# Patient Record
Sex: FEMALE | Race: WHITE | NOT HISPANIC OR LATINO | Employment: FULL TIME | URBAN - METROPOLITAN AREA
[De-identification: names, ages, dates, MRNs, and addresses within clinical notes are randomized per-mention and may not be internally consistent; named-entity substitution may affect disease eponyms.]

---

## 2017-05-23 ENCOUNTER — TELEPHONE (OUTPATIENT)
Dept: BARIATRICS | Facility: CLINIC | Age: 42
End: 2017-05-23

## 2017-05-26 NOTE — TELEPHONE ENCOUNTER
Discussed options for surgery, balloon, and medical wt loss. Her ins doesn't cover anything.  She will decide and in the meantime I told her I'd send her info to [facility] to discuss pricing so this can help her with decisions.  She was appreciative of the call.

## 2017-08-28 ENCOUNTER — INITIAL CONSULT (OUTPATIENT)
Dept: BARIATRICS | Facility: CLINIC | Age: 42
End: 2017-08-28
Payer: COMMERCIAL

## 2017-08-28 VITALS
DIASTOLIC BLOOD PRESSURE: 70 MMHG | BODY MASS INDEX: 42.35 KG/M2 | HEART RATE: 86 BPM | HEIGHT: 65 IN | WEIGHT: 254.19 LBS | SYSTOLIC BLOOD PRESSURE: 126 MMHG

## 2017-08-28 DIAGNOSIS — E66.01 MORBID OBESITY WITH BMI OF 40.0-44.9, ADULT: ICD-10-CM

## 2017-08-28 DIAGNOSIS — R73.01 IMPAIRED FASTING BLOOD SUGAR: ICD-10-CM

## 2017-08-28 PROCEDURE — 99999 PR PBB SHADOW E&M-EST. PATIENT-LVL III: CPT | Mod: PBBFAC,,, | Performed by: INTERNAL MEDICINE

## 2017-08-28 PROCEDURE — 3008F BODY MASS INDEX DOCD: CPT | Mod: S$GLB,,, | Performed by: INTERNAL MEDICINE

## 2017-08-28 PROCEDURE — 99203 OFFICE O/P NEW LOW 30 MIN: CPT | Mod: S$GLB,,, | Performed by: INTERNAL MEDICINE

## 2017-08-28 RX ORDER — METHOTREXATE 2.5 MG/1
TABLET ORAL
Refills: 0 | COMMUNITY
Start: 2017-08-14 | End: 2018-09-11

## 2017-08-28 RX ORDER — DIETHYLPROPION HYDROCHLORIDE 75 MG/1
75 TABLET, EXTENDED RELEASE ORAL DAILY
Qty: 30 TABLET | Refills: 1 | Status: SHIPPED | OUTPATIENT
Start: 2017-08-28 | End: 2017-11-16

## 2017-08-28 RX ORDER — USTEKINUMAB 90 MG/ML
INJECTION, SOLUTION SUBCUTANEOUS
Refills: 0 | COMMUNITY
Start: 2017-06-13 | End: 2018-09-11

## 2017-08-28 NOTE — PROGRESS NOTES
Subjective:       Patient ID: Lachelle Engel is a 41 y.o. female.    Chief Complaint: Consult    Current attempts at weight loss: new pt to me with Patient Active Problem List:     Depression     Psoriasis     Impaired fasting blood sugar     GERD (gastroesophageal reflux disease)     Morbid obesity        No results found for: HGBA1C  Lab Results       Component                Value               Date                       LDLCALC                  89.0                07/17/2012                 CREATININE               0.7                 07/09/2012              Exercise 3 days a week . Has cut back on carbs.       Previous diet attempts:WW in past. Hard to track.     Heaviest weight: 254#    Lightest weight: 112#    Goal weight: 170#    History of medication for loss: No coverage for surgery. Phentermine- would lose a few lbs only.         Typical eating patterns: Is a teacher. Bar tends at night. Has 2 kids and boyfriend.   Breakfast: scrambled egg.     Lunch: Salad from [employer]. Weekends may skip or sushi.     Dinner: beans with meat and a salad. Pasta.     Snacks: denies    Beverages: Diet coke (2-3 /day), green tea- 0 usha or water.    Willingness to change: 8-9/10    EKG: Sinus bradycardia with sinus arrhythmia  Otherwise normal ECG  Since previous tracing Sinus arrhythmia Now present  Confirmed by fellow Jeffrey SEGURA (Unofficial Report), Mehdi ([id number]) on    BMR: 1820        Review of Systems   Constitutional: Negative for chills and fever.   Respiratory: Negative for shortness of breath.         + snores   Cardiovascular: Positive for leg swelling. Negative for chest pain.   Gastrointestinal: Negative for constipation and diarrhea.   Genitourinary: Negative for difficulty urinating and menstrual problem.        S/p ablation   Musculoskeletal: Negative for arthralgias and back pain.   Neurological: Negative for dizziness and light-headedness.   Psychiatric/Behavioral: Negative for dysphoric mood. The  "patient is not nervous/anxious.        Objective:     /70   Pulse 86   Ht 5' 5" (1.651 m)   Wt 115.3 kg (254 lb 3.1 oz)   BMI 42.30 kg/m²     Physical Exam   Constitutional: She is oriented to person, place, and time. She appears well-developed. No distress.   Morbidly obese     HENT:   Head: Normocephalic and atraumatic.   Eyes: EOM are normal. Pupils are equal, round, and reactive to light. No scleral icterus.   Neck: Normal range of motion. Neck supple. No thyromegaly present.   Cardiovascular: Normal rate and normal heart sounds.  Exam reveals no gallop and no friction rub.    No murmur heard.  Pulmonary/Chest: Effort normal and breath sounds normal. No respiratory distress. She has no wheezes.   Abdominal: Soft. Bowel sounds are normal. She exhibits no distension. There is no tenderness.   Musculoskeletal: Normal range of motion. She exhibits no edema.   Neurological: She is alert and oriented to person, place, and time. No cranial nerve deficit.   Skin: Skin is warm and dry. No erythema.   Psychiatric: She has a normal mood and affect. Her behavior is normal. Judgment normal.   Vitals reviewed.      Assessment:       1. Impaired fasting blood sugar    2. Morbid obesity with BMI of 40.0-44.9, adult        Plan:           1. Impaired fasting blood sugar  Discussed decreasing the risks of diabetes with changes in diet, routine exercise and losing weight.  Continue metformin.      2. Morbid obesity with BMI of 40.0-44.9, adult    Patient warned of common side effects of diethylpropion including anxiety, insomnia, palpitations and increased blood pressure. It was also explained that it is for short-term usage along with diet and exercise, and that stopping the medication without making lifestyle changes will result in regain of weight. Patient states understanding.    Weight loss medications are controlled substances.  They require routine follow up. Prescription or pills that are lost or destroyed will " not be replaced.       3 meals a day made up of the following:  Unlimited green vegetables, tomatoes, mushrooms, spaghetti squash, cauliflower, meat, poultry, seafood, eggs and hard cheeses.   Milk and plain yogurt  Dressings, seasonings, condiments, etc should have less than 2 g sugars.   beans or nuts can have 1 x a day.   1-2 servings of citrus fruits, berries, pineapple or melon a day (1/2 cup)  Avoid fried foods    No grains, rice, pasta, potatoes, bread, corn, peas, oatmeal, grits, tortillas, crackers, chips    No soda, sweet tea, juices or lemonade    [email or web address].[email or web address] for recipes.

## 2017-08-28 NOTE — PATIENT INSTRUCTIONS
Patient warned of common side effects of diethylpropion including anxiety, insomnia, palpitations and increased blood pressure. It was also explained that it is for short-term usage along with diet and exercise, and that stopping the medication without making lifestyle changes will result in regain of weight. Patient states understanding.    Weight loss medications are controlled substances.  They require routine follow up. Prescription or pills that are lost or destroyed will not be replaced.       3 meals a day made up of the following:  Unlimited green vegetables, tomatoes, mushrooms, spaghetti squash, cauliflower, meat, poultry, seafood, eggs and hard cheeses.   Milk and plain yogurt  Dressings, seasonings, condiments, etc should have less than 2 g sugars.   beans or nuts can have 1 x a day.   1-2 servings of citrus fruits, berries, pineapple or melon a day (1/2 cup)  Avoid fried foods    No grains, rice, pasta, potatoes, bread, corn, peas, oatmeal, grits, tortillas, crackers, chips    No soda, sweet tea, juices or lemonade    [email or web address].[email or web address] for recipes.    Meal Ideas for Regular Bariatric Diet  *Recipes and products available at www.bariatriceating.com      Breakfast: (15-20g protein)    - Egg white omelet: 2 egg whites or ½ cup Egg Beaters. (Optional proteins: cheese, shrimp, black beans, chicken, sliced turkey) (Optional veggies: tomatoes, salsa, spinach, mushrooms, onions, green peppers, or small slice avocado)     - Egg and sausage: 1 egg or ¼ cup Egg Beaters (any variety), with 1 nory or 2 links of Turkey sausage or Veggie breakfast sausage ("[employer] or Star.me)    - Crust-less breakfast quiche: To make a glass pie dish, mix 4oz part skim Ricotta, 1 cup skim milk, and 2 eggs as your base. Add protein: shredded cheese, sliced lean ham or turkey, turkey bal/sausage. Add veggies: tomato, onion, green onion, mushroom, green pepper, spinach, etc.    - Yogurt parfait: Mix 1 - 6oz container Dannon  Light N Fit vanilla yogurt, with ¼ cup Kashi Go Lean cereal    - Cottage cheese and fruit: ½ cup part-skim cottage cheese or ricotta cheese topped with fresh fruit or sugar free preserves     - Trixie Faria's Vanilla Egg custard* (add 2 Tbsp instant coffee granules to make Cappuccino Custard*)    - Hi-Protein café latte (skim milk, decaf coffee, 1 scoop protein powder). Optional to add Sugar free syrup or extract flavoring.    Lunch: (20-30g protein)    - ½ cup Black bean soup (Homemade or Progresso), with ¼ cup shredded low-fat cheese. Top with chopped tomato or fresh salsa.     - Lean deli turkey breast and low-fat sliced cheese, mustard or light andre to moisten, rolled up together, or wrapped in a Samuel lettuce leaf    - Chicken salad made from dinner leftovers, moisten with low-fat salad dressing or light andre. Also try leftover salmon, shrimp, tuna or boiled eggs. Serve ½ cup over dark green salad    - Fat-free canned refried beans, topped with ¼ cup shredded low-fat cheese. Top with chopped tomato or fresh salsa.     - Greek salad: Top mixed greens with 1-2oz grilled chicken, tomatoes, red onions, 2-3 kalamata olives, and sprinkle lightly with feta cheese. Spritz with Balsamic vinegar to taste.     - Crust-less lunch quiche: To make a glass pie dish, mix 4oz part skim Ricotta, 1 cup skim milk, and 2 eggs as your base. Add protein: shredded cheese, sliced lean ham or turkey, shrimp, chicken. Add veggies: tomato, onion, green onion, mushroom, green pepper, spinach, artichoke, broccoli, etc.    - Pizza bake: tomato sauce, low-fat shredded mozzarella and turkey pepperoni or [place] bal. Add any veggies.    - Cucumber crab bites: Spread ¼ cup crab dip (lump crabmeat + light cream cheese and green onions) over sliced cucumber.     - Chicken with light spinach and artichoke dip*: Puree in : 6oz cooked and drained spinach, 2 cloves garlic, 1 can cannelloni beans, ½ cup chopped green onions, 1 can  drained artichoke hearts (not marinated in oil), lemon juice and basil. Mix in 2oz chopped up chicken.    Supper: (20-30g protein)    - Serve grilled fish over dark green salad tossed with low-fat dressing, served with grilled asparagus colbert     - Rotisserie chicken salad: served with sliced strawberries, walnuts, fat-free feta cheese crumbles and 1 tbsp Kellys Own Light Raspberry [place] Vinaigrette    - Shrimp cocktail: Dip cold boiled shrimp in homemade low-sugar cocktail sauce (1/2 cup True One Carb ketchup, 2 tbsp horseradish, 1/4 tsp hot sauce, 1 tsp Worcestershire sauce, 1 tbsp freshly-squeezed lemon juice). Serve with dark green salad, walnuts, and crumbled blue cheese drizzled with olive oil and Balsamic vinegar    - Tuna Melt: Spread tuna salad onto 2 thick slices of tomato. Top with low-fat cheese and broil until cheese is melted. May also be made with chicken salad of shrimp salad. [place] with different types of cheeses.    - Homemade low-fat Chili using extra lean ground beef or ground turkey. Top with shredded cheese and salsa as desired. May add dollop fat-free sour cream if desired    - Dinner Omelet with shrimp or chicken and onion, green peppers and chives.    - No noodle lasagna: Use sliced zucchini or eggplant in place of noodles.  Layer with part skim ricotta cheese and low sugar meat sauce (use very lean ground beef or ground turkey).    - Mexican chicken bake: Bake chunks of chicken breast or thigh with taco seasoning, Pace brand enchilada sauce, green onions and low-fat cheese. Serve with ¼ cup black beans or fat free refried beans topped with chopped tomatoes or salsa.    - Linda frozen meatballs, simmered in Classico Marinara sauce. Different flavors of salsa or spaghetti sauce create different dishes! Sprinkle with parmesan cheese. Serve with grilled or steamed veggies, or a dark green salad.    - Simmer boneless skinless chicken thigh chunks in Classico Marinara sauce or  roasted salsa until tender with chopped onion, bell pepper, garlic, mushrooms, spinach, etc.     - Hamburger, without the bun, dressed the way you like. Served with grilled or steamed veggies.    - Eggplant parmesan: Bake slices of eggplant at 350 degrees for 15 minutes. Layer tomato sauce, sliced eggplant and low-fat mozzarella cheese in a baking dish and cover with foil. Bake 30-40 more minutes or until bubbly. Uncover and bake at 400 degrees for about 15 more minutes, or until top is slightly crisp.    - Fish tacos: grilled/baked white fish, wrapped in Samuel lettuce leaf, topped with salsa, shredded low-fat cheese, and light coleslaw.    Snacks: (100-200 calories; >5g protein)    - 1 low-fat cheese stick with 8 cherry tomatoes or 1 serving fresh fruit  - 4 thin slices fat-free turkey breast and 1 slice low-fat cheese  - 4 thin slices fat-free honey ham with wedge of melon  - 1/4 cup unsalted nuts with ½ cup fruit  - 6-oz container Dannon Light n Fit vanilla yogurt, topped with 1oz unsalted nuts         - apple, celery or baby carrots spread with 2 Tbsp natural peanut butter or almond butter   - apple slices with 1 oz slice low-fat cheese  - celery, cucumber, bell pepper or baby carrots dipped in ¼ cup hummus bean spread or light spinach and artichoke dip (*recipe in lunch section)  - 100 calorie bag microwave light popcorn with 3 tbsp grated parmesan cheese  - Yonny Links Beef Steak - 14g protein! (similar to beef jerky)  - 2 wedges [place] - Light Herb & Garlic Cheese with sliced cucumber or green bell pepper  - 1/2 cup low-fat cottage cheese with ¼ cup fruit or ¼ cup salsa  - RTD Protein drinks: Atkins, Low Carb Slim Fast, EAS light, Muscle Milk Light, etc.  - Homemade Protein drinks: [employer]NC Soy95, Isopure, Nectar, UNJURY, Whey Gourmet, etc. Mix 1 scoop powder with 8oz skim/1% milk or light soymilk.  - Protein bars: Atkins, EAS, Pure Protein, Think Thin, Detour, etc. Must have 0-4 grams sugar - Read the  label.    Takeout Options: No more than twice/week  Deli - Salads (no pasta or rice), meats, cheeses. Roasted chicken. Lox (salmon)    Mexican - Platters which don't include tortillas, chips, or rice. Go easy on the beans. Example: Fajitas without the tortillas. Ask the  not to bring chips to the table if they are too tempting.    Greek - Meat or fish and vegetable, but no bread or rice. Including hummus, baba ganoush, etc, is OK. Most sit-down Greek restaurants can provide you with cucumber slices for dipping instead of scottie bread.    Fast Food (Avoid as much as possible) - Salads (no croutons and limit salad dressing to 2 tbsp), grilled chicken sandwich without the bun and ask for no andre. Analilias low fat chili or Taco Bell pintos and cheese.    BBQ - The meats are fine if you ask for sauces on the side, but most of the traditional side dishes are loaded with carbs. Portillo slaw, baked beans and BBQ sauce are typically made with sugar.    Chinese - Nothing deep-fried, no rice or noodles. Many Chinese sauces have starch and sugar in them, so you'll have to use your judgement. If you find that these sauces trigger cravings, or cause Dumping, you can ask for the sauce to be made without sugar or just use soy sauce.      Fruits and Vegetables       Include 1-2 servings of fruit daily.      1 serving of fruit includes ½ cup unsweetened applesauce, ½ medium banana, tennis ball size piece of fruit, 17 grapes, 1 cup melon, 1 cup strawberries, ¼ cup dried fruit     Include 2-3 servings of vegetables daily. 1 serving is 1 cup raw or ½ cup cooked.     Non-starchy vegetables include artichoke, asparagus, baby corn, bamboo shoots, beans: green/Italian/wax, bean sprouts, beets, broccoli, [place] sprouts, cabbage, carrots, cauliflower, celery, cucumber, eggplant, green onions or scallions, greens, jicama, leeks, mushrooms, okra, onions, pea pods, peppers, radishes, spinach, summer squash, tomatoes and salsa, turnips,  vegetable juice cocktail, water chestnuts, zucchini

## 2017-11-15 ENCOUNTER — TELEPHONE (OUTPATIENT)
Dept: BARIATRICS | Facility: CLINIC | Age: 42
End: 2017-11-15

## 2017-11-16 ENCOUNTER — OFFICE VISIT (OUTPATIENT)
Dept: BARIATRICS | Facility: CLINIC | Age: 42
End: 2017-11-16
Payer: COMMERCIAL

## 2017-11-16 VITALS
BODY MASS INDEX: 40.52 KG/M2 | SYSTOLIC BLOOD PRESSURE: 110 MMHG | WEIGHT: 243.19 LBS | HEART RATE: 70 BPM | DIASTOLIC BLOOD PRESSURE: 70 MMHG | HEIGHT: 65 IN

## 2017-11-16 DIAGNOSIS — R73.01 IMPAIRED FASTING BLOOD SUGAR: ICD-10-CM

## 2017-11-16 DIAGNOSIS — E66.01 MORBID OBESITY WITH BMI OF 40.0-44.9, ADULT: Primary | ICD-10-CM

## 2017-11-16 PROCEDURE — 99999 PR PBB SHADOW E&M-EST. PATIENT-LVL III: CPT | Mod: PBBFAC,,, | Performed by: INTERNAL MEDICINE

## 2017-11-16 PROCEDURE — 99213 OFFICE O/P EST LOW 20 MIN: CPT | Mod: S$GLB,,, | Performed by: INTERNAL MEDICINE

## 2017-11-16 RX ORDER — TOPIRAMATE 50 MG/1
50 CAPSULE, EXTENDED RELEASE ORAL DAILY
Qty: 30 CAPSULE | Refills: 2 | Status: SHIPPED | OUTPATIENT
Start: 2017-11-16 | End: 2018-02-02 | Stop reason: SDUPTHER

## 2017-11-16 NOTE — PATIENT INSTRUCTIONS
Patient was informed that topiramate is used for migraine prevention and seizures. Weight loss is a common side effect that is well documented. She understands this. She was informed of the potential side effects such as serious and possibly fatal rash in which case the medication should be discontinued immediately. Paresthesias, forgetfulness, fatigue, kidney stones, GI symptoms, and changes in lab values such as electrolytes, blood counts and kidney function.    3 meals a day made up of the following:  Unlimited green vegetables, tomatoes, mushrooms, spaghetti squash, cauliflower, meat, poultry, seafood, eggs and hard cheeses.   Milk and plain yogurt  Dressings, seasonings, condiments, etc should have less than 2 g sugars.   beans or nuts can have 1 x a day.   1-2 servings of citrus fruits, berries, pineapple or melon a day (1/2 cup)  Avoid fried foods    No grains, rice, pasta, potatoes, bread, corn, peas, oatmeal, grits, tortillas, crackers, chips    No soda, sweet tea, juices or lemonade    Www.dietdoctor.[employer] for recipes.    Helpful tips to survive the holidays:  - Dont save yourself for the meal: Make sure you continue to eat high protein small meals every 3-4 hours to ensure to do not become over-hungry. Avoid temptation by not showing up to a holiday party or gathering hungry.   - Plan ahead. Bring a dish to a party if you know there may not be an appropriate option.   - Choose sugar-free drinks: Stick to water or other sugar-free beverages and make sure you are getting 6-8 cups of fluid each day (but not with meals!). Avoid alcohol, carbonated beverages, and high-fat/high-sugar beverages like hot chocolate and eggnog. Try sugar-free hot cocoa made with skim milk or water, or sugar-free spiced tea to add some holiday flair to your beverage (see sugar-free mulled cider recipe below)  - Take your time: Eat mindfully. Dont graze on food throughout the day. Sit down to enjoy your small meals. Chew slowly and  thoroughly. Cut your food into small pieces before eating.  - Listen to your body: Stop eating as soon as you feel full. Do not feel pressured to try certain (or all) foods or to eat all of the food on your plate. Listen to your hunger cues.   - REMEMBER: Make your holidays about spending time with family and friends instead of focusing gatherings around food.  - Keep up your exercise routine: Make sure you continue to get regular exercise throughout the holiday season. Encourage friends and family to be active by taking a walk together after a meal, to look at holiday lights, or to window-shop.    Good Holiday Meal Options:  - Roasted Turkey, NO skin. Use low sodium broth instead of gravy.   - Stuffed Bell Peppers made WITHOUT bread crumbs or Rice. Try using parmesan cheese instead  - Gumbo, NO rice. Try picking out mostly the meat/seafood and vegetables with little broth.   - Green Bean Casserole made with 98% fat free cream of mushroom soup and crushed almonds/pecans instead of fried onions  - Side salad w/ low fat dressing. Try a different kind of salad maybe use Kale or spinach.   - Roasted non-starchy vegetables like brussel sprouts, broccoli, green beans, zucchini, butternut squash, cauliflower  - Cauliflower Mash (steam or roast cauliflower, puree w/ low fat cheese, dash of fat free milk and 2-3 sprays of I cant believe its not butter spray. Add garlic powder and black pepper to season). Use Low sodium broth instead of gravy.   - Try Loaded Cauliflower Mash (Make cauliflower like above cauliflower mash. Top with diced turkey bal, ¼ cup low fat cheddar cheese and bake @ 350* F for 5-10 minutes, until cheese is melted. Top with minced chives, black pepper and garlic to taste).   - Homemade cranberry sauce using Splenda or another alternative sweetener. Boil fresh cranberries and add splenda to taste. Boil until cranberries break open and then simmer until it reaches the consistency you want (less time for  more watery sauce and simmer for longer to create a thicker sauce).   - Deviled eggs: make using low fat andre, mustard, DILL relish (not sweet relish).   - Vegetable tray w/ Greek yogurt Ranch Dip. Mix 1 packet of [place] ranch dip mix w/ 16 oz low fat plain greek yogurt.     Good Holiday Dessert Options:  - High protein Pumpkin Cheesecake (see recipe below)  - Pumpkin Whip (see recipe below)  - Quest Apple Pie or Cinnamon Roll flavored protein bar (warm in microwave for 10-15 seconds)  - Eggnog Protein shake (see recipe below)  - Fresh fruit w/ low fat cheese  - Sugar-free Jello Parfaits. Layer Red and Green sugar-free jello in cups and top w/ 2 tbsp Sugar-free cool-whip    Pumpkin Cheesecake    8 ounces fat free cream cheese, softened   2 scoops of vanilla protein powder (<4 g sugar per serving)   ¼ tsp Fine salt   2 eggs, at room temperature   1/3 cup fat free sour cream  1/3 cup fat free half and half  1 15 -ounce can pure pumpkin puree   1 tablespoon pumpkin pie spice, plus more for dusting   Unsalted nuts, crushed  *Add splenda to taste    Directions     1. Preheat the oven to 300 degrees F. Line 18 muffin cups with paper liners. Sprinkle 1 tsp crushed unsalted nuts at the bottom of each of muffin cup liner.     2. In a large bowl, beat the cream cheese, vanilla protein powder and 1/4 teaspoon fine salt on medium-high speed until smooth and creamy, 2 to 3 minutes. Scrape down the sides, reduce speed to low and beat in the eggs, 1 at a time, until combined. Beat in 1/3 cup fat free sour cream and fat free half and half. Stir in the pumpkin puree and pumpkin pie spice until smooth. Divide evenly among cookie-lined paper cups, filling almost all the way to the top.     3. Bake until the filling is just set, 40 to 45 minutes. A sharp knife inserted into the center will come out moist, but clean. Cool completely in tins on a wire rack. Refrigerate until cold, 4 hours, or overnight. Top with a dusting of  pumpkin pie spice.    Recipe altered from the following recipe: http://www.[employer]/recipes/food-network-vinnie/mini-pumpkin-cheesecakes-recipe.print.html?oc=chinmay    Pumpkin Whip    Box of sugar-free vanilla pudding  Can of pumpkin puree  Pumpkin Pie spice (sprinkle to taste)  ½ cup of sugar-free Cool Whip    Directions:  Make sugar-free pudding according to package directions using fat free or 1% milk. Stir in pumpkin and cool whip. Add pumpkin pie spice to taste.     Egg Nog Protein shake    8 oz skim or 1% milk  1 scoop vanilla protein powder  1 tbsp sugar-free vanilla pudding mix  ½ tsp butter flavor extract  ½ tsp rum extract  ½ tsp cinnamon     Shake together or blend with ice and serve.     Sugar-Free Mulled Cider    3 oz diet cran-apple juice  6 oz water  1 packet sugar-free apple cider mix  ½ tsp apple pie spice  ½ tsp butter flavor extract  1 tbsp Sugar-free Syrup    Mix together. Warm if needed and serve w/ orange wedge and cinnamon stick.     Eating well to be healthy and lose weight does not have to be hard. It also does not have to be time consuming or expensive. There a lots of ways you can work in healthy choices into your day. Many of these are easy, quick and even family friendly!    Homemade hazelnut au lait  Brew your favorite brand of hazelnut flavored coffee ([employer] makes a good one). Microwave 1/2 cup of milk that fits your eating plan (whole, skim or sugar-free almond milk can all work). Add half to 1 oz sugar free hazelnut syrup.     Quick and easy breakfast  1-2 boiled eggs or mini-frittatas with a tangerine. The boiled eggs and mini-frittatas can both be made ahead and last for up to 4 days in the refrigerator. Bonus if you portion them out in ready to go containers or zipper bags.     Breakfast Egg Muffins with Mccarthy and Spinach  Makes 12 muffins  Ingredients    6 eggs  ¼ cup milk  ¼ teaspoon salt  2 cups grated cheddar cheese  3/4 cup spinach, cooked and drained (about 8  oz fresh spinach)  6 bal slices, cooked, drained of fat, and chopped  1/2 cup grated Parmesan cheese (optional)    Instructions      Preheat oven to 350 degrees. Use a regular 12-cup muffin pan. Spray the muffin pan with non-stick cooking spray.  In a large bowl, beat eggs until smooth. Add milk, salt, Cheddar cheese and mix. Stir spinach, cooked bal into the egg mixture. Ladle the egg mixture into greased muffin cups ¾ full.  Top each muffin cup with grated Parmesan cheese.  Bake for 25 minutes. Remove from the oven, let the muffins cool for 30 minutes before removing them from the pan.      Be a brown bagger! When you make dinner, plan for an extra helping. When you serve your plate for dinner, serve an additional helping into a container that you can take with you the next day. If you don't have a refrigerator available during the day, an insulated lunch bag and ice packs will help you safely store you lunch.     Cold Brewed Iced tea. Fill a pitcher with 64 oz filtered water. Add either 4 regular tea bags of your choice or a large iced tea bag. Refrigerate over night then remove the tea bags. The tea will not be bitter and is super flavorful. Get creative! Try combinations like green tea and hibiscus tea or black tea with lemon zinger. Add orange or lemon slices for even more flavor.     Snack wisely. Protein filled snacks will fill you up, allowing you to get by with fewer calories. String cheese, pork skins (chicharrones), turkey pepperoni, or celery with cream cheese will all fit the bill.       Ditch the take out. Turkey tacos (with or without a low carb tortilla), burgers (without the bun), or fun stir fries are all quick and easy. The whole family will be happy, and you can save calories and money.      Orange Chicken Stir burton with asparagus   Makes 6 servings  Ingredients:    1.5 lbs boneless skinless chicken breast/tenders, diced into 1-inch pieces  1 Tbsp extra virgin olive or avocado oil, divided  2  lb asparagus, end portions trimmed and remainder diced into 1 1/2-inch pieces  1 small yellow onion, sliced into thin strips  8 oz button mushrooms, sliced  1 Tbsp peeled and finely grated fresh yoli  4 cloves garlic, minced  1/2 cup low-sodium chicken broth  Juice of 2 fresh oranges  2 Tbsp low sodium soy sauce  2 Tbsp cornstarch  Sea salt and freshly ground black pepper    Directions:    In a 12-inch non-stick wok, heat 1/2 oil over moderately high heat. Once oil is hot, add diced chicken and season lightly with salt and pepper. Sauté until cooked through, tossing occasionally, about 5-6 minutes.  Place chicken on a large plate and set aside. Return wok, reduce to medium-high heat, add remaining oil.  Once oil is hot, add asparagus, yellow onion and mushrooms, and sauté until tender-crisp, about 4 - 5 minutes, adding in garlic and yoli during the last 1 minute of sautéing.  Meanwhile, in a mixing bowl whisk together chicken broth, orange juice, soy sauce and cornstarch until well blended.  Pour chicken broth mixture into skillet with veggies, season with salt and pepper to taste, and bring mixture to a light boil, stirring constantly. Allow mixture to gently boil, stirring constantly, until thickened, about 1 minute.  Toss chicken into mixture and serve immediately over cauliflower rice or Shirataki noodles.      Skinny Chicken Tortilla Soup  Makes 7 servings    2 teaspoons olive oil  1 cup onion, chopped (about 1 small)  2 cups celery, sliced (about 4 medium stalks)  4 garlic cloves, minced  4 medium tomatoes, chopped  2 cups water  4 cups low-sodium organic chicken broth  3 cups chopped and/or shredded rotisserie chicken, skinless  2 cups sliced carrots (about 3 medium)  1 teaspoon dried oregano leaves  2 teaspoons chili powder  1 teaspoon garlic powder  2 teaspoons cumin  ½ teaspoon cayenne pepper (add less or omit, if you don't want a spicy soup)  ½ teaspoon sea salt + more to taste  ½ teaspoon pepper +  more to taste    Directions:   Put all ingredients into a large crock pot. Cook on low for 5-6 hours.     Optional garnish with chopped avocado, chopped fresh cilantro, crumbled Cotija cheese, sour cream, Greek yogurt, your favorite hot sauce.           Vegan Avocado Banana Chocolate Pudding  Makes 4 servings  Ingredients    1 1/2 ripe avocados  2 ripe bananas  6 tbsp raw cacao powder or unsweetened cocoa powder  2-3 tbsp maple syrup (or calorie free sweetener)  1/4 cup almond milk  Instructions    Blend everything together in a  until the consistency is smooth and velvety. Taste and see if more sweetener is needed and stir to make sure everything is evenly mixed. Blend a second time if needed.  Top with banana slices, raw cacao nibs, almond butter, or any other toppings and enjoy!

## 2017-11-24 ENCOUNTER — TELEPHONE (OUTPATIENT)
Dept: BARIATRICS | Facility: CLINIC | Age: 42
End: 2017-11-24

## 2017-11-24 NOTE — TELEPHONE ENCOUNTER
----- Message from Jessica De La Cruz sent at 11/24/2017  8:28 AM CST -----  [phone]//pt states that she needs to speak with nurse in ref to her meds causing her to be sleepy and needs to know what can be done//please call//thank you

## 2017-11-24 NOTE — TELEPHONE ENCOUNTER
Patient said that is her only side effect she is feeling which is tired. She has no entergy. She only been taking it a week. She do not know if she is giving it enough time.

## 2017-11-26 ENCOUNTER — ANESTHESIA EVENT (OUTPATIENT)
Dept: SURGERY | Facility: HOSPITAL | Age: 42
End: 2017-11-26
Payer: COMMERCIAL

## 2017-11-26 ENCOUNTER — ANESTHESIA (OUTPATIENT)
Dept: SURGERY | Facility: HOSPITAL | Age: 42
End: 2017-11-26
Payer: COMMERCIAL

## 2017-11-26 ENCOUNTER — HOSPITAL ENCOUNTER (OUTPATIENT)
Facility: HOSPITAL | Age: 42
Discharge: HOME | End: 2017-11-27
Attending: EMERGENCY MEDICINE | Admitting: SURGERY
Payer: COMMERCIAL

## 2017-11-26 DIAGNOSIS — K35.80 ACUTE APPENDICITIS, UNSPECIFIED ACUTE APPENDICITIS TYPE: Primary | ICD-10-CM

## 2017-11-26 DIAGNOSIS — K35.80 ACUTE APPENDICITIS: ICD-10-CM

## 2017-11-26 LAB
ALBUMIN SERPL BCP-MCNC: 3.8 G/DL
ALP SERPL-CCNC: 83 U/L
ALT SERPL W/O P-5'-P-CCNC: 38 U/L
ANION GAP SERPL CALC-SCNC: 7 MMOL/L
AST SERPL-CCNC: 26 U/L
B-HCG UR QL: NEGATIVE
BACTERIA #/AREA URNS AUTO: NORMAL /HPF
BASOPHILS # BLD AUTO: 0.01 K/UL
BASOPHILS NFR BLD: 0.1 %
BILIRUB SERPL-MCNC: 0.4 MG/DL
BILIRUB UR QL STRIP: NEGATIVE
BUN SERPL-MCNC: 11 MG/DL
CALCIUM SERPL-MCNC: 8.8 MG/DL
CHLORIDE SERPL-SCNC: 112 MMOL/L
CLARITY UR REFRACT.AUTO: ABNORMAL
CO2 SERPL-SCNC: 20 MMOL/L
COLOR UR AUTO: YELLOW
CREAT SERPL-MCNC: 0.9 MG/DL
CTP QC/QA: YES
DIFFERENTIAL METHOD: ABNORMAL
EOSINOPHIL # BLD AUTO: 0 K/UL
EOSINOPHIL NFR BLD: 0 %
ERYTHROCYTE [DISTWIDTH] IN BLOOD BY AUTOMATED COUNT: 13.2 %
EST. GFR  (AFRICAN AMERICAN): >60 ML/MIN/1.73 M^2
EST. GFR  (NON AFRICAN AMERICAN): >60 ML/MIN/1.73 M^2
GLUCOSE SERPL-MCNC: 83 MG/DL
GLUCOSE UR QL STRIP: NEGATIVE
HCT VFR BLD AUTO: 41.6 %
HGB BLD-MCNC: 14.4 G/DL
HGB UR QL STRIP: ABNORMAL
IMM GRANULOCYTES # BLD AUTO: 0.06 K/UL
IMM GRANULOCYTES NFR BLD AUTO: 0.6 %
KETONES UR QL STRIP: NEGATIVE
LEUKOCYTE ESTERASE UR QL STRIP: NEGATIVE
LIPASE SERPL-CCNC: 7 U/L
LYMPHOCYTES # BLD AUTO: 1.3 K/UL
LYMPHOCYTES NFR BLD: 12.5 %
MCH RBC QN AUTO: 31.1 PG
MCHC RBC AUTO-ENTMCNC: 34.6 G/DL
MCV RBC AUTO: 90 FL
MICROSCOPIC COMMENT: NORMAL
MONOCYTES # BLD AUTO: 0.4 K/UL
MONOCYTES NFR BLD: 3.7 %
NEUTROPHILS # BLD AUTO: 8.7 K/UL
NEUTROPHILS NFR BLD: 83.1 %
NITRITE UR QL STRIP: NEGATIVE
NRBC BLD-RTO: 0 /100 WBC
PH UR STRIP: 5 [PH] (ref 5–8)
PLATELET # BLD AUTO: 193 K/UL
PMV BLD AUTO: 11.3 FL
POTASSIUM SERPL-SCNC: 4 MMOL/L
PROT SERPL-MCNC: 6.6 G/DL
PROT UR QL STRIP: NEGATIVE
RBC # BLD AUTO: 4.63 M/UL
RBC #/AREA URNS AUTO: 2 /HPF (ref 0–4)
SODIUM SERPL-SCNC: 139 MMOL/L
SP GR UR STRIP: 1.01 (ref 1–1.03)
SQUAMOUS #/AREA URNS AUTO: 6 /HPF
URN SPEC COLLECT METH UR: ABNORMAL
UROBILINOGEN UR STRIP-ACNC: NEGATIVE EU/DL
WBC # BLD AUTO: 10.51 K/UL
WBC #/AREA URNS AUTO: 0 /HPF (ref 0–5)

## 2017-11-26 PROCEDURE — G0378 HOSPITAL OBSERVATION PER HR: HCPCS

## 2017-11-26 PROCEDURE — 25000003 PHARM REV CODE 250: Performed by: SURGERY

## 2017-11-26 PROCEDURE — 88304 TISSUE EXAM BY PATHOLOGIST: CPT | Mod: 26,,, | Performed by: PATHOLOGY

## 2017-11-26 PROCEDURE — S0030 INJECTION, METRONIDAZOLE: HCPCS | Performed by: SURGERY

## 2017-11-26 PROCEDURE — 25000003 PHARM REV CODE 250: Performed by: NURSE ANESTHETIST, CERTIFIED REGISTERED

## 2017-11-26 PROCEDURE — 63600175 PHARM REV CODE 636 W HCPCS: Performed by: SURGERY

## 2017-11-26 PROCEDURE — 25000003 PHARM REV CODE 250: Performed by: PHYSICIAN ASSISTANT

## 2017-11-26 PROCEDURE — 27201423 OPTIME MED/SURG SUP & DEVICES STERILE SUPPLY: Performed by: SURGERY

## 2017-11-26 PROCEDURE — 44970 LAPAROSCOPY APPENDECTOMY: CPT | Mod: ,,, | Performed by: SURGERY

## 2017-11-26 PROCEDURE — 63600175 PHARM REV CODE 636 W HCPCS: Performed by: NURSE ANESTHETIST, CERTIFIED REGISTERED

## 2017-11-26 PROCEDURE — D9220A PRA ANESTHESIA: Mod: CRNA,,, | Performed by: NURSE ANESTHETIST, CERTIFIED REGISTERED

## 2017-11-26 PROCEDURE — 37000008 HC ANESTHESIA 1ST 15 MINUTES: Performed by: SURGERY

## 2017-11-26 PROCEDURE — 81001 URINALYSIS AUTO W/SCOPE: CPT

## 2017-11-26 PROCEDURE — 36000709 HC OR TIME LEV III EA ADD 15 MIN: Performed by: SURGERY

## 2017-11-26 PROCEDURE — 94761 N-INVAS EAR/PLS OXIMETRY MLT: CPT

## 2017-11-26 PROCEDURE — 85025 COMPLETE CBC W/AUTO DIFF WBC: CPT

## 2017-11-26 PROCEDURE — 63600175 PHARM REV CODE 636 W HCPCS: Performed by: PHYSICIAN ASSISTANT

## 2017-11-26 PROCEDURE — 36000708 HC OR TIME LEV III 1ST 15 MIN: Performed by: SURGERY

## 2017-11-26 PROCEDURE — D9220A PRA ANESTHESIA: Mod: ANES,,, | Performed by: ANESTHESIOLOGY

## 2017-11-26 PROCEDURE — 80053 COMPREHEN METABOLIC PANEL: CPT

## 2017-11-26 PROCEDURE — 88304 TISSUE EXAM BY PATHOLOGIST: CPT | Performed by: PATHOLOGY

## 2017-11-26 PROCEDURE — 96375 TX/PRO/DX INJ NEW DRUG ADDON: CPT

## 2017-11-26 PROCEDURE — 99285 EMERGENCY DEPT VISIT HI MDM: CPT | Mod: ,,, | Performed by: PHYSICIAN ASSISTANT

## 2017-11-26 PROCEDURE — 81025 URINE PREGNANCY TEST: CPT | Performed by: EMERGENCY MEDICINE

## 2017-11-26 PROCEDURE — 37000009 HC ANESTHESIA EA ADD 15 MINS: Performed by: SURGERY

## 2017-11-26 PROCEDURE — 99285 EMERGENCY DEPT VISIT HI MDM: CPT | Mod: 25

## 2017-11-26 PROCEDURE — 25500020 PHARM REV CODE 255: Performed by: EMERGENCY MEDICINE

## 2017-11-26 PROCEDURE — 96374 THER/PROPH/DIAG INJ IV PUSH: CPT

## 2017-11-26 PROCEDURE — 83690 ASSAY OF LIPASE: CPT

## 2017-11-26 PROCEDURE — 71000039 HC RECOVERY, EACH ADD'L HOUR: Performed by: SURGERY

## 2017-11-26 PROCEDURE — 96361 HYDRATE IV INFUSION ADD-ON: CPT

## 2017-11-26 PROCEDURE — 71000033 HC RECOVERY, INTIAL HOUR: Performed by: SURGERY

## 2017-11-26 RX ORDER — NEOSTIGMINE METHYLSULFATE 1 MG/ML
INJECTION, SOLUTION INTRAVENOUS
Status: DISCONTINUED | OUTPATIENT
Start: 2017-11-26 | End: 2017-11-26

## 2017-11-26 RX ORDER — ONDANSETRON 8 MG/1
8 TABLET, ORALLY DISINTEGRATING ORAL EVERY 8 HOURS PRN
Status: DISCONTINUED | OUTPATIENT
Start: 2017-11-26 | End: 2017-11-27 | Stop reason: HOSPADM

## 2017-11-26 RX ORDER — RAMELTEON 8 MG/1
8 TABLET ORAL NIGHTLY PRN
Status: DISCONTINUED | OUTPATIENT
Start: 2017-11-26 | End: 2017-11-27 | Stop reason: HOSPADM

## 2017-11-26 RX ORDER — ONDANSETRON 2 MG/ML
INJECTION INTRAMUSCULAR; INTRAVENOUS
Status: DISCONTINUED | OUTPATIENT
Start: 2017-11-26 | End: 2017-11-26

## 2017-11-26 RX ORDER — ACETAMINOPHEN 325 MG/1
650 TABLET ORAL EVERY 8 HOURS PRN
Status: DISCONTINUED | OUTPATIENT
Start: 2017-11-26 | End: 2017-11-27 | Stop reason: HOSPADM

## 2017-11-26 RX ORDER — DIPHENHYDRAMINE HYDROCHLORIDE 50 MG/ML
25 INJECTION INTRAMUSCULAR; INTRAVENOUS EVERY 4 HOURS PRN
Status: DISCONTINUED | OUTPATIENT
Start: 2017-11-26 | End: 2017-11-27 | Stop reason: HOSPADM

## 2017-11-26 RX ORDER — SODIUM CHLORIDE 9 MG/ML
INJECTION, SOLUTION INTRAVENOUS CONTINUOUS
Status: DISCONTINUED | OUTPATIENT
Start: 2017-11-26 | End: 2017-11-27

## 2017-11-26 RX ORDER — LEVOTHYROXINE SODIUM 100 UG/1
100 TABLET ORAL DAILY
Status: DISCONTINUED | OUTPATIENT
Start: 2017-11-27 | End: 2017-11-27 | Stop reason: HOSPADM

## 2017-11-26 RX ORDER — OXYCODONE AND ACETAMINOPHEN 5; 325 MG/1; MG/1
1 TABLET ORAL EVERY 4 HOURS PRN
Qty: 31 TABLET | Refills: 0 | Status: SHIPPED | OUTPATIENT
Start: 2017-11-26 | End: 2017-11-27

## 2017-11-26 RX ORDER — MIDAZOLAM HYDROCHLORIDE 1 MG/ML
INJECTION, SOLUTION INTRAMUSCULAR; INTRAVENOUS
Status: DISCONTINUED | OUTPATIENT
Start: 2017-11-26 | End: 2017-11-26

## 2017-11-26 RX ORDER — PROPOFOL 10 MG/ML
VIAL (ML) INTRAVENOUS
Status: DISCONTINUED | OUTPATIENT
Start: 2017-11-26 | End: 2017-11-26

## 2017-11-26 RX ORDER — ONDANSETRON 2 MG/ML
4 INJECTION INTRAMUSCULAR; INTRAVENOUS
Status: COMPLETED | OUTPATIENT
Start: 2017-11-26 | End: 2017-11-26

## 2017-11-26 RX ORDER — OXYCODONE AND ACETAMINOPHEN 5; 325 MG/1; MG/1
1 TABLET ORAL EVERY 4 HOURS PRN
Status: DISCONTINUED | OUTPATIENT
Start: 2017-11-26 | End: 2017-11-27 | Stop reason: HOSPADM

## 2017-11-26 RX ORDER — METOCLOPRAMIDE HYDROCHLORIDE 5 MG/ML
10 INJECTION INTRAMUSCULAR; INTRAVENOUS EVERY 10 MIN PRN
Status: DISCONTINUED | OUTPATIENT
Start: 2017-11-26 | End: 2017-11-26 | Stop reason: HOSPADM

## 2017-11-26 RX ORDER — HYDROMORPHONE HYDROCHLORIDE 1 MG/ML
0.2 INJECTION, SOLUTION INTRAMUSCULAR; INTRAVENOUS; SUBCUTANEOUS EVERY 5 MIN PRN
Status: DISCONTINUED | OUTPATIENT
Start: 2017-11-26 | End: 2017-11-26 | Stop reason: HOSPADM

## 2017-11-26 RX ORDER — OXYCODONE AND ACETAMINOPHEN 10; 325 MG/1; MG/1
1 TABLET ORAL EVERY 4 HOURS PRN
Status: DISCONTINUED | OUTPATIENT
Start: 2017-11-26 | End: 2017-11-27 | Stop reason: HOSPADM

## 2017-11-26 RX ORDER — METRONIDAZOLE 500 MG/100ML
500 INJECTION, SOLUTION INTRAVENOUS
Status: DISCONTINUED | OUTPATIENT
Start: 2017-11-26 | End: 2017-11-26

## 2017-11-26 RX ORDER — SODIUM CHLORIDE 0.9 % (FLUSH) 0.9 %
3 SYRINGE (ML) INJECTION
Status: DISCONTINUED | OUTPATIENT
Start: 2017-11-26 | End: 2017-11-26 | Stop reason: HOSPADM

## 2017-11-26 RX ORDER — FENTANYL CITRATE 50 UG/ML
INJECTION, SOLUTION INTRAMUSCULAR; INTRAVENOUS
Status: DISCONTINUED | OUTPATIENT
Start: 2017-11-26 | End: 2017-11-26

## 2017-11-26 RX ORDER — DEXTROSE MONOHYDRATE, SODIUM CHLORIDE, AND POTASSIUM CHLORIDE 50; 1.49; 4.5 G/1000ML; G/1000ML; G/1000ML
INJECTION, SOLUTION INTRAVENOUS CONTINUOUS
Status: DISCONTINUED | OUTPATIENT
Start: 2017-11-26 | End: 2017-11-27

## 2017-11-26 RX ORDER — BUPIVACAINE HYDROCHLORIDE 2.5 MG/ML
INJECTION, SOLUTION EPIDURAL; INFILTRATION; INTRACAUDAL
Status: DISCONTINUED | OUTPATIENT
Start: 2017-11-26 | End: 2017-11-26

## 2017-11-26 RX ORDER — AMOXICILLIN 250 MG
1 CAPSULE ORAL DAILY
COMMUNITY
Start: 2017-11-26 | End: 2018-09-11

## 2017-11-26 RX ORDER — MORPHINE SULFATE 4 MG/ML
4 INJECTION, SOLUTION INTRAMUSCULAR; INTRAVENOUS
Status: COMPLETED | OUTPATIENT
Start: 2017-11-26 | End: 2017-11-26

## 2017-11-26 RX ORDER — LIDOCAINE HYDROCHLORIDE 10 MG/ML
INJECTION, SOLUTION EPIDURAL; INFILTRATION; INTRACAUDAL; PERINEURAL
Status: DISCONTINUED | OUTPATIENT
Start: 2017-11-26 | End: 2017-11-26

## 2017-11-26 RX ORDER — GLYCOPYRROLATE 0.2 MG/ML
INJECTION INTRAMUSCULAR; INTRAVENOUS
Status: DISCONTINUED | OUTPATIENT
Start: 2017-11-26 | End: 2017-11-26

## 2017-11-26 RX ORDER — ROCURONIUM BROMIDE 10 MG/ML
INJECTION, SOLUTION INTRAVENOUS
Status: DISCONTINUED | OUTPATIENT
Start: 2017-11-26 | End: 2017-11-26

## 2017-11-26 RX ORDER — LIDOCAINE HCL/PF 100 MG/5ML
SYRINGE (ML) INTRAVENOUS
Status: DISCONTINUED | OUTPATIENT
Start: 2017-11-26 | End: 2017-11-26

## 2017-11-26 RX ORDER — HYDROMORPHONE HYDROCHLORIDE 2 MG/ML
INJECTION, SOLUTION INTRAMUSCULAR; INTRAVENOUS; SUBCUTANEOUS
Status: DISCONTINUED | OUTPATIENT
Start: 2017-11-26 | End: 2017-11-26

## 2017-11-26 RX ADMIN — METRONIDAZOLE 500 MG: 500 INJECTION, SOLUTION INTRAVENOUS at 06:11

## 2017-11-26 RX ADMIN — FENTANYL CITRATE 50 MCG: 50 INJECTION, SOLUTION INTRAMUSCULAR; INTRAVENOUS at 06:11

## 2017-11-26 RX ADMIN — PROPOFOL 180 MG: 10 INJECTION, EMULSION INTRAVENOUS at 06:11

## 2017-11-26 RX ADMIN — HYDROMORPHONE HYDROCHLORIDE 0.4 MG: 2 INJECTION, SOLUTION INTRAMUSCULAR; INTRAVENOUS; SUBCUTANEOUS at 07:11

## 2017-11-26 RX ADMIN — DEXTROSE MONOHYDRATE, SODIUM CHLORIDE, AND POTASSIUM CHLORIDE: 50; 4.5; 1.49 INJECTION, SOLUTION INTRAVENOUS at 08:11

## 2017-11-26 RX ADMIN — SODIUM CHLORIDE, SODIUM GLUCONATE, SODIUM ACETATE, POTASSIUM CHLORIDE, MAGNESIUM CHLORIDE, SODIUM PHOSPHATE, DIBASIC, AND POTASSIUM PHOSPHATE: .53; .5; .37; .037; .03; .012; .00082 INJECTION, SOLUTION INTRAVENOUS at 06:11

## 2017-11-26 RX ADMIN — CEFTRIAXONE 2 G: 2 INJECTION, SOLUTION INTRAVENOUS at 06:11

## 2017-11-26 RX ADMIN — MIDAZOLAM HYDROCHLORIDE 2 MG: 1 INJECTION, SOLUTION INTRAMUSCULAR; INTRAVENOUS at 06:11

## 2017-11-26 RX ADMIN — GLYCOPYRROLATE 0.4 MG: 0.2 INJECTION, SOLUTION INTRAMUSCULAR; INTRAVENOUS at 07:11

## 2017-11-26 RX ADMIN — IOHEXOL 100 ML: 350 INJECTION, SOLUTION INTRAVENOUS at 03:11

## 2017-11-26 RX ADMIN — SODIUM CHLORIDE 1000 ML: 0.9 INJECTION, SOLUTION INTRAVENOUS at 01:11

## 2017-11-26 RX ADMIN — SODIUM CHLORIDE 1000 ML: 0.9 INJECTION, SOLUTION INTRAVENOUS at 08:11

## 2017-11-26 RX ADMIN — MORPHINE SULFATE 4 MG: 4 INJECTION INTRAVENOUS at 01:11

## 2017-11-26 RX ADMIN — NEOSTIGMINE METHYLSULFATE 4 MG: 1 INJECTION INTRAVENOUS at 07:11

## 2017-11-26 RX ADMIN — ROCURONIUM BROMIDE 10 MG: 10 INJECTION, SOLUTION INTRAVENOUS at 06:11

## 2017-11-26 RX ADMIN — OXYCODONE HYDROCHLORIDE AND ACETAMINOPHEN 1 TABLET: 10; 325 TABLET ORAL at 08:11

## 2017-11-26 RX ADMIN — ONDANSETRON 4 MG: 2 INJECTION INTRAMUSCULAR; INTRAVENOUS at 01:11

## 2017-11-26 RX ADMIN — ROCURONIUM BROMIDE 40 MG: 10 INJECTION, SOLUTION INTRAVENOUS at 06:11

## 2017-11-26 RX ADMIN — ONDANSETRON 4 MG: 2 INJECTION INTRAMUSCULAR; INTRAVENOUS at 07:11

## 2017-11-26 RX ADMIN — LIDOCAINE HYDROCHLORIDE 50 MG: 20 INJECTION, SOLUTION INTRAVENOUS at 06:11

## 2017-11-26 NOTE — CONSULTS
"History & Physical  Surgery      SUBJECTIVE:     Chief Complaint/Reason for Admission: acute appendicitis    History of Present Illness: Lachelle Engel is a 42 y.o. female with past medical history of thyroid disease and GERD who presents the ED with abdominal pain and nausea.  Patient states at 4 AM this morning, she was awakened by abdominal pain.  Patient states that her abdominal pain was located in her epigastric/mid abdominal area and started to localize in her right lower quadrant around 8 AM.  Currently, she complains of 8/10 "sharp" constant pain in her right lower quadrant that is worse with movement.  She has nausea.  She has not eaten since noon yesterday.  WBC wnl.  Afebrile.  CT abd/pelvis consistent with early acute appendicitis.      No current facility-administered medications on file prior to encounter.      Current Outpatient Prescriptions on File Prior to Encounter   Medication Sig    methotrexate 2.5 MG Tab TK 6 TS PO WEEKLY    levothyroxine (SYNTHROID) 100 MCG tablet Take 100 mcg by mouth once daily.    STELARA 90 mg/mL Syrg syringe BRING SYRINGE TO OFFICE TO BE ADMINISTERED    topiramate (TROKENDI XR) 50 mg Cp24 Take 50 mg by mouth once daily.    [DISCONTINUED] metformin (GLUCOPHAGE-XR) 500 MG 24 hr tablet Take 1,000 mg by mouth 2 (two) times daily.       Review of patient's allergies indicates:  No Known Allergies    Past Medical History:   Diagnosis Date    Depression     GERD (gastroesophageal reflux disease)     Impaired fasting blood sugar     Psoriasis     Thyroid disease     hyothyroidism     Past Surgical History:   Procedure Laterality Date     SECTION      CHOLECYSTECTOMY      ENDOMETRIAL ABLATION      gladder remova      INCONTINENCE SURGERY      TUBAL LIGATION       Family History   Problem Relation Age of Onset    Cancer Maternal Aunt 30     colon cancer    Multiple sclerosis Maternal Aunt      Social History   Substance Use Topics    Smoking " status: Current Some Day Smoker     Types: Cigarettes    Smokeless tobacco: Not on file      Comment: Social    Alcohol use Yes      Comment: Social        Review of Systems   Constitutional: Positive for appetite change. Negative for chills, fatigue and fever.   HENT: Negative for congestion and rhinorrhea.    Eyes: Negative for visual disturbance.   Respiratory: Negative for cough and shortness of breath.    Cardiovascular: Negative for chest pain and leg swelling.   Gastrointestinal: Positive for abdominal pain and nausea. Negative for constipation, diarrhea and vomiting.   Endocrine: Negative for polyuria.   Genitourinary: Negative for dysuria.   Musculoskeletal: Negative for arthralgias and myalgias.   Skin: Negative for wound.   Neurological: Negative for dizziness, light-headedness and headaches.   Psychiatric/Behavioral: Negative for agitation.     OBJECTIVE:     Vital Signs (Most Recent)  Temp: 98.6 °F (37 °C) (11/26/17 1558)  Pulse: 75 (11/26/17 1558)  Resp: 16 (11/26/17 1558)  BP: 129/71 (11/26/17 1558)  SpO2: 99 % (11/26/17 1558)    Physical Exam   Constitutional: She is oriented to person, place, and time. She appears well-developed and well-nourished. No distress.   HENT:   Head: Normocephalic and atraumatic.   Eyes: Conjunctivae and EOM are normal. Pupils are equal, round, and reactive to light.   Neck: Normal range of motion. Neck supple. No tracheal deviation present.   Cardiovascular: Normal rate, regular rhythm and normal heart sounds.    Pulmonary/Chest: Effort normal and breath sounds normal.   Abdominal: Soft. Bowel sounds are normal.   Well healed laparoscopic surgical scars.  RLQ TTP.  No rebound tenderness or guarding.  Negative Rosving's.   Musculoskeletal: Normal range of motion. She exhibits no edema.   Neurological: She is alert and oriented to person, place, and time.   Skin: Skin is warm and dry.   Psychiatric: She has a normal mood and affect.     Laboratory  CBC:   Recent Labs  Lab  11/26/17  1338   WBC 10.51   RBC 4.63   HGB 14.4   HCT 41.6      MCV 90   MCH 31.1*   MCHC 34.6     CMP:   Recent Labs  Lab 11/26/17  1410   GLU 83   CALCIUM 8.8   ALBUMIN 3.8   PROT 6.6      K 4.0   CO2 20*   *   BUN 11   CREATININE 0.9   ALKPHOS 83   ALT 38   AST 26   BILITOT 0.4       Diagnostic Results:  CT: Reviewed    ASSESSMENT/PLAN:     A/P:  41 yo F with acute appendicitis    To OR tonight for laparoscopic appendectomy  NPO  Rocephin and Flagyl on call to OR  The procedure was described in detail to the patient and all questions were answered.  The risks and benefits of the procedure were discussed and the patient wishes to proceed with surgery.    Vazquez Clay  General Surgery, PGY-5  Pager # [phone]

## 2017-11-26 NOTE — ED PROVIDER NOTES
"Encounter Date: 2017    SCRIBE #1 NOTE: I, Zora Soria, am scribing for, and in the presence of,  Dr. Smith. I have scribed the following portions of the note -       History     Chief Complaint   Patient presents with    Abdominal Pain     Patient is a 42-year-old female with a past medical history of thyroid disease and GERD who presents the ED with abdominal pain and nausea.  Patient states at 4 AM this morning, she was awakened by abdominal pain.  Patient states that her abdominal pain was located in her epigastric/mid abdominal area and started to localize in her right lower quadrant around 8 AM.  Currently, she complains of 8/10 "sharp" constant pain in her right lower quadrant that is worse with movement.  She endorses nausea.  She was able to tolerate a little sip of water.  She has not vomited.  No bowel movements since the onset of her symptoms.  She endorses weak urinary stream, but denies any dysuria, urinary frequency, or hematuria.  No fever or chills.  She has a history of a  and cholecystectomy.  She denies any chest pain, cough, SOB, vaginal bleeding or vaginal discharge.  She takes NSAIDs twice a week for years for her plantar fasciitis.  She had a sip of alcohol yesterday and drinks occasionally.          Review of patient's allergies indicates:  No Known Allergies  Past Medical History:   Diagnosis Date    Depression     GERD (gastroesophageal reflux disease)     Impaired fasting blood sugar     Psoriasis     Thyroid disease     hyothyroidism     Past Surgical History:   Procedure Laterality Date     SECTION      CHOLECYSTECTOMY      ENDOMETRIAL ABLATION      gladder remova      INCONTINENCE SURGERY      TUBAL LIGATION       Family History   Problem Relation Age of Onset    Cancer Maternal Aunt 30     colon cancer    Multiple sclerosis Maternal Aunt      Social History   Substance Use Topics    Smoking status: Current Some Day Smoker     Types: " Cigarettes    Smokeless tobacco: Not on file      Comment: Social    Alcohol use Yes      Comment: Social     Review of Systems   Constitutional: Negative for fever.   HENT: Negative for sore throat.    Respiratory: Negative for cough and shortness of breath.    Cardiovascular: Negative for chest pain.   Gastrointestinal: Positive for abdominal pain and nausea. Negative for constipation, diarrhea and vomiting.   Genitourinary: Negative for dysuria, frequency, urgency, vaginal bleeding and vaginal discharge.        +Weak urine stream   Musculoskeletal: Negative for back pain.   Skin: Negative for rash.   Neurological: Negative for weakness.   Hematological: Does not bruise/bleed easily.       Physical Exam     Initial Vitals [11/26/17 1156]   BP Pulse Resp Temp SpO2   (!) 171/79 77 16 98.4 °F (36.9 °C) 100 %      MAP       109.67         Physical Exam    Vitals reviewed.  Constitutional: She appears well-developed and well-nourished. She is not diaphoretic.   HENT:   Head: Normocephalic and atraumatic.   Nose: Nose normal.   Eyes: Conjunctivae and EOM are normal.   Neck: Normal range of motion.   Cardiovascular: Normal rate, regular rhythm and normal heart sounds. Exam reveals no friction rub.    No murmur heard.  Pulmonary/Chest: Breath sounds normal. No respiratory distress. She has no wheezes. She has no rales.   Abdominal: Soft. Bowel sounds are normal. She exhibits no distension. There is tenderness in the right lower quadrant. There is no rigidity, no rebound, no guarding and no CVA tenderness. No hernia.   Patient became tearful after palpation of right lower quadrant. +obdurator sign. -psoas sign and heel tap.   Musculoskeletal: Normal range of motion.   Neurological: She is alert and oriented to person, place, and time. She has normal strength. No sensory deficit.   Skin: Skin is warm and dry. No erythema. No pallor.   Psychiatric: She has a normal mood and affect. Her behavior is normal. Judgment and  thought content normal.         ED Course   Procedures  Labs Reviewed   URINALYSIS, REFLEX TO URINE CULTURE - Abnormal; Notable for the following:        Result Value    Appearance, UA Hazy (*)     Occult Blood UA 1+ (*)     All other components within normal limits    Narrative:     Preferred Collection Type->Urine, Clean Catch   CBC W/ AUTO DIFFERENTIAL - Abnormal; Notable for the following:     MCH 31.1 (*)     Immature Granulocytes 0.6 (*)     Gran # 8.7 (*)     Immature Grans (Abs) 0.06 (*)     Gran% 83.1 (*)     Lymph% 12.5 (*)     Mono% 3.7 (*)     All other components within normal limits   COMPREHENSIVE METABOLIC PANEL - Abnormal; Notable for the following:     Chloride 112 (*)     CO2 20 (*)     Anion Gap 7 (*)     All other components within normal limits   URINALYSIS MICROSCOPIC    Narrative:     Preferred Collection Type->Urine, Clean Catch   LIPASE   POCT URINE PREGNANCY             Medical Decision Making:   History:   Old Medical Records: I decided to obtain old medical records.  Clinical Tests:   Lab Tests: Ordered and Reviewed  Radiological Study: Ordered and Reviewed  Other:   I have discussed this case with another health care provider.    Imaging Results          CT Abdomen Pelvis With Contrast (Final result)  Result time 11/26/17 15:54:49    Final result by Allison Avila MD (11/26/17 15:54:49)                 Impression:      1.  Findings most consistent with acute appendicitis without focal fluid collection or free air.    2.  Suspected hepatic steatosis, correlation with LFTs recommended.    3.  Mild splenomegaly, nonspecific.    4.  Small hiatal area, and several additional findings above.      Electronically signed by: ALLISON AVILA MD  Date:     11/26/17  Time:    15:54              Narrative:    CT abdomen and pelvis with contrast    Clinical history: Right lower quadrant pain with nausea    Comparison: None    Technique:  Axial images of the abdomen and pelvis were obtained at 5 mm  intervals following administration of 100 cc Omni 350 IV contrast.  Coronal and sagittal reformatted images were reviewed.    Findings:  Images of the lower thorax are remarkable for minimal dependent atelectasis.    The liver is mildly hypoattenuating, suggesting steatosis, correlation with LFTs recommended.  The spleen is mildly prominent.  The pancreas and adrenal glands are grossly unremarkable.  The gallbladder is surgically absent.  There is no biliary dilation or ascites.  The portal vein, splenic vein, SMV, celiac axis and SMA all are patent.    The kidneys enhance symmetrically and excrete contrast appropriately without hydronephrosis or nephrolithiasis.  The bilateral ureters are unremarkable without calculi seen.  The urinary bladder is unremarkable.  The uterus and adnexa is grossly unremarkable.  No significant free fluid in the pelvis.    The large bowel is grossly unremarkable.  There is subtle inflammation about the appendix, early changes of appendicitis are suspected, correlation with right lower quadrant pain recommended.  The small bowel is grossly unremarkable.  Scattered shotty periaortic and paracaval lymph nodes are noted.    Degenerative changes are noted of the lower lumbar spine without focal osseous destructive process.    There is a small fat-containing umbilical hernia, without inflammation.  No significant inguinal lymphadenopathy.  Postsurgical changes are noted of the anterior abdominal wall without inflammation.                                 APC / Resident Notes:   On exam, vital signs stable.  Abdomen soft.  Patient became tearful after palpation of right lower quadrant.  No rebound, guarding, or rigidity.  Positive obturator sign.  Negative psoas and heel tap sign.  DDX includes but is not limited to appendicitis, gastroenteritis, colitis, gastritis, UTI, pancreatitis, musculoskeletal strain. I will consider pelvic organ pathology if initial work up negative, but high suspicious  for appendicitis given history and physical exam.  Will order labs, give IV fluids/Zofran/morphine, order CT abdomen and pelvis with contrast, and continue to monitor.    UA with no signs of infection.  UPT negative.  CBC with no leukocytosis with WBC at 10.51.  No anemia.  CMP with no significant electrolyte abnormalities.  Creatinine stable at 0.9.  No hyperbilirubinemia or transaminitis.  Lipase WNL at 7.    3:49 PM  Patient reassessed.  She reports great improvement in her nausea and abdominal pain after Zofran and morphine.  She would not like any more meds at this time.  CT results pending.  Will continue to monitor.    3:56 PM  CT shows acute appendicitis without complication. Will page Gen Surg.    5:06 PM  General surgery with patient in consult room. Patient seen ambulating to consult room in NAD and without difficulty. They will most likely admit for appendectomy. Consult appreciated.       Scribe Attestation:   Scribe #1: I performed the above scribed service and the documentation accurately describes the services I performed. I attest to the accuracy of the note.    Attending Attestation:     Physician Attestation Statement for NP/PA:   I discussed this assessment and plan of this patient with the NP/PA, but I did not personally examine the patient. The face to face encounter was performed by the NP/PA.    Other NP/PA Attestation Additions:    History of Present Illness: Jenny-umbilical pain radiating to the right lower quadrant.     Medical Decision Making: CT reveals acute appendicitis. Surgery consulted.       Physician Attestation for Scribe:      Comments: I, Dr. Thompson Smith, personally performed the services described in this documentation. All medical record entries made by the scribe were at my direction and in my presence.  I have reviewed the chart and agree that the record reflects my personal performance and is accurate and complete. Thompson Smith MD.  4:22 PM 11/26/2017              ED  Course      Clinical Impression:   The encounter diagnosis was Acute appendicitis, unspecified acute appendicitis type.    Disposition:   Disposition: Admitted                        Gracie Nicholson PA-C  11/26/17 3995

## 2017-11-26 NOTE — ANESTHESIA PREPROCEDURE EVALUATION
[facility]  Anesthesia Pre-Operative Evaluation         Patient Name: Lachelle Engel  YOB: 1975  MRN: [id number]    SUBJECTIVE:     Pre-operative evaluation for Procedure(s) (LRB):  APPENDECTOMY-LAPAROSCOPIC (N/A)     2017    Lachelle Engel is a 42 y.o. female w/ a significant PMHx of thyroid disease, MDD, and GERD who presented to the ED with abdominal pain and nausea.  .    Patient now presents for the above procedure(s).      LDA:       Peripheral IV - Single Lumen 17 1339 Left Hand (Active)   Site Assessment Clean;Dry;Intact;No redness;No swelling 2017  1:39 PM   Line Status Blood return noted 2017  1:39 PM   Number of days: 0       Prev airway: None documented.    Drips: None documented.      Patient Active Problem List   Diagnosis    Depression    Psoriasis    Impaired fasting blood sugar    GERD (gastroesophageal reflux disease)    Acute appendicitis       Review of patient's allergies indicates:  No Known Allergies    Current Inpatient Medications:      No current facility-administered medications on file prior to encounter.      Current Outpatient Prescriptions on File Prior to Encounter   Medication Sig Dispense Refill    methotrexate 2.5 MG Tab TK 6 TS PO WEEKLY  0    levothyroxine (SYNTHROID) 100 MCG tablet Take 100 mcg by mouth once daily.      STELARA 90 mg/mL Syrg syringe BRING SYRINGE TO OFFICE TO BE ADMINISTERED  0    topiramate (TROKENDI XR) 50 mg Cp24 Take 50 mg by mouth once daily. 30 capsule 2       Past Surgical History:   Procedure Laterality Date     SECTION      CHOLECYSTECTOMY      ENDOMETRIAL ABLATION      gladder remova      INCONTINENCE SURGERY      TUBAL LIGATION         Social History     Social History    Marital status: Single     Spouse name: N/A    Number of children: N/A    Years of education: N/A     Occupational History     [employer]     Social History Main Topics     Smoking status: Current Some Day Smoker     Types: Cigarettes    Smokeless tobacco: Not on file      Comment: Social    Alcohol use Yes      Comment: Social    Drug use: No    Sexual activity: Yes     Birth control/ protection: IUD      Comment: mirena     Other Topics Concern    Not on file     Social History Narrative    No narrative on file       OBJECTIVE:     Vital Signs Range (Last 24H):  Temp:  [36.9 °C (98.4 °F)-37 °C (98.6 °F)]   Pulse:  [75-77]   Resp:  [16]   BP: (129-171)/(71-79)   SpO2:  [99 %-100 %]       CBC:   Recent Labs      11/26/17   1338   WBC  10.51   RBC  4.63   HGB  14.4   HCT  41.6   PLT  193   MCV  90   MCH  31.1*   MCHC  34.6       CMP:   Recent Labs      11/26/17   1410   NA  139   K  4.0   CL  112*   CO2  20*   BUN  11   CREATININE  0.9   GLU  83   CALCIUM  8.8   ALBUMIN  3.8   PROT  6.6   ALKPHOS  83   ALT  38   AST  26   BILITOT  0.4       INR:  No results for input(s): INR, PROTIME, APTT in the last 72 hours.    Invalid input(s): PT    Diagnostic Studies: No relevant studies.    EKG: No recent studies available.    2D ECHO:  No results found for this or any previous visit.      ASSESSMENT/PLAN:         Anesthesia Evaluation    I have reviewed the Patient Summary Reports.     I have reviewed the Medications.     Review of Systems  Anesthesia Hx:  No problems with previous Anesthesia  History of prior surgery of interest to airway management or planning: Denies Family Hx of Anesthesia complications.   Denies Personal Hx of Anesthesia complications.   Social:  Smoker, Alcohol Use    Hematology/Oncology:  Hematology Normal   Oncology Normal     EENT/Dental:EENT/Dental Normal   Cardiovascular:  Cardiovascular Normal  ECG has been reviewed.    Pulmonary:  Pulmonary Normal    Hepatic/GI:   GERD    Neurological:  Neurology Normal    Endocrine:  Endocrine Normal    Psych:   depression          Physical Exam  General:  Well nourished    Airway/Jaw/Neck:  Airway Findings: Mouth  Opening: Normal Tongue: Normal  General Airway Assessment: Adult, Good  Mallampati: I  Jaw/Neck Findings:  Neck ROM: Normal ROM      Dental:  Dental Findings: In tact   Chest/Lungs:  Chest/Lungs Clear    Heart/Vascular:  Heart Findings: Normal Heart murmur: negative    Abdomen:  Abdomen Findings:  Tenderness       Mental Status:  Mental Status Findings:  Cooperative, Alert and Oriented         Anesthesia Plan  Type of Anesthesia, risks & benefits discussed:  Anesthesia Type:  general  Patient's Preference:   Intra-op Monitoring Plan: standard ASA monitors  Intra-op Monitoring Plan Comments:   Post Op Pain Control Plan: per primary service following discharge from PACU, IV/PO Opioids PRN and multimodal analgesia  Post Op Pain Control Plan Comments:   Induction:   IV  Beta Blocker:  Patient is not currently on a Beta-Blocker (No further documentation required).       Informed Consent: Patient understands risks and agrees with Anesthesia plan.  Questions answered. Anesthesia consent signed with patient.  ASA Score: 2     Day of Surgery Review of History & Physical:  There are no significant changes.          Ready For Surgery From Anesthesia Perspective.

## 2017-11-27 VITALS
OXYGEN SATURATION: 100 % | RESPIRATION RATE: 14 BRPM | HEART RATE: 70 BPM | DIASTOLIC BLOOD PRESSURE: 76 MMHG | HEIGHT: 65 IN | BODY MASS INDEX: 36.65 KG/M2 | SYSTOLIC BLOOD PRESSURE: 110 MMHG | TEMPERATURE: 98 F | WEIGHT: 220 LBS

## 2017-11-27 LAB
ANION GAP SERPL CALC-SCNC: 8 MMOL/L
BASOPHILS # BLD AUTO: 0.01 K/UL
BASOPHILS NFR BLD: 0.2 %
BUN SERPL-MCNC: 8 MG/DL
CALCIUM SERPL-MCNC: 8.1 MG/DL
CHLORIDE SERPL-SCNC: 113 MMOL/L
CO2 SERPL-SCNC: 19 MMOL/L
CREAT SERPL-MCNC: 0.8 MG/DL
DIFFERENTIAL METHOD: ABNORMAL
EOSINOPHIL # BLD AUTO: 0 K/UL
EOSINOPHIL NFR BLD: 0.2 %
ERYTHROCYTE [DISTWIDTH] IN BLOOD BY AUTOMATED COUNT: 13.2 %
EST. GFR  (AFRICAN AMERICAN): >60 ML/MIN/1.73 M^2
EST. GFR  (NON AFRICAN AMERICAN): >60 ML/MIN/1.73 M^2
GLUCOSE SERPL-MCNC: 106 MG/DL
HCT VFR BLD AUTO: 35.8 %
HGB BLD-MCNC: 12.1 G/DL
IMM GRANULOCYTES # BLD AUTO: 0.02 K/UL
IMM GRANULOCYTES NFR BLD AUTO: 0.4 %
LYMPHOCYTES # BLD AUTO: 1.7 K/UL
LYMPHOCYTES NFR BLD: 29.3 %
MAGNESIUM SERPL-MCNC: 2.1 MG/DL
MCH RBC QN AUTO: 31.1 PG
MCHC RBC AUTO-ENTMCNC: 33.8 G/DL
MCV RBC AUTO: 92 FL
MONOCYTES # BLD AUTO: 0.3 K/UL
MONOCYTES NFR BLD: 6 %
NEUTROPHILS # BLD AUTO: 3.6 K/UL
NEUTROPHILS NFR BLD: 63.9 %
NRBC BLD-RTO: 0 /100 WBC
PHOSPHATE SERPL-MCNC: 2.9 MG/DL
PLATELET # BLD AUTO: 138 K/UL
PMV BLD AUTO: 10.6 FL
POTASSIUM SERPL-SCNC: 4.1 MMOL/L
RBC # BLD AUTO: 3.89 M/UL
SODIUM SERPL-SCNC: 140 MMOL/L
WBC # BLD AUTO: 5.67 K/UL

## 2017-11-27 PROCEDURE — 85025 COMPLETE CBC W/AUTO DIFF WBC: CPT

## 2017-11-27 PROCEDURE — 83735 ASSAY OF MAGNESIUM: CPT

## 2017-11-27 PROCEDURE — G0378 HOSPITAL OBSERVATION PER HR: HCPCS

## 2017-11-27 PROCEDURE — 36415 COLL VENOUS BLD VENIPUNCTURE: CPT

## 2017-11-27 PROCEDURE — 84100 ASSAY OF PHOSPHORUS: CPT

## 2017-11-27 PROCEDURE — 25000003 PHARM REV CODE 250: Performed by: SURGERY

## 2017-11-27 PROCEDURE — 80048 BASIC METABOLIC PNL TOTAL CA: CPT

## 2017-11-27 RX ORDER — OXYCODONE AND ACETAMINOPHEN 5; 325 MG/1; MG/1
1 TABLET ORAL EVERY 4 HOURS PRN
Qty: 31 TABLET | Refills: 0 | Status: SHIPPED | OUTPATIENT
Start: 2017-11-27 | End: 2018-09-11

## 2017-11-27 RX ORDER — ONDANSETRON 8 MG/1
8 TABLET, ORALLY DISINTEGRATING ORAL EVERY 8 HOURS PRN
Qty: 30 TABLET | Refills: 2 | Status: SHIPPED | OUTPATIENT
Start: 2017-11-27 | End: 2018-09-11

## 2017-11-27 RX ADMIN — ACETAMINOPHEN 650 MG: 325 TABLET ORAL at 11:11

## 2017-11-27 RX ADMIN — ACETAMINOPHEN 650 MG: 325 TABLET ORAL at 03:11

## 2017-11-27 RX ADMIN — OXYCODONE HYDROCHLORIDE AND ACETAMINOPHEN 1 TABLET: 5; 325 TABLET ORAL at 09:11

## 2017-11-27 RX ADMIN — DEXTROSE MONOHYDRATE, SODIUM CHLORIDE, AND POTASSIUM CHLORIDE: 50; 4.5; 1.49 INJECTION, SOLUTION INTRAVENOUS at 04:11

## 2017-11-27 NOTE — ED NOTES
18:05 pt transported via stretcher with nurse  And PCT to surgery Pt 's mother with pt  Pt condition stable on transport, pt belongings were given to pt's mother,

## 2017-11-27 NOTE — PLAN OF CARE
Problem: Patient Care Overview  Goal: Plan of Care Review  Outcome: Ongoing (interventions implemented as appropriate)  Vital signs stable. Afebrile. Drowsy but oriented and following commands. Pain controlled with PRN pain meds. Denies nausea. IVF infusing per order. 1L NS bolus given per MD order.  Lap sites remain well approximated and without drainage

## 2017-11-27 NOTE — NURSING TRANSFER
Nursing Transfer Note      11/26/2017     Transfer to 540a    Transfer via stretcher    Transfer with n/a    Transported by JONELLE Emery RN     Medicines sent: n/a    Chart send with patient: yes    Notified: pts mother    Patient reassessed at: 11/26/17 @ 1222

## 2017-11-27 NOTE — PLAN OF CARE
Patient is a 42 year old female admitted from home through the ER and underwent Laparoscopic Appendectomy 11/26/2017.  Patient is expected to discharge home today with no needs.    PCP  Lyn Deras MD  [address] [facility] [id number] [phone]        [facility] [address] [place] [address] [address] [facility] [address]  [address] [facility] [address]  Phone: [phone] Fax: [phone]      Extended Emergency Contact Information  Primary Emergency Contact: Rosaline Marin   [place]  Mobile Phone: [phone]  Relation: Mother       11/27/17 0943   Discharge Assessment   Assessment Type Discharge Planning Assessment   Confirmed/corrected address and phone number on facesheet? Yes   Assessment information obtained from? Medical Record   Expected Length of Stay (days) 3   Prior to hospitilization cognitive status: Alert/Oriented   Prior to hospitalization functional status: Independent   Current cognitive status: Alert/Oriented   Current Functional Status: Independent   Patient's perception of discharge disposition home or selfcare   Equipment Currently Used at Home none   Does the patient have transportation home? Yes   Transportation Available family or friend will provide   Discharge Plan A Home with family

## 2017-11-27 NOTE — ASSESSMENT & PLAN NOTE
42 y.o female with acute appendicitis s/p lap appendectomy on 11/26/17    - POD#1 doing well  - Advance to regular diet  - D/C IVF  - D/C home later today if tolerates diet ok

## 2017-11-27 NOTE — SUBJECTIVE & OBJECTIVE
Interval History: No issues overnight. Pain well controlled. Some nausea now resolved. Tolerating clears    Medications:  Continuous Infusions:   Scheduled Meds:   levothyroxine  100 mcg Oral Daily     PRN Meds:acetaminophen, diphenhydrAMINE, ondansetron, oxyCODONE-acetaminophen, oxyCODONE-acetaminophen, ramelteon     Review of patient's allergies indicates:  No Known Allergies  Objective:     Vital Signs (Most Recent):  Temp: 98.1 °F (36.7 °C) (11/27/17 0715)  Pulse: 71 (11/27/17 0715)  Resp: 14 (11/27/17 0715)  BP: (!) 95/55 (11/27/17 0715)  SpO2: 97 % (11/27/17 0715) Vital Signs (24h Range):  Temp:  [96.3 °F (35.7 °C)-99 °F (37.2 °C)] 98.1 °F (36.7 °C)  Pulse:  [67-81] 71  Resp:  [14-20] 14  SpO2:  [96 %-100 %] 97 %  BP: ()/(53-79) 95/55     Weight: 99.8 kg (220 lb)  Body mass index is 36.61 kg/m².    Intake/Output - Last 3 Shifts       11/25 0700 - 11/26 0659 11/26 0700 - 11/27 0659 11/27 0700 - 11/28 0659    P.O.  118     I.V. (mL/kg)  1722.2 (17.3)     Other  0     IV Piggyback  1000     Total Intake(mL/kg)  2840.2 (28.5)     Urine (mL/kg/hr)  40     Total Output   40      Net   +2800.2             Urine Occurrence  1 x           Physical Exam   Constitutional: She is oriented to person, place, and time. She appears well-developed and well-nourished.   HENT:   Head: Atraumatic.   Eyes: EOM are normal.   Neck: Neck supple.   Cardiovascular: Normal rate and regular rhythm.    Pulmonary/Chest: Effort normal and breath sounds normal.   Abdominal: Soft. She exhibits no distension. There is tenderness (aTTP around incisions). There is no rebound and no guarding.   Lap sites c/d/i   Neurological: She is alert and oriented to person, place, and time.   Psychiatric: She has a normal mood and affect. Her behavior is normal.       Significant Labs:  CBC:   Recent Labs  Lab 11/27/17  0419   WBC 5.67   RBC 3.89*   HGB 12.1   HCT 35.8*   *   MCV 92   MCH 31.1*   MCHC 33.8     BMP:   Recent Labs  Lab  11/27/17  0419         K 4.1   *   CO2 19*   BUN 8   CREATININE 0.8   CALCIUM 8.1*   MG 2.1     LFTs:   Recent Labs  Lab 11/26/17  1410   ALT 38   AST 26   ALKPHOS 83   BILITOT 0.4   PROT 6.6   ALBUMIN 3.8       Significant Diagnostics:  I have reviewed all pertinent imaging results/findings within the past 24 hours.

## 2017-11-27 NOTE — OP NOTE
DATE OF PROCEDURE: 11/26/2017     PREOPERATIVE DIAGNOSIS: Acute appendicitis.     POSTOPERATIVE DIAGNOSIS: Acute appendicitis.     PROCEDURE PERFORMED: Laparoscopic appendectomy.     ATTENDING SURGEON: Marielena Matthews M.D.     HOUSESTAFF SURGEON: JOVITA Clay M.D. (RES)     ANESTHESIA: General endotracheal.     ESTIMATED BLOOD LOSS: 15 mL.     FINDINGS: Acute nonperforated appendicitis.     SPECIMEN: Appendix.     DRAINS: None.     COMPLICATIONS: None.     INDICATIONS: Lachelle Engel is a 42 y.o.female who presented to the Emergency Department with lower abdominal pain. The history and exam were consistent with acute appendicitis, which was confirmed by laboratory studies and a CT scan. We recommended laparoscopic appendectomy and the patient agreed to proceed. The patient signed informed consent and expressed understanding of the risks and benefits of surgery.     OPERATIVE PROCEDURE: The patient was identified in Preoperative Holding and  brought back to the Operating Room. Placed supine on the operating table and padded appropriately. Monitors were applied and there was smooth induction of general endotracheal anesthesia. A Davis catheter was placed. The patient's abdomen was prepped and draped in the standard sterile surgical fashion. A time-out was performed and all team members present agreed this was the correct procedure on the correct patient. We also confirmed administration of appropriate preoperative antibiotics.    A 2-cm infraumbilical skin incision was made. Subcutaneous tissue was bluntly dissected. The umbilical stalk was grasped with penetrating towel clip and elevated and a 1.5-cm midline infraumbilical fascial incision was made. The abdomen was bluntly entered under direct vision. A 0 Vicryl stay suture was placed around the fascial incision in a horizontal mattress fashion. A Ernie trocar was placed and the abdomen was insufflated with carbon dioxide to a maximum pressure of 15 mmHg. A  10-mm laparoscope was placed and the abdomen was examined. There was no evidence of injury from the initial trocar placement. Two 5-mm trocars were placed under direct vision through separate stab incisions, one in the suprapubic area avoiding the dome of the bladder and one in the left lower quadrant avoiding the inferior epigastric artery. We directed our attention to the right lower quadrant. The appendix was identified and noted to have moderate inflammatory change without evidence of perforation. The appendix was elevated. A mesenteric defect was made at the base of the appendix using the [place] dissector. The appendix was divided at the base using the Endo-RYDER stapler with a blue (45-3.5) load. The mesoappendix was then divided with a white (45-2.5) load of the stapler. The appendix was placed into an Endocatch bag and removed from the Ernie trocar without difficulty. We returned the laparoscope and Ernie trocar to the umbilicus and reexamined the right lower quadrant. The staple line on the mesoappendix was examined and no bleeding was noted. The base of the appendix was examined and appeared viable and well-sealed. All ports were removed under direct vision and no bleeding from any port site was noted. The insufflation of the abdomen was evacuated and the laparoscope and Ernie trocar were removed. The fascial incision at the umbilical port site was closed with the preexisting 0 Vicryl stitch. All port sites were infiltrated with Marcaine and closed in a subcuticular fashion. Sterile dressings were applied. The patient's Davis catheter was removed. The patient was extubated in the Operating Room and transported to the Recovery Room in stable condition. All sponge, instrument and needle counts were correct at the end of the case. I was present and scrubbed for the entire procedure.

## 2017-11-27 NOTE — PLAN OF CARE
Problem: Patient Care Overview  Goal: Plan of Care Review  Outcome: Ongoing (interventions implemented as appropriate)  Pt AAOX4, VSS. Surgical site CDI. family at bedside. Pt is resting quietly. No s/s infection, skin breakdown/pressure ulcers - pt moves independently well. Pain managed with PRN medications. Deep breathing encouraged. SCD's on and functioning. Fall precautions maintained. Will monitor

## 2017-11-27 NOTE — PROGRESS NOTES
Patient arrived to floor from recovery via stretcher. Patient is awake, alert, and oriented. Skin is warm and dry. Family at bedside. Some crackles noted to lung fields. Abdomen is slightly tender to touch. Ivf's infusing without difficulty. No skin breakdown noted, or complaints of discomfort. Oriented to environment. Will continue to monitor.

## 2017-11-27 NOTE — TRANSFER OF CARE
"Anesthesia Transfer of Care Note    Patient: Lachelle Engel    Procedure(s) Performed: Procedure(s) (LRB):  APPENDECTOMY-LAPAROSCOPIC (N/A)    Patient location: PACU    Anesthesia Type: general    Transport from OR: Transported from OR on 6-10 L/min O2 by face mask with adequate spontaneous ventilation    Post pain: adequate analgesia    Post assessment: no apparent anesthetic complications    Post vital signs: stable    Level of consciousness: awake    Nausea/Vomiting: no nausea/vomiting    Complications: none    Transfer of care protocol was followed      Last vitals:   Visit Vitals  /60 (BP Location: Right arm, Patient Position: Lying)   Pulse 77   Temp 37 °C (98.6 °F) (Temporal)   Resp 16   Ht 5' 5" (1.651 m)   Wt 108.9 kg (240 lb)   SpO2 100%   Breastfeeding? No   BMI 39.94 kg/m²     "

## 2017-11-27 NOTE — PROGRESS NOTES
[facility]  General Surgery  Progress Note    Subjective:     History of Present Illness:  No notes on file    Post-Op Info:  Procedure(s) (LRB):  APPENDECTOMY-LAPAROSCOPIC (N/A)   1 Day Post-Op     Interval History: No issues overnight. Pain well controlled. Some nausea now resolved. Tolerating clears    Medications:  Continuous Infusions:   Scheduled Meds:   levothyroxine  100 mcg Oral Daily     PRN Meds:acetaminophen, diphenhydrAMINE, ondansetron, oxyCODONE-acetaminophen, oxyCODONE-acetaminophen, ramelteon     Review of patient's allergies indicates:  No Known Allergies  Objective:     Vital Signs (Most Recent):  Temp: 98.1 °F (36.7 °C) (11/27/17 0715)  Pulse: 71 (11/27/17 0715)  Resp: 14 (11/27/17 0715)  BP: (!) 95/55 (11/27/17 0715)  SpO2: 97 % (11/27/17 0715) Vital Signs (24h Range):  Temp:  [96.3 °F (35.7 °C)-99 °F (37.2 °C)] 98.1 °F (36.7 °C)  Pulse:  [67-81] 71  Resp:  [14-20] 14  SpO2:  [96 %-100 %] 97 %  BP: ()/(53-79) 95/55     Weight: 99.8 kg (220 lb)  Body mass index is 36.61 kg/m².    Intake/Output - Last 3 Shifts       11/25 0700 - 11/26 0659 11/26 0700 - 11/27 0659 11/27 0700 - 11/28 0659    P.O.  118     I.V. (mL/kg)  1722.2 (17.3)     Other  0     IV Piggyback  1000     Total Intake(mL/kg)  2840.2 (28.5)     Urine (mL/kg/hr)  40     Total Output   40      Net   +2800.2             Urine Occurrence  1 x           Physical Exam   Constitutional: She is oriented to person, place, and time. She appears well-developed and well-nourished.   HENT:   Head: Atraumatic.   Eyes: EOM are normal.   Neck: Neck supple.   Cardiovascular: Normal rate and regular rhythm.    Pulmonary/Chest: Effort normal and breath sounds normal.   Abdominal: Soft. She exhibits no distension. There is tenderness (aTTP around incisions). There is no rebound and no guarding.   Lap sites c/d/i   Neurological: She is alert and oriented to person, place, and time.   Psychiatric: She has a normal mood and  affect. Her behavior is normal.       Significant Labs:  CBC:   Recent Labs  Lab 11/27/17  0419   WBC 5.67   RBC 3.89*   HGB 12.1   HCT 35.8*   *   MCV 92   MCH 31.1*   MCHC 33.8     BMP:   Recent Labs  Lab 11/27/17  0419         K 4.1   *   CO2 19*   BUN 8   CREATININE 0.8   CALCIUM 8.1*   MG 2.1     LFTs:   Recent Labs  Lab 11/26/17  1410   ALT 38   AST 26   ALKPHOS 83   BILITOT 0.4   PROT 6.6   ALBUMIN 3.8       Significant Diagnostics:  I have reviewed all pertinent imaging results/findings within the past 24 hours.    Assessment/Plan:     * Acute appendicitis    42 y.o female with acute appendicitis s/p lap appendectomy on 11/26/17    - POD#1 doing well  - Advance to regular diet  - D/C IVF  - D/C home later today if tolerates diet anna Zimmerman MD  General Surgery  [facility]

## 2017-11-27 NOTE — PLAN OF CARE
Patient discharged home with no needs.  Requested new PCP, scheduled.    Future Appointments  Date Time Provider Department Center   12/12/2017 3:45 PM Marielena Matthews MD [facility]   1/11/2018 8:40 AM Lyn Deras MD [facility]   2/15/2018 1:45 PM Mona Qureshi MD [facility]          11/27/17 1448   Final Note   Assessment Type Final Discharge Note   Discharge Disposition Home   Hospital Follow Up  Appt(s) scheduled? Yes   Right Care Referral Info   Post Acute Recommendation No Care

## 2017-11-28 NOTE — DISCHARGE SUMMARY
"[facility]  General Surgery  Discharge Summary      Patient Name: Lachelle Enegl  MRN: [id number]  Admission Date: 11/26/2017  Hospital Length of Stay: 0 days  Discharge Date and Time: 11/27/2017  2:22 PM  Attending Physician: Laura att. providers found   Discharging Provider: Delroy Zimmerman MD  Primary Care Provider: Lyn Deras MD     HPI:  Lachelle Engel is a 42 y.o. female with past medical history of thyroid disease and GERD who presents the ED with abdominal pain and nausea.  Patient states at 4 AM this morning, she was awakened by abdominal pain.  Patient states that her abdominal pain was located in her epigastric/mid abdominal area and started to localize in her right lower quadrant around 8 AM.  Currently, she complains of 8/10 "sharp" constant pain in her right lower quadrant that is worse with movement.  She has nausea.  She has not eaten since noon yesterday.  WBC wnl.  Afebrile.  CT abd/pelvis consistent with early acute appendicitis.    Procedure(s) (LRB):  APPENDECTOMY-LAPAROSCOPIC (N/A)     Hospital Course: The patient was admitted the underwent a laparoscopic appendectomy without complication. Her diet was advanced from liquids to regular without issue. Her pain has been well controlled on oral medication. She is to be discharged home with family and has a follow up appointment in 2 weeks.     Consults:   Consults         Status Ordering Provider     Inpatient consult to General surgery  Once     Provider:  (Not yet assigned)    Completed BHANU ROSADO          Significant Diagnostic Studies: Labs:   BMP:   Recent Labs  Lab 11/26/17  1410 11/27/17  0419   GLU 83 106    140   K 4.0 4.1   * 113*   CO2 20* 19*   BUN 11 8   CREATININE 0.9 0.8   CALCIUM 8.8 8.1*   MG  --  2.1   , CMP   Recent Labs  Lab 11/26/17  1410 11/27/17  0419    140   K 4.0 4.1   * 113*   CO2 20* 19*   GLU 83 106   BUN 11 8   CREATININE 0.9 0.8   CALCIUM 8.8 8.1*   PROT 6.6  --  "   ALBUMIN 3.8  --    BILITOT 0.4  --    ALKPHOS 83  --    AST 26  --    ALT 38  --    ANIONGAP 7* 8   ESTGFRAFRICA >60.0 >60.0   EGFRNONAA >60.0 >60.0    and CBC   Recent Labs  Lab 11/26/17  1338 11/27/17  0419   WBC 10.51 5.67   HGB 14.4 12.1   HCT 41.6 35.8*    138*       Pending Diagnostic Studies:     None        Final Active Diagnoses:    Diagnosis Date Noted POA    PRINCIPAL PROBLEM:  Acute appendicitis [K35.80] 11/26/2017 Unknown      Problems Resolved During this Admission:    Diagnosis Date Noted Date Resolved POA      Discharged Condition: good    Disposition: Home or Self Care    Follow Up:  Follow-up Information     Marielena Matthews MD On 12/12/2017.    Specialties:  General Surgery, Breast Surgery  Why:  SURGICAL FOLLOW UP with Dr Matthews 12/12/2017 @ 3:45pm in the [facility]  Contact information:  [address]  [facility] [address]  [phone]             Lyn Deras MD On 1/11/2018.    Specialty:  Internal Medicine  Why:  HOSPITAL FOLLOW UP and ESTABLISH CARE WITH MITCH PCPDr Deras 1/11/2018 @ 8:40am at [facility]  Contact information:  [address]  [facility] [address]  [phone]                 Patient Instructions:     Diet general     Activity as tolerated     Shower on day dressing removed (No bath)   Order Comments: No dressings needed. Can shower starting tomorrow with soap and water. No soaking in bath.     Lifting restrictions   Order Comments: No lifting greater than 20lbs for the next 4 weeks.     Call MD for:  redness, tenderness, or signs of infection (pain, swelling, redness, odor or green/yellow discharge around incision site)     Call MD for:  severe uncontrolled pain     Call MD for:  persistent nausea and vomiting or diarrhea     Call MD for:  temperature >100.4     No dressing needed       Medications:  Reconciled Home Medications:   Discharge Medication List as of 11/27/2017 11:50 AM      START taking these  medications    Details   ondansetron (ZOFRAN-ODT) 8 MG TbDL Take 1 tablet (8 mg total) by mouth every 8 (eight) hours as needed., Starting Mon 11/27/2017, Normal      senna-docusate 8.6-50 mg (SENNA WITH DOCUSATE SODIUM) 8.6-50 mg per tablet Take 1 tablet by mouth once daily., Starting Sun 11/26/2017, OTC         CONTINUE these medications which have CHANGED    Details   oxyCODONE-acetaminophen (PERCOCET) 5-325 mg per tablet Take 1 tablet by mouth every 4 (four) hours as needed., Starting Mon 11/27/2017, Print         CONTINUE these medications which have NOT CHANGED    Details   levothyroxine (SYNTHROID) 100 MCG tablet Take 100 mcg by mouth once daily., Until Discontinued, Historical Med      methotrexate 2.5 MG Tab TK 6 TS PO WEEKLY, Historical Med      STELARA 90 mg/mL Syrg syringe BRING SYRINGE TO OFFICE TO BE ADMINISTERED, Historical Med      topiramate (TROKENDI XR) 50 mg Cp24 Take 50 mg by mouth once daily., Starting Thu 11/16/2017, Normal             Delroy Zimmerman MD  General Surgery  [facility]

## 2017-12-08 ENCOUNTER — PATIENT MESSAGE (OUTPATIENT)
Dept: BARIATRICS | Facility: CLINIC | Age: 42
End: 2017-12-08

## 2017-12-12 ENCOUNTER — OFFICE VISIT (OUTPATIENT)
Dept: SURGERY | Facility: CLINIC | Age: 42
End: 2017-12-12
Payer: COMMERCIAL

## 2017-12-12 VITALS
HEART RATE: 65 BPM | DIASTOLIC BLOOD PRESSURE: 73 MMHG | SYSTOLIC BLOOD PRESSURE: 126 MMHG | WEIGHT: 240.19 LBS | BODY MASS INDEX: 40.02 KG/M2 | HEIGHT: 65 IN | TEMPERATURE: 99 F

## 2017-12-12 DIAGNOSIS — K35.30 ACUTE APPENDICITIS WITH LOCALIZED PERITONITIS: Primary | ICD-10-CM

## 2017-12-12 PROCEDURE — 99999 PR PBB SHADOW E&M-EST. PATIENT-LVL III: CPT | Mod: PBBFAC,,, | Performed by: SURGERY

## 2017-12-12 PROCEDURE — 99024 POSTOP FOLLOW-UP VISIT: CPT | Mod: S$GLB,,, | Performed by: SURGERY

## 2017-12-12 NOTE — LETTER
December 12, 2017      [facility]  [address]  [facility] [address]  Phone: [phone]  Fax: [phone]       Patient: Lachelle Engel   YOB: 1975  Date of Visit: 12/12/2017    To Whom It May Concern:    Thee Engel  was at [facility] on 12/12/2017.  Ms Engel had surgery 11/26/17.She  may return to work/school on 12/13/17 with no restrictions. If you have any questions or concerns, or if I can be of further assistance, please do not hesitate to contact me.    Sincerely,          Marielena Matthews MD

## 2017-12-18 NOTE — PROGRESS NOTES
"Seen and examined.  No problems since surgery.  She is back to regular activity.  BMs are normal.  No abdominal pain.  Regular diet.    /73 (BP Location: Right arm, Patient Position: Sitting, BP Method: Small (Automatic))   Pulse 65   Temp 98.6 °F (37 °C)   Ht 5' 5" (1.651 m)   Wt 109 kg (240 lb 3.1 oz)   BMI 39.97 kg/m²     Abd soft, NT, ND, incisions healing well.    A/P:  41 y/o female s/p laparoscopic appendectomy for acute appendicitis.  Resume regular activity and diet.  Return PRN.  "

## 2018-01-26 ENCOUNTER — PATIENT MESSAGE (OUTPATIENT)
Dept: ADMINISTRATIVE | Facility: OTHER | Age: 43
End: 2018-01-26

## 2018-02-02 DIAGNOSIS — E66.01 MORBID OBESITY WITH BMI OF 40.0-44.9, ADULT: ICD-10-CM

## 2018-02-02 RX ORDER — TOPIRAMATE 50 MG/1
50 CAPSULE, EXTENDED RELEASE ORAL DAILY
Qty: 30 CAPSULE | Refills: 2 | Status: SHIPPED | OUTPATIENT
Start: 2018-02-02 | End: 2018-02-15

## 2018-02-15 ENCOUNTER — OFFICE VISIT (OUTPATIENT)
Dept: BARIATRICS | Facility: CLINIC | Age: 43
End: 2018-02-15
Payer: COMMERCIAL

## 2018-02-15 VITALS
BODY MASS INDEX: 39.52 KG/M2 | DIASTOLIC BLOOD PRESSURE: 84 MMHG | HEART RATE: 80 BPM | WEIGHT: 237.19 LBS | SYSTOLIC BLOOD PRESSURE: 130 MMHG | HEIGHT: 65 IN

## 2018-02-15 DIAGNOSIS — E66.01 SEVERE OBESITY WITH BODY MASS INDEX (BMI) OF 35.0 TO 39.9: ICD-10-CM

## 2018-02-15 DIAGNOSIS — R73.01 IFG (IMPAIRED FASTING GLUCOSE): ICD-10-CM

## 2018-02-15 PROCEDURE — 99213 OFFICE O/P EST LOW 20 MIN: CPT | Mod: S$GLB,,, | Performed by: INTERNAL MEDICINE

## 2018-02-15 PROCEDURE — 99999 PR PBB SHADOW E&M-EST. PATIENT-LVL III: CPT | Mod: PBBFAC,,, | Performed by: INTERNAL MEDICINE

## 2018-02-15 PROCEDURE — 3008F BODY MASS INDEX DOCD: CPT | Mod: S$GLB,,, | Performed by: INTERNAL MEDICINE

## 2018-02-15 RX ORDER — TOPIRAMATE 100 MG/1
100 CAPSULE, EXTENDED RELEASE ORAL DAILY
Qty: 30 CAPSULE | Refills: 5 | Status: SHIPPED | OUTPATIENT
Start: 2018-02-15 | End: 2018-05-28 | Stop reason: SDUPTHER

## 2018-02-15 RX ORDER — PHENTERMINE HYDROCHLORIDE 37.5 MG/1
TABLET ORAL
Qty: 30 TABLET | Refills: 1 | Status: SHIPPED | OUTPATIENT
Start: 2018-02-15 | End: 2018-05-28 | Stop reason: SDUPTHER

## 2018-02-15 NOTE — PATIENT INSTRUCTIONS
Patient was informed that topiramate is used for migraine prevention and seizures. Weight loss is a common side effect that is well documented. She understands this. She was informed of the potential side effects such as serious and possibly fatal rash in which case the medication should be discontinued immediately. Paresthesias, forgetfulness, fatigue, kidney stones, GI symptoms, and changes in lab values such as electrolytes, blood counts and kidney function.    Patient warned of common side effects of phentermine including anxiety, insomnia, palpitations and increased blood pressure. It was also explained that it is for short-term usage along with diet and exercise, and that stopping the medication without making lifestyle changes will result in regain of weight. Patient states understanding.     Weight loss medications are controlled substances.  They require routine follow up. Prescription or pills that are lost or destroyed will not be replaced.     Start phentermine with 1/2 pill a day.      3 meals a day made up of the following:  Unlimited green vegetables, tomatoes, mushrooms, spaghetti squash, cauliflower, meat, poultry, seafood, eggs and hard cheeses.   Milk and plain yogurt  Dressings, seasonings, condiments, etc should have less than 2 g sugars.   beans or nuts can have 1 x a day.   1-2 servings of citrus fruits, berries, pineapple or melon a day (1/2 cup)  Avoid fried foods    No grains, rice, pasta, potatoes, bread, corn, peas, oatmeal, grits, tortillas, crackers, chips    No soda, sweet tea, juices or lemonade    [email or web address]-[employer] for recipes.        Dinner in Under 30 minutes and 400 calories.     Baked Chicken breast with Broccoli Cheese Casserole  2-3 chicken breast (approximately 6-8 oz each)    2 tsp each garlic and herb  Dash seasoning   2 tsp your favorite creole seasoning  2 tablespoons of balsamic vinegar  2 - 10 oz packages of frozen broccoli  1 egg   ½ cup skim milk  ½ tsp paprika    ½ tsp cayenne pepper   1 can fat free cream of mushroom soup.   1 .5 cups of shredded cheddar cheese    Pre-heat oven to 450 degrees. Toss 2-3 chicken breast (approximately 6-8 oz each) in 2 tsp each garlic and herb Mrs. Dash seasoning, 2 tsp your favorite creole seasoning, and 2 tablespoons of balsamic vinegar. This can also be done up to 24 hours ahead of time, and the chicken can be left in the refrigerator to marinate. Place on a baking sheet sprayed with non-stick cooking spray and bake on 450 degrees for 10 min, then reduce heat to 350 degrees and cook an addition 10-15 minutes until chicken is cooked through.   While chicken is baking, microwave safe dish, thaw 2 - 10 oz packages of frozen broccoli. Drain any excess water, season with salt, pepper, all-purpose seasoning of your choice. In another bowl, whisk together 1 egg, ½ cup skim milk, ½ tsp paprika, ½ tsp cayenne pepper and 1 can fat free cream of mushroom soup. Combine broccoli, soup mixture, 1 cup of shredded cheddar cheese in baking dish sprayed with cooking spray. Top with remaining cheese. Bake in the oven with chicken for about 20 min or until set cheese is melted and bay.     Makes 4 servings. 389 calories per serving.10 g fat, 13 g carbs, 54g protein     Sheet Pan [place] Shrimp  1 pound large shrimp, shelled, peeled and deveined.   2 tablespoon olive oil  The zest and the juice of 1 lime  ½ tsp chili powder  ½-1 tsp cayenne pepper  1 tsp cumin  ½ tsp dry oregano  1 red bell pepper  1 green bell pepper  1 red onion  2 cups mushrooms, quartered  1 avocado  Whole bria lettuce leaves  Preheat oven to 375 degrees.  In a bowl combine shrimp, lime juice, lime zest, cumin, cayenne pepper, chili powder, and a pinch of salt. Set aside.   Slice peppers and onions into thin strips. Toss in 1 tablespoon of olive oil. Sprinkle with salt and pepper and arrange in a single layer over 1/3 of a large sheet pan  Toss mushrooms with remaining olive oil,  oregano, salt and pepper. Arrange in single layer on sheet pan. Place sheet pan in oven for about 15-20 minutes until vegetables are softened, cooked about ¾ of the way through. Remove the sheet pan from oven.  Change setting on oven to low broil.  If needed, rearrange vegetables to make room for shrimp. Arrange the shrimp in a single layer on the sheet pan and return pan to oven. After 5 minutes, flip shrimp. Cook 3-5 additional minutes, or until  Shrimp are opaque.   Serve shrimp and cooked vegetables on lettuce leaves with sliced avocado.   Makes 4 servings. Calories per servin; 23g fat, 19 g carbohydrates, 27g protein.    Zoodles Primavera with Seasoned Ricotta  8 cups zucchini noodles. These can be purchased already prepared, or you can make them on your own with whole zucchini and a vegetable spiralizer.   1.5 cups cherry tomatoes, quartered  2 cups sliced mushrooms  1 cup chopped fresh broccoli  1 cup frozen peas and carrots  2 cloves garlic, finely chopped  16 oz part skim ricotta cheese  2 oz Neufchatel cream cream cheese, cut into small cubes  Juice and zest of 1 lemon  1 pinch red pepper flakes    2 tsp Italian seasoning  4-5 leaves fresh basil, thinly sliced  1 tablespoon of olive oil  Parmesan cheese for topping (optional)     In a bowl, combine ricotta cheese, 1 tsp Italian seasoning, ½ teaspoon lemon zest. Season with salt and pepper to taste. Mix well. Fold in half of fresh basil. Set aside.   Heat a large skillet to medium high. Add olive oil. Sautee mushrooms until starting to become tender. Season them with salt and pepper while cooking.  Add broccoli and peas and carrots. Reduce heat to medium. Cover pan and cook for 4-5 minutes until broccoli is tender and peas and carrots are warmed through. Add Neuf[place] cheese, Italian seasoning, red pepper flakes, 1 tsp lemon zest and lemon juice. Stir until a smooth sauce is formed. Add zucchini noodles (zoodles) to skillet and toss in sauce, then  add cherry tomatoes.  Separate zoodle and vegetables into 4 equal portions. Serve each with a ¼ cup serving of seasoned ricotta cheese. Sprinkle with grated parmesan cheese or fresh basil,  if desired.   Makes 4 servings. Calories per servin calories, 32 g carbs, 33 g protein, 18 g fat

## 2018-02-15 NOTE — PROGRESS NOTES
Subjective:       Patient ID: Lachelle Engel is a 42 y.o. female.    Chief Complaint: No chief complaint on file.    Pt here today for follow-up. Has lost 5 more lbs, net neg 16  lbs. On metformin.  Added LCHF diet and diethylpropion.  No signs hypoglycemia.  She sometimes skipped a few a few days. She did have emergency appendenctomy. Back to normal activity.     Had switched to topiramate.She had a period where she was fatigued. She did take 2 at a time. Some visual SE>          Review of Systems   Constitutional: Negative for chills and fever.   Respiratory: Negative for shortness of breath.         + snores   Cardiovascular: Positive for leg swelling. Negative for chest pain.   Gastrointestinal: Negative for constipation and diarrhea.   Genitourinary: Negative for difficulty urinating and menstrual problem.        S/p ablation   Musculoskeletal: Negative for arthralgias and back pain.   Neurological: Negative for dizziness and light-headedness.   Psychiatric/Behavioral: Negative for dysphoric mood. The patient is not nervous/anxious.        Objective:     There were no vitals taken for this visit.    Physical Exam   Constitutional: She is oriented to person, place, and time. She appears well-developed. No distress.   Morbidly obese     HENT:   Head: Normocephalic and atraumatic.   Eyes: EOM are normal. Pupils are equal, round, and reactive to light. No scleral icterus.   Neck: Normal range of motion. Neck supple.   Cardiovascular: Normal rate and normal heart sounds.  Exam reveals no gallop and no friction rub.    No murmur heard.  Pulmonary/Chest: Effort normal and breath sounds normal. No respiratory distress. She has no wheezes.   Abdominal: She exhibits no distension.   Musculoskeletal: Normal range of motion. She exhibits no edema.   Neurological: She is alert and oriented to person, place, and time. No cranial nerve deficit.   Skin: Skin is warm and dry. No erythema.   Psychiatric: She has a normal  mood and affect. Her behavior is normal. Judgment normal.   Vitals reviewed.      Assessment:       1. IFG (impaired fasting glucose)    2. Severe obesity with body mass index (BMI) of 35.0 to 39.9        Plan:           1. IFG (impaired fasting glucose)  Recheck after 10% TBW lost.       2. Severe obesity with body mass index (BMI) of 35.0 to 39.9    - phentermine (ADIPEX-P) 37.5 mg tablet; 1/2 tab po daily  Dispense: 30 tablet; Refill: 1    3 meals a day made up of the following:  Unlimited green vegetables, tomatoes, mushrooms, spaghetti squash, cauliflower, meat, poultry, seafood, eggs and hard cheeses.   Milk and plain yogurt  Dressings, seasonings, condiments, etc should have less than 2 g sugars.   beans or nuts can have 1 x a day.   1-2 servings of citrus fruits, berries, pineapple or melon a day (1/2 cup)  Avoid fried foods    No grains, rice, pasta, potatoes, bread, corn, peas, oatmeal, grits, tortillas, crackers, chips    No soda, sweet tea, juices or lemonade    [email or web address].dietdoctor.com for recipes.    30 min recipes tips given.

## 2018-05-28 ENCOUNTER — OFFICE VISIT (OUTPATIENT)
Dept: BARIATRICS | Facility: CLINIC | Age: 43
End: 2018-05-28
Payer: COMMERCIAL

## 2018-05-28 VITALS
WEIGHT: 220.25 LBS | HEART RATE: 80 BPM | SYSTOLIC BLOOD PRESSURE: 110 MMHG | BODY MASS INDEX: 36.69 KG/M2 | DIASTOLIC BLOOD PRESSURE: 70 MMHG | HEIGHT: 65 IN

## 2018-05-28 DIAGNOSIS — E66.01 SEVERE OBESITY WITH BODY MASS INDEX (BMI) OF 35.0 TO 39.9: ICD-10-CM

## 2018-05-28 DIAGNOSIS — R73.01 IMPAIRED FASTING BLOOD SUGAR: Primary | ICD-10-CM

## 2018-05-28 PROCEDURE — 3008F BODY MASS INDEX DOCD: CPT | Mod: CPTII,S$GLB,, | Performed by: INTERNAL MEDICINE

## 2018-05-28 PROCEDURE — 99999 PR PBB SHADOW E&M-EST. PATIENT-LVL III: CPT | Mod: PBBFAC,,, | Performed by: INTERNAL MEDICINE

## 2018-05-28 PROCEDURE — 99213 OFFICE O/P EST LOW 20 MIN: CPT | Mod: S$GLB,,, | Performed by: INTERNAL MEDICINE

## 2018-05-28 RX ORDER — TOPIRAMATE 100 MG/1
100 CAPSULE, EXTENDED RELEASE ORAL DAILY
Qty: 30 CAPSULE | Refills: 5 | Status: SHIPPED | OUTPATIENT
Start: 2018-05-28 | End: 2018-09-05 | Stop reason: SDUPTHER

## 2018-05-28 RX ORDER — PHENTERMINE HYDROCHLORIDE 37.5 MG/1
TABLET ORAL
Qty: 30 TABLET | Refills: 1 | Status: SHIPPED | OUTPATIENT
Start: 2018-05-28 | End: 2018-09-05 | Stop reason: SDUPTHER

## 2018-05-28 NOTE — PROGRESS NOTES
"Subjective:       Patient ID: Lachelle Engel is a 42 y.o. female.    Chief Complaint: Follow-up    Pt here today for follow-up. Has lost 17more lbs, net neg 33  lbs.  Added LCHF diet and topiramate, and then phentermine. Last filled 4/10/18. Now off the metformin. She feels like she has had more weight loss has been more in the past month. She has been walking for exericise. Plans to start adding some more weight training.       Review of Systems   Constitutional: Negative for chills and fever.   Respiratory: Negative for shortness of breath.         + snores   Cardiovascular: Positive for leg swelling. Negative for chest pain.   Gastrointestinal: Negative for constipation and diarrhea.   Genitourinary: Negative for difficulty urinating and menstrual problem.        S/p ablation   Musculoskeletal: Negative for arthralgias and back pain.   Neurological: Negative for dizziness and light-headedness.   Psychiatric/Behavioral: Negative for dysphoric mood. The patient is not nervous/anxious.        Objective:     /70   Pulse 80   Ht 5' 5" (1.651 m)   Wt 99.9 kg (220 lb 3.8 oz)   BMI 36.65 kg/m²     Physical Exam   Constitutional: She is oriented to person, place, and time. She appears well-developed. No distress.   Morbidly obese     HENT:   Head: Normocephalic and atraumatic.   Eyes: EOM are normal. Pupils are equal, round, and reactive to light. No scleral icterus.   Neck: Normal range of motion. Neck supple.   Cardiovascular: Normal rate and normal heart sounds.    Pulmonary/Chest: Effort normal and breath sounds normal.   Musculoskeletal: Normal range of motion. She exhibits no edema.   Neurological: She is alert and oriented to person, place, and time. No cranial nerve deficit.   Skin: Skin is warm and dry. No erythema.   Psychiatric: She has a normal mood and affect. Her behavior is normal. Judgment normal.   Vitals reviewed.      Assessment:       1. Impaired fasting blood sugar    2. Severe " obesity with body mass index (BMI) of 35.0 to 39.9        Plan:           1. IFG (impaired fasting glucose)  Should be improved on recheck. She has been off of metformin > 3 months now.       2. Severe obesity with body mass index (BMI) of 35.0 to 39.9  The current medical regimen is effective;  continue present plan and medications.   - phentermine (ADIPEX-P) 37.5 mg tablet; 1/2 tab po daily  Dispense: 30 tablet; Refill: 1    Spring recipes.  given.     Patient was informed that topiramate is used for migraine prevention and seizures. Weight loss is a common side effect that is well documented. She understands this. She was informed of the potential side effects such as serious and possibly fatal rash in which case the medication should be discontinued immediately. Paresthesias, forgetfulness, fatigue, kidney stones, GI symptoms, and changes in lab values such as electrolytes, blood counts and kidney function.    Patient warned of common side effects of phentermine including anxiety, insomnia, palpitations and increased blood pressure. It was also explained that it is for short-term usage along with diet and exercise, and that stopping the medication without making lifestyle changes will result in regain of weight. Patient states understanding.     Weight loss medications are controlled substances.  They require routine follow up. Prescription or pills that are lost or destroyed will not be replaced.       3 meals a day made up of the following:  Unlimited green vegetables, tomatoes, mushrooms, spaghetti squash, cauliflower, meat, poultry, seafood, eggs and hard cheeses.   Milk and plain yogurt  Dressings, seasonings, condiments, etc should have less than 2 g sugars.   Beans (1-1.5 cups) or nuts (1/4 cup) can have 1 x a day.   1-2 servings of citrus fruits, berries, pineapple or melon a day (1/2 cup)  Avoid fried foods    No grains, rice, pasta, potatoes, bread, corn, peas, oatmeal, grits, tortillas, crackers,  chips    No soda, sweet tea, juices or lemonade    [email or web address] for recipes. Moderate carb intake

## 2018-05-28 NOTE — PATIENT INSTRUCTIONS
Patient was informed that topiramate is used for migraine prevention and seizures. Weight loss is a common side effect that is well documented. She understands this. She was informed of the potential side effects such as serious and possibly fatal rash in which case the medication should be discontinued immediately. Paresthesias, forgetfulness, fatigue, kidney stones, GI symptoms, and changes in lab values such as electrolytes, blood counts and kidney function.    Patient warned of common side effects of phentermine including anxiety, insomnia, palpitations and increased blood pressure. It was also explained that it is for short-term usage along with diet and exercise, and that stopping the medication without making lifestyle changes will result in regain of weight. Patient states understanding.     Weight loss medications are controlled substances.  They require routine follow up. Prescription or pills that are lost or destroyed will not be replaced.       3 meals a day made up of the following:  Unlimited green vegetables, tomatoes, mushrooms, spaghetti squash, cauliflower, meat, poultry, seafood, eggs and hard cheeses.   Milk and plain yogurt  Dressings, seasonings, condiments, etc should have less than 2 g sugars.   Beans (1-1.5 cups) or nuts (1/4 cup) can have 1 x a day.   1-2 servings of citrus fruits, berries, pineapple or melon a day (1/2 cup)  Avoid fried foods    No grains, rice, pasta, potatoes, bread, corn, peas, oatmeal, grits, tortillas, crackers, chips    No soda, sweet tea, juices or lemonade    [email or web address] for recipes. Moderate carb intake    Spring Recipes:    Low Moor Shake:     Ingredients  ½ cup low fat cottage cheese  ¼ cup vanilla protein powder  1/8 tsp mint extract  2-3 packets of stevia or artificial sweetener of choice  5-10 ice cubes (more or less depending on how thick you would like it)  4 oz of water  2-3 drops of green food coloring OR a small handful of spinach to make it  "green    Put all ingredients in  and  until desired consistency.      "Unstuffed" Cabbage Rolls:    Ingredients:  1 1/2 to 2 pounds lean ground beef or turkey  1 large onion, chopped  1 clove garlic, minced  1 small cabbage, chopped  2 cans (14.5 ounces each) diced tomatoes  1 can (8 ounces) tomato sauce  ¼ - ½ cup water depending on desired consistency  1 teaspoon ground black pepper, salt to taste    Spray large skillet with nonstick cookspray. Heat pan on medium heat. Sauté the onions until tender, and then add the ground beef (or turkey) and cook until the meat is browned.    Add the garlic, cook an additional minute before adding the remaining ingredients. Cover, reduce the heat and simmer about 25 minutes (or until the cabbage is  fork tender)        Not so Fuentes's Pie:    Ingredients  2 (or more) pounds of lean ground beef, or turkey  2 heads of cauliflower or 3 bags of frozen cauliflower, chopped  1 bag of frozen mixed veggies          (NO Corn, Peas or Potatoes!)  1-2 onions  1 egg  1 teaspoon each of basil, garlic powder, pepper, oregano and a little cayenne  4-5 sprays of I cant believe its not butter spray  4 ounces of fat free cream cheese (optional)  Low fat Cheese to top (optional)    Roast cauliflower in oven on 350* F for about 15-20 minutes, until browned and fork tender. (begin waldrop meat while cauliflower is cooking). Place cooked cauliflower in . Add 4 ounces of fat free cream cheese, 4-5 sprays of I cant believe its not butter SPRAY, and spices and puree until creamy.     While cauliflower is roasting, brown meat in large skillet and season to taste. Set aside. Sauté diced onion in skillet until somewhat soft. Set aside with meat. Pour mixed veggies in the skillet to heat on low heat.     Mix the meat, onions, mixed veggies, raw egg and any additional seasonings and put in bottom of 9x13 baking dish. Spread mashed cauliflower mixture over it until smooth.    Bake " at 350 for approximately 30 minutes. Add cheese and bake 5 additional minutes (optional). Serve warm (or reheat later).    Crock Pot Balsamic Pork Tenderloin:    Ingredients:  1 tsp dried thyme  1 tsp ground pepper  ½ tsp salt  3 tbsp dried minced onion  2 tsp dried basil  ¼ cup low sodium broth  ½ cup balsamic vinegar  2 cloves garlic, minced  2 lbs Pork Tenderloin    Mix first 5 ingredients together. Rub pork tenderloin with dry seasoning mixture.  In a large sauté pan, heat ¼ cup balsamic vinegar and garlic on medium heat. Add pork to sauté anderson and sear, waldrop all sides. Add low sodium broth, 1 tbsp at a time to keep tenderloin from sticking or use nonstick cookspray.     Transfer meat to crock pot. Pour remaining balsamic vinegar into sauté pan and continue  to stir for a few minutes to deglaze the pan. Pour juices over the top of the tenderloin in crockpot. Cook on high for 3 hours or until meat thermometer says 170*. Let rest 5-10 minutes and then slice.       Side Dish: Steamed carrots w/ garlic and yoli    Ingredients:  2 garlic cloves, minced  1 lb baby carrots  5-6 sprays of I cant believe its not butter SPRAY  1 tsp minced peeled fresh yoli  1 tbsp chopped fresh cilantro  ½ tsp grated lime rind  1 tbsp fresh lime juice   ¼ tsp salt    Prepare garlic; let stand 10 minutes. Steam carrots, covered in pot, about 10 minutes or until tender.     In a nonstick skillet over medium heat, spray pan w/ I cant believe its not butter SPRAY. Add garlic and yoli and cook 1 minute, stirring constantly. Remove from  heat; stir in carrots, cilantro and remaining ingredients.         Side Dish: Green Bean Almondine    Ingredients:  1 pound fresh green beans, trimmed  1 tbsp bo vinegar  1 ½ tsp water  1 tsp Steven Mustard  ¼ tsp salt  ¼ tsp freshly ground black pepper  1 tbsp sliced almonds, toasted    Cook green beans in boiling water for 4 minutes or until crisp-tender. Drain and plunge beans into ice  water. Drain well. Pat beans dry w/ paper towel.     Combine vinegar, water, mustard, salt and pepper in a medium bowl. Add beans to vinegar mixture; toss to coat well. Sprinkle with toasted almonds.      How to Cook the Perfect Hard Boiled Egg:    Steam in water: Fill a large pot with 1 inch of water. Place steamer insert inside, cover, and bring to a boil over high heat. Add eggs to steamer basket, cover, and continue cooking 12 minute. If serving cold, immediately place eggs in a bowl of ice water and allow to cool for at least 15 minutes before peeling under cool running water. Store in the refrigerator for up to 5 days.    OR    Purchase Dash Go Rapid Egg Boiler: available @ [employer], [employer], [employer], [employer] for ~$15-$20      Classic Egg Salad Recipe:    Ingredients:  8 hard cooked eggs, peeled and coarsely chopped  4-6 tbsp of low fat or fat free andre  2 tbsp celery, chopped  2 tsp kelechi mustard  Dash of cayenne pepper (for spice)  Black pepper, salt to taste    In a medium bowl, combine andre, celery, mustard and cayenne pepper with chopped eggs. Season w/ pepper and salt. Serve over Lettuce leaves.     Get Creative! Add chopped turkey bal and tomato and serve on lettuce leaves for an egg-cellent BLT egg salad or mix lean diced ham, low fat cheddar and tomatoes for  egg salad    Tuna Deviled Eggs:    Ingredients:  12 hard boiled eggs  1 can of tuna packed in water  1 rib celery, diced  ¼ cup low fat andre  1 tsp mustard  Garlic powder, black pepper to taste  Dash of paprika (for finish)    Cut eggs in half lengthwise. Remove yolk and mash in bowl. In separate bowl, mix tuna, celery, low fat andre, mustard and seasonings.  Stir in egg yolks until blended. Stuff eggs and sprinkle dash of paprika      Bal Jalapeno Deviled Eggs:    Ingredients:  12 hard boiled eggs, peeled  ½ cup low fat andre  1 ½ tsp rice vinegar  ¾ tsp ground mustard  ½ packet splenda  2 jalapenos, seeded and chopped, 6  pieces turkey bal, cooked crisp and crumbled  Dash of paprika (for finish)    Cut eggs in half lengthwise. Remove yolk and mash in bowl. Add andre, vinegar, spices and Splenda until well combined. Mix in jalapenos and bal. Stuff eggs and sprinkle w/ dash of paprika      Sugar-Free High Protein Brownie Recipe:    Ingredients:  2 cups of pureed zucchini  4 oz fat free cream cheese  1 large egg  2 scoops chocolate protein powder  1 tbsp pure vanilla extract  1/3 cup unsweetened cocoa powder    ¼ tsp salt  2 tbsp chopped nuts  *8-9 packets of splenda or artificial sweetener of choice    Preheat oven to 350* F.     Line an 8x8 pan w/ parchment paper or spray with nonstick cookspray.     In a medium bowl, blend the fat free cream cheese with egg until creamy. Add chocolate protein powder and cocoa powder until creamy. Add vanilla extract. Stir in pureed zucchini and salt.     The batter will be thick, but not dry. If batter seems dry, add 1-2 tbsp water. Fold in Nuts. Pour batter in prepared pan.     Bake for 30 minutes. Allow to cool completely. Cut into squares and chill to semi-set.     *best if eaten within 2 days but may last 3-4 days in fridge.         Lemon Whip:    Ingredients:  Small box of sugar-free vanilla instant pudding mix  1 small packet of crystal light lemonade mix  2 cups skim milk or fat free fairlife milk  Sugar-free, fat free cool whip     Make sugar-free pudding using 2 cups skim milk according to package. Stir in 1 small packet of crystal light lemonade mix.  Fold in a dollop of sugar-free, fat free cool whip and stir to combine.     Home-made Sugar-free Pudding Pops:    Ingredients:  1 small pkg of sugar-free instant pudding mix (flavor of choice!)  2 cups fat free milk     Beat ingredients with whisk for 2 minutes. Pour into 6 paper or plastic cups or into a popsicle mold. Insert wooden pop stick in center of each cup.     Freeze for 5 hours or until firm. Peel off paper or pull out of  popsicle mold.     Variations: add sugar-free syrups to change up the flavor, such as sugar-free chocolate pudding + sugar free raspberry syrup = chocolate raspberry fudgesicle.

## 2018-08-21 DIAGNOSIS — E66.01 SEVERE OBESITY WITH BODY MASS INDEX (BMI) OF 35.0 TO 39.9: ICD-10-CM

## 2018-08-21 RX ORDER — PHENTERMINE HYDROCHLORIDE 37.5 MG/1
TABLET ORAL
Qty: 30 TABLET | Refills: 1 | OUTPATIENT
Start: 2018-08-21

## 2018-09-05 ENCOUNTER — OFFICE VISIT (OUTPATIENT)
Dept: BARIATRICS | Facility: CLINIC | Age: 43
End: 2018-09-05
Payer: COMMERCIAL

## 2018-09-05 VITALS
WEIGHT: 212.5 LBS | DIASTOLIC BLOOD PRESSURE: 70 MMHG | HEIGHT: 65 IN | SYSTOLIC BLOOD PRESSURE: 130 MMHG | BODY MASS INDEX: 35.4 KG/M2 | HEART RATE: 84 BPM

## 2018-09-05 DIAGNOSIS — E66.01 SEVERE OBESITY WITH BODY MASS INDEX (BMI) OF 35.0 TO 39.9: ICD-10-CM

## 2018-09-05 DIAGNOSIS — R73.01 IMPAIRED FASTING BLOOD SUGAR: Primary | ICD-10-CM

## 2018-09-05 PROCEDURE — 99213 OFFICE O/P EST LOW 20 MIN: CPT | Mod: S$GLB,,, | Performed by: INTERNAL MEDICINE

## 2018-09-05 PROCEDURE — 3008F BODY MASS INDEX DOCD: CPT | Mod: CPTII,S$GLB,, | Performed by: INTERNAL MEDICINE

## 2018-09-05 PROCEDURE — 99999 PR PBB SHADOW E&M-EST. PATIENT-LVL III: CPT | Mod: PBBFAC,,, | Performed by: INTERNAL MEDICINE

## 2018-09-05 RX ORDER — PHENTERMINE HYDROCHLORIDE 37.5 MG/1
TABLET ORAL
Qty: 30 TABLET | Refills: 1 | Status: SHIPPED | OUTPATIENT
Start: 2018-09-05 | End: 2018-12-26

## 2018-09-05 RX ORDER — TOPIRAMATE 100 MG/1
100 CAPSULE, EXTENDED RELEASE ORAL DAILY
Qty: 30 CAPSULE | Refills: 5 | Status: SHIPPED | OUTPATIENT
Start: 2018-09-05 | End: 2019-04-10

## 2018-09-05 NOTE — PATIENT INSTRUCTIONS
Patient warned of common side effects of phentermine including anxiety, insomnia, palpitations and increased blood pressure. It was also explained that it is for short-term usage along with diet and exercise, and that stopping the medication without making lifestyle changes will result in regain of weight. Patient states understanding.     Weight loss medications are controlled substances.  They require routine follow up. Prescription or pills that are lost or destroyed will not be replaced.     Continue with exercise      Patient was informed that topiramate is used for migraine prevention and seizures. Weight loss is a common side effect that is well documented. She understands this. She was informed of the potential side effects such as serious and possibly fatal rash in which case the medication should be discontinued immediately. Paresthesias, forgetfulness, fatigue, kidney stones, GI symptoms, and changes in lab values such as electrolytes, blood counts and kidney function.    3 meals a day made up of the following:  Unlimited green vegetables, tomatoes, mushrooms, spaghetti squash, cauliflower, meat, poultry, seafood, eggs and hard cheeses.   Milk and plain yogurt  Dressings, seasonings, condiments, etc should have less than 2 g sugars.   Beans (1-1.5 cups) or nuts (1/4 cup) can have 1 x a day.   1-2 servings of citrus fruits, berries, pineapple or melon a day (1/2 cup)  Avoid fried foods    No grains, rice, pasta, potatoes, bread, corn, peas, oatmeal, grits, tortillas, crackers, chips    No soda, sweet tea, juices or lemonade    [email or web address] for recipes. Moderate carb intake    Lower Carb Comfort Food Dupes      Skinny Bell Pepper Peng Boats  Yields: 18 boats  Servin boats  Calories: 145  Total Fat: 9g  Saturated Fat: 4g  Trans Fat: 0g  Cholesterol: 50mg  Sodium: 293mg  Carbohydrates: 4g  Fiber: 1g  Sugars: 2g  Protein: 13g  SmartPoints: 4     Ingredients   1 pound lean ground  turkey   1 teaspoons chili powder   1 teaspoon cumin   1/2 teaspoon black pepper   1/4 teaspoon kosher or sea salt   3/4 cup salsa, no sugar added   1 cup grated cheddar cheese, reduced-fat   3 bell peppers  Directions  Remove seeds, core, and membrane from bell peppers then slice each one into 6 verticle pieces where they dip down. Set sliced bell peppers aside.  Cook ground turkey over medium-high heat, breaking up as it cooks. Cook until the turkey loses it's pink color and is cooked through. Drain off any fat.  Preheat oven to 375 degrees.  Combine cooked turkey with spices and salsa. Evenly distribute mixture into the bell pepper boats, top with cheese. Bake on a parchment lined baking sheet for 10 minutes or until cheese is melted and peppers are hot.  NOTE: If you prefer much softer bell peppers, add a few tablespoons water to the bottom of a large casserole dish, add filled nachos, cover tightly with foil and bake 15 minutes.  Remove from the oven and add additional toppings, If desired.  Optional ingredients: sliced Jalepeno peppers, diced avocado, fat-free Greek yogurt or sour cream, or sliced green onions.    [place] Chicken Spaghetti Squash  Yields: 4 servings  Calories: 457  Total Fat: 23g  Saturated Fat: 8g  Trans Fat: 0g  Cholesterol: 201mg  Sodium: 1146mg  Carbohydrates: 19g  Fiber: 4g  Sugar: 9g  Protein: 44g  SmartPoints: 13    Ingredients   1 large spaghetti squash   1 small onion, diced   2 medium carrots, diced   2 celery stalks, diced   1 pound cooked chicken, shredded   1/2 cup hot sauce   1/4 cup Homemade Ranch dressing   1/2 teaspoon garlic powder   salt and pepper to taste   1 cup low-fat shredded cheddar cheese   2 eggs   1/4 cup green onion, chopped  Directions  Preheat oven to 400 degrees F and spray a baking sheet with cooking spray.  Slice your spaghetti squash in half lengthwise and scoop out the seeds, then spray the cut side of the squash with a little  olive oil cooking spray and place cut side down on the baking pan.  Roast spaghetti squash for 30-45 minutes or until it is tender.  While the squash is cooking, sauté the onion, celery, and carrots until softened and mostly cooked through.  In a large bowl, combine the sauteed vegetables, chicken, hot sauce, ranch dressing, garlic powder, salt, pepper, eggs, and cheese.  Once the squash is cooked through, allow to cool slightly then use a fork to scrape the insides into your chicken mixture, making sure not to tear the skins.  Make sure that the spaghetti squash is well incorporated with the other ingredients, then divide the mixture between the spaghetti squash halves.  Bake at 350 degrees for 30-35 minutes, or until hot and bubbly.  Remove from the oven and garnish with the green onions and additional ranch or hot sauce if desired.    Vianey Zucchini Boats  Servings: 8 zucchini boats  Ingredients   Report this ad    4 medium zucchini (2 1/2 lbs), sliced into halves through the length*   1 cup (8.6 oz) part-skim ricotta cheese   1 large egg   1 1/2 Tbsp chopped fresh parsley , plus more for garnish   1 1/4 cups (5 oz) shredded mozzarella cheese   1/2 cup (2 oz) finely shredded parmesan cheese   8 oz 93% lean ground beef or lean ground turkey   4 tsp olive oil , divided   Salt and freshly ground black pepper   1 3/4 cup roasted garlic marinara sauce (low sugar)   1 Tbsp chopped fresh basil , plus more for garnish  Instructions  1. Preheat oven to 400 degrees. Using a spoon, scoop centers from zucchini while leaving a 1/4-inch rim to create boats. Set aside.  2. In a mixing bowl stir together ricotta cheese, egg and 1 1/2 Tbsp of the parsley. Season lightly with salt and pepper. Stir in 1/2 cup of the mozzarella cheese and the parmesan cheese. Set aside.  3. Heat 2 tsp of the olive oil in a large non-stick skillet over medium-high heat. Crumble beef into pan, season with salt and pepper and cook,  stirring occasionally and breaking up beef when stirring, until browned (there shouldn't be any excess fat but if you happened to use a fattier beef then just drain excess rendered fat). Stir in marinara sauce and 1 Tbsp of the basil, remove from heat.  4. To assemble boats, brush both sides of of zucchini lightly with remaining 2 tsp olive oil and place in two baking pans (I used a 13 by 9 and a 9 by 9). Divide cheese mixture among zucchini spooning about 2 1/2 Tbsp into each, then spread cheese mixture into and even layer. Divide sauce among zucchini adding a few heaping spoonfuls to each. Cover baking dishes with foil and place in oven side by side and bake in preheated oven 30 minutes. Remove from oven, sprinkle tops with remaining 3/4 cup mozzarella, return to oven and bake until cheese has melted and zucchini is tender, about 5 minutes. Sprinkle tops with with fresh basil and parsley and serve warm.  5. *Look for zucchini that is wider and more uniform in width. The skinnier zucchini won't fit much filling.  6. Recipe source: [employer]    Chicken Avocado Lime Soup  Prep Time: 15 minutes  Cook Time: 20 minutes  Ingredients   Report this ad    1 1/2 lbs boneless skinless chicken breasts*   1 Tbsp olive oil   1 cup chopped green onions (including whites, mince the whites)   2 jalapeños , seeded and minced (leave seeds if you want soup spicy, omit if you don't like heat)   2 cloves garlic , minced   4 (14.5 oz) cans low-sodium chicken broth   2 Purlear tomatoes , seeded and diced   1/2 tsp ground cumin   Salt and freshly ground black pepper   1/3 cup chopped cilantro   3 Tbsp fresh lime juice   3 medium avocados , peeled, cored and diced   Tortilla chips , lloyd beatris cheese, sour cream for serving (optional)    Instructions  1. In a large pot heat 1 Tbsp olive oil over medium heat. Once hot, add green onions and jalapenos and saute until tender, about 2 minutes, adding garlic during last 30  "seconds of sauteing. Add chicken broth, tomatoes, cumin, season with salt and pepper to taste and add chicken breasts. Bring mixture to a boil over medium-high heat. Then reduce heat to medium, cover with lid and allow to cook, stirring occasionally, until chicken has cooked through 10 - 15 minutes (cook time will vary based on thickness of chicken breasts). Reduce burner to warm heat, remove chicken from pan and let rest on a cutting board 5 minutes, then shred chicken and return to soup. Stir in cilantro and lime juice. Add avocados to soup just before serving (if you don't plan on serving the soup right away, I would recommend adding the avocados to each bowl individually, about 1/2 an avocado per serving). Serve with tortilla chips, cheese and sour cream if desired.  2. *For thicker chicken breasts, cut breasts in half through the length (thickness) of the breasts, they will cook faster and more evenly.  3. Recipe Source: adapted slightly from [facility]        CAULIFLOWER "MAC" AND CHEESE    INGREDIENTS   1 small head cauliflower cut into small florets about 5-6 cups   1/2 small onion, diced   1 teaspoon olive oil   Kosher salt   Freshly ground black pepper   2 tablespoons parsley   1 teaspoon paprika   2 tablespoons butter   2 tablespoons flour   1 1/4 cups milk, (whole milk or coconut is best)   1/2 teaspoon granulated garlic   1 teaspoon mustard (optional)   1/2 teaspoon paprika   2 1/2 cups grated extra sharp cheddar cheese (reserve 1/2 cup)     PREPARATION  1. Preheat oven to 400°F. Place cauliflower florets on a baking sheet, mix with diced onion and oil, sprinkle with salt and pepper. Roast for 20-25 minutes tossing half way through until browned.  2. Warm the milk in the microwave, so it is just heated through (this helps prevent the cheese sauce from clumping). Heat a medium saucepan over medium med-high heat. Add 2 tablespoons butter and flour then whisk for 2 minutes (until a smooth " therese is made), add milk and continue whisking until sauce thickens. Once smooth add salt and pepper and other spices. Then add the cheese reserving 1/2 cup. Mix with spatula and add cauliflower, stir gently. Now add the reserved cheese, mix just enough to evenly distribute.   4. Place mixture into a 8x8 inch greased casserole dish and cover with paprika and parsley. Bake in oven for 20 minutes until toasty and bubbly.

## 2018-09-05 NOTE — PROGRESS NOTES
"Subjective:       Patient ID: Lachelle Engel is a 42 y.o. female.    Chief Complaint: Follow-up    Pt here today for follow-up. Has lost 8 more lbs, net neg 41 lbs.  Added LCHF diet and topiramate, and then phentermine. Last filled 8/18/18. She has been exercising. She does feel like the medication is still helping. Sometimes hungrier in the evening time.       Review of Systems   Constitutional: Negative for chills and fever.   Respiratory: Negative for shortness of breath.         + snores   Cardiovascular: Positive for leg swelling. Negative for chest pain.   Gastrointestinal: Negative for constipation and diarrhea.   Genitourinary: Negative for difficulty urinating and menstrual problem.        S/p ablation   Musculoskeletal: Negative for arthralgias and back pain.   Neurological: Negative for dizziness and light-headedness.   Psychiatric/Behavioral: Negative for dysphoric mood. The patient is not nervous/anxious.        Objective:     /70   Pulse 84   Ht 5' 5" (1.651 m)   Wt 96.4 kg (212 lb 8.4 oz)   BMI 35.37 kg/m²     Physical Exam   Constitutional: She is oriented to person, place, and time. She appears well-developed. No distress.   Morbidly obese     HENT:   Head: Normocephalic and atraumatic.   Eyes: EOM are normal. Pupils are equal, round, and reactive to light. No scleral icterus.   Neck: Normal range of motion. Neck supple.   Cardiovascular: Normal rate and normal heart sounds.   Pulmonary/Chest: Effort normal and breath sounds normal.   Musculoskeletal: Normal range of motion. She exhibits no edema.   Neurological: She is alert and oriented to person, place, and time. No cranial nerve deficit.   Skin: Skin is warm and dry. No erythema.   Psychiatric: She has a normal mood and affect. Her behavior is normal. Judgment normal.   Vitals reviewed.      Assessment:       1. Impaired fasting blood sugar    2. Severe obesity with body mass index (BMI) of 35.0 to 39.9        Plan:           1. " IFG (impaired fasting glucose)  Should be improved on recheck. She has been off of metformin > 3 months now.       2. Severe obesity with body mass index (BMI) of 35.0 to 39.9  The current medical regimen is effective;  continue present plan and medications.   - phentermine (ADIPEX-P) 37.5 mg tablet; 1/2 tab po daily  Dispense: 30 tablet; Refill: 1    Low carb comfort recipes given.     Patient was informed that topiramate is used for migraine prevention and seizures. Weight loss is a common side effect that is well documented. She understands this. She was informed of the potential side effects such as serious and possibly fatal rash in which case the medication should be discontinued immediately. Paresthesias, forgetfulness, fatigue, kidney stones, GI symptoms, and changes in lab values such as electrolytes, blood counts and kidney function.    Patient warned of common side effects of phentermine including anxiety, insomnia, palpitations and increased blood pressure. It was also explained that it is for short-term usage along with diet and exercise, and that stopping the medication without making lifestyle changes will result in regain of weight. Patient states understanding.     Weight loss medications are controlled substances.  They require routine follow up. Prescription or pills that are lost or destroyed will not be replaced.       3 meals a day made up of the following:  Unlimited green vegetables, tomatoes, mushrooms, spaghetti squash, cauliflower, meat, poultry, seafood, eggs and hard cheeses.   Milk and plain yogurt  Dressings, seasonings, condiments, etc should have less than 2 g sugars.   Beans (1-1.5 cups) or nuts (1/4 cup) can have 1 x a day.   1-2 servings of citrus fruits, berries, pineapple or melon a day (1/2 cup)  Avoid fried foods    No grains, rice, pasta, potatoes, bread, corn, peas, oatmeal, grits, tortillas, crackers, chips    No soda, sweet tea, juices or lemonade    [email or web address] for  recipes. Moderate carb intake.

## 2018-09-11 ENCOUNTER — OFFICE VISIT (OUTPATIENT)
Dept: INTERNAL MEDICINE | Facility: CLINIC | Age: 43
End: 2018-09-11
Payer: COMMERCIAL

## 2018-09-11 ENCOUNTER — LAB VISIT (OUTPATIENT)
Dept: LAB | Facility: HOSPITAL | Age: 43
End: 2018-09-11
Attending: INTERNAL MEDICINE
Payer: COMMERCIAL

## 2018-09-11 VITALS
WEIGHT: 218.94 LBS | SYSTOLIC BLOOD PRESSURE: 116 MMHG | HEART RATE: 57 BPM | HEIGHT: 65 IN | BODY MASS INDEX: 36.48 KG/M2 | OXYGEN SATURATION: 99 % | DIASTOLIC BLOOD PRESSURE: 80 MMHG | TEMPERATURE: 99 F

## 2018-09-11 DIAGNOSIS — R00.1 BRADYCARDIA: ICD-10-CM

## 2018-09-11 DIAGNOSIS — R42 DIZZINESS: Primary | ICD-10-CM

## 2018-09-11 DIAGNOSIS — R42 DIZZINESS: ICD-10-CM

## 2018-09-11 LAB
ALBUMIN SERPL BCP-MCNC: 3.9 G/DL
ALP SERPL-CCNC: 93 U/L
ALT SERPL W/O P-5'-P-CCNC: 19 U/L
ANION GAP SERPL CALC-SCNC: 6 MMOL/L
AST SERPL-CCNC: 16 U/L
BASOPHILS # BLD AUTO: 0.01 K/UL
BASOPHILS NFR BLD: 0.2 %
BILIRUB SERPL-MCNC: 0.2 MG/DL
BILIRUB UR QL STRIP: NEGATIVE
BUN SERPL-MCNC: 13 MG/DL
CALCIUM SERPL-MCNC: 9.4 MG/DL
CHLORIDE SERPL-SCNC: 109 MMOL/L
CLARITY UR REFRACT.AUTO: CLEAR
CO2 SERPL-SCNC: 25 MMOL/L
COLOR UR AUTO: YELLOW
CREAT SERPL-MCNC: 0.8 MG/DL
DIFFERENTIAL METHOD: NORMAL
EOSINOPHIL # BLD AUTO: 0.1 K/UL
EOSINOPHIL NFR BLD: 1.9 %
ERYTHROCYTE [DISTWIDTH] IN BLOOD BY AUTOMATED COUNT: 12.5 %
EST. GFR  (AFRICAN AMERICAN): >60 ML/MIN/1.73 M^2
EST. GFR  (NON AFRICAN AMERICAN): >60 ML/MIN/1.73 M^2
GLUCOSE SERPL-MCNC: 96 MG/DL
GLUCOSE UR QL STRIP: NEGATIVE
HCT VFR BLD AUTO: 41.8 %
HGB BLD-MCNC: 13.4 G/DL
HGB UR QL STRIP: NEGATIVE
KETONES UR QL STRIP: NEGATIVE
LEUKOCYTE ESTERASE UR QL STRIP: NEGATIVE
LYMPHOCYTES # BLD AUTO: 2.1 K/UL
LYMPHOCYTES NFR BLD: 37.3 %
MCH RBC QN AUTO: 30.9 PG
MCHC RBC AUTO-ENTMCNC: 32.1 G/DL
MCV RBC AUTO: 96 FL
MONOCYTES # BLD AUTO: 0.3 K/UL
MONOCYTES NFR BLD: 4.9 %
NEUTROPHILS # BLD AUTO: 3.2 K/UL
NEUTROPHILS NFR BLD: 55.5 %
NITRITE UR QL STRIP: NEGATIVE
NRBC BLD-RTO: 0 /100 WBC
PH UR STRIP: 5 [PH] (ref 5–8)
PLATELET # BLD AUTO: 153 K/UL
PMV BLD AUTO: 11.4 FL
POTASSIUM SERPL-SCNC: 4.5 MMOL/L
PROT SERPL-MCNC: 6.6 G/DL
PROT UR QL STRIP: NEGATIVE
RBC # BLD AUTO: 4.34 M/UL
SODIUM SERPL-SCNC: 140 MMOL/L
SP GR UR STRIP: 1.01 (ref 1–1.03)
TSH SERPL DL<=0.005 MIU/L-ACNC: 1.88 UIU/ML
URN SPEC COLLECT METH UR: NORMAL
UROBILINOGEN UR STRIP-ACNC: NEGATIVE EU/DL
WBC # BLD AUTO: 5.68 K/UL

## 2018-09-11 PROCEDURE — 99213 OFFICE O/P EST LOW 20 MIN: CPT | Mod: S$GLB,,, | Performed by: INTERNAL MEDICINE

## 2018-09-11 PROCEDURE — 84443 ASSAY THYROID STIM HORMONE: CPT

## 2018-09-11 PROCEDURE — 93005 ELECTROCARDIOGRAM TRACING: CPT | Mod: S$GLB,,, | Performed by: INTERNAL MEDICINE

## 2018-09-11 PROCEDURE — 93010 ELECTROCARDIOGRAM REPORT: CPT | Mod: S$GLB,,, | Performed by: INTERNAL MEDICINE

## 2018-09-11 PROCEDURE — 3008F BODY MASS INDEX DOCD: CPT | Mod: CPTII,S$GLB,, | Performed by: INTERNAL MEDICINE

## 2018-09-11 PROCEDURE — 80053 COMPREHEN METABOLIC PANEL: CPT

## 2018-09-11 PROCEDURE — 81003 URINALYSIS AUTO W/O SCOPE: CPT

## 2018-09-11 PROCEDURE — 99999 PR PBB SHADOW E&M-EST. PATIENT-LVL III: CPT | Mod: PBBFAC,,, | Performed by: INTERNAL MEDICINE

## 2018-09-11 PROCEDURE — 85025 COMPLETE CBC W/AUTO DIFF WBC: CPT

## 2018-09-11 PROCEDURE — 36415 COLL VENOUS BLD VENIPUNCTURE: CPT

## 2018-09-12 NOTE — PROGRESS NOTES
Subjective:       Patient ID: Lachelle Engel is a 42 y.o. female.    Chief Complaint: Dizziness    Patient presents for an urgent visit c/o dizziness with LOW blood pressure. Onset yesterday afternoon while at work, lightheadedness that made her feel faint. No syncope or collapse. 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 She was advised rest and hydration, and f/u with a doctor. Awoke this morning feeling somewhat better, but not quite herself. Has felt fatigued for the past few days, but no other symptoms reported. No recent travel or activity out of the ordinary. No new medications - currently taking Phentermine and Topiramate for weight management but states she's been on them for several months with no problems. Has been eating well and hydrating, and getting adequate rest. Her dizziness is not vertiginous in nature.     PMH: Psoriasis, not currently on any of the immune suppressants listed on the chart. Thyroid disease not currently on medication.   NKDA.  Some day smoker, social alcohol. Teacher.         Review of Systems   Constitutional: Positive for fatigue. Negative for activity change, appetite change, chills, diaphoresis, fever and unexpected weight change.   HENT: Negative for congestion, hearing loss, rhinorrhea, sneezing, sore throat, trouble swallowing and voice change.    Eyes: Negative for pain and visual disturbance.   Respiratory: Negative for cough, chest tightness, shortness of breath and wheezing.    Cardiovascular: Negative for chest pain, palpitations and leg swelling.   Gastrointestinal: Negative for abdominal pain, blood in stool, constipation, diarrhea, nausea and vomiting.   Genitourinary: Negative for difficulty urinating, dysuria, flank pain, frequency, hematuria, menstrual problem and urgency.   Musculoskeletal: Negative for arthralgias, back pain, gait problem, joint swelling, myalgias and neck pain.   Skin: Negative for  "color change and rash.   Neurological: Negative for syncope, facial asymmetry, speech difficulty, weakness, numbness and headaches.       Objective:    /80, Pulse 57, Temp 98.5, O2 Sat 99%, Ht 5' 5", Wt 218.8 lbs  Physical Exam   Constitutional: She is oriented to person, place, and time. She appears well-developed and well-nourished. No distress.   HENT:   Head: Normocephalic and atraumatic.   Right Ear: External ear normal.   Left Ear: External ear normal.   Nose: Nose normal.   Mouth/Throat: Oropharynx is clear and moist. No oropharyngeal exudate.   Eyes: Conjunctivae and EOM are normal. Pupils are equal, round, and reactive to light. Right conjunctiva is not injected. Left conjunctiva is not injected. No scleral icterus.   Neck: Normal range of motion. Neck supple. No JVD present. Carotid bruit is not present. No thyromegaly present.   Cardiovascular: Normal rate, regular rhythm, normal heart sounds and intact distal pulses. Exam reveals no gallop and no friction rub.   No murmur heard.  Pulmonary/Chest: Effort normal and breath sounds normal. No respiratory distress. She has no wheezes. She has no rhonchi. She has no rales.   Abdominal: Soft. Bowel sounds are normal. She exhibits no distension and no mass. There is no hepatosplenomegaly. There is no tenderness. There is no CVA tenderness.   Musculoskeletal: Normal range of motion. She exhibits no edema.   Lymphadenopathy:     She has no cervical adenopathy.   Neurological: She is alert and oriented to person, place, and time. She has normal strength and normal reflexes. No cranial nerve deficit. She exhibits normal muscle tone. Coordination and gait normal.   Skin: Skin is warm and dry. No lesion and no rash noted. She is not diaphoretic. No cyanosis or erythema. No pallor. Nails show no clubbing.   Psychiatric: She has a normal mood and affect. Her behavior is normal. Thought content normal.   Vitals reviewed.      EKG: NSR 62 bpm, with sinus arrhythmia, " otherwise normal and unchanged from EKG of July 2012.     Assessment:       1. Dizziness    2. Bradycardia        Plan:       Dizziness  -     CBC auto differential; Future; Expected date: 09/11/2018  -     Comprehensive metabolic panel; Future; Expected date: 09/11/2018  -     TSH; Future; Expected date: 09/11/2018  -     Urinalysis    Bradycardia  -     IN OFFICE EKG 12-LEAD (to [facility])  -     TSH; Future; Expected date: 09/11/2018    Advised regular meals, avoid skipping; and drink plenty water. Call if any new symptoms develop, and consider side effects of medication as causative.

## 2018-09-23 ENCOUNTER — PATIENT MESSAGE (OUTPATIENT)
Dept: INTERNAL MEDICINE | Facility: CLINIC | Age: 43
End: 2018-09-23

## 2018-12-26 ENCOUNTER — OFFICE VISIT (OUTPATIENT)
Dept: BARIATRICS | Facility: CLINIC | Age: 43
End: 2018-12-26
Payer: COMMERCIAL

## 2018-12-26 VITALS
BODY MASS INDEX: 36.1 KG/M2 | HEIGHT: 65 IN | SYSTOLIC BLOOD PRESSURE: 110 MMHG | WEIGHT: 216.69 LBS | HEART RATE: 64 BPM | DIASTOLIC BLOOD PRESSURE: 74 MMHG

## 2018-12-26 DIAGNOSIS — R73.01 IMPAIRED FASTING BLOOD SUGAR: ICD-10-CM

## 2018-12-26 DIAGNOSIS — E66.01 CLASS 2 SEVERE OBESITY WITH BODY MASS INDEX (BMI) OF 35 TO 39.9 WITH SERIOUS COMORBIDITY: ICD-10-CM

## 2018-12-26 PROCEDURE — 99213 OFFICE O/P EST LOW 20 MIN: CPT | Mod: S$GLB,,, | Performed by: INTERNAL MEDICINE

## 2018-12-26 PROCEDURE — 99999 PR PBB SHADOW E&M-EST. PATIENT-LVL III: CPT | Mod: PBBFAC,,, | Performed by: INTERNAL MEDICINE

## 2018-12-26 PROCEDURE — 3008F BODY MASS INDEX DOCD: CPT | Mod: CPTII,S$GLB,, | Performed by: INTERNAL MEDICINE

## 2018-12-26 RX ORDER — DIETHYLPROPION HYDROCHLORIDE 75 MG/1
75 TABLET, EXTENDED RELEASE ORAL DAILY
Qty: 30 TABLET | Refills: 2 | Status: SHIPPED | OUTPATIENT
Start: 2018-12-26 | End: 2019-04-10

## 2018-12-26 NOTE — PROGRESS NOTES
"Subjective:       Patient ID: Lachelle Engel is a 43 y.o. female.    Chief Complaint: Follow-up    Pt here today for follow-up. Has gained 4 lbs, net neg 37 lbs.  Added LCHF diet and topiramate, and then phentermine. Last filled 12/17/18. She has been exercising. She does feel like the medication is still helping, some effect may be wearing off. Does say she had a vacation and then going through the holidays. BS was 96 non fasting on recent labs.                    Review of Systems   Constitutional: Negative for chills and fever.   Respiratory: Negative for shortness of breath.         + snores   Cardiovascular: Positive for leg swelling. Negative for chest pain.   Gastrointestinal: Negative for constipation and diarrhea.   Genitourinary: Negative for difficulty urinating and menstrual problem.        S/p ablation   Musculoskeletal: Negative for arthralgias and back pain.   Neurological: Negative for dizziness and light-headedness.   Psychiatric/Behavioral: Negative for dysphoric mood. The patient is not nervous/anxious.        Objective:     /74   Pulse 64   Ht 5' 5" (1.651 m)   Wt 98.3 kg (216 lb 11.4 oz)   BMI 36.06 kg/m²     Physical Exam   Constitutional: She is oriented to person, place, and time. She appears well-developed. No distress.   Morbidly obese     HENT:   Head: Normocephalic and atraumatic.   Eyes: EOM are normal. Pupils are equal, round, and reactive to light. No scleral icterus.   Neck: Normal range of motion. Neck supple.   Cardiovascular: Normal rate.   Pulmonary/Chest: Effort normal.   Musculoskeletal: Normal range of motion. She exhibits no edema.   Neurological: She is alert and oriented to person, place, and time. No cranial nerve deficit.   Skin: Skin is warm and dry. No erythema.   Psychiatric: She has a normal mood and affect. Her behavior is normal. Judgment normal.   Vitals reviewed.      Assessment:       1. Impaired fasting blood sugar    2. Class 2 severe obesity " with body mass index (BMI) of 35 to 39.9 with serious comorbidity        Plan:             Lachelle Gunderson was seen today for follow-up.    Diagnoses and all orders for this visit:    Impaired fasting blood sugar  Labs improving.   Class 2 severe obesity with body mass index (BMI) of 35 to 39.9 with serious comorbidity  -     diethylpropion 75 mg TbSR; Take 75 mg by mouth once daily.            Patient warned of common side effects of diethylpropion including anxiety, insomnia, palpitations and increased blood pressure. It was also explained that it is for short-term usage along with diet and exercise, and that stopping the medication without making lifestyle changes will result in regain of weight. Patient states understanding.    Weight loss medications are controlled substances.  They require routine follow up. Prescription or pills that are lost or destroyed will not be replaced.       3 meals a day made up of the following:  Unlimited green vegetables, tomatoes, mushrooms, spaghetti squash, cauliflower, meat, poultry, seafood, eggs and hard cheeses.   Milk and plain yogurt  Dressings, seasonings, condiments, etc should have less than 2 g sugars.   Beans (1-1.5 cups) or nuts (1/4 cup) can have 1 x a day.   1-2 servings of citrus fruits, berries, pineapple or melon a day (1/2 cup)  Avoid fried foods    No grains, rice, pasta, potatoes, bread, corn, peas, oatmeal, grits, tortillas, crackers, chips    No soda, sweet tea, juices or lemonade    [email or web address].[email or web address] for recipes. Moderate carb intake.

## 2018-12-26 NOTE — PATIENT INSTRUCTIONS
Patient warned of common side effects of diethylpropion including anxiety, insomnia, palpitations and increased blood pressure. It was also explained that it is for short-term usage along with diet and exercise, and that stopping the medication without making lifestyle changes will result in regain of weight. Patient states understanding.    Weight loss medications are controlled substances.  They require routine follow up. Prescription or pills that are lost or destroyed will not be replaced.       3 meals a day made up of the following:  Unlimited green vegetables, tomatoes, mushrooms, spaghetti squash, cauliflower, meat, poultry, seafood, eggs and hard cheeses.   Milk and plain yogurt  Dressings, seasonings, condiments, etc should have less than 2 g sugars.   Beans (1-1.5 cups) or nuts (1/4 cup) can have 1 x a day.   1-2 servings of citrus fruits, berries, pineapple or melon a day (1/2 cup)  Avoid fried foods    No grains, rice, pasta, potatoes, bread, corn, peas, oatmeal, grits, tortillas, crackers, chips    No soda, sweet tea, juices or lemonade    [email or web address].[email or web address] for recipes. Moderate carb intake.

## 2019-01-20 NOTE — PROGRESS NOTES
"Subjective:       Patient ID: Lachelle Engel is a 42 y.o. female.    Chief Complaint: Follow-up    Pt here today for follow-up. Has lost 11 lbs. On metformin.  Added LCHF diet and diethylpropion.  No signs hypoglycemia. Last filled 10/3/17. She sometimes skipped a few a few days.       Review of Systems   Constitutional: Negative for chills and fever.   Respiratory: Negative for shortness of breath.         + snores   Cardiovascular: Positive for leg swelling. Negative for chest pain.   Gastrointestinal: Negative for constipation and diarrhea.   Genitourinary: Negative for difficulty urinating and menstrual problem.        S/p ablation   Musculoskeletal: Negative for arthralgias and back pain.   Neurological: Negative for dizziness and light-headedness.   Psychiatric/Behavioral: Negative for dysphoric mood. The patient is not nervous/anxious.        Objective:     /70   Pulse 70   Ht 5' 5" (1.651 m)   Wt 110.3 kg (243 lb 2.7 oz)   BMI 40.47 kg/m²     Physical Exam   Constitutional: She is oriented to person, place, and time. She appears well-developed. No distress.   Morbidly obese     HENT:   Head: Normocephalic and atraumatic.   Eyes: EOM are normal. Pupils are equal, round, and reactive to light. No scleral icterus.   Neck: Normal range of motion. Neck supple. No thyromegaly present.   Cardiovascular: Normal rate and normal heart sounds.  Exam reveals no gallop and no friction rub.    No murmur heard.  Pulmonary/Chest: Effort normal and breath sounds normal. No respiratory distress. She has no wheezes.   Abdominal: Soft. Bowel sounds are normal. She exhibits no distension. There is no tenderness.   Musculoskeletal: Normal range of motion. She exhibits no edema.   Neurological: She is alert and oriented to person, place, and time. No cranial nerve deficit.   Skin: Skin is warm and dry. No erythema.   Psychiatric: She has a normal mood and affect. Her behavior is normal. Judgment normal.   Vitals " reviewed.      Assessment:       1. Morbid obesity with BMI of 40.0-44.9, adult    2. Impaired fasting blood sugar        Plan:           1. Impaired fasting blood sugar  Discussed decreasing the risks of diabetes with changes in diet, routine exercise and losing weight.  Continue metformin.      2. Morbid obesity with BMI of 40.0-44.9, adult  Doing well.     3 meals a day made up of the following:  Unlimited green vegetables, tomatoes, mushrooms, spaghetti squash, cauliflower, meat, poultry, seafood, eggs and hard cheeses.   Milk and plain yogurt  Dressings, seasonings, condiments, etc should have less than 2 g sugars.   beans or nuts can have 1 x a day.   1-2 servings of citrus fruits, berries, pineapple or melon a day (1/2 cup)  Avoid fried foods    No grains, rice, pasta, potatoes, bread, corn, peas, oatmeal, grits, tortillas, crackers, chips    No soda, sweet tea, juices or lemonade    [email or web address] for recipes.    Healthy all day and holiday tips given.    No

## 2019-01-31 ENCOUNTER — OFFICE VISIT (OUTPATIENT)
Dept: GASTROENTEROLOGY | Facility: CLINIC | Age: 44
End: 2019-01-31
Payer: COMMERCIAL

## 2019-01-31 VITALS
HEIGHT: 65 IN | SYSTOLIC BLOOD PRESSURE: 118 MMHG | DIASTOLIC BLOOD PRESSURE: 76 MMHG | BODY MASS INDEX: 36.44 KG/M2 | WEIGHT: 218.69 LBS

## 2019-01-31 DIAGNOSIS — R14.0 ABDOMINAL BLOATING: ICD-10-CM

## 2019-01-31 DIAGNOSIS — K58.0 IRRITABLE BOWEL SYNDROME WITH DIARRHEA: Primary | ICD-10-CM

## 2019-01-31 DIAGNOSIS — K58.1 IRRITABLE BOWEL SYNDROME WITH CONSTIPATION: ICD-10-CM

## 2019-01-31 DIAGNOSIS — Z83.719 FAMILY HISTORY OF COLONIC POLYPS: ICD-10-CM

## 2019-01-31 DIAGNOSIS — R19.8 ALTERNATING CONSTIPATION AND DIARRHEA: ICD-10-CM

## 2019-01-31 DIAGNOSIS — R15.2 FECAL URGENCY: ICD-10-CM

## 2019-01-31 PROCEDURE — 3008F PR BODY MASS INDEX (BMI) DOCUMENTED: ICD-10-PCS | Mod: CPTII,S$GLB,, | Performed by: NURSE PRACTITIONER

## 2019-01-31 PROCEDURE — 99999 PR PBB SHADOW E&M-EST. PATIENT-LVL III: CPT | Mod: PBBFAC,,, | Performed by: NURSE PRACTITIONER

## 2019-01-31 PROCEDURE — 99203 OFFICE O/P NEW LOW 30 MIN: CPT | Mod: S$GLB,,, | Performed by: NURSE PRACTITIONER

## 2019-01-31 PROCEDURE — 3008F BODY MASS INDEX DOCD: CPT | Mod: CPTII,S$GLB,, | Performed by: NURSE PRACTITIONER

## 2019-01-31 PROCEDURE — 99999 PR PBB SHADOW E&M-EST. PATIENT-LVL III: ICD-10-PCS | Mod: PBBFAC,,, | Performed by: NURSE PRACTITIONER

## 2019-01-31 PROCEDURE — 99203 PR OFFICE/OUTPT VISIT, NEW, LEVL III, 30-44 MIN: ICD-10-PCS | Mod: S$GLB,,, | Performed by: NURSE PRACTITIONER

## 2019-01-31 NOTE — PROGRESS NOTES
Subjective:       Patient ID: Lachelle Engel is a 43 y.o. female.    Chief Complaint: Abdominal Pain; Constipation; and Diarrhea    HPI  Reports abdominal complaints off and on for some time.  She thought these would improve with appendectomy in 11/2017 but have continued.  She describes alternating diarrhea and constipation.  She will 3-4 days with no bowel movement and then hard stool and then have 3-4 days with urgency while eating or shortly after with diarrhea.  She can not associate this with any certain foods.    She does not eat very much bread, pastas, sweets or dairy.    Reports persistent abdominal bloating despite bowel pattern but not necessarily abdominal pain.    Denies blood with bowel movements or black stools.     She denies nausea, vomiting, reflux.    Family history of colon polyps in mother.     Patient last colonoscopy 5 years ago.    Review of Systems   Constitutional: Negative.  Negative for activity change, appetite change, fatigue, fever and unexpected weight change.   Respiratory: Negative for shortness of breath.    Cardiovascular: Negative for chest pain.   Gastrointestinal: Positive for abdominal pain, constipation and diarrhea. Negative for blood in stool, nausea and vomiting.   Skin: Negative.    Neurological: Negative.    Psychiatric/Behavioral: Negative.        Objective:      Physical Exam   Constitutional: She is oriented to person, place, and time. She appears well-developed and well-nourished. No distress.   Eyes: No scleral icterus.   Pulmonary/Chest: Effort normal. No respiratory distress.   Abdominal: Soft. Bowel sounds are normal. She exhibits no distension and no mass. There is no tenderness. There is no guarding.   Neurological: She is alert and oriented to person, place, and time.   Skin: Skin is warm and dry. She is not diaphoretic.   Psychiatric: She has a normal mood and affect. Her behavior is normal. Judgment and thought content normal.   Vitals reviewed.       Assessment:       1. Irritable bowel syndrome with diarrhea    2. Irritable bowel syndrome with constipation    3. Abdominal bloating    4. Fecal urgency    5. Family history of colonic polyps    6. Alternating constipation and diarrhea        Plan:     Lachelle Gunderson was seen today for abdominal pain, constipation and diarrhea.    Diagnoses and all orders for this visit:    Irritable bowel syndrome with diarrhea    Irritable bowel syndrome with constipation    Abdominal bloating    Fecal urgency    Family history of colonic polyps    Alternating constipation and diarrhea         Suspect IBS with diarrhea and constipation.     She needs screening colonoscopy due to family history of colon polyps and she will call back to schedule when able.    Would recommend fiber supplement and probiotic.     Could consider IBGard and/or trial of xifaxan.

## 2019-04-10 ENCOUNTER — OFFICE VISIT (OUTPATIENT)
Dept: BARIATRICS | Facility: CLINIC | Age: 44
End: 2019-04-10
Payer: COMMERCIAL

## 2019-04-10 VITALS
HEIGHT: 65 IN | DIASTOLIC BLOOD PRESSURE: 60 MMHG | WEIGHT: 218.25 LBS | BODY MASS INDEX: 36.36 KG/M2 | SYSTOLIC BLOOD PRESSURE: 120 MMHG | HEART RATE: 96 BPM

## 2019-04-10 DIAGNOSIS — E66.01 CLASS 2 SEVERE OBESITY WITH BODY MASS INDEX (BMI) OF 35 TO 39.9 WITH SERIOUS COMORBIDITY: Primary | ICD-10-CM

## 2019-04-10 PROCEDURE — 3008F PR BODY MASS INDEX (BMI) DOCUMENTED: ICD-10-PCS | Mod: CPTII,S$GLB,, | Performed by: INTERNAL MEDICINE

## 2019-04-10 PROCEDURE — 99213 OFFICE O/P EST LOW 20 MIN: CPT | Mod: S$GLB,,, | Performed by: INTERNAL MEDICINE

## 2019-04-10 PROCEDURE — 99999 PR PBB SHADOW E&M-EST. PATIENT-LVL III: CPT | Mod: PBBFAC,,, | Performed by: INTERNAL MEDICINE

## 2019-04-10 PROCEDURE — 3008F BODY MASS INDEX DOCD: CPT | Mod: CPTII,S$GLB,, | Performed by: INTERNAL MEDICINE

## 2019-04-10 PROCEDURE — 99999 PR PBB SHADOW E&M-EST. PATIENT-LVL III: ICD-10-PCS | Mod: PBBFAC,,, | Performed by: INTERNAL MEDICINE

## 2019-04-10 PROCEDURE — 99213 PR OFFICE/OUTPT VISIT, EST, LEVL III, 20-29 MIN: ICD-10-PCS | Mod: S$GLB,,, | Performed by: INTERNAL MEDICINE

## 2019-04-10 NOTE — PROGRESS NOTES
"Subjective:       Patient ID: Lachelle Engel is a 43 y.o. female.    Chief Complaint: Follow-up    Pt here today for follow-up. Has gained 2 more lbs, net neg 35 lbs. 2nd OV with weight gain  Added LCHF diet and topiramate, and with diethylpropion. Last filled 2/14/19. She switched to sensible meals. Has been using almonds for snacks. Boiled seafood on weekends. She has been exercising. She does feel like the medication is still helping, some effect may be wearing off.                Review of Systems   Constitutional: Negative for chills and fever.   Respiratory: Negative for shortness of breath.         + snores   Cardiovascular: Positive for leg swelling. Negative for chest pain.   Gastrointestinal: Negative for constipation and diarrhea.   Genitourinary: Negative for difficulty urinating and menstrual problem.        S/p ablation   Musculoskeletal: Negative for arthralgias and back pain.   Neurological: Negative for dizziness and light-headedness.   Psychiatric/Behavioral: Negative for dysphoric mood. The patient is not nervous/anxious.        Objective:     /60   Pulse 96   Ht 5' 5" (1.651 m)   Wt 99 kg (218 lb 4.1 oz)   BMI 36.32 kg/m²     Physical Exam   Constitutional: She is oriented to person, place, and time. She appears well-developed. No distress.   Morbidly obese     HENT:   Head: Normocephalic and atraumatic.   Eyes: Pupils are equal, round, and reactive to light. EOM are normal. No scleral icterus.   Neck: Normal range of motion. Neck supple.   Cardiovascular: Normal rate.   Pulmonary/Chest: Effort normal.   Musculoskeletal: Normal range of motion. She exhibits no edema.   Neurological: She is alert and oriented to person, place, and time. No cranial nerve deficit.   Skin: Skin is warm and dry. No erythema.   Psychiatric: She has a normal mood and affect. Her behavior is normal. Judgment normal.   Vitals reviewed.      Assessment:       1. Class 2 severe obesity with body mass index " (BMI) of 35 to 39.9 with serious comorbidity        Plan:               Lachelle Gunderson was seen today for follow-up.    Diagnoses and all orders for this visit:    Class 2 severe obesity with body mass index (BMI) of 35 to 39.9 with serious comorbidity  -     naltrexone-bupropion (CONTRAVE) 8-90 mg TbSR; Take 2 tablets by mouth 2 (two) times daily.         To taper trokendi, take one every other night for a week then 1 every third night for 1 week, then stop.       Contrave is long term weight loss medication. Side effects may include insomnia, nausea, headache, constipation, depression or change in thinking.  These side effects will improve with stopping the medication.            Please follow the dosing guide below when starting Contrave:  Week 1: One pill in the morning.   Week 2: 1 pill in the morning and evening  Week 3: 2 pills in the morning, 1 pill in the evening.   Week 4 and beyond: 2 pills twice a day.     3 meals a day made up of the following:  Unlimited green vegetables, tomatoes, mushrooms, spaghetti squash, cauliflower, meat, poultry, seafood, eggs and hard cheeses.   Milk and plain yogurt  Dressings, seasonings, condiments, etc should have less than 2 g sugars.   Beans (1-1.5 cups) or nuts (1/4 cup) can have 1 x a day.   1-2 servings of citrus fruits, berries, pineapple or melon a day (1/2 cup)  Avoid fried foods    No grains, rice, pasta, potatoes, bread, corn, peas, oatmeal, grits, tortillas, crackers, chips    No soda, sweet tea, juices or lemonade    [email or web address] for recipes. Moderate carb intake.

## 2019-04-10 NOTE — PATIENT INSTRUCTIONS
To taper trokendi, take one every other night for a week then 1 every third night for 1 week, then stop.       Contrave is long term weight loss medication. Side effects may include insomnia, nausea, headache, constipation, depression or change in thinking.  These side effects will improve with stopping the medication.            Please follow the dosing guide below when starting Contrave:  Week 1: One pill in the morning.   Week 2: 1 pill in the morning and evening  Week 3: 2 pills in the morning, 1 pill in the evening.   Week 4 and beyond: 2 pills twice a day.            3 meals a day made up of the following:  Unlimited green vegetables, tomatoes, mushrooms, spaghetti squash, cauliflower, meat, poultry, seafood, eggs and hard cheeses.   Milk and plain yogurt  Dressings, seasonings, condiments, etc should have less than 2 g sugars.   Beans (1-1.5 cups) or nuts (1/4 cup) can have 1 x a day.   1-2 servings of citrus fruits, berries, pineapple or melon a day (1/2 cup)  Avoid fried foods    No grains, rice, pasta, potatoes, bread, corn, peas, oatmeal, grits, tortillas, crackers, chips    No soda, sweet tea, juices or lemonade    [email or web address] for recipes. Moderate carb intake.      January 28, 2019  Hot Spinach and Artichoke Dip (Recipe)  BY DIEUDONNE KENT RD, CSSD    This creamy spinach dip can double as a protein-rich, gluten-free side dish, game day appetizer or parade route snack.  Calories 85 calories    Fat 3 grams saturated fat    Prep Time 5 minutes  Cook Time10 minutes  Yield Serves 5-10 people  Ingredients   1/2 cup onion, finely chopped   3 (10 ounce) packs of frozen chopped spinach, thawed and squeezed dry   1 (8 ounce) package reduced-fat cream cheese   1 (8 ounce) carton fat-free plain Greek yogurt   1/2 cup Parmesan cheese, grated   1 (14 ounce) can artichoke hearts   1/4 teaspoon salt (optional)   1/4 teaspoon black pepper   1/8 teaspoon crushed red pepper flakes  Instructions  1. Lightly  coat a skillet with cooking spray.  2. Cook and stir onion over medium heat until transparent (about 5 minutes).  3. Add spinach. Cook until thoroughly heated (about 1-2 minutes).  4. Reduce heat and add cream cheese. Stir until melted and smooth.  5. Stir in Greek yogurt, Parmesan cheese and artichokes. Remove from heat.  6. Season with salt (optional) and black and red pepper.  7. Serve with raw vegetables.                          January 31, 2019  Pizza Cups (Recipe)    Trying to eat better but craving pizza? These protein-packed pizza cups will do the trick.    Calories 65 calories per cup  Fat 2.7 grams per cup  Prep Time5 minutes  Cook Time 25 minutes  Yield 12 pizza cups  Ingredients   1 ½ cups liquid egg whites   2 large eggs   1 cup fat free or low moisture shredded mozzarella cheese   37 turkey pepperonis (25 chopped and 12 sliced)   ¼ cup Parmesan cheese   ¼ tsp oregano   ¼ tsp black pepper  Instructions  1. Preheat oven to 350 degrees Fahrenheit.  2. Chop up 25 turkey pepperonis into small pieces. In a bowl, combine all ingredients except the remaining 12 sliced turkey pepperoni.  3. Spray a muffin anderson with nonstick spray.  4. Place a whole sliced turkey pepperoni in the bottom of each of the 12 muffin molds.  5. Pour or spoon in the mixture in your bowl into each muffin mold evenly ¾ full.  6. Bake in the oven for 20-25 minutes. Place a toothpick in the center of the pizza cup to ensure all liquid egg has cooked. For a crispier pizza cup, leave in the oven a little longer.  7. Let cool for a few minutes before serving. Enjoy!

## 2019-05-05 ENCOUNTER — HOSPITAL ENCOUNTER (EMERGENCY)
Facility: HOSPITAL | Age: 44
Discharge: HOME | End: 2019-05-05
Attending: EMERGENCY MEDICINE
Payer: COMMERCIAL

## 2019-05-05 VITALS
OXYGEN SATURATION: 99 % | SYSTOLIC BLOOD PRESSURE: 105 MMHG | HEART RATE: 80 BPM | WEIGHT: 210 LBS | DIASTOLIC BLOOD PRESSURE: 58 MMHG | HEIGHT: 65 IN | BODY MASS INDEX: 34.99 KG/M2 | TEMPERATURE: 98 F | RESPIRATION RATE: 18 BRPM

## 2019-05-05 DIAGNOSIS — R10.13 EPIGASTRIC ABDOMINAL PAIN: ICD-10-CM

## 2019-05-05 DIAGNOSIS — R10.13 EPIGASTRIC PAIN: Primary | ICD-10-CM

## 2019-05-05 DIAGNOSIS — R74.01 TRANSAMINITIS: ICD-10-CM

## 2019-05-05 LAB
ALBUMIN SERPL BCP-MCNC: 3.9 G/DL (ref 3.5–5.2)
ALP SERPL-CCNC: 97 U/L (ref 55–135)
ALT SERPL W/O P-5'-P-CCNC: 65 U/L (ref 10–44)
ANION GAP SERPL CALC-SCNC: 10 MMOL/L (ref 8–16)
AST SERPL-CCNC: 74 U/L (ref 10–40)
BACTERIA #/AREA URNS AUTO: NORMAL /HPF
BASOPHILS # BLD AUTO: 0.01 K/UL (ref 0–0.2)
BASOPHILS NFR BLD: 0.2 % (ref 0–1.9)
BILIRUB SERPL-MCNC: 0.4 MG/DL (ref 0.1–1)
BILIRUB UR QL STRIP: NEGATIVE
BNP SERPL-MCNC: <10 PG/ML (ref 0–99)
BUN SERPL-MCNC: 14 MG/DL (ref 6–20)
CALCIUM SERPL-MCNC: 9.6 MG/DL (ref 8.7–10.5)
CHLORIDE SERPL-SCNC: 109 MMOL/L (ref 95–110)
CLARITY UR REFRACT.AUTO: ABNORMAL
CO2 SERPL-SCNC: 22 MMOL/L (ref 23–29)
COLOR UR AUTO: YELLOW
CREAT SERPL-MCNC: 0.7 MG/DL (ref 0.5–1.4)
D DIMER PPP IA.FEU-MCNC: 0.19 MG/L FEU
DIFFERENTIAL METHOD: ABNORMAL
EOSINOPHIL # BLD AUTO: 0.1 K/UL (ref 0–0.5)
EOSINOPHIL NFR BLD: 1.3 % (ref 0–8)
ERYTHROCYTE [DISTWIDTH] IN BLOOD BY AUTOMATED COUNT: 12.1 % (ref 11.5–14.5)
EST. GFR  (AFRICAN AMERICAN): >60 ML/MIN/1.73 M^2
EST. GFR  (NON AFRICAN AMERICAN): >60 ML/MIN/1.73 M^2
GLUCOSE SERPL-MCNC: 92 MG/DL (ref 70–110)
GLUCOSE UR QL STRIP: NEGATIVE
HCT VFR BLD AUTO: 42 % (ref 37–48.5)
HGB BLD-MCNC: 14.2 G/DL (ref 12–16)
HGB UR QL STRIP: NEGATIVE
IMM GRANULOCYTES # BLD AUTO: 0.01 K/UL (ref 0–0.04)
IMM GRANULOCYTES NFR BLD AUTO: 0.2 % (ref 0–0.5)
KETONES UR QL STRIP: NEGATIVE
LEUKOCYTE ESTERASE UR QL STRIP: ABNORMAL
LIPASE SERPL-CCNC: 27 U/L (ref 4–60)
LYMPHOCYTES # BLD AUTO: 1.2 K/UL (ref 1–4.8)
LYMPHOCYTES NFR BLD: 22.4 % (ref 18–48)
MCH RBC QN AUTO: 30.7 PG (ref 27–31)
MCHC RBC AUTO-ENTMCNC: 33.8 G/DL (ref 32–36)
MCV RBC AUTO: 91 FL (ref 82–98)
MICROSCOPIC COMMENT: NORMAL
MONOCYTES # BLD AUTO: 0.4 K/UL (ref 0.3–1)
MONOCYTES NFR BLD: 6.5 % (ref 4–15)
NEUTROPHILS # BLD AUTO: 3.8 K/UL (ref 1.8–7.7)
NEUTROPHILS NFR BLD: 69.4 % (ref 38–73)
NITRITE UR QL STRIP: NEGATIVE
NRBC BLD-RTO: 0 /100 WBC
PH UR STRIP: 5 [PH] (ref 5–8)
PLATELET # BLD AUTO: 128 K/UL (ref 150–350)
PMV BLD AUTO: 10.7 FL (ref 9.2–12.9)
POTASSIUM SERPL-SCNC: 3.8 MMOL/L (ref 3.5–5.1)
PROT SERPL-MCNC: 7 G/DL (ref 6–8.4)
PROT UR QL STRIP: NEGATIVE
RBC # BLD AUTO: 4.62 M/UL (ref 4–5.4)
RBC #/AREA URNS AUTO: 3 /HPF (ref 0–4)
SODIUM SERPL-SCNC: 141 MMOL/L (ref 136–145)
SP GR UR STRIP: 1.02 (ref 1–1.03)
SQUAMOUS #/AREA URNS AUTO: 3 /HPF
TROPONIN I SERPL DL<=0.01 NG/ML-MCNC: 0.01 NG/ML (ref 0–0.03)
TROPONIN I SERPL DL<=0.01 NG/ML-MCNC: <0.006 NG/ML (ref 0–0.03)
URN SPEC COLLECT METH UR: ABNORMAL
WBC # BLD AUTO: 5.4 K/UL (ref 3.9–12.7)
WBC #/AREA URNS AUTO: 1 /HPF (ref 0–5)

## 2019-05-05 PROCEDURE — 93010 ELECTROCARDIOGRAM REPORT: CPT | Mod: ,,, | Performed by: INTERNAL MEDICINE

## 2019-05-05 PROCEDURE — 96361 HYDRATE IV INFUSION ADD-ON: CPT

## 2019-05-05 PROCEDURE — 25000003 PHARM REV CODE 250: Performed by: PHYSICIAN ASSISTANT

## 2019-05-05 PROCEDURE — 85379 FIBRIN DEGRADATION QUANT: CPT

## 2019-05-05 PROCEDURE — 80053 COMPREHEN METABOLIC PANEL: CPT

## 2019-05-05 PROCEDURE — 83880 ASSAY OF NATRIURETIC PEPTIDE: CPT

## 2019-05-05 PROCEDURE — 84484 ASSAY OF TROPONIN QUANT: CPT

## 2019-05-05 PROCEDURE — 85025 COMPLETE CBC W/AUTO DIFF WBC: CPT

## 2019-05-05 PROCEDURE — 93010 EKG 12-LEAD: ICD-10-PCS | Mod: ,,, | Performed by: INTERNAL MEDICINE

## 2019-05-05 PROCEDURE — 99285 EMERGENCY DEPT VISIT HI MDM: CPT | Mod: ,,, | Performed by: PHYSICIAN ASSISTANT

## 2019-05-05 PROCEDURE — 83690 ASSAY OF LIPASE: CPT

## 2019-05-05 PROCEDURE — 96374 THER/PROPH/DIAG INJ IV PUSH: CPT

## 2019-05-05 PROCEDURE — S0028 INJECTION, FAMOTIDINE, 20 MG: HCPCS | Performed by: PHYSICIAN ASSISTANT

## 2019-05-05 PROCEDURE — 81001 URINALYSIS AUTO W/SCOPE: CPT

## 2019-05-05 PROCEDURE — 99285 EMERGENCY DEPT VISIT HI MDM: CPT | Mod: 25

## 2019-05-05 PROCEDURE — 93005 ELECTROCARDIOGRAM TRACING: CPT

## 2019-05-05 PROCEDURE — 99285 PR EMERGENCY DEPT VISIT,LEVEL V: ICD-10-PCS | Mod: ,,, | Performed by: PHYSICIAN ASSISTANT

## 2019-05-05 RX ORDER — FAMOTIDINE 10 MG/ML
20 INJECTION INTRAVENOUS
Status: COMPLETED | OUTPATIENT
Start: 2019-05-05 | End: 2019-05-05

## 2019-05-05 RX ORDER — FAMOTIDINE 20 MG/1
20 TABLET, FILM COATED ORAL 2 TIMES DAILY
Qty: 20 TABLET | Refills: 0 | Status: SHIPPED | OUTPATIENT
Start: 2019-05-05 | End: 2019-07-17 | Stop reason: ALTCHOICE

## 2019-05-05 RX ORDER — ONDANSETRON 4 MG/1
4 TABLET, ORALLY DISINTEGRATING ORAL EVERY 6 HOURS PRN
Qty: 15 TABLET | Refills: 0 | Status: SHIPPED | OUTPATIENT
Start: 2019-05-05 | End: 2020-11-03

## 2019-05-05 RX ADMIN — ALUMINUM HYDROXIDE, MAGNESIUM HYDROXIDE, AND SIMETHICONE 50 ML: 200; 200; 20 SUSPENSION ORAL at 05:05

## 2019-05-05 RX ADMIN — SODIUM CHLORIDE 1000 ML: 0.9 INJECTION, SOLUTION INTRAVENOUS at 05:05

## 2019-05-05 RX ADMIN — FAMOTIDINE 20 MG: 10 INJECTION, SOLUTION INTRAVENOUS at 05:05

## 2019-05-05 NOTE — ED TRIAGE NOTES
"Patient states episode of severe mid upper abd pain at 1400 lasting 10 min. Episode in lobby lasted 5 min. Currently denies nausea, denies pain "mild" denies vomiting, NO OTC pain meds.   "

## 2019-05-05 NOTE — ED NOTES
Patient identifiers verified and correct for Ms Engel  C/C: Brief periods of abd cramping SEE NN  APPEARANCE: awake and alert in NAD.  SKIN: warm, dry and intact. No breakdown or bruising.  MUSCULOSKELETAL: Patient moving all extremities spontaneously, no obvious swelling or deformities noted. Ambulates independently.  RESPIRATORY: Denies shortness of breath.Respirations unlabored.   CARDIAC: Denies CP, 2+ distal pulses; no peripheral edema  ABDOMEN:ABdomen soft, round, mild pain upon admit to Ed, denies nausea or vomiting,  : voids spontaneously, denies difficulty  Neurologic: AAO x 4; follows commands equal strength in all extremities; denies numbness/tingling. Denies dizziness Denies weakness

## 2019-05-05 NOTE — ED PROVIDER NOTES
"Encounter Date: 2019       History     Chief Complaint   Patient presents with    Abdominal Pain     epigastric pain that began while driving, gallbladder and appendix both removed, no cardiac hx,      43 year old female with medical history of GERD, Depression, Obesity, Irritable BS, Hx of appendectomy and cholecystectomy presenting to the ED with the chief complaint of abdominal pain. Patient reports developing constant, waxing and waning, "stabbing" epigastric abdominal pain today at 2:30pm while ridding in the car. Patient reports the pain feels similar to when she had gallbladder problems. She reports having a Jodie and lunch 1 hour prior to symptom onset. She reports the pain feels different than her typical GERD symptoms. She denies fever, chest pain, SOB, nausea, vomiting, urinary and bowel movement changes. Last BM was yesterday and normal. No melena. No family history of cardiac disease, recent surgeries or travel, calf pain/edema.         Review of patient's allergies indicates:  No Known Allergies  Past Medical History:   Diagnosis Date    Bradycardia     Depression     Dizziness     GERD (gastroesophageal reflux disease)     Impaired fasting blood sugar     Psoriasis     Thyroid disease     hyothyroidism     Past Surgical History:   Procedure Laterality Date    APPENDECTOMY-LAPAROSCOPIC N/A 2017    [facility]     SECTION      CHOLECYSTECTOMY      ENDOMETRIAL ABLATION      gladder remova      INCONTINENCE SURGERY      TUBAL LIGATION       Family History   Problem Relation Age of Onset    Cancer Maternal Aunt 30        colon cancer    Multiple sclerosis Maternal Aunt      Social History     Tobacco Use    Smoking status: Current Every Day Smoker     Types: Cigarettes    Smokeless tobacco: Never Used    Tobacco comment: 1 pack every 3 days   Substance Use Topics    Alcohol use: Yes     Comment: occas, yesterday    Drug use: " No     Review of Systems   Constitutional: Negative for chills, diaphoresis and fever.   HENT: Negative for congestion, sore throat and trouble swallowing.    Eyes: Negative for pain and redness.   Respiratory: Negative for shortness of breath.    Cardiovascular: Negative for chest pain.   Gastrointestinal: Positive for abdominal pain (epigastric). Negative for nausea.   Genitourinary: Negative for dysuria and flank pain.   Musculoskeletal: Negative for back pain, neck pain and neck stiffness.   Skin: Negative for rash and wound.   Neurological: Negative for weakness, light-headedness and headaches.   Hematological: Does not bruise/bleed easily.       Physical Exam     Initial Vitals [05/05/19 1509]   BP Pulse Resp Temp SpO2   139/71 103 18 97.7 °F (36.5 °C) 97 %      MAP       --         Physical Exam    Constitutional: She appears well-developed and well-nourished. She is not diaphoretic. No distress.   Obese female   HENT:   Head: Normocephalic and atraumatic.   Mouth/Throat: Oropharynx is clear and moist. No oropharyngeal exudate.   Eyes: EOM are normal. Pupils are equal, round, and reactive to light.   Neck: Normal range of motion. Neck supple.   Cardiovascular: Regular rhythm. Tachycardia present.    Pulmonary/Chest: Breath sounds normal. No respiratory distress. She has no wheezes.   Abdominal: There is tenderness (Epi, mild).       Musculoskeletal: Normal range of motion. She exhibits no edema.   Neurological: She is alert and oriented to person, place, and time. She has normal strength. No cranial nerve deficit or sensory deficit.   Skin: Skin is warm and dry. No erythema.       ED Course   Procedures  Labs Reviewed   CBC W/ AUTO DIFFERENTIAL - Abnormal; Notable for the following components:       Result Value    Platelets 128 (*)     All other components within normal limits    Narrative:     ADD-ON BNP #[id number] PER SWATHI PHAN PA-C 17:06  05/05/2019   ADD-ON DDIMR #[id number] PER SWATHI PHAN PA-C  17:08  05/05/2019    COMPREHENSIVE METABOLIC PANEL - Abnormal; Notable for the following components:    CO2 22 (*)     AST 74 (*)     ALT 65 (*)     All other components within normal limits    Narrative:     ADD-ON BNP #[id number] PER SWATHI PHAN, PA-C 17:06  05/05/2019   ADD-ON DDIMR #[id number] PER SWATHI PHAN, PA-C 17:08  05/05/2019    URINALYSIS, REFLEX TO URINE CULTURE - Abnormal; Notable for the following components:    Appearance, UA Hazy (*)     Leukocytes, UA Trace (*)     All other components within normal limits    Narrative:     yellow an gray  Preferred Collection Type->Urine, Clean Catch   TROPONIN I    Narrative:     ADD-ON BNP #[id number] PER SWATHI PHAN, PA-C 17:06  05/05/2019   ADD-ON DDIMR #[id number] PER SWATHI PHAN, PA-C 17:08  05/05/2019    LIPASE    Narrative:     ADD-ON BNP #[id number] PER SWATHI PHAN, PA-C 17:06  05/05/2019   ADD-ON DDIMR #[id number] PER SWATHI PHAN, PA-C 17:08  05/05/2019    D DIMER, QUANTITATIVE   B-TYPE NATRIURETIC PEPTIDE   URINALYSIS MICROSCOPIC    Narrative:     yellow an gray  Preferred Collection Type->Urine, Clean Catch   B-TYPE NATRIURETIC PEPTIDE    Narrative:     ADD-ON BNP #[id number] PER SWATHI PHAN, PA-C 17:06  05/05/2019   ADD-ON DDIMR #[id number] PER SWATHI PHAN, PA-C 17:08  05/05/2019    D DIMER, QUANTITATIVE    Narrative:     ADD-ON BNP #[id number] PER SWATHI PHAN, PA-C 17:06  05/05/2019   ADD-ON DDIMR #[id number] PER SWATHI PHAN, PA-C 17:08  05/05/2019    TROPONIN I          Imaging Results          X-Ray Chest PA And Lateral (Final result)  Result time 05/05/19 17:10:25    Final result by Ian Solorio MD (05/05/19 17:10:25)                 Impression:      No acute process.      Electronically signed by: Ian Solorio MD  Date:    05/05/2019  Time:    17:10             Narrative:    EXAMINATION:  XR CHEST PA AND LATERAL    CLINICAL HISTORY:  Epigastric pain    TECHNIQUE:  PA and lateral views of the chest were  performed.    COMPARISON:  07/09/2012.    FINDINGS:  The trachea is unremarkable.  The cardiomediastinal silhouette is within normal limits.  The hilar structures are unremarkable.  The hemidiaphragms are within normal limits.  There is no evidence of free air beneath the hemidiaphragms.  There are no pleural effusions.  There is no evidence of a pneumothorax.  There is no evidence of pneumomediastinum.  No airspace opacities are present.  The osseous structures are unremarkable.                                 Medical Decision Making:   History:   Old Medical Records: I decided to obtain old medical records.  Old Records Summarized: records from clinic visits.  Clinical Tests:   Lab Tests: Ordered and Reviewed  Radiological Study: Ordered and Reviewed  Medical Tests: Ordered and Reviewed    Additional MDM:   PERC Rule:   Age is greater than or equal to 50 = 0.0  Heart Rate is greater than or equal to 100 = 0.0  SaO2 on room air < 95% = 0.0  Unilateral leg swelling = 0.0  Hemoptysis = 0.0  Recent surgery or trauma = 0.0  Prior PE or DVT =  0.0  Hormone use = 0.00  PERC Score = 0    APC / Resident Notes:   43 year old female with medical history of GERD, Depression, Obesity, Irritable BS, Hx of appendectomy and cholecystectomy presenting to the ED c/o epigastric abdominal pain for 1 day. DDx includes but not limited to GERD, biliary colic, pancreatitis, esophagitis, ACS, PE, GI obstuction. Will get labs, CXR, ECG. Will give fluids and GI cocktail.     Work-up shows WBC 5.4, H/H 14/42, Plt 128, Na 141, K 3.8, Crt 0.7, Tbil 0.4, Alk phos 97, AST/ALT 74/65, AG 10, Trop negative x2, Lipase 27, D-dimer 0.19, UA negative for UTI  CXR without acute intrathoracic findings  ECG shows NSR 94 bpm. No STEMI    No emergent findings on ED work-up today. Patient sleeping comfortably on reassessment and reports moderate improvement in her abdominal pain. Repeat abdominal pain benign and she is tolerating PO fluids without  difficulty. Mild transaminitis on blood work likely from recent ETOH use. Patient is stable for discharge with outpatient follow-up with PCP. RX for Zofran and Pepcid given. Work excuse given. Patient expresses understanding and agreeable to the plan. Return to ED precautions given for new, worsening, or concerning symptoms. I have discussed the care of this patient with my supervising physician.                  Clinical Impression:       ICD-10-CM ICD-9-CM   1. Epigastric pain R10.13 789.06   2. Epigastric abdominal pain R10.13 789.06   3. Transaminitis R74.0 790.4         Disposition:   Disposition: Discharged  Condition: Stable                        Santos Caldera PA-C  05/05/19 2039

## 2019-05-05 NOTE — PROVIDER PROGRESS NOTES - EMERGENCY DEPT.
Encounter Date: 5/5/2019    ED Physician Progress Notes       SCRIBE NOTE: I, Nilo Teran, am scribing for, and in the presence of,  Dr. Olea.  Physician Statement: I, Dr. Olea, personally performed the services described in this documentation as scribed by Nilo Teran in my presence, and it is both accurate and complete.      EKG - STEMI Decision  Initial Reading: No STEMI present.

## 2019-05-06 NOTE — DISCHARGE INSTRUCTIONS
Follow-up with your primary care provider for further evaluation  Take the prescribed Pepcid and Zofran as needed for managing your symptoms  Hydrate by drinking plenty of water    Return to the emergency room for new, worsening, or concerning symptoms    Our goal in the emergency department is to always give you outstanding care and exceptional service. You may receive a survey by mail or e-mail in the next week regarding your experience in our ED. We would greatly appreciate your completing and returning the survey. Your feedback provides us with a way to recognize our staff who give very good care and it helps us learn how to improve when your experience was below our aspiration of excellence.

## 2019-07-17 ENCOUNTER — OFFICE VISIT (OUTPATIENT)
Dept: BARIATRICS | Facility: CLINIC | Age: 44
End: 2019-07-17
Payer: COMMERCIAL

## 2019-07-17 VITALS
HEART RATE: 56 BPM | WEIGHT: 226 LBS | HEIGHT: 65 IN | DIASTOLIC BLOOD PRESSURE: 80 MMHG | BODY MASS INDEX: 37.65 KG/M2 | SYSTOLIC BLOOD PRESSURE: 110 MMHG

## 2019-07-17 DIAGNOSIS — E66.01 CLASS 2 SEVERE OBESITY WITH BODY MASS INDEX (BMI) OF 35 TO 39.9 WITH SERIOUS COMORBIDITY: ICD-10-CM

## 2019-07-17 DIAGNOSIS — R73.01 IFG (IMPAIRED FASTING GLUCOSE): Primary | ICD-10-CM

## 2019-07-17 PROCEDURE — 99213 OFFICE O/P EST LOW 20 MIN: CPT | Mod: S$GLB,,, | Performed by: INTERNAL MEDICINE

## 2019-07-17 PROCEDURE — 99999 PR PBB SHADOW E&M-EST. PATIENT-LVL IV: ICD-10-PCS | Mod: PBBFAC,,, | Performed by: INTERNAL MEDICINE

## 2019-07-17 PROCEDURE — 3008F BODY MASS INDEX DOCD: CPT | Mod: CPTII,S$GLB,, | Performed by: INTERNAL MEDICINE

## 2019-07-17 PROCEDURE — 99213 PR OFFICE/OUTPT VISIT, EST, LEVL III, 20-29 MIN: ICD-10-PCS | Mod: S$GLB,,, | Performed by: INTERNAL MEDICINE

## 2019-07-17 PROCEDURE — 99999 PR PBB SHADOW E&M-EST. PATIENT-LVL IV: CPT | Mod: PBBFAC,,, | Performed by: INTERNAL MEDICINE

## 2019-07-17 PROCEDURE — 3008F PR BODY MASS INDEX (BMI) DOCUMENTED: ICD-10-PCS | Mod: CPTII,S$GLB,, | Performed by: INTERNAL MEDICINE

## 2019-07-17 RX ORDER — METFORMIN HYDROCHLORIDE 500 MG/1
500 TABLET, EXTENDED RELEASE ORAL
Qty: 90 TABLET | Refills: 1 | Status: SHIPPED | OUTPATIENT
Start: 2019-07-17 | End: 2019-10-29 | Stop reason: SDUPTHER

## 2019-07-17 RX ORDER — DIETHYLPROPION HYDROCHLORIDE 75 MG/1
TABLET, EXTENDED RELEASE ORAL
Refills: 2 | COMMUNITY
Start: 2019-06-18 | End: 2019-07-17 | Stop reason: SDUPTHER

## 2019-07-17 RX ORDER — PHENTERMINE HYDROCHLORIDE 37.5 MG/1
37.5 TABLET ORAL
Qty: 30 TABLET | Refills: 2 | Status: SHIPPED | OUTPATIENT
Start: 2019-07-17 | End: 2019-08-16

## 2019-07-17 NOTE — PROGRESS NOTES
"Subjective:       Patient ID: Lachelle Engel is a 43 y.o. female.    Chief Complaint: Follow-up    Pt here today for follow-up. Has gained 7 more lbs, net neg 28 lbs. 3rd OV with weight gain  Added LCHF diet and contrave at last OV, and with diethylpropion. Last filled 6/18/19. She switched to sensible meals. Has been using almonds for snacks. Boiled seafood on weekends. She has been exercising.   States she had constipation with the contrave. Was taking laxatives. She has continued to take it. She does not feel like it has decreased her appetite.   Tried topiramate. She gained weight. Was gaining weight last she took phentermine.     Review of Systems   Constitutional: Negative for chills and fever.   Respiratory: Negative for shortness of breath.         + snores   Cardiovascular: Positive for leg swelling. Negative for chest pain.   Gastrointestinal: Negative for constipation and diarrhea.   Genitourinary: Negative for difficulty urinating and menstrual problem.        S/p ablation   Musculoskeletal: Negative for arthralgias and back pain.   Neurological: Negative for dizziness and light-headedness.   Psychiatric/Behavioral: Negative for dysphoric mood. The patient is not nervous/anxious.        Objective:     /80   Pulse (!) 56   Ht 5' 5" (1.651 m)   Wt 102.5 kg (225 lb 15.5 oz)   BMI 37.60 kg/m²     Physical Exam   Constitutional: She is oriented to person, place, and time. She appears well-developed. No distress.   Morbidly obese     HENT:   Head: Normocephalic and atraumatic.   Eyes: Pupils are equal, round, and reactive to light. EOM are normal. No scleral icterus.   Neck: Normal range of motion. Neck supple.   Cardiovascular: Normal rate.   Pulmonary/Chest: Effort normal.   Musculoskeletal: Normal range of motion. She exhibits no edema.   Neurological: She is alert and oriented to person, place, and time. No cranial nerve deficit.   Skin: Skin is warm and dry. No erythema.   Psychiatric: " She has a normal mood and affect. Her behavior is normal. Judgment normal.   Vitals reviewed.      Assessment:       1. IFG (impaired fasting glucose)    2. Class 2 severe obesity with body mass index (BMI) of 35 to 39.9 with serious comorbidity        Plan:               Lachelle Gunderson was seen today for follow-up.    Diagnoses and all orders for this visit:    IFG (impaired fasting glucose)  -     metFORMIN (GLUCOPHAGE-XR) 500 MG 24 hr tablet; Take 1 tablet (500 mg total) by mouth daily with breakfast.    Class 2 severe obesity with body mass index (BMI) of 35 to 39.9 with serious comorbidity  -     phentermine (ADIPEX-P) 37.5 mg tablet; Take 1 tablet (37.5 mg total) by mouth before breakfast.         To taper contrave: decrease to 2 pills in the morning , and 1 pill in the evening for 3 days, then 1 pill twice a day for 3 days, then 1 pill in the morning for 3 days, then stop.     Start metformin with dinner for 2 weeks, then breakfast and dinner.  Always take with food.  No Alcohol.       Patient warned of common side effects of phentermine including anxiety, insomnia, palpitations and increased blood pressure. It was also explained that it is for short-term usage along with diet and exercise, and that stopping the medication without making lifestyle changes will result in regain of weight. Patient states understanding.     Weight loss medications are controlled substances.  They require routine follow up. Prescription or pills that are lost or destroyed will not be replaced.         3 meals a day made up of the following:  Unlimited green vegetables, tomatoes, mushrooms, spaghetti squash, cauliflower, meat, poultry, seafood, eggs and hard cheeses.   Milk and plain yogurt  Dressings, seasonings, condiments, etc should have less than 2 g sugars.   Beans (1-1.5 cups) or nuts (1/4 cup) can have 1 x a day.   1-2 servings of citrus fruits, berries, pineapple or melon a day (1/2 cup)  Avoid fried foods     No grains,  rice, pasta, potatoes, bread, corn, peas, oatmeal, grits, tortillas, crackers, chips     No soda, sweet tea, juices or lemonade     [email or web address] for recipes. Moderate carb intake.     Franca Galen recipes given.

## 2019-07-17 NOTE — PATIENT INSTRUCTIONS
To taper contrave: decrease to 2 pills in the morning , and 1 pill in the evening for 3 days, then 1 pill twice a day for 3 days, then 1 pill in the morning for 3 days, then stop.     Start metformin with dinner for 2 weeks, then breakfast and dinner.  Always take with food.  No Alcohol.       Patient warned of common side effects of phentermine including anxiety, insomnia, palpitations and increased blood pressure. It was also explained that it is for short-term usage along with diet and exercise, and that stopping the medication without making lifestyle changes will result in regain of weight. Patient states understanding.     Weight loss medications are controlled substances.  They require routine follow up. Prescription or pills that are lost or destroyed will not be replaced.         3 meals a day made up of the following:  Unlimited green vegetables, tomatoes, mushrooms, spaghetti squash, cauliflower, meat, poultry, seafood, eggs and hard cheeses.   Milk and plain yogurt  Dressings, seasonings, condiments, etc should have less than 2 g sugars.   Beans (1-1.5 cups) or nuts (1/4 cup) can have 1 x a day.   1-2 servings of citrus fruits, berries, pineapple or melon a day (1/2 cup)  Avoid fried foods     No grains, rice, pasta, potatoes, bread, corn, peas, oatmeal, grits, tortillas, crackers, chips     No soda, sweet tea, juices or lemonade     [email or web address] for recipes. Moderate carb intake.       January 28, 2019  Hot Spinach and Artichoke Dip (Recipe)  BY DIEUDONNE KENT RD, CSSD    This creamy spinach dip can double as a protein-rich, gluten-free side dish, game day appetizer or parade route snack.  Calories 85 calories    Fat 3 grams saturated fat    Prep Time 5 minutes  Cook Time10 minutes  Yield Serves 5-10 people  Ingredients   1/2 cup onion, finely chopped   3 (10 ounce) packs of frozen chopped spinach, thawed and squeezed dry   1 (8 ounce) package reduced-fat cream cheese   1 (8 ounce) carton  fat-free plain Greek yogurt   1/2 cup Parmesan cheese, grated   1 (14 ounce) can artichoke hearts   1/4 teaspoon salt (optional)   1/4 teaspoon black pepper   1/8 teaspoon crushed red pepper flakes  Instructions  1. Lightly coat a skillet with cooking spray.  2. Cook and stir onion over medium heat until transparent (about 5 minutes).  3. Add spinach. Cook until thoroughly heated (about 1-2 minutes).  4. Reduce heat and add cream cheese. Stir until melted and smooth.  5. Stir in Greek yogurt, Parmesan cheese and artichokes. Remove from heat.  6. Season with salt (optional) and black and red pepper.  7. Serve with raw vegetables.                          January 31, 2019  Pizza Cups (Recipe)    Trying to eat better but craving pizza? These protein-packed pizza cups will do the trick.    Calories 65 calories per cup  Fat 2.7 grams per cup  Prep Time5 minutes  Cook Time 25 minutes  Yield 12 pizza cups  Ingredients   1 ½ cups liquid egg whites   2 large eggs   1 cup fat free or low moisture shredded mozzarella cheese   37 turkey pepperonis (25 chopped and 12 sliced)   ¼ cup Parmesan cheese   ¼ tsp oregano   ¼ tsp black pepper  Instructions  1. Preheat oven to 350 degrees Fahrenheit.  2. Chop up 25 turkey pepperonis into small pieces. In a bowl, combine all ingredients except the remaining 12 sliced turkey pepperoni.  3. Spray a muffin anderson with nonstick spray.  4. Place a whole sliced turkey pepperoni in the bottom of each of the 12 muffin molds.  5. Pour or spoon in the mixture in your bowl into each muffin mold evenly ¾ full.  6. Bake in the oven for 20-25 minutes. Place a toothpick in the center of the pizza cup to ensure all liquid egg has cooked. For a crispier pizza cup, leave in the oven a little longer.  7. Let cool for a few minutes before serving. Enjoy!

## 2019-08-22 ENCOUNTER — HOSPITAL ENCOUNTER (OUTPATIENT)
Dept: RADIOLOGY | Facility: HOSPITAL | Age: 44
Discharge: HOME | End: 2019-08-22
Attending: INTERNAL MEDICINE
Payer: COMMERCIAL

## 2019-08-22 ENCOUNTER — OFFICE VISIT (OUTPATIENT)
Dept: RHEUMATOLOGY | Facility: CLINIC | Age: 44
End: 2019-08-22
Payer: COMMERCIAL

## 2019-08-22 VITALS
SYSTOLIC BLOOD PRESSURE: 119 MMHG | BODY MASS INDEX: 39.15 KG/M2 | HEART RATE: 80 BPM | DIASTOLIC BLOOD PRESSURE: 79 MMHG | HEIGHT: 65 IN | WEIGHT: 235 LBS

## 2019-08-22 DIAGNOSIS — M47.816 SPONDYLOSIS OF LUMBAR REGION WITHOUT MYELOPATHY OR RADICULOPATHY: ICD-10-CM

## 2019-08-22 DIAGNOSIS — G89.29 CHRONIC MIDLINE LOW BACK PAIN WITH RIGHT-SIDED SCIATICA: ICD-10-CM

## 2019-08-22 DIAGNOSIS — M54.41 CHRONIC MIDLINE LOW BACK PAIN WITH RIGHT-SIDED SCIATICA: ICD-10-CM

## 2019-08-22 DIAGNOSIS — L40.9 PSORIASIS: ICD-10-CM

## 2019-08-22 DIAGNOSIS — L40.9 PSORIASIS: Primary | ICD-10-CM

## 2019-08-22 PROCEDURE — 72200 X-RAY EXAM SI JOINTS: CPT | Mod: TC

## 2019-08-22 PROCEDURE — 72200 X-RAY EXAM SI JOINTS: CPT | Mod: 26,,, | Performed by: RADIOLOGY

## 2019-08-22 PROCEDURE — 99204 PR OFFICE/OUTPT VISIT, NEW, LEVL IV, 45-59 MIN: ICD-10-PCS | Mod: S$GLB,,, | Performed by: INTERNAL MEDICINE

## 2019-08-22 PROCEDURE — 3008F PR BODY MASS INDEX (BMI) DOCUMENTED: ICD-10-PCS | Mod: CPTII,S$GLB,, | Performed by: INTERNAL MEDICINE

## 2019-08-22 PROCEDURE — 99999 PR PBB SHADOW E&M-EST. PATIENT-LVL IV: ICD-10-PCS | Mod: PBBFAC,,, | Performed by: INTERNAL MEDICINE

## 2019-08-22 PROCEDURE — 73521 XR HIPS BILATERAL 2 VIEW INCL AP PELVIS: ICD-10-PCS | Mod: 26,,, | Performed by: RADIOLOGY

## 2019-08-22 PROCEDURE — 72100 X-RAY EXAM L-S SPINE 2/3 VWS: CPT | Mod: 26,,, | Performed by: RADIOLOGY

## 2019-08-22 PROCEDURE — 77077 JOINT SURVEY SINGLE VIEW: CPT | Mod: TC

## 2019-08-22 PROCEDURE — 73521 X-RAY EXAM HIPS BI 2 VIEWS: CPT | Mod: 26,,, | Performed by: RADIOLOGY

## 2019-08-22 PROCEDURE — 72100 X-RAY EXAM L-S SPINE 2/3 VWS: CPT | Mod: TC

## 2019-08-22 PROCEDURE — 73521 X-RAY EXAM HIPS BI 2 VIEWS: CPT | Mod: TC

## 2019-08-22 PROCEDURE — 72100 XR LUMBAR SPINE AP AND LATERAL: ICD-10-PCS | Mod: 26,,, | Performed by: RADIOLOGY

## 2019-08-22 PROCEDURE — 73562 X-RAY EXAM OF KNEE 3: CPT | Mod: 50,TC

## 2019-08-22 PROCEDURE — 73562 XR KNEE 3 VIEW BILATERAL: ICD-10-PCS | Mod: 26,,, | Performed by: RADIOLOGY

## 2019-08-22 PROCEDURE — 99204 OFFICE O/P NEW MOD 45 MIN: CPT | Mod: S$GLB,,, | Performed by: INTERNAL MEDICINE

## 2019-08-22 PROCEDURE — 77077 XR ARTHRITIS SURVEY: ICD-10-PCS | Mod: 26,,, | Performed by: RADIOLOGY

## 2019-08-22 PROCEDURE — 72200 XR SACROILIAC JOINTS 3 VIEWS: ICD-10-PCS | Mod: 26,,, | Performed by: RADIOLOGY

## 2019-08-22 PROCEDURE — 3008F BODY MASS INDEX DOCD: CPT | Mod: CPTII,S$GLB,, | Performed by: INTERNAL MEDICINE

## 2019-08-22 PROCEDURE — 77077 JOINT SURVEY SINGLE VIEW: CPT | Mod: 26,,, | Performed by: RADIOLOGY

## 2019-08-22 PROCEDURE — 99999 PR PBB SHADOW E&M-EST. PATIENT-LVL IV: CPT | Mod: PBBFAC,,, | Performed by: INTERNAL MEDICINE

## 2019-08-22 PROCEDURE — 73562 X-RAY EXAM OF KNEE 3: CPT | Mod: 26,,, | Performed by: RADIOLOGY

## 2019-08-22 RX ORDER — FOLIC ACID 1 MG/1
1 TABLET ORAL DAILY
Qty: 30 TABLET | Refills: 5 | Status: SHIPPED | OUTPATIENT
Start: 2019-08-22 | End: 2020-01-08

## 2019-08-22 RX ORDER — METHOTREXATE 2.5 MG/1
TABLET ORAL
Qty: 50 TABLET | Refills: 5 | Status: SHIPPED | OUTPATIENT
Start: 2019-08-22 | End: 2020-01-08

## 2019-08-22 ASSESSMENT — ROUTINE ASSESSMENT OF PATIENT INDEX DATA (RAPID3)
PSYCHOLOGICAL DISTRESS SCORE: 1
PAIN SCORE: 4
TOTAL RAPID3 SCORE: 2.89
AM STIFFNESS SCORE: 1, YES
PATIENT GLOBAL ASSESSMENT SCORE: 3
MDHAQ FUNCTION SCORE: .5
FATIGUE SCORE: 6.5

## 2019-08-22 NOTE — PROGRESS NOTES
Chief Complaint   Patient presents with    Disease Management    Pain       Patient was referred by : self    History of presenting illness    43 year old female has had psoriasis since 2005  Initially started on the scalp  Now on the hands,ears,feet,scalp  Dermatology confirmed    Tried  -mtx  -stelara  -otezla    On no meds for 6 months  Has active skin disease    She did well on 10 pills weekly mtx  She doesn't know why they stopped it    Didn't repond to otezla or stelara    Joints   -right hip,knee and foot hurts  She gets bad sciatica from the low back  Low back hurts on the right side,not in the center    Massages dont help  Left side no pain  Its mostly coming from the back  -neck feels tense  Massages dont help much  Hands,wrists,elbows,shoulders ok    She had been diagnosed with right hip bursitis and she got steroid shot  She had right foot plantar fasciitis     EMS 1 hour    Pain wakes her up in the middle of the night  She cant lay on the right side    Activity helps  Rest makes it worse     No malar rash,photosensitivity   No telangiectasias   No calcinosis     No patchy alopecia   No oral and nasal ulcers     No sicca symptoms     No pleurisy or any cardiopulmonary complaints     No dysphagia,diplopia and dysphonia and muscle weakness     No n/v/d/c   No acid reflux+     No raynaud's+   No digital ulcers     No cytopenias   No renal issues     No blood clots     No fever,chills,night sweats,weight loss and loss of appetite   No pregnancy losses     No headaches   No recurrent conjunctivitis or uveitis     No chronic or bloody diarrhea with no u colitis or crohn's /inflammatory bowel disease     No vaginal and urethral d/c/STDs/no ulcers       Past history : thyroid disease,depression,GERD,psoriasis     Family history : MS,cancer    Social history : current smoker         Review of Systems   Constitutional: Negative for activity change, appetite change, chills, diaphoresis, fatigue, fever and  unexpected weight change.   HENT: Negative for congestion, dental problem, drooling, ear discharge, ear pain, facial swelling, hearing loss, mouth sores, nosebleeds, postnasal drip, rhinorrhea, sinus pressure, sinus pain, sneezing, sore throat, tinnitus, trouble swallowing and voice change.    Eyes: Negative for photophobia, pain, discharge, redness, itching and visual disturbance.   Respiratory: Negative for apnea, cough, choking, chest tightness, shortness of breath, wheezing and stridor.    Cardiovascular: Negative for chest pain, palpitations and leg swelling.   Gastrointestinal: Negative for abdominal distention, abdominal pain, anal bleeding, blood in stool, constipation, diarrhea, nausea, rectal pain and vomiting.   Endocrine: Negative for cold intolerance, heat intolerance, polydipsia, polyphagia and polyuria.   Genitourinary: Negative for decreased urine volume, difficulty urinating, dysuria, enuresis, flank pain, frequency, genital sores, hematuria and urgency.   Musculoskeletal: Positive for arthralgias. Negative for back pain, gait problem, joint swelling, myalgias, neck pain and neck stiffness.   Skin: Positive for rash. Negative for color change, pallor and wound.   Allergic/Immunologic: Negative for environmental allergies, food allergies and immunocompromised state.   Neurological: Negative for dizziness, tremors, seizures, syncope, facial asymmetry, speech difficulty, weakness, light-headedness, numbness and headaches.   Hematological: Negative for adenopathy. Does not bruise/bleed easily.   Psychiatric/Behavioral: Negative for agitation, behavioral problems, confusion, decreased concentration, dysphoric mood, hallucinations, self-injury, sleep disturbance and suicidal ideas. The patient is not nervous/anxious and is not hyperactive.      Physical Exam     BUCHANAN-28 tender joint count: 0  BUCHANAN-28 swollen joint count: 0    Right hip laterally and posteriorly hurt with all ROM of the hip  LS spine is  tender  SI joints not tender     Right foot 3rd MTP tender     Physical Exam   Constitutional: She is oriented to person, place, and time and well-developed, well-nourished, and in no distress. No distress.   HENT:   Head: Normocephalic.   Mouth/Throat: Oropharynx is clear and moist.   Eyes: Conjunctivae are normal. Pupils are equal, round, and reactive to light. Right eye exhibits no discharge. Left eye exhibits no discharge. No scleral icterus.   Neck: Normal range of motion. No thyromegaly present.   Cardiovascular: Normal rate, regular rhythm, normal heart sounds and intact distal pulses.    Pulmonary/Chest: Effort normal and breath sounds normal. No stridor.   Abdominal: Soft. Bowel sounds are normal.   Lymphadenopathy:     She has no cervical adenopathy.   Neurological: She is alert and oriented to person, place, and time.   Skin: Skin is warm. No rash noted. She is not diaphoretic.     Psychiatric: Affect and judgment normal.   Musculoskeletal: Normal range of motion.         Assessment       43 year old female has had psoriasis since 2005  Past history : thyroid disease,depression,GERD,psoriasis     Initially started on the scalp  Now on the hands,ears,feet,scalp  Dermatology confirmed  Tried  -mtx  -stelara  -otezla    On no meds for 6 months  Has active skin disease    She did well on 10 pills weekly mtx  She doesn't know why they stopped it  Didn't repond to otezla or stelara    She has joint pains in the right hip,knee,foot and low back  She gets bad sciatica from the low back  Low back hurts on the right side,not in the center    She had been diagnosed with right hip bursitis and she got steroid shot  She had right foot plantar fasciitis   EMS 1 hour  Pain wakes her up in the middle of the night  She cant lay on the right side    Activity helps  Rest makes it worse     She has extensive psoriasis on the hands and feet   I am not clear she has psoriatic arthritis    No enthesitis,dactylitis or  synovitis  Right hip tender laterally and posteriorly   Right knee normal exam  LS spine tender  Right foot one MTP on the 3rd toe    Prior LS spine imaging shows deg arthritis    Her back pain seems inflammatory in nature      1. Psoriasis    2. Chronic midline low back pain with right-sided sciatica    3. Spondylosis of lumbar region without myelopathy or radiculopathy        New problem     Plan    -Inflammatory arthritis labs    -Xray  LS spine,hips,knees,arthritis survey and SI joints    -go back on mtx 10 tabs weekly and see if the psoriasis helps better    -lose weight    -follow with dermatology for the psoriasis  Add TNFs if mtx doesn't help enough      Lachelle Gunderson was seen today for disease management and pain.    Diagnoses and all orders for this visit:    Psoriasis  -     CBC auto differential; Future  -     Comprehensive metabolic panel; Future  -     Sedimentation rate; Future  -     C-reactive protein; Future  -     Rheumatoid factor; Future  -     Cyclic citrul peptide antibody, IgG; Future  -     HLA B27 Antigen; Future  -     X-Ray Sacroiliac Joints 3 Views; Future  -     X-Ray Hips Bilateral 2 View Inc AP Pelvis; Future  -     X-Ray Knee 3 View Bilateral; Future  -     X-Ray Lumbar Spine AP And Lateral; Future  -     XR Arthritis Survey; Future    Chronic midline low back pain with right-sided sciatica  -     CBC auto differential; Future  -     Comprehensive metabolic panel; Future  -     Sedimentation rate; Future  -     C-reactive protein; Future  -     Rheumatoid factor; Future  -     Cyclic citrul peptide antibody, IgG; Future  -     HLA B27 Antigen; Future  -     X-Ray Sacroiliac Joints 3 Views; Future  -     X-Ray Hips Bilateral 2 View [employer] AP Pelvis; Future  -     X-Ray Knee 3 View Bilateral; Future  -     X-Ray Lumbar Spine AP And Lateral; Future  -     XR Arthritis Survey; Future    Spondylosis of lumbar region without myelopathy or radiculopathy    Other orders  -     methotrexate 2.5  MG Tab; Take 10 pills weekly  -     folic acid (FOLVITE) 1 MG tablet; Take 1 tablet (1 mg total) by mouth once daily.

## 2019-09-13 ENCOUNTER — OFFICE VISIT (OUTPATIENT)
Dept: DERMATOLOGY | Facility: CLINIC | Age: 44
End: 2019-09-13
Payer: COMMERCIAL

## 2019-09-13 DIAGNOSIS — L40.0 PSORIASIS VULGARIS: Primary | ICD-10-CM

## 2019-09-13 PROCEDURE — 99999 PR PBB SHADOW E&M-EST. PATIENT-LVL II: ICD-10-PCS | Mod: PBBFAC,,, | Performed by: DERMATOLOGY

## 2019-09-13 PROCEDURE — 99999 PR PBB SHADOW E&M-EST. PATIENT-LVL II: CPT | Mod: PBBFAC,,, | Performed by: DERMATOLOGY

## 2019-09-13 PROCEDURE — 99202 PR OFFICE/OUTPT VISIT, NEW, LEVL II, 15-29 MIN: ICD-10-PCS | Mod: S$GLB,,, | Performed by: DERMATOLOGY

## 2019-09-13 PROCEDURE — 99202 OFFICE O/P NEW SF 15 MIN: CPT | Mod: S$GLB,,, | Performed by: DERMATOLOGY

## 2019-09-13 RX ORDER — CALCIPOTRIENE 50 UG/G
OINTMENT TOPICAL 2 TIMES DAILY
Qty: 120 G | Refills: 2 | Status: SHIPPED | OUTPATIENT
Start: 2019-09-13 | End: 2020-11-03

## 2019-09-13 NOTE — PROGRESS NOTES
Subjective:       Patient ID:  Lachelle Engel is a 43 y.o. female who presents for   Chief Complaint   Patient presents with    Psoriasis     head, hands, feet      Patient presents for psoriasis, located on her hands, feet, ears and head. Patient has had it for 17 years. Patient is now using  ointment. The ointment helps but makes the psoriasis scaly.     Psoriasis     Pt's psoriasis was treated in the past by Dr. Disla. Trying to switch all health care to [facility].   Psoriasis initially started on the scalp  Now on the hands,ears,feet,scalp     Previous tx:  -mtx  -stelara  -otezla  -enstilar    On no meds for 6 months  Has active skin disease     She did well on 10 pills weekly mtx. States Dr. Disla stopped it to switch her to another agent, but the stelara and otezla did not work as well. States she's not sure of her total MTX dose, but Dr. Disla had mentioned possible liver biopsy in the future. Does have pain in right hip,knee and foot. She gets bad sciatica from the low back. Low back hurts on the right side,not in the center    Rheumatologist recently started her back on MTX 10 pills weekly, but pt hasn't taken it yet because she had a cold. She plans to start it soon since she feels better. She would like a topical medication that isn't a steroid, because she feels that her skin gets worse once she stops the topical steroids.    Review of Systems   Constitutional: Negative for fever, chills, weight loss, weight gain, fatigue, night sweats and malaise.   Skin: Positive for itching, dry skin and recent sunburn. Negative for daily sunscreen use, activity-related sunscreen use and wears hat.   Hematologic/Lymphatic: Does not bruise/bleed easily.        Objective:    Physical Exam   Constitutional: She appears well-developed and well-nourished.   Neurological: She is alert and oriented to person, place, and time.   Psychiatric: She has a normal mood and affect.   Skin:   Areas Examined (abnormalities  noted in diagram):   Head / Face Inspection Performed  Neck Inspection Performed  RUE Inspected  LUE Inspection Performed  RLE Inspected  LLE Inspection Performed  Nails and Digits Inspection Performed              Diagram Legend     Erythematous scaling macule/papule c/w actinic keratosis       Vascular papule c/w angioma      Pigmented verrucoid papule/plaque c/w seborrheic keratosis      Yellow umbilicated papule c/w sebaceous hyperplasia      Irregularly shaped tan macule c/w lentigo     1-2 mm smooth white papules consistent with Milia      Movable subcutaneous cyst with punctum c/w epidermal inclusion cyst      Subcutaneous movable cyst c/w pilar cyst      Firm pink to brown papule c/w dermatofibroma      Pedunculated fleshy papule(s) c/w skin tag(s)      Evenly pigmented macule c/w junctional nevus     Mildly variegated pigmented, slightly irregular-bordered macule c/w mildly atypical nevus      Flesh colored to evenly pigmented papule c/w intradermal nevus       Pink pearly papule/plaque c/w basal cell carcinoma      Erythematous hyperkeratotic cursted plaque c/w SCC      Surgical scar with no sign of skin cancer recurrence      Open and closed comedones      Inflammatory papules and pustules      Verrucoid papule consistent consistent with wart     Erythematous eczematous patches and plaques     Dystrophic onycholytic nail with subungual debris c/w onychomycosis     Umbilicated papule    Erythematous-base heme-crusted tan verrucoid plaque consistent with inflamed seborrheic keratosis     Erythematous Silvery Scaling Plaque c/w Psoriasis     See annotation      Assessment / Plan:        Psoriasis vulgaris  -     calcipotriene (DOVONOX) 0.005 % ointment; Apply topically 2 (two) times daily.  Dispense: 120 g; Refill: 2  Pt does not currently want a topical steroid.  She plans to re-start MTX per rheum. She will try to find out her total dose from Dr. Disla.    rtc prn

## 2019-10-29 DIAGNOSIS — R73.01 IFG (IMPAIRED FASTING GLUCOSE): ICD-10-CM

## 2019-10-29 NOTE — TELEPHONE ENCOUNTER
Returned. . Call, patient stated. She will not be able to make her scheduled appointment she has on 10/31/2019. Due to, A halloween event at the school. Patient asked to rescheduled. I informed. . . Does not have any open availability until  March of 2020, informed, . I can add her to our recall list patient stated okay. Asked was she out of medication. Patient stated she is on her last week. she Asked could. . Refilled her Rx. Metformin she understand if she can't refill Rx. Phentermine. Due to she does not want to start over on taking Rx. Metformin. I informed. Ms. Engel. I will let . Know,  Also send her a refilled request.

## 2019-10-29 NOTE — TELEPHONE ENCOUNTER
----- Message from Keyanna Loya sent at 10/29/2019  4:32 PM CDT -----  Contact: patient  Please call pt at [phone]    Patient would like to reschedule the appt scheduled on 10/31/19    Thank you

## 2019-10-30 RX ORDER — METFORMIN HYDROCHLORIDE 500 MG/1
500 TABLET, EXTENDED RELEASE ORAL
Qty: 90 TABLET | Refills: 1 | Status: SHIPPED | OUTPATIENT
Start: 2019-10-30 | End: 2020-06-08 | Stop reason: SDUPTHER

## 2019-12-27 ENCOUNTER — OFFICE VISIT (OUTPATIENT)
Dept: URGENT CARE | Facility: CLINIC | Age: 44
End: 2019-12-27
Payer: COMMERCIAL

## 2019-12-27 VITALS
SYSTOLIC BLOOD PRESSURE: 113 MMHG | HEART RATE: 69 BPM | OXYGEN SATURATION: 99 % | TEMPERATURE: 98 F | RESPIRATION RATE: 18 BRPM | BODY MASS INDEX: 39.15 KG/M2 | WEIGHT: 235 LBS | HEIGHT: 65 IN | DIASTOLIC BLOOD PRESSURE: 73 MMHG

## 2019-12-27 DIAGNOSIS — J30.9 ALLERGIC RHINITIS, UNSPECIFIED SEASONALITY, UNSPECIFIED TRIGGER: Primary | ICD-10-CM

## 2019-12-27 PROCEDURE — 99214 PR OFFICE/OUTPT VISIT, EST, LEVL IV, 30-39 MIN: ICD-10-PCS | Mod: 25,S$GLB,, | Performed by: FAMILY MEDICINE

## 2019-12-27 PROCEDURE — 96372 THER/PROPH/DIAG INJ SC/IM: CPT | Mod: S$GLB,,, | Performed by: FAMILY MEDICINE

## 2019-12-27 PROCEDURE — 96372 PR INJECTION,THERAP/PROPH/DIAG2ST, IM OR SUBCUT: ICD-10-PCS | Mod: S$GLB,,, | Performed by: FAMILY MEDICINE

## 2019-12-27 PROCEDURE — 99214 OFFICE O/P EST MOD 30 MIN: CPT | Mod: 25,S$GLB,, | Performed by: FAMILY MEDICINE

## 2019-12-27 RX ORDER — BETAMETHASONE SODIUM PHOSPHATE AND BETAMETHASONE ACETATE 3; 3 MG/ML; MG/ML
9 INJECTION, SUSPENSION INTRA-ARTICULAR; INTRALESIONAL; INTRAMUSCULAR; SOFT TISSUE
Status: COMPLETED | OUTPATIENT
Start: 2019-12-27 | End: 2019-12-27

## 2019-12-27 RX ADMIN — BETAMETHASONE SODIUM PHOSPHATE AND BETAMETHASONE ACETATE 9 MG: 3; 3 INJECTION, SUSPENSION INTRA-ARTICULAR; INTRALESIONAL; INTRAMUSCULAR; SOFT TISSUE at 11:12

## 2019-12-27 NOTE — PATIENT INSTRUCTIONS
Understanding Nasal Allergies  Nasal allergies (also called allergic rhinitis) are a common health problem. They may be seasonal. This means they cause symptoms only at certain times of the year. Or they may be perennial. This means they cause symptoms all year long. Other health problems, such as asthma, often occur along with allergies as well.    What is an allergic reaction?  An allergy is a reaction to a substance called an allergen. Common allergens include:  · Wind-borne pollen  · Mold  · Dust mites  · Furry and feathered animals  · Cockroaches  Normally, allergens are harmless. But when a person has allergies, the body thinks they are harmful. The body then attacks allergens with antibodies. Antibodies are attached to special cells called mast cells. Allergens stick to the antibodies. This makes the mast cells release histamine and other chemicals. This is an allergic reaction. The chemicals irritate nearby nasal tissue. This causes nasal allergy symptoms.  Common nasal allergy symptoms  Allergies can cause nasal tissue to swell. This makes the air passages smaller. The nose may feel stuffed up. The nose may also make extra mucus, which can plug the nasal passages or drip out of the nose. Mucus can drip down the back of the throat (postnasal drip) as well. Sinus tissue can swell. This may cause pain and headache. Common allergy symptoms include:  · Runny nose with clear, watery discharge  · Stuffy nose (nasal congestion)  · Drainage down your throat (postnasal drip)  · Sneezing  · Red, watery eyes  · Itchy nose, eyes, ears, and throat  · Plugged-up ears (ear congestion)  · Sore throat  · Coughing  · Sinus pain and swelling  · Headache  It may not be allergies  Other health problems can cause symptoms like those of nasal allergies. These include:  · Nonallergic rhinitis and viruses such as colds  · Irritants and pollutants, such as strong odors or smoke  · Certain medicines  · Changes in the weather    Treatment  Your healthcare provider will evaluate you to find the cause of your symptoms then recommend treatment. If your symptoms are due to nasal allergies, your healthcare provider may prescribe nasal steroid sprays or oral antihistamines to help reduce symptoms. Avoidance of the allergen will also be suggested. You may also be referred to an allergist.   Date Last Reviewed: 10/1/2016  © 2016-8401 [employer]. [address] [place] [address], [place] [address]. All rights reserved. This information is not intended as a substitute for professional medical care. Always follow your healthcare professional's instructions.        TRY USING ZYRTEC OR CLARITIN DAILY AS NEEDED.    TRY USING THE FLONASE ON A REGULAR BASIS DURING DIFFICULT TIMES TO KEEP YOU FROM HAVING TO COME IN FOR A STEROID SHOT OR PILLS OR FROM DEVELOPING A SINUSITIS OR EAR INFECTION.      Make sure that you follow up with your primary care doctor in the next 2-5 days if needed .  Return to the Urgent Care if signs or symptoms change and certainly if you have worsening symptoms go to the nearest emergency department for further evaluation.

## 2019-12-27 NOTE — PROGRESS NOTES
"Subjective:       Patient ID: Lachelle Engel is a 44 y.o. female.    Vitals:  height is 5' 5" (1.651 m) and weight is 106.6 kg (235 lb). Her temperature is 98.3 °F (36.8 °C). Her blood pressure is 113/73 and her pulse is 69. Her respiration is 18 and oxygen saturation is 99%.     Chief Complaint: Sinus Problem and Otalgia (right)    Patient presents with sinus issues and right ear pain that started yestrerday.  She reports right ear pain intermittent over past 4 weeks.  Almost constant posterior nasal drainage. No fever.  No colored sputum or nasal drainage.    Sinus Problem   This is a new problem. The current episode started yesterday. The problem is unchanged. There has been no fever. She is experiencing no pain. Associated symptoms include congestion, ear pain, headaches and sinus pressure. Pertinent negatives include no chills, coughing, diaphoresis, shortness of breath or sore throat. Past treatments include acetaminophen and oral decongestants. The treatment provided no relief.   Otalgia    Associated symptoms include headaches. Pertinent negatives include no coughing, rash, sore throat or vomiting.       Constitution: Negative for chills, sweating, fatigue and fever.   HENT: Positive for ear pain, congestion, postnasal drip, sinus pain and sinus pressure. Negative for sore throat and voice change.    Neck: Negative for painful lymph nodes.   Eyes: Negative for eye redness.   Respiratory: Negative for chest tightness, cough, sputum production, bloody sputum, COPD, shortness of breath, stridor, wheezing and asthma.    Gastrointestinal: Negative for nausea and vomiting.   Musculoskeletal: Negative for muscle ache.   Skin: Negative for rash.   Allergic/Immunologic: Negative for seasonal allergies and asthma.   Neurological: Positive for headaches.   Hematologic/Lymphatic: Negative for swollen lymph nodes.       Objective:      Physical Exam   Constitutional: She is oriented to person, place, and time. She " appears well-developed and well-nourished. She is cooperative.  Non-toxic appearance. She does not have a sickly appearance. She does not appear ill. No distress.   HENT:   Head: Normocephalic and atraumatic.   Right Ear: Hearing, tympanic membrane, external ear and ear canal normal.   Left Ear: Hearing, tympanic membrane, external ear and ear canal normal.   Nose: Nose normal. No mucosal edema, rhinorrhea or nasal deformity. No epistaxis. Right sinus exhibits no maxillary sinus tenderness and no frontal sinus tenderness. Left sinus exhibits no maxillary sinus tenderness and no frontal sinus tenderness.   Mouth/Throat: Uvula is midline, oropharynx is clear and moist and mucous membranes are normal. No trismus in the jaw. Normal dentition. No uvula swelling. No oropharyngeal exudate, posterior oropharyngeal edema or posterior oropharyngeal erythema.   Pharynx with cobblestoning.  Sinuses nontender.   Eyes: Pupils are equal, round, and reactive to light. Conjunctivae, EOM and lids are normal. No scleral icterus.   Neck: Trachea normal, normal range of motion, full passive range of motion without pain and phonation normal. Neck supple. No neck rigidity. No edema and no erythema present.   Cardiovascular: Normal rate, regular rhythm, normal heart sounds, intact distal pulses and normal pulses.   Pulmonary/Chest: Effort normal and breath sounds normal. No stridor. No respiratory distress. She has no decreased breath sounds. She has no wheezes. She has no rhonchi. She has no rales.   Abdominal: Normal appearance.   Musculoskeletal: Normal range of motion. She exhibits no edema or deformity.   Lymphadenopathy:     She has no cervical adenopathy.   Neurological: She is alert and oriented to person, place, and time. She exhibits normal muscle tone. Coordination normal.   Skin: Skin is warm, dry, intact, not diaphoretic and not pale.   Psychiatric: She has a normal mood and affect. Her speech is normal and behavior is normal.  Judgment and thought content normal. Cognition and memory are normal.   Nursing note and vitals reviewed.        Assessment:       1. Allergic rhinitis, unspecified seasonality, unspecified trigger        Plan:         Allergic rhinitis, unspecified seasonality, unspecified trigger  -     betamethasone acetate-betamethasone sodium phosphate injection 9 mg    TRY USING ZYRTEC OR CLARITIN DAILY AS NEEDED.    TRY USING THE FLONASE ON A REGULAR BASIS DURING DIFFICULT TIMES TO KEEP YOU FROM HAVING TO COME IN FOR A STEROID SHOT OR PILLS OR FROM DEVELOPING A SINUSITIS OR EAR INFECTION.      Make sure that you follow up with your primary care doctor in the next 2-5 days if needed .  Return to the Urgent Care if signs or symptoms change and certainly if you have worsening symptoms go to the nearest emergency department for further evaluation.

## 2020-01-08 ENCOUNTER — OFFICE VISIT (OUTPATIENT)
Dept: RHEUMATOLOGY | Facility: CLINIC | Age: 45
End: 2020-01-08
Payer: COMMERCIAL

## 2020-01-08 VITALS
SYSTOLIC BLOOD PRESSURE: 157 MMHG | WEIGHT: 244.94 LBS | BODY MASS INDEX: 40.81 KG/M2 | DIASTOLIC BLOOD PRESSURE: 86 MMHG | HEART RATE: 84 BPM | HEIGHT: 65 IN

## 2020-01-08 DIAGNOSIS — L40.9 PSORIASIS: Primary | ICD-10-CM

## 2020-01-08 DIAGNOSIS — L40.50 PSORIATIC ARTHRITIS: ICD-10-CM

## 2020-01-08 PROCEDURE — 3008F PR BODY MASS INDEX (BMI) DOCUMENTED: ICD-10-PCS | Mod: CPTII,S$GLB,, | Performed by: INTERNAL MEDICINE

## 2020-01-08 PROCEDURE — 99214 OFFICE O/P EST MOD 30 MIN: CPT | Mod: S$GLB,,, | Performed by: INTERNAL MEDICINE

## 2020-01-08 PROCEDURE — 3008F BODY MASS INDEX DOCD: CPT | Mod: CPTII,S$GLB,, | Performed by: INTERNAL MEDICINE

## 2020-01-08 PROCEDURE — 99214 PR OFFICE/OUTPT VISIT, EST, LEVL IV, 30-39 MIN: ICD-10-PCS | Mod: S$GLB,,, | Performed by: INTERNAL MEDICINE

## 2020-01-08 PROCEDURE — 99999 PR PBB SHADOW E&M-EST. PATIENT-LVL III: ICD-10-PCS | Mod: PBBFAC,,, | Performed by: INTERNAL MEDICINE

## 2020-01-08 PROCEDURE — 99999 PR PBB SHADOW E&M-EST. PATIENT-LVL III: CPT | Mod: PBBFAC,,, | Performed by: INTERNAL MEDICINE

## 2020-01-08 RX ORDER — METHOTREXATE 2.5 MG/1
TABLET ORAL
Qty: 12 TABLET | Refills: 5 | Status: SHIPPED | OUTPATIENT
Start: 2020-01-08 | End: 2020-11-03

## 2020-01-08 RX ORDER — PHENTERMINE HYDROCHLORIDE 37.5 MG/1
TABLET ORAL
COMMUNITY
Start: 2019-11-16 | End: 2020-01-22 | Stop reason: SDUPTHER

## 2020-01-08 RX ORDER — ADALIMUMAB 40MG/0.8ML
40 KIT SUBCUTANEOUS
Qty: 2 PEN | Refills: 11 | Status: SHIPPED | OUTPATIENT
Start: 2020-01-08 | End: 2021-02-19

## 2020-01-08 RX ORDER — ADALIMUMAB 40MG/0.8ML
KIT SUBCUTANEOUS
Qty: 4 PEN | Refills: 0 | Status: SHIPPED | OUTPATIENT
Start: 2020-01-08 | End: 2020-02-07

## 2020-01-08 RX ORDER — FOLIC ACID 1 MG/1
1 TABLET ORAL DAILY
Qty: 30 TABLET | Refills: 5 | Status: SHIPPED | OUTPATIENT
Start: 2020-01-08 | End: 2021-02-19

## 2020-01-08 ASSESSMENT — ROUTINE ASSESSMENT OF PATIENT INDEX DATA (RAPID3)
PATIENT GLOBAL ASSESSMENT SCORE: 6
TOTAL RAPID3 SCORE: 4.22
MDHAQ FUNCTION SCORE: .5
PAIN SCORE: 5
AM STIFFNESS SCORE: 1, YES
FATIGUE SCORE: 5
PSYCHOLOGICAL DISTRESS SCORE: 1.1

## 2020-01-08 NOTE — PROGRESS NOTES
Chief Complaint   Patient presents with    Disease Management       History of presenting illness    44 year old female has had psoriasis since 2005  Initially started on the scalp  Now on the hands,ears,feet,scalp  Dermatology confirmed    Tried  -mtx  -stelara  -otezla    On no meds for 6 months  Has active skin disease    In the past she did well on mtx 10 tabs weekly and she had stopped it  So we resumed it  Now she cannot tolerate it    Didn't repond to otezla or stelara    Joints   -right hip,knee and foot hurts  She gets bad sciatica from the low back  Low back hurts on the right side,not in the center    Massages dont help  Left side no pain  Its mostly coming from the back  -neck feels tense  Massages dont help much  Hands,wrists,elbows,shoulders ok    She had been diagnosed with right hip bursitis and she got steroid shot  She had right foot plantar fasciitis     EMS 1 hour    Pain wakes her up in the middle of the night  She cant lay on the right side    Activity helps  Rest makes it worse     No malar rash,photosensitivity   No telangiectasias   No calcinosis     No patchy alopecia   No oral and nasal ulcers     No sicca symptoms     No pleurisy or any cardiopulmonary complaints     No dysphagia,diplopia and dysphonia and muscle weakness     No n/v/d/c   No acid reflux+     No raynaud's+   No digital ulcers     No cytopenias   No renal issues     No blood clots     No fever,chills,night sweats,weight loss and loss of appetite   No pregnancy losses     No headaches   No recurrent conjunctivitis or uveitis     No chronic or bloody diarrhea with no u colitis or crohn's /inflammatory bowel disease     No vaginal and urethral d/c/STDs/no ulcers     Neg RF,CCP,HLAB27  CBC : mild thrombocytopenia  CMP nml  ESR,CRP nml    Xrays    LS spine deg arthritis  SI joint nml  Hip xray nml  C spine deg arthritis  Knees,hands,wrists feet nml      Past history : thyroid disease,depression,GERD,psoriasis     Family history  : MS,cancer    Social history : current smoker         Review of Systems   Constitutional: Negative for activity change, appetite change, chills, diaphoresis, fatigue, fever and unexpected weight change.   HENT: Negative for congestion, dental problem, drooling, ear discharge, ear pain, facial swelling, hearing loss, mouth sores, nosebleeds, postnasal drip, rhinorrhea, sinus pressure, sinus pain, sneezing, sore throat, tinnitus, trouble swallowing and voice change.    Eyes: Negative for photophobia, pain, discharge, redness, itching and visual disturbance.   Respiratory: Negative for apnea, cough, choking, chest tightness, shortness of breath, wheezing and stridor.    Cardiovascular: Negative for chest pain, palpitations and leg swelling.   Gastrointestinal: Negative for abdominal distention, abdominal pain, anal bleeding, blood in stool, constipation, diarrhea, nausea, rectal pain and vomiting.   Endocrine: Negative for cold intolerance, heat intolerance, polydipsia, polyphagia and polyuria.   Genitourinary: Negative for decreased urine volume, difficulty urinating, dysuria, enuresis, flank pain, frequency, genital sores, hematuria and urgency.   Musculoskeletal: Positive for arthralgias. Negative for back pain, gait problem, joint swelling, myalgias, neck pain and neck stiffness.   Skin: Positive for rash. Negative for color change, pallor and wound.   Allergic/Immunologic: Negative for environmental allergies, food allergies and immunocompromised state.   Neurological: Negative for dizziness, tremors, seizures, syncope, facial asymmetry, speech difficulty, weakness, light-headedness, numbness and headaches.   Hematological: Negative for adenopathy. Does not bruise/bleed easily.   Psychiatric/Behavioral: Negative for agitation, behavioral problems, confusion, decreased concentration, dysphoric mood, hallucinations, self-injury, sleep disturbance and suicidal ideas. The patient is not nervous/anxious and is not  hyperactive.      Physical Exam     BUCHANAN-28 tender joint count: 0  BUCHANAN-28 swollen joint count: 0    Right hip laterally and posteriorly hurt with all ROM of the hip  LS spine is tender  SI joints not tender     Right foot 3rd MTP tender     Physical Exam   Constitutional: She is oriented to person, place, and time and well-developed, well-nourished, and in no distress. No distress.   HENT:   Head: Normocephalic.   Mouth/Throat: Oropharynx is clear and moist.   Eyes: Conjunctivae are normal. Pupils are equal, round, and reactive to light. Right eye exhibits no discharge. Left eye exhibits no discharge. No scleral icterus.   Neck: Normal range of motion. No thyromegaly present.   Cardiovascular: Normal rate, regular rhythm, normal heart sounds and intact distal pulses.    Pulmonary/Chest: Effort normal and breath sounds normal. No stridor.   Abdominal: Soft. Bowel sounds are normal.   Lymphadenopathy:     She has no cervical adenopathy.   Neurological: She is alert and oriented to person, place, and time.   Skin: Skin is warm. No rash noted. She is not diaphoretic.     Psychiatric: Affect and judgment normal.   Musculoskeletal: Normal range of motion.         Assessment       44 year old female has had psoriasis since 2005  Past history : thyroid disease,depression,GERD,psoriasis     Initially started on the scalp  Now on the hands,ears,feet,scalp  Dermatology confirmed  Tried  -mtx  -stelara  -otezla    On no meds for 6 months  Has active skin disease    She did well on 10 pills weekly mtx  She doesn't know why they stopped it  Didn't repond to otezla or stelara  But when we resumed it she had severe GI side effects    She has joint pains in the right hip,knee,foot and low back  She gets bad sciatica from the low back  Low back hurts on the right side,not in the center    She had been diagnosed with right hip bursitis and she got steroid shot  She had right foot plantar fasciitis   EMS 1 hour  Pain wakes her up in the  middle of the night  She cant lay on the right side    Activity helps  Rest makes it worse     She has extensive psoriasis on the hands and feet   I am not clear she has psoriatic arthritis    No enthesitis,dactylitis or synovitis  Right hip tender laterally and posteriorly   Right knee normal exam  LS spine tender  Right foot one MTP on the 3rd toe    Prior LS spine imaging shows deg arthritis    Her back pain seems inflammatory in nature    But labs and xrays dont show inflammatory arthritis yet    Will treat psoriasis and questionable psoriatic arthritis       1. Psoriasis    2. Psoriatic arthritis        f/u problem     Plan    humira to be initiated    Cut down mtx to 3 tabs weekly/folic acid     -lose weight    -follow with dermatology for the psoriasis      Lachelle Gunderson was seen today for disease management.    Diagnoses and all orders for this visit:    Psoriasis  -     HIV 1/2 Ag/Ab (4th Gen); Future  -     Hepatitis B surface antigen; Future  -     HBcAB; Future  -     Hepatitis B surface antibody; Future  -     Hepatitis C antibody; Future  -     Hepatitis A antibody, IgG; Future  -     Strongyloides IgG Antibodies; Future  -     Quantiferon Gold TB; Future  -     RPR; Future  -     Varicella zoster antibody, IgG; Future  -     CBC auto differential; Future  -     Comprehensive metabolic panel; Future    Psoriatic arthritis  -     HIV 1/2 Ag/Ab (4th Gen); Future  -     Hepatitis B surface antigen; Future  -     HBcAB; Future  -     Hepatitis B surface antibody; Future  -     Hepatitis C antibody; Future  -     Hepatitis A antibody, IgG; Future  -     Strongyloides IgG Antibodies; Future  -     Quantiferon Gold TB; Future  -     RPR; Future  -     Varicella zoster antibody, IgG; Future  -     CBC auto differential; Future  -     Comprehensive metabolic panel; Future    Other orders  -     methotrexate 2.5 MG Tab; Take 3 pills weekly  -     folic acid (FOLVITE) 1 MG tablet; Take 1 tablet (1 mg total) by  mouth once daily.  -     adalimumab (HUMIRA PEN) 40 mg/0.8 mL PnKt; Inject 1 pen (40 mg total) into the skin every 14 (fourteen) days.  -     adalimumab (HUMIRA PEN) 40 mg/0.8 mL PnKt; Initial: 80 mg as a single dose  Maintenance: 40 mg every other week beginning 1 week after initial dose

## 2020-01-08 NOTE — PROGRESS NOTES
Rapid3 Question Responses and Scores 1/8/2020   MDHAQ Score 0.5   Psychologic Score 1.1   Pain Score 5   When you awakened in the morning OVER THE LAST WEEK, did you feel stiff? Yes   If Yes, please indicate the number of hours until you are as limber as you will be for the day 3   Fatigue Score 5   Global Health Score 6   RAPID3 Score 4.22       Answers for HPI/ROS submitted by the patient on 1/8/2020   fever: Yes  eye redness: No  headaches: Yes  shortness of breath: No  chest pain: No  trouble swallowing: No  diarrhea: No  constipation: Yes  unexpected weight change: No  genital sore: No  dysuria: No  During the last 3 days, have you had a skin rash?: No  Bruises or bleeds easily: No  cough: No

## 2020-01-09 ENCOUNTER — TELEPHONE (OUTPATIENT)
Dept: PHARMACY | Facility: CLINIC | Age: 45
End: 2020-01-09

## 2020-01-13 ENCOUNTER — OFFICE VISIT (OUTPATIENT)
Dept: GASTROENTEROLOGY | Facility: CLINIC | Age: 45
End: 2020-01-13
Payer: COMMERCIAL

## 2020-01-13 ENCOUNTER — TELEPHONE (OUTPATIENT)
Dept: ENDOSCOPY | Facility: HOSPITAL | Age: 45
End: 2020-01-13

## 2020-01-13 VITALS
DIASTOLIC BLOOD PRESSURE: 78 MMHG | HEART RATE: 66 BPM | HEIGHT: 65 IN | SYSTOLIC BLOOD PRESSURE: 125 MMHG | WEIGHT: 241.19 LBS | BODY MASS INDEX: 40.18 KG/M2

## 2020-01-13 DIAGNOSIS — R19.7 DIARRHEA, UNSPECIFIED TYPE: ICD-10-CM

## 2020-01-13 DIAGNOSIS — K59.00 CONSTIPATION, UNSPECIFIED CONSTIPATION TYPE: ICD-10-CM

## 2020-01-13 DIAGNOSIS — K21.9 GASTROESOPHAGEAL REFLUX DISEASE, ESOPHAGITIS PRESENCE NOT SPECIFIED: ICD-10-CM

## 2020-01-13 DIAGNOSIS — R10.13 EPIGASTRIC PAIN: Primary | ICD-10-CM

## 2020-01-13 PROCEDURE — 3008F BODY MASS INDEX DOCD: CPT | Mod: CPTII,S$GLB,, | Performed by: NURSE PRACTITIONER

## 2020-01-13 PROCEDURE — 99999 PR PBB SHADOW E&M-EST. PATIENT-LVL IV: CPT | Mod: PBBFAC,,, | Performed by: NURSE PRACTITIONER

## 2020-01-13 PROCEDURE — 3008F PR BODY MASS INDEX (BMI) DOCUMENTED: ICD-10-PCS | Mod: CPTII,S$GLB,, | Performed by: NURSE PRACTITIONER

## 2020-01-13 PROCEDURE — 99999 PR PBB SHADOW E&M-EST. PATIENT-LVL IV: ICD-10-PCS | Mod: PBBFAC,,, | Performed by: NURSE PRACTITIONER

## 2020-01-13 PROCEDURE — 99214 PR OFFICE/OUTPT VISIT, EST, LEVL IV, 30-39 MIN: ICD-10-PCS | Mod: S$GLB,,, | Performed by: NURSE PRACTITIONER

## 2020-01-13 PROCEDURE — 99214 OFFICE O/P EST MOD 30 MIN: CPT | Mod: S$GLB,,, | Performed by: NURSE PRACTITIONER

## 2020-01-13 RX ORDER — OMEPRAZOLE 20 MG/1
20 CAPSULE, DELAYED RELEASE ORAL DAILY
Qty: 30 CAPSULE | Refills: 11 | Status: SHIPPED | OUTPATIENT
Start: 2020-01-13 | End: 2023-01-17

## 2020-01-13 NOTE — H&P (VIEW-ONLY)
[facility] Consultation Note    Reason for Consult:  The primary encounter diagnosis was Epigastric pain. Diagnoses of Gastroesophageal reflux disease, esophagitis presence not specified, Constipation, unspecified constipation type, and Diarrhea, unspecified type were also pertinent to this visit.    PCP:   Lyn Deras       Referring MD:  No referring provider defined for this encounter.    Initial History of Present Illness (HPI):  This is a 44 y.o. female here for evaluation of 10 year history of epigastric pain and burning. She feels this has worsened recently to daily symptoms. This pain worsens at night, worse when lying down. Occasionally she will have dysphagia of liquids and solids. She denies N, V, or NSAID use. She also complains of alternating diarrhea and constipation. She has at least one bowel movement per day. She denies rectal bleeding or melena.     Abdominal pain - epigastric  Reflux - yes  Dysphagia - liquids and solids  Bowel habits - diarrhea/constipation  GI bleeding - none  NSAID usage - none      ROS:  Constitutional: No fevers, chills, No weight loss  ENT:  No heartburn no dysphagia no odynophagia no hoarseness  CV: No chest pain, no palpitation  Pulm: No cough, No shortness of breath, no wheezing  Ophtho: No vision changes  GI: see HPI  Derm: +psoriasis   Heme: No lymphadenopathy, No easy bruising  MSK: No significant arthritis  : No dysuria, No hematuria  Endo: No hot or cold intolerance  Neuro: No syncope, No seizure, no strokes  Psych: No uncontrolled anxiety, No uncontrolled depression    Medical History:  has a past medical history of Bradycardia, Depression, Dizziness, GERD (gastroesophageal reflux disease), Impaired fasting blood sugar, Psoriasis, and Thyroid disease.    Surgical History:  has a past surgical history that includes  section; gladder remova; Incontinence surgery; Cholecystectomy; Endometrial ablation; Tubal ligation; and  "Appendectomy.    Family History: family history includes Cancer (age of onset: 30) in her maternal aunt; Multiple sclerosis in her maternal aunt..     Social History:  reports that she has been smoking cigarettes. She has never used smokeless tobacco. She reports that she drinks alcohol. She reports that she does not use drugs.    Review of patient's allergies indicates:  No Known Allergies    Medication List with Changes/Refills   New Medications    OMEPRAZOLE (PRILOSEC) 20 MG CAPSULE    Take 1 capsule (20 mg total) by mouth once daily.   Current Medications    ADALIMUMAB (HUMIRA PEN) 40 MG/0.8 ML PNKT    Inject 1 pen (40 mg total) into the skin every 14 (fourteen) days.    ADALIMUMAB (HUMIRA PEN) 40 MG/0.8 ML PNKT    Initial: 80 mg as a single dose  Maintenance: 40 mg every other week beginning 1 week after initial dose    CALCIPOTRIENE (DOVONOX) 0.005 % OINTMENT    Apply topically 2 (two) times daily.    FOLIC ACID (FOLVITE) 1 MG TABLET    Take 1 tablet (1 mg total) by mouth once daily.    METFORMIN (GLUCOPHAGE-XR) 500 MG 24 HR TABLET    Take 1 tablet (500 mg total) by mouth daily with breakfast.    METHOTREXATE 2.5 MG TAB    Take 3 pills weekly    ONDANSETRON (ZOFRAN-ODT) 4 MG TBDL    Take 1 tablet (4 mg total) by mouth every 6 (six) hours as needed.    PHENTERMINE (ADIPEX-P) 37.5 MG TABLET    TK ONE T PO D BEFORE BREAKFAST         Objective Findings:    Vital Signs:  Blood Pressure 125/78 (BP Location: Right arm)   Pulse 66   Height 5' 5" (1.651 m)   Weight 109.4 kg (241 lb 2.9 oz)   Body Mass Index 40.13 kg/m²   Body mass index is 40.13 kg/m².    Physical Exam:  General Appearance: Well appearing in no acute distress  Eyes:    No scleral icterus  ENT:  No lesions or masses   Lungs: CTA bilaterally, no wheezes, no rhonchi, no rales  Heart:  S1, S2 normal, no murmurs heard  Abdomen:  Non distended, soft, no guarding, no rebound, no tenderness, no appreciated ascites, no bruits, no hepatosplenomegaly,  No CVA " tenderness, no appreciated hernias  Musculoskeletal:  No major joint deformities  Skin: No petechiae or rash on exposed skin areas  Neurologic:  Alert and oriented x4  Psychiatric:  Normal speech mentation and affect    Labs:  Lab Results   Component Value Date    WBC 6.58 2020    HGB 13.5 2020    HCT 42.6 2020     (L) 2020    CHOL 159 2012    TRIG 100 2012    HDL 50 2012    ALT 27 2020    AST 20 2020     2020    K 3.8 2020     2020    CREATININE 0.9 2020    BUN 11 2020    CO2 27 2020    TSH 1.883 2018    INR 0.9 2010           Imagin17 CT abdomen pelvis  1.  Findings most consistent with acute appendicitis without focal fluid collection or free air.  2.  Suspected hepatic steatosis, correlation with LFTs recommended.  3.  Mild splenomegaly, nonspecific.  4.  Small hiatal area, and several additional findings above.    Endoscopy:    Per pt report- EGD/Colonoscopy in  at [facility]  She reports EGD showed hiatal hernia, gerd, and erosions  She reports colonoscopy was normal    Medical Decision Making:    Assessment:  1. Epigastric pain    2. Gastroesophageal reflux disease, esophagitis presence not specified    3. Constipation, unspecified constipation type    4. Diarrhea, unspecified type         Recommendations:   Ms. Engel is a 44yr female with longstanding history of reflux and epigastric pain. She recalls trying a ppi for a few months 8-10 years ago and did not note much improvement. She recalls EGD with acidic damage. Her symptoms have worsened recently and include dysphagia. Because of this, will start PPI now and order EGD.     F/u pending scopes    Order summary:  Orders Placed This Encounter    omeprazole (PRILOSEC) 20 MG capsule    Case request GI: EGD (ESOPHAGOGASTRODUODENOSCOPY)         Thank you for allowing me to participate in the care of Lachelle Gunderson  Toro Blackburn, FNP-C

## 2020-01-13 NOTE — PATIENT INSTRUCTIONS
[email or web address]  Dropping Acid The Reflux Diet Cookbook and Cure -  Eliel Taavres M.D.    GERD  Worst Foods for Acid Reflux  Chocolate (milk chocolate worse than dark chocolate)  Soda (all carbonated beverages)  Alcohol (beer, liquor, wine)  Fried foods  Mccarthy, sausage, ribs  Cream sauce  Fatty meats (beef)  Butter, margarine, lard, shortening  Coffee, tea  Mint   High fat nuts  Hot sauces and pepper  Citrus fruit/juices      Acidic foods (pH - 1 is MORE acidic, 5 is LESS acidic)     Do not eat or drink these (lower numbers are worse)    Induction diet - For 2 weeks eat nothing below pH 5     Lemon juice 2.3  Grape cranberry juice 2.5  Stomach Acid 2.5  Gelatin Dessert 2.6  Lemon/lime 2.9/2.7  Vinegar 2.9  Gatorade 3.0  Fruits - plums, apricots, strawberries, cherries 3.0  Vitamin C (ascorbic acid) 3.0  Iced tea, Snapple 3.1  Mustard 3.2  Soft drinks 3.3  Nectarines 3.3  Pomegranate 3.3  Applesauce 3.4  Grapefruit 3.4  Kiwi 3.4  Barbecue sauce 3.4  Caesar dressing 3.5  [place] island dressing 3.6  Strawberries 3.5  Pineapple juice 3.5  Beer 3.5  Wine 3.5  Grape 3.6  Apples 3.6  Pineapple 3.7  Pickle 3.7  Blackberries, blueberries 3.7  [place] 3.7  Orange 3.8  Cherries 3.9  Red Bull 3.9  Tomatoes 4.2  Coffee 5.1      These are Safe foods:  Agave  Aloe Vera  Apple (only red)  Bagels  Banana (worsens reflux in 1%)  Beans - black, red, lima, lentils  Bread - whole grain, rye  Caramel  Celery  Chamomile tea  Chicken - skinless, never fried  Chicken stock or bouillon  Coffee - one cup/day with milk  Fennel  Fish  Collette  Green vegetables (no green peppers)  Herbs  Honey  Melon  Milk - skin, soy, or Lactaid skim milk  Mushrooms  Oatmeal  Olive oil  Parsley  Pasta  Pears  Popcorn  Potatoes  Red bell peppers  Rice  Soups  Tofu  Turkey Breast  Turnip  Vegetables - no onion, tomatoes, peppers  Vinaigrette  Water - non carbonated  Whole grain breads, crackers, breakfast cereals      Best Foods for Acid  Reflux  Whole grain breads  Oatmeal  Aloe Vera  Salad (no tomatoes, onions, cheese, or high fat dressing)  Banana  Melon  Fennel  Chicken and turkey (skinless, never fried)  Fish/seafood (never fried)  Celery  Parsley  Couscous and Rice    Maybe bad foods (Everyone is unique)  Tomatoes  Garlic  Onion  Nuts (macadamia nuts)  Apples (especially green)  Cucumber  Green peppers  Spicy food  Some herbal teas    GERD tips  Change what you eat:  Eat smaller meals  Eat slowly and chew thoroughly until food is almost liquid  Cut down on junk carbohydrates such as sugar and white flour  Use herbs in your cooking  Eat more raw foods (more than 10 ingredients is not a raw food)  Avoid trans fats and partially hydrogenated oils  Eat more fish and switch to grass fed beef  Switch your cooking oil to macadamia nut or olive oil  Watch extremes of salt intake (too high or too low is bad)    If just cutting out acidic foods is not enough, change how you eat:  Large breakfast, medium lunch, light dinner  Dont mix fruit juices, sweet fruits, and refined starches with meats and heavy food  Dont wash your food down with a lot of liquid      Change these habits:  Stop smoking  Eat dinner earlier (3-4 hours before lying down to sleep)  Elevate the head of your bed 6 inches (blocks under the head of the bed are better than pillows) OR [employer] sells a special mWater Reflux relief system  That may be purchased online for a comfortable, individual solution for raising the head of the bed.  Exercise (but wait 2 hours after eating)  Drink more water (between meals)    Take these supplements:  Multi vitamin  Probiotic  Fish oil    Most common food allergens: milk, eggs, peanuts, tree nuts, fish, shellfish, wheat, and soy    All natural immediate relief:  Chew 2-3 soft probiotic capsules - Dr. Hawkins's Probiotics 12 Plus  Chew chewable DGL licorice tablet  Chew papaya tablet with high protein meal - [employer]  Drink 2 ounces of aloe  vera juice  Swedish bitters  Prelief- reduce the acid in food to keep it form burning sensitive tissue  Iberogast  [place]  Drink Chamomile Tea  Teaspoon of baking soda in water  Spoonful of vinegar in water      All natural ulcer healers:  Zinc carnosine - 75.5 mg with food twice a day x 8 weeks   Lima by Jim - $8 for 60 pills  DGL (deglycyrrhizinated licorice) - 2 tablets before meals. Heals stomach lining   Natural Factors brand, Enzymatic therapy brand.  Aloe Vera juice  - 2 to 8 ounces a day   [place] or [place]

## 2020-01-13 NOTE — PROGRESS NOTES
[facility] Consultation Note    Reason for Consult:  The primary encounter diagnosis was Epigastric pain. Diagnoses of Gastroesophageal reflux disease, esophagitis presence not specified, Constipation, unspecified constipation type, and Diarrhea, unspecified type were also pertinent to this visit.    PCP:   Lyn Deras       Referring MD:  No referring provider defined for this encounter.    Initial History of Present Illness (HPI):  This is a 44 y.o. female here for evaluation of 10 year history of epigastric pain and burning. She feels this has worsened recently to daily symptoms. This pain worsens at night, worse when lying down. Occasionally she will have dysphagia of liquids and solids. She denies N, V, or NSAID use. She also complains of alternating diarrhea and constipation. She has at least one bowel movement per day. She denies rectal bleeding or melena.     Abdominal pain - epigastric  Reflux - yes  Dysphagia - liquids and solids  Bowel habits - diarrhea/constipation  GI bleeding - none  NSAID usage - none      ROS:  Constitutional: No fevers, chills, No weight loss  ENT:  No heartburn no dysphagia no odynophagia no hoarseness  CV: No chest pain, no palpitation  Pulm: No cough, No shortness of breath, no wheezing  Ophtho: No vision changes  GI: see HPI  Derm: +psoriasis   Heme: No lymphadenopathy, No easy bruising  MSK: No significant arthritis  : No dysuria, No hematuria  Endo: No hot or cold intolerance  Neuro: No syncope, No seizure, no strokes  Psych: No uncontrolled anxiety, No uncontrolled depression    Medical History:  has a past medical history of Bradycardia, Depression, Dizziness, GERD (gastroesophageal reflux disease), Impaired fasting blood sugar, Psoriasis, and Thyroid disease.    Surgical History:  has a past surgical history that includes  section; gladder remova; Incontinence surgery; Cholecystectomy; Endometrial ablation; Tubal ligation; and  "Appendectomy.    Family History: family history includes Cancer (age of onset: 30) in her maternal aunt; Multiple sclerosis in her maternal aunt..     Social History:  reports that she has been smoking cigarettes. She has never used smokeless tobacco. She reports that she drinks alcohol. She reports that she does not use drugs.    Review of patient's allergies indicates:  No Known Allergies    Medication List with Changes/Refills   New Medications    OMEPRAZOLE (PRILOSEC) 20 MG CAPSULE    Take 1 capsule (20 mg total) by mouth once daily.   Current Medications    ADALIMUMAB (HUMIRA PEN) 40 MG/0.8 ML PNKT    Inject 1 pen (40 mg total) into the skin every 14 (fourteen) days.    ADALIMUMAB (HUMIRA PEN) 40 MG/0.8 ML PNKT    Initial: 80 mg as a single dose  Maintenance: 40 mg every other week beginning 1 week after initial dose    CALCIPOTRIENE (DOVONOX) 0.005 % OINTMENT    Apply topically 2 (two) times daily.    FOLIC ACID (FOLVITE) 1 MG TABLET    Take 1 tablet (1 mg total) by mouth once daily.    METFORMIN (GLUCOPHAGE-XR) 500 MG 24 HR TABLET    Take 1 tablet (500 mg total) by mouth daily with breakfast.    METHOTREXATE 2.5 MG TAB    Take 3 pills weekly    ONDANSETRON (ZOFRAN-ODT) 4 MG TBDL    Take 1 tablet (4 mg total) by mouth every 6 (six) hours as needed.    PHENTERMINE (ADIPEX-P) 37.5 MG TABLET    TK ONE T PO D BEFORE BREAKFAST         Objective Findings:    Vital Signs:  Blood Pressure 125/78 (BP Location: Right arm)   Pulse 66   Height 5' 5" (1.651 m)   Weight 109.4 kg (241 lb 2.9 oz)   Body Mass Index 40.13 kg/m²   Body mass index is 40.13 kg/m².    Physical Exam:  General Appearance: Well appearing in no acute distress  Eyes:    No scleral icterus  ENT:  No lesions or masses   Lungs: CTA bilaterally, no wheezes, no rhonchi, no rales  Heart:  S1, S2 normal, no murmurs heard  Abdomen:  Non distended, soft, no guarding, no rebound, no tenderness, no appreciated ascites, no bruits, no hepatosplenomegaly,  No CVA " tenderness, no appreciated hernias  Musculoskeletal:  No major joint deformities  Skin: No petechiae or rash on exposed skin areas  Neurologic:  Alert and oriented x4  Psychiatric:  Normal speech mentation and affect    Labs:  Lab Results   Component Value Date    WBC 6.58 2020    HGB 13.5 2020    HCT 42.6 2020     (L) 2020    CHOL 159 2012    TRIG 100 2012    HDL 50 2012    ALT 27 2020    AST 20 2020     2020    K 3.8 2020     2020    CREATININE 0.9 2020    BUN 11 2020    CO2 27 2020    TSH 1.883 2018    INR 0.9 2010           Imagin17 CT abdomen pelvis  1.  Findings most consistent with acute appendicitis without focal fluid collection or free air.  2.  Suspected hepatic steatosis, correlation with LFTs recommended.  3.  Mild splenomegaly, nonspecific.  4.  Small hiatal area, and several additional findings above.    Endoscopy:    Per pt report- EGD/Colonoscopy in  at [facility]  She reports EGD showed hiatal hernia, gerd, and erosions  She reports colonoscopy was normal    Medical Decision Making:    Assessment:  1. Epigastric pain    2. Gastroesophageal reflux disease, esophagitis presence not specified    3. Constipation, unspecified constipation type    4. Diarrhea, unspecified type         Recommendations:   Ms. Engel is a 44yr female with longstanding history of reflux and epigastric pain. She recalls trying a ppi for a few months 8-10 years ago and did not note much improvement. She recalls EGD with acidic damage. Her symptoms have worsened recently and include dysphagia. Because of this, will start PPI now and order EGD.     F/u pending scopes    Order summary:  Orders Placed This Encounter    omeprazole (PRILOSEC) 20 MG capsule    Case request GI: EGD (ESOPHAGOGASTRODUODENOSCOPY)         Thank you for allowing me to participate in the care of Lachelle Gunderson  Toro Blackburn, FNP-C

## 2020-01-16 ENCOUNTER — HOSPITAL ENCOUNTER (OUTPATIENT)
Facility: HOSPITAL | Age: 45
Discharge: HOME | End: 2020-01-16
Attending: INTERNAL MEDICINE | Admitting: INTERNAL MEDICINE
Payer: COMMERCIAL

## 2020-01-16 ENCOUNTER — ANESTHESIA EVENT (OUTPATIENT)
Dept: ENDOSCOPY | Facility: HOSPITAL | Age: 45
End: 2020-01-16
Payer: COMMERCIAL

## 2020-01-16 ENCOUNTER — ANESTHESIA (OUTPATIENT)
Dept: ENDOSCOPY | Facility: HOSPITAL | Age: 45
End: 2020-01-16
Payer: COMMERCIAL

## 2020-01-16 VITALS
OXYGEN SATURATION: 98 % | SYSTOLIC BLOOD PRESSURE: 129 MMHG | DIASTOLIC BLOOD PRESSURE: 62 MMHG | RESPIRATION RATE: 12 BRPM | TEMPERATURE: 99 F | BODY MASS INDEX: 39.99 KG/M2 | HEIGHT: 65 IN | WEIGHT: 240 LBS | HEART RATE: 62 BPM

## 2020-01-16 DIAGNOSIS — K21.9 GASTROESOPHAGEAL REFLUX DISEASE, ESOPHAGITIS PRESENCE NOT SPECIFIED: Primary | ICD-10-CM

## 2020-01-16 DIAGNOSIS — R10.9 ABDOMINAL PAIN: ICD-10-CM

## 2020-01-16 LAB — POCT GLUCOSE: 67 MG/DL (ref 70–110)

## 2020-01-16 PROCEDURE — 63600175 PHARM REV CODE 636 W HCPCS: Performed by: INTERNAL MEDICINE

## 2020-01-16 PROCEDURE — 43239 EGD BIOPSY SINGLE/MULTIPLE: CPT | Mod: ,,, | Performed by: INTERNAL MEDICINE

## 2020-01-16 PROCEDURE — 37000008 HC ANESTHESIA 1ST 15 MINUTES: Performed by: INTERNAL MEDICINE

## 2020-01-16 PROCEDURE — 43239 EGD BIOPSY SINGLE/MULTIPLE: CPT | Performed by: INTERNAL MEDICINE

## 2020-01-16 PROCEDURE — E9220 PRA ENDO ANESTHESIA: HCPCS | Mod: ,,, | Performed by: NURSE ANESTHETIST, CERTIFIED REGISTERED

## 2020-01-16 PROCEDURE — 88305 TISSUE EXAM BY PATHOLOGIST: CPT | Mod: 26,,, | Performed by: PATHOLOGY

## 2020-01-16 PROCEDURE — 63600175 PHARM REV CODE 636 W HCPCS: Performed by: NURSE ANESTHETIST, CERTIFIED REGISTERED

## 2020-01-16 PROCEDURE — 27201012 HC FORCEPS, HOT/COLD, DISP: Performed by: INTERNAL MEDICINE

## 2020-01-16 PROCEDURE — 88305 TISSUE EXAM BY PATHOLOGIST: ICD-10-PCS | Mod: 26,,, | Performed by: PATHOLOGY

## 2020-01-16 PROCEDURE — 25000003 PHARM REV CODE 250: Performed by: NURSE ANESTHETIST, CERTIFIED REGISTERED

## 2020-01-16 PROCEDURE — 43239 PR EGD, FLEX, W/BIOPSY, SGL/MULTI: ICD-10-PCS | Mod: ,,, | Performed by: INTERNAL MEDICINE

## 2020-01-16 PROCEDURE — E9220 PRA ENDO ANESTHESIA: ICD-10-PCS | Mod: ,,, | Performed by: NURSE ANESTHETIST, CERTIFIED REGISTERED

## 2020-01-16 PROCEDURE — 37000009 HC ANESTHESIA EA ADD 15 MINS: Performed by: INTERNAL MEDICINE

## 2020-01-16 PROCEDURE — 88305 TISSUE EXAM BY PATHOLOGIST: CPT | Performed by: PATHOLOGY

## 2020-01-16 RX ORDER — LIDOCAINE HCL/PF 100 MG/5ML
SYRINGE (ML) INTRAVENOUS
Status: DISCONTINUED | OUTPATIENT
Start: 2020-01-16 | End: 2020-01-16

## 2020-01-16 RX ORDER — SODIUM CHLORIDE 9 MG/ML
INJECTION, SOLUTION INTRAVENOUS CONTINUOUS
Status: DISCONTINUED | OUTPATIENT
Start: 2020-01-16 | End: 2020-01-16 | Stop reason: HOSPADM

## 2020-01-16 RX ORDER — PROPOFOL 10 MG/ML
VIAL (ML) INTRAVENOUS CONTINUOUS PRN
Status: DISCONTINUED | OUTPATIENT
Start: 2020-01-16 | End: 2020-01-16

## 2020-01-16 RX ORDER — GLYCOPYRROLATE 0.2 MG/ML
INJECTION INTRAMUSCULAR; INTRAVENOUS
Status: DISCONTINUED | OUTPATIENT
Start: 2020-01-16 | End: 2020-01-16

## 2020-01-16 RX ORDER — PROPOFOL 10 MG/ML
VIAL (ML) INTRAVENOUS
Status: DISCONTINUED | OUTPATIENT
Start: 2020-01-16 | End: 2020-01-16

## 2020-01-16 RX ORDER — SODIUM CHLORIDE 9 MG/ML
INJECTION, SOLUTION INTRAVENOUS CONTINUOUS
Status: CANCELLED | OUTPATIENT
Start: 2020-01-16

## 2020-01-16 RX ADMIN — PROPOFOL 80 MG: 10 INJECTION, EMULSION INTRAVENOUS at 03:01

## 2020-01-16 RX ADMIN — PROPOFOL 200 MCG/KG/MIN: 10 INJECTION, EMULSION INTRAVENOUS at 03:01

## 2020-01-16 RX ADMIN — LIDOCAINE HYDROCHLORIDE 100 MG: 20 INJECTION, SOLUTION INTRAVENOUS at 03:01

## 2020-01-16 RX ADMIN — SODIUM CHLORIDE: 0.9 INJECTION, SOLUTION INTRAVENOUS at 02:01

## 2020-01-16 RX ADMIN — GLYCOPYRROLATE 0.2 MG: 0.2 INJECTION, SOLUTION INTRAMUSCULAR; INTRAVENOUS at 03:01

## 2020-01-16 NOTE — DISCHARGE INSTRUCTIONS

## 2020-01-16 NOTE — ANESTHESIA PREPROCEDURE EVALUATION
2020  Lachelle Engel is a 44 y.o., female.  Past Medical History:   Diagnosis Date    Bradycardia     Depression     Dizziness     GERD (gastroesophageal reflux disease)     Impaired fasting blood sugar     Psoriasis     Thyroid disease     hyothyroidism     Past Surgical History:   Procedure Laterality Date    APPENDECTOMY       SECTION      CHOLECYSTECTOMY      ENDOMETRIAL ABLATION      INCONTINENCE SURGERY      TUBAL LIGATION           Anesthesia Evaluation    I have reviewed the Patient Summary Reports.    I have reviewed the Nursing Notes.      Review of Systems  Anesthesia Hx:  History of prior surgery of interest to airway management or planning: Denies Family Hx of Anesthesia complications.   Denies Personal Hx of Anesthesia complications.   Cardiovascular:   Exercise tolerance: good    Hepatic/GI:   GERD    Psych:   Psychiatric History          Physical Exam  General:  Well nourished    Airway/Jaw/Neck:  AIRWAY FINDINGS: Normal      Eyes/Ears/Nose:  EYES/EARS/NOSE FINDINGS: Normal   Dental:  DENTAL FINDINGS: Normal   Chest/Lungs:  Chest/Lungs Clear    Heart/Vascular:  Heart Findings: Normal       Mental Status:  Mental Status Findings: Normal        Anesthesia Plan  Type of Anesthesia, risks & benefits discussed:  Anesthesia Type:  general  Patient's Preference: general  Intra-op Monitoring Plan: standard ASA monitors  Intra-op Monitoring Plan Comments:   Post Op Pain Control Plan:   Post Op Pain Control Plan Comments:   Induction:   IV  Beta Blocker:  Patient is not currently on a Beta-Blocker (No further documentation required).       Informed Consent: Patient understands risks and agrees with Anesthesia plan.  Questions answered. Anesthesia consent signed with patient.  ASA Score: 2     Day of Surgery Review of History & Physical:    H&P update referred to the  provider.         Ready For Surgery From Anesthesia Perspective.

## 2020-01-16 NOTE — ANESTHESIA POSTPROCEDURE EVALUATION
Anesthesia Post Evaluation    Patient: Lachelle Engel    Procedure(s) Performed: Procedure(s) (LRB):  EGD (ESOPHAGOGASTRODUODENOSCOPY) (N/A)    Final Anesthesia Type: general    Patient location during evaluation: GI PACU  Patient participation: Yes- Able to Participate  Level of consciousness: awake and alert  Post-procedure vital signs: reviewed and stable  Pain management: adequate  Airway patency: patent    PONV status at discharge: No PONV  Anesthetic complications: no      Cardiovascular status: blood pressure returned to baseline  Respiratory status: unassisted  Hydration status: euvolemic  Follow-up not needed.          Vitals Value Taken Time   /72 1/16/2020  3:25 PM   Temp 37.1 °C (98.7 °F) 1/16/2020  3:31 PM   Pulse 104 1/16/2020  3:25 PM   Resp 14 1/16/2020  3:25 PM   SpO2 98 % 1/16/2020  3:25 PM         No case tracking events are documented in the log.      Pain/Joel Score: No data recorded

## 2020-01-16 NOTE — [employer] PATIENT INSTRUCTIONS
Discharge Summary/Instructions after an Endoscopic Procedure  Patient Name: Lachelle Engel  Patient MRN: [id number]  Patient YOB: 1975 Thursday, January 16, 2020  Venkat Be MD  RESTRICTIONS:  During your procedure today, you received medications for sedation.  These   medications may affect your judgment, balance and coordination.  Therefore,   for 24 hours, you have the following restrictions:   - DO NOT drive a car, operate machinery, make legal/financial decisions,   sign important papers or drink alcohol.    ACTIVITY:  Today: no heavy lifting, straining or running due to procedural   sedation/anesthesia.  The following day: return to full activity including work.  DIET:  Eat and drink normally unless instructed otherwise.     TREATMENT FOR COMMON SIDE EFFECTS:  - Mild abdominal pain, nausea, belching, bloating or excessive gas:  rest,   eat lightly and use a heating pad.  - Sore Throat: treat with throat lozenges and/or gargle with warm salt   water.  - Because air was used during the procedure, expelling large amounts of air   from your rectum or belching is normal.  - If a bowel prep was taken, you may not have a bowel movement for 1-3 days.    This is normal.  SYMPTOMS TO WATCH FOR AND REPORT TO YOUR PHYSICIAN:  1. Abdominal pain or bloating, other than gas cramps.  2. Chest pain.  3. Back pain.  4. Signs of infection such as: chills or fever occurring within 24 hours   after the procedure.  5. Rectal bleeding, which would show as bright red, maroon, or black stools.   (A tablespoon of blood from the rectum is not serious, especially if   hemorrhoids are present.)  6. Vomiting.  7. Weakness or dizziness.  GO DIRECTLY TO THE NEAREST EMERGENCY ROOM IF YOU HAVE ANY OF THE FOLLOWING:      Difficulty breathing              Chills and/or fever over 101 F   Persistent vomiting and/or vomiting blood   Severe abdominal pain   Severe chest pain   Black, tarry stools   Bleeding- more than one  tablespoon   Any other symptom or condition that you feel may need urgent attention  Your doctor recommends these additional instructions:  If any biopsies were taken, your doctors clinic will contact you in 1 to 2   weeks with any results.  - Patient has a contact number available for emergencies.  The signs and   symptoms of potential delayed complications were discussed with the   patient.  Return to normal activities tomorrow.  Written discharge   instructions were provided to the patient.   - Discharge patient to home (ambulatory).   - Resume previous diet.   - Continue present medications.   - Use Prilosec (omeprazole) 40 mg PO daily.   - Await pathology results.   - Return to referring physician as previously scheduled.  For questions, problems or results please call your physician - Venkat Be MD at Work:  [phone].  [facility], EMERGENCY ROOM PHONE NUMBER: [phone]  IF A COMPLICATION OR EMERGENCY SITUATION ARISES AND YOU ARE UNABLE TO REACH   YOUR PHYSICIAN - GO DIRECTLY TO THE EMERGENCY ROOM.  Venkat Be MD  1/16/2020 3:23:51 PM  This report has been verified and signed electronically.  [employer]

## 2020-01-16 NOTE — TRANSFER OF CARE
"Anesthesia Transfer of Care Note    Patient: Lachelle Engel    Procedure(s) Performed: Procedure(s) (LRB):  EGD (ESOPHAGOGASTRODUODENOSCOPY) (N/A)    Patient location: GI    Anesthesia Type: general    Transport from OR: Transported from OR on room air with adequate spontaneous ventilation    Post pain: adequate analgesia    Post assessment: no apparent anesthetic complications and tolerated procedure well    Post vital signs: stable    Level of consciousness: sedated and responds to stimulation    Nausea/Vomiting: no nausea/vomiting    Complications: none    Transfer of care protocol was followed      Last vitals:   Visit Vitals  /70   Pulse 78   Temp 36.8 °C (98.2 °F)   Resp 15   Ht 5' 5" (1.651 m)   Wt 108.9 kg (240 lb)   SpO2 98%   BMI 39.94 kg/m²     "

## 2020-01-21 NOTE — TELEPHONE ENCOUNTER
DOCUMENTATION ONLY:  Prior authorization for Humira approved from 1/9/2020 to 6/30/2020    Case ID# [id number]    Co-pay: $5.00    Patient Assistance IS NOT required.    Forward to clinical pharmacist for consult & shipment. -HBR

## 2020-01-22 ENCOUNTER — OFFICE VISIT (OUTPATIENT)
Dept: BARIATRICS | Facility: CLINIC | Age: 45
End: 2020-01-22
Payer: COMMERCIAL

## 2020-01-22 VITALS
HEIGHT: 65 IN | WEIGHT: 237.88 LBS | BODY MASS INDEX: 39.63 KG/M2 | HEART RATE: 98 BPM | DIASTOLIC BLOOD PRESSURE: 74 MMHG | SYSTOLIC BLOOD PRESSURE: 114 MMHG

## 2020-01-22 DIAGNOSIS — R73.01 IMPAIRED FASTING BLOOD SUGAR: ICD-10-CM

## 2020-01-22 DIAGNOSIS — E66.01 CLASS 2 SEVERE OBESITY WITH BODY MASS INDEX (BMI) OF 35 TO 39.9 WITH SERIOUS COMORBIDITY: Primary | ICD-10-CM

## 2020-01-22 PROCEDURE — 3008F BODY MASS INDEX DOCD: CPT | Mod: CPTII,S$GLB,, | Performed by: INTERNAL MEDICINE

## 2020-01-22 PROCEDURE — 99999 PR PBB SHADOW E&M-EST. PATIENT-LVL III: CPT | Mod: PBBFAC,,, | Performed by: INTERNAL MEDICINE

## 2020-01-22 PROCEDURE — 99999 PR PBB SHADOW E&M-EST. PATIENT-LVL III: ICD-10-PCS | Mod: PBBFAC,,, | Performed by: INTERNAL MEDICINE

## 2020-01-22 PROCEDURE — 3008F PR BODY MASS INDEX (BMI) DOCUMENTED: ICD-10-PCS | Mod: CPTII,S$GLB,, | Performed by: INTERNAL MEDICINE

## 2020-01-22 PROCEDURE — 99213 OFFICE O/P EST LOW 20 MIN: CPT | Mod: S$GLB,,, | Performed by: INTERNAL MEDICINE

## 2020-01-22 PROCEDURE — 99213 PR OFFICE/OUTPT VISIT, EST, LEVL III, 20-29 MIN: ICD-10-PCS | Mod: S$GLB,,, | Performed by: INTERNAL MEDICINE

## 2020-01-22 RX ORDER — TOPIRAMATE 50 MG/1
50 TABLET, FILM COATED ORAL 2 TIMES DAILY
Qty: 60 TABLET | Refills: 3 | Status: SHIPPED | OUTPATIENT
Start: 2020-01-22 | End: 2020-06-08 | Stop reason: SDUPTHER

## 2020-01-22 RX ORDER — PHENTERMINE HYDROCHLORIDE 37.5 MG/1
TABLET ORAL
Qty: 30 TABLET | Refills: 1 | Status: SHIPPED | OUTPATIENT
Start: 2020-01-22 | End: 2020-06-08 | Stop reason: SDUPTHER

## 2020-01-22 NOTE — PATIENT INSTRUCTIONS
Patient was informed that topiramate is used for migraine prevention and seizures. Weight loss is a common side effect that is well documented. S/he understands this. S/he was informed of the potential side effects such as serious and possibly fatal rash in which case the medication should be discontinued immediately. Paresthesias, forgetfulness, fatigue, kidney stones, GI symptoms, and changes in lab values such as electrolytes, blood counts and kidney function.      Start topiramate  in the evening for 1 week, then morning and evening.       Patient warned of common side effects of phentermine including anxiety, insomnia, palpitations and increased blood pressure. It was also explained that it is for short-term usage along with diet and exercise, and that stopping the medication without making lifestyle changes will result in regain of weight. Patient states understanding.     Weight loss medications are controlled substances.  They require routine follow up. Prescription or pills that are lost or destroyed will not be replaced.       Start phentermine with 1/2 pill a day        Dinner in Under 30 minutes and 400 calories.     Baked Chicken breast with Broccoli Cheese Casserole  2-3 chicken breast (approximately 6-8 oz each)    2 tsp each garlic and herb Mrs. Dash seasoning   2 tsp your favorite creole seasoning  2 tablespoons of balsamic vinegar  2 - 10 oz packages of frozen broccoli  1 egg   ½ cup skim milk  ½ tsp paprika   ½ tsp cayenne pepper   1 can fat free cream of mushroom soup.   1 .5 cups of shredded cheddar cheese    Pre-heat oven to 450 degrees. Toss 2-3 chicken breast (approximately 6-8 oz each) in 2 tsp each garlic and herb Mrs. Dash seasoning, 2 tsp your favorite creole seasoning, and 2 tablespoons of balsamic vinegar. This can also be done up to 24 hours ahead of time, and the chicken can be left in the refrigerator to marinate. Place on a baking sheet sprayed with non-stick cooking spray and bake  on 450 degrees for 10 min, then reduce heat to 350 degrees and cook an addition 10-15 minutes until chicken is cooked through.   While chicken is baking, microwave safe dish, thaw 2 - 10 oz packages of frozen broccoli. Drain any excess water, season with salt, pepper, all-purpose seasoning of your choice. In another bowl, whisk together 1 egg, ½ cup skim milk, ½ tsp paprika, ½ tsp cayenne pepper and 1 can fat free cream of mushroom soup. Combine broccoli, soup mixture, 1 cup of shredded cheddar cheese in baking dish sprayed with cooking spray. Top with remaining cheese. Bake in the oven with chicken for about 20 min or until set cheese is melted and bay.     Makes 4 servings. 389 calories per serving.10 g fat, 13 g carbs, 54g protein     Sheet Pan [place] Shrimp  1 pound large shrimp, shelled, peeled and deveined.   2 tablespoon olive oil  The zest and the juice of 1 lime  ½ tsp chili powder  ½-1 tsp cayenne pepper  1 tsp cumin  ½ tsp dry oregano  1 red bell pepper  1 green bell pepper  1 red onion  2 cups mushrooms, quartered  1 avocado  Whole bria lettuce leaves  Preheat oven to 375 degrees.  In a bowl combine shrimp, lime juice, lime zest, cumin, cayenne pepper, chili powder, and a pinch of salt. Set aside.   Slice peppers and onions into thin strips. Toss in 1 tablespoon of olive oil. Sprinkle with salt and pepper and arrange in a single layer over 1/3 of a large sheet pan  Toss mushrooms with remaining olive oil, oregano, salt and pepper. Arrange in single layer on sheet pan. Place sheet pan in oven for about 15-20 minutes until vegetables are softened, cooked about ¾ of the way through. Remove the sheet pan from oven.  Change setting on oven to low broil.  If needed, rearrange vegetables to make room for shrimp. Arrange the shrimp in a single layer on the sheet pan and return pan to oven. After 5 minutes, flip shrimp. Cook 3-5 additional minutes, or until  Shrimp are opaque.   Serve shrimp and cooked  vegetables on lettuce leaves with sliced avocado.   Makes 4 servings. Calories per servin; 23g fat, 19 g carbohydrates, 27g protein.    Zoodles Primavera with Seasoned Ricotta  8 cups zucchini noodles. These can be purchased already prepared, or you can make them on your own with whole zucchini and a vegetable spiralizer.   1.5 cups cherry tomatoes, quartered  2 cups sliced mushrooms  1 cup chopped fresh broccoli  1 cup frozen peas and carrots  2 cloves garlic, finely chopped  16 oz part skim ricotta cheese  2 oz Neufchatel cream cream cheese, cut into small cubes  Juice and zest of 1 lemon  1 pinch red pepper flakes    2 tsp Italian seasoning  4-5 leaves fresh basil, thinly sliced  1 tablespoon of olive oil  Parmesan cheese for topping (optional)     In a bowl, combine ricotta cheese, 1 tsp Italian seasoning, ½ teaspoon lemon zest. Season with salt and pepper to taste. Mix well. Fold in half of fresh basil. Set aside.   Heat a large skillet to medium high. Add olive oil. Sautee mushrooms until starting to become tender. Season them with salt and pepper while cooking.  Add broccoli and peas and carrots. Reduce heat to medium. Cover pan and cook for 4-5 minutes until broccoli is tender and peas and carrots are warmed through. Add Neuf[place]el cheese, Italian seasoning, red pepper flakes, 1 tsp lemon zest and lemon juice. Stir until a smooth sauce is formed. Add zucchini noodles (zoodles) to skillet and toss in sauce, then add cherry tomatoes.  Separate zoodle and vegetables into 4 equal portions. Serve each with a ¼ cup serving of seasoned ricotta cheese. Sprinkle with grated parmesan cheese or fresh basil,  if desired.   Makes 4 servings. Calories per servin calories, 32 g carbs, 33 g protein, 18 g fat

## 2020-01-22 NOTE — PROGRESS NOTES
"Subjective:       Patient ID: Lachelle Engel is a 44 y.o. female.    Chief Complaint: Follow-up    Pt here today for follow-up. Has gained 12 more lbs, net neg 16 lbs. 4th OV with weight gain  Added LCHF diet and phentermine at last OV,. Last filled 11/16/19. Also metformin for IFG. She feels she is doing ok with eating. She has been exercising. Denies SE with metformin.   States she had constipationand weight gain with the contrave. Tried topiramate. She gained weight. Was gaining weight last she took phentermine and diethylpropion      Follow-up   Pertinent negatives include no arthralgias, chest pain, chills or fever.     Review of Systems   Constitutional: Negative for chills and fever.   Respiratory: Negative for shortness of breath.         + snores   Cardiovascular: Positive for leg swelling. Negative for chest pain.   Gastrointestinal: Negative for constipation and diarrhea.   Genitourinary: Negative for difficulty urinating and menstrual problem.        S/p ablation   Musculoskeletal: Negative for arthralgias and back pain.   Neurological: Negative for dizziness and light-headedness.   Psychiatric/Behavioral: Negative for dysphoric mood. The patient is not nervous/anxious.        Objective:     /74   Pulse 98   Ht 5' 5" (1.651 m)   Wt 107.9 kg (237 lb 14 oz)   BMI 39.58 kg/m²     Physical Exam   Constitutional: She is oriented to person, place, and time. She appears well-developed. No distress.   Morbidly obese     HENT:   Head: Normocephalic and atraumatic.   Eyes: Pupils are equal, round, and reactive to light. EOM are normal. No scleral icterus.   Neck: Normal range of motion. Neck supple.   Cardiovascular: Normal rate.   Pulmonary/Chest: Effort normal.   Musculoskeletal: Normal range of motion. She exhibits no edema.   Neurological: She is alert and oriented to person, place, and time. No cranial nerve deficit.   Skin: Skin is warm and dry. No erythema.   Psychiatric: She has a normal " mood and affect. Her behavior is normal. Judgment normal.   Vitals reviewed.      Assessment:       1. Class 2 severe obesity with body mass index (BMI) of 35 to 39.9 with serious comorbidity    2. Impaired fasting blood sugar        Plan:               Lachelle Gunderson was seen today for follow-up.    Diagnoses and all orders for this visit:    Class 2 severe obesity with body mass index (BMI) of 35 to 39.9 with serious comorbidity  -     phentermine (ADIPEX-P) 37.5 mg tablet; 1/2 tab po qam    Impaired fasting blood sugar    Other orders  -     topiramate (TOPAMAX) 50 MG tablet; Take 1 tablet (50 mg total) by mouth 2 (two) times daily.           Patient warned of common side effects of phentermine including anxiety, insomnia, palpitations and increased blood pressure. It was also explained that it is for short-term usage along with diet and exercise, and that stopping the medication without making lifestyle changes will result in regain of weight. Patient states understanding.     Weight loss medications are controlled substances.  They require routine follow up. Prescription or pills that are lost or destroyed will not be replaced.     Patient was informed that topiramate is used for migraine prevention and seizures. Weight loss is a common side effect that is well documented. S/he understands this. S/he was informed of the potential side effects such as serious and possibly fatal rash in which case the medication should be discontinued immediately. Paresthesias, forgetfulness, fatigue, kidney stones, GI symptoms, and changes in lab values such as electrolytes, blood counts and kidney function.    Start topiramate  in the evening for 1 week, then morning and evening.     Start phentermine with 1/2 pill a day       3 meals a day made up of the following:  Unlimited green vegetables, tomatoes, mushrooms, spaghetti squash, cauliflower, meat, poultry, seafood, eggs and hard cheeses.   Milk and plain yogurt  Dressings,  seasonings, condiments, etc should have less than 2 g sugars.   Beans (1-1.5 cups) or nuts (1/4 cup) can have 1 x a day.   1-2 servings of citrus fruits, berries, pineapple or melon a day (1/2 cup)  Avoid fried foods     No grains, rice, pasta, potatoes, bread, corn, peas, oatmeal, grits, tortillas, crackers, chips     No soda, sweet tea, juices or lemonade     [email or web address] for recipes. Moderate carb intake.  30 min recipes given.

## 2020-01-28 ENCOUNTER — PATIENT MESSAGE (OUTPATIENT)
Dept: RHEUMATOLOGY | Facility: CLINIC | Age: 45
End: 2020-01-28

## 2020-01-29 NOTE — TELEPHONE ENCOUNTER
DOCUMENTATION ONLY:  Prior authorization for Humira loading dose approved from 01/21/2020 to 2/20/2020    Case ID# [id number]    Co-pay: $5.00    Patient Assistance IS required.    Forward to clinical pharmacist for consult & shipment. -HBR

## 2020-01-30 ENCOUNTER — TELEPHONE (OUTPATIENT)
Dept: PHARMACY | Facility: CLINIC | Age: 45
End: 2020-01-30

## 2020-01-30 NOTE — TELEPHONE ENCOUNTER
Initial Humira 40mg/0.8ml Pen Injection consult completed on .Humira 40mg/0.8ml Pen Injection will be shipped on  to arrive at patient's home on  via [employer]. $ 5.00 copay. Patient will start Humira 40mg/0.8ml Pen Injection on . Address confirmed, CC on file. Confirmed 2 patient identifiers - name and . Therapy Appropriate.    Counseled patient on administration directions:  -Initial, 80 mg subQ; 40 mg subQ every other week beginning 1 week after initial dosage.   - Wash hands before and after injection.  - Monthly RX will come with gauze, bandaids, and alcohol swabs.  - Patient may inject in either the tops of the thighs, abdomen- but at least 2 inches away from her belly button, or the outer part of her upper arm.  Patient was instructed to rotate injections sites.  - Patient is to wipe down the injection site with the alcohol pad, wait to dry.  Gently squeeze the area of the cleaned skin and hold it firmly.   Place the pen flat against the raised area of skin that is being squeezed, then push down on the button and hold for 10-15 seconds, until the window has gone from clear to yellow.  - Patient should rotate injection sites.   - Patient will use sharps container; once full, per [place] law, she may lock the sharps container and place in her trash. She can then contact the Pharmacy and we will replace the sharps at no additional charge.    Patient was counseled on possible side effects:  - Injection site reaction: redness, soreness, itching, bruising, which should resolve within 3-5 days.   - Increased risk for infections.  If patient becomes sick, patient is to hold Humira  use until she is better.     DDIs:  Medication list reviewed and potential DDIs addressed.    Patient verbalized understanding. Compliance stressed. Patient advised to keep a calendar marking dates of injections to ensure better compliance. Patient advised to call myself or provider should any questions arise. Patient plans to  start Humira on 2/1. Consultation included: indication; goals of treatment; administration; storage and handling; side effects; how to handle side effects; the importance of compliance; how to handle missed doses; the importance of laboratory monitoring; the importance of keeping all follow up appointments.  Patient understands to report any medication changes to [facility] and provider. All questions answered and addressed to patient's satisfaction. [facility] will f/u with her in 1 week from start, [facility] to contact patient in 3 weeks for refills.

## 2020-02-03 ENCOUNTER — TELEPHONE (OUTPATIENT)
Dept: GASTROENTEROLOGY | Facility: CLINIC | Age: 45
End: 2020-02-03

## 2020-02-03 LAB
FINAL PATHOLOGIC DIAGNOSIS: NORMAL
GROSS: NORMAL

## 2020-02-03 NOTE — TELEPHONE ENCOUNTER
----- Message from Venkat Be MD sent at 2/3/2020 12:08 PM CST -----  The stomach biopsy did not show anything of concern, and no bacterial infection

## 2020-02-12 NOTE — ANESTHESIA POSTPROCEDURE EVALUATION
"Anesthesia Post Evaluation    Patient: Lachelle Engel    Procedure(s) Performed: Procedure(s) (LRB):  APPENDECTOMY-LAPAROSCOPIC (N/A)    Final Anesthesia Type: general  Patient location during evaluation: PACU  Patient participation: Yes- Able to Participate  Level of consciousness: awake and alert and oriented  Post-procedure vital signs: reviewed and stable  Pain management: adequate  Airway patency: patent  PONV status at discharge: No PONV  Anesthetic complications: no      Cardiovascular status: hemodynamically stable  Respiratory status: unassisted, spontaneous ventilation and room air  Hydration status: euvolemic  Follow-up not needed.        Visit Vitals  BP (!) 121/59 (BP Location: Right arm, Patient Position: Lying)   Pulse 68   Temp 36.3 °C (97.3 °F) (Oral)   Resp 16   Ht 5' 5" (1.651 m)   Wt 99.8 kg (220 lb)   SpO2 96%   Breastfeeding? No   BMI 36.61 kg/m²       Pain/Joel Score: Pain Assessment Performed: Yes (11/26/2017 10:00 PM)  Presence of Pain: denies (11/26/2017 10:00 PM)  Pain Rating Prior to Med Admin: 3 (11/26/2017  8:31 PM)  Joel Score: 9 (11/26/2017  9:15 PM)      " Workforce Health  Daily Note    Visit Count: 14  Referred by: Dr. Latisha Ramos MD  Diagnosis:  Right rotator cuff repair  Next Referring Provider Visit: 2/17/2020  Â   Insurance: [employer] Medical Number: [id number]  Claim Status: claim open and in good standing  Current Work Status: full time occupation: ; off work due to current condition  Adjustor/: [employer]   Phone number: [phone], [phone]  Â   Date of Injury: 5/16/2019  Surgery: date of surgery: 10/17/2019; surgery performed: right rotator cuff repair, biceps tenodesis  Precautions: History of left total knee arthroplasty  Â   Work Status:  Job Title:   Current Employer: [employer]  :   Last Date Worked: 10/16/2019  Restrictions: currently off work due to current condition  Return to Work Date: hoping for end of March, 2020  Job Description Obtained: no  Job Site Visit: No, due to nature of the work    Case Notes/Attendance:  Date of Communication: NA; Results: none at this time  Attendance Concerns: none at this time    SUBJECTIVE   Pain on arrival: 3/10. Patient reports that his shoulder is its normal sore. OBJECTIVE   Hand Dominance: right  Posture/Observation:   Patient is observed to be an obese older male who sits with fair posture, but forward, rounded shoulders are noted. Â   Palpation:   Mild tenderness to palpation noted over the right biceps tendon  Â   Range of Motion (ROM)Â (norms in parentheses, measurement in degrees unless noted): Shoulder Flexion (180): Right: Active:Â 150Â   Shoulder Abduction (180):Right: Active:Â 150  Shoulder Internal Rotation at 45Â°: Right: Â Active: 55  Shoulder External Rotation at 45Â°: Right: Active:Â 95  Comments / Details:Â Right shoulder scaption 150 Â°Â     Treatment   Completed task sheet.     Therapeutic Exercise:   Exercise: Weight lbs Reps: Sets: Time: Position/Cues:   Recumbent bike L6   15 min At the beginning of the session   UBE: F/R L4   10 min Standing   Seated Row 65# 20 1     Lat pull down 57.5# 10 2     Chest press 37.5# 10 2     Shoulder press 30# 10 2     FM extension 20# 10 2     FM reverse fly  20# 10 2     FM ER 2.5# 10 2     FM IR 5# 10 2     FM tricep push down 30# 10 2     Wall dribbles red  10 2     Pizza carry 90/90, full flexion 6# 1 lap  1     Yellow band wall walks  5 1  Until fatigue   Alvarez press level 2 push ups  10 2     Red band ER walks   2 laps      Bicep Curls and hammer curls 7# 10 2     Supine SA Punches 6# 10 2  Bilateral   Supine CW/CCW Circles 6# 10 2  Bilateral   Supine Long Axis Rhythmic Stabilization 6# 30sec 2     Incline Bench: T/Y/I 2# 10 ea   Bilateral           Sleeper stretch  3 1 30 sec    Standing towel stretch  2 1 20 sec    Lifting circuit        Waist to overhead lift  10.5# 10 1    Floor to waist lift 40# 10 1    Front carry with box 50 feet 35# 10 1    Post carry 100 feet  2 1    Overhead nuts and bolts  1 at 3 min     Push/pull sled 30 feet 50# on sled 10 1    Comments: body mechanics and work simulation addressed with the above exercises     ASSESSMENT   Patient tolerated progressing to the band ER walking and wall dribbles without increased pain. Patient is very excited about returning to work next week. Pain after treatment: 3/10. Result of above outlined education: Verbalizes understanding and Demonstrates understanding    Goals:       See eval     PLAN   Progress LE strengthening. Procedures and total treatment time documented in Time Entry flowsheet.     Patient consented to portions of this session being provided by [employer], LAT under the supervision of Cristel Medina DPT.  2

## 2020-02-20 ENCOUNTER — OFFICE VISIT (OUTPATIENT)
Dept: URGENT CARE | Facility: CLINIC | Age: 45
End: 2020-02-20
Payer: COMMERCIAL

## 2020-02-20 ENCOUNTER — TELEPHONE (OUTPATIENT)
Dept: PHARMACY | Facility: CLINIC | Age: 45
End: 2020-02-20

## 2020-02-20 VITALS
TEMPERATURE: 97 F | SYSTOLIC BLOOD PRESSURE: 141 MMHG | HEART RATE: 86 BPM | BODY MASS INDEX: 39.49 KG/M2 | HEIGHT: 65 IN | DIASTOLIC BLOOD PRESSURE: 92 MMHG | OXYGEN SATURATION: 99 % | RESPIRATION RATE: 12 BRPM | WEIGHT: 237 LBS

## 2020-02-20 DIAGNOSIS — J20.9 ACUTE PURULENT BRONCHITIS: ICD-10-CM

## 2020-02-20 DIAGNOSIS — J01.40 ACUTE PANSINUSITIS, RECURRENCE NOT SPECIFIED: Primary | ICD-10-CM

## 2020-02-20 PROCEDURE — 99214 OFFICE O/P EST MOD 30 MIN: CPT | Mod: 25,S$GLB,, | Performed by: INTERNAL MEDICINE

## 2020-02-20 PROCEDURE — 96372 PR INJECTION,THERAP/PROPH/DIAG2ST, IM OR SUBCUT: ICD-10-PCS | Mod: S$GLB,,, | Performed by: INTERNAL MEDICINE

## 2020-02-20 PROCEDURE — 99214 PR OFFICE/OUTPT VISIT, EST, LEVL IV, 30-39 MIN: ICD-10-PCS | Mod: 25,S$GLB,, | Performed by: INTERNAL MEDICINE

## 2020-02-20 PROCEDURE — 96372 THER/PROPH/DIAG INJ SC/IM: CPT | Mod: S$GLB,,, | Performed by: INTERNAL MEDICINE

## 2020-02-20 RX ORDER — BETAMETHASONE SODIUM PHOSPHATE AND BETAMETHASONE ACETATE 3; 3 MG/ML; MG/ML
9 INJECTION, SUSPENSION INTRA-ARTICULAR; INTRALESIONAL; INTRAMUSCULAR; SOFT TISSUE ONCE
Status: COMPLETED | OUTPATIENT
Start: 2020-02-20 | End: 2020-02-20

## 2020-02-20 RX ORDER — AZITHROMYCIN 250 MG/1
TABLET, FILM COATED ORAL
Qty: 6 TABLET | Refills: 0 | Status: SHIPPED | OUTPATIENT
Start: 2020-02-20 | End: 2020-11-03

## 2020-02-20 RX ADMIN — BETAMETHASONE SODIUM PHOSPHATE AND BETAMETHASONE ACETATE 9 MG: 3; 3 INJECTION, SUSPENSION INTRA-ARTICULAR; INTRALESIONAL; INTRAMUSCULAR; SOFT TISSUE at 08:02

## 2020-02-21 NOTE — PROGRESS NOTES
"Subjective:       Patient ID: Lachelle Engel is a 44 y.o. female.    Vitals:  height is 5' 5" (1.651 m) and weight is 107.5 kg (237 lb). Her oral temperature is 97.1 °F (36.2 °C). Her blood pressure is 141/92 (abnormal) and her pulse is 86. Her respiration is 12 and oxygen saturation is 99%.     Chief Complaint: URI    Pt c/o chills, persistent cough, postnasal drip, head and chest congestion, x 1 wk     URI    This is a new problem. The problem has been gradually worsening. There has been no fever. Associated symptoms include congestion, coughing and rhinorrhea. Pertinent negatives include no ear pain, nausea, rash, sinus pain, sore throat, vomiting or wheezing. She has tried decongestant (OTC cold meds ) for the symptoms. The treatment provided mild relief.       Constitution: Positive for chills. Negative for sweating, fatigue and fever.   HENT: Positive for congestion and postnasal drip. Negative for ear pain, sinus pain, sinus pressure, sore throat and voice change.    Neck: Negative for painful lymph nodes.   Eyes: Negative for eye redness.   Respiratory: Positive for cough and sputum production. Negative for chest tightness, bloody sputum, COPD, shortness of breath, stridor, wheezing and asthma.    Gastrointestinal: Negative for nausea and vomiting.   Musculoskeletal: Negative for muscle ache.   Skin: Negative for rash.   Allergic/Immunologic: Negative for seasonal allergies and asthma.   Hematologic/Lymphatic: Negative for swollen lymph nodes.       Objective:      Physical Exam   Constitutional: She appears well-developed and well-nourished.   HENT:   Head: Normocephalic and atraumatic.   Nose: Mucosal edema and purulent discharge present. Right sinus exhibits maxillary sinus tenderness. Left sinus exhibits maxillary sinus tenderness.   Eyes: Pupils are equal, round, and reactive to light. Conjunctivae and EOM are normal.   Neck: Normal range of motion. Neck supple.   Cardiovascular: Normal rate and " regular rhythm.   Pulmonary/Chest: Effort normal.   Upper airway congestion with end exp wheeze3   Nursing note and vitals reviewed.        Assessment:       No diagnosis found.    Plan:         There are no diagnoses linked to this encounter.

## 2020-03-25 ENCOUNTER — TELEPHONE (OUTPATIENT)
Dept: PHARMACY | Facility: CLINIC | Age: 45
End: 2020-03-25

## 2020-04-06 ENCOUNTER — PATIENT MESSAGE (OUTPATIENT)
Dept: PHARMACY | Facility: CLINIC | Age: 45
End: 2020-04-06

## 2020-04-06 ENCOUNTER — PATIENT MESSAGE (OUTPATIENT)
Dept: DERMATOLOGY | Facility: CLINIC | Age: 45
End: 2020-04-06

## 2020-04-06 ENCOUNTER — TELEPHONE (OUTPATIENT)
Dept: PHARMACY | Facility: CLINIC | Age: 45
End: 2020-04-06

## 2020-04-08 ENCOUNTER — PATIENT MESSAGE (OUTPATIENT)
Dept: DERMATOLOGY | Facility: CLINIC | Age: 45
End: 2020-04-08

## 2020-04-08 DIAGNOSIS — B00.9 HSV (HERPES SIMPLEX VIRUS) INFECTION: Primary | ICD-10-CM

## 2020-04-08 RX ORDER — VALACYCLOVIR HYDROCHLORIDE 500 MG/1
TABLET, FILM COATED ORAL
Qty: 6 TABLET | Refills: 4 | Status: SHIPPED | OUTPATIENT
Start: 2020-04-08 | End: 2020-11-03

## 2020-04-09 ENCOUNTER — PATIENT MESSAGE (OUTPATIENT)
Dept: RHEUMATOLOGY | Facility: CLINIC | Age: 45
End: 2020-04-09

## 2020-04-16 ENCOUNTER — OFFICE VISIT (OUTPATIENT)
Dept: RHEUMATOLOGY | Facility: CLINIC | Age: 45
End: 2020-04-16
Payer: COMMERCIAL

## 2020-04-16 DIAGNOSIS — L40.50 PSORIATIC ARTHRITIS: Primary | ICD-10-CM

## 2020-04-16 PROCEDURE — 99214 PR OFFICE/OUTPT VISIT, EST, LEVL IV, 30-39 MIN: ICD-10-PCS | Mod: 95,,, | Performed by: INTERNAL MEDICINE

## 2020-04-16 PROCEDURE — 99214 OFFICE O/P EST MOD 30 MIN: CPT | Mod: 95,,, | Performed by: INTERNAL MEDICINE

## 2020-04-16 ASSESSMENT — ROUTINE ASSESSMENT OF PATIENT INDEX DATA (RAPID3)
PSYCHOLOGICAL DISTRESS SCORE: 2.2
AM STIFFNESS SCORE: 0, NO
PAIN SCORE: 4
PATIENT GLOBAL ASSESSMENT SCORE: 4
TOTAL RAPID3 SCORE: 3.11
MDHAQ FUNCTION SCORE: .4
FATIGUE SCORE: 2

## 2020-04-16 NOTE — PROGRESS NOTES
Pre-charting Done. Nieves Elmore MA  Answers for HPI/ROS submitted by the patient on 4/15/2020   fever: No  eye redness: No  headaches: No  shortness of breath: No  chest pain: No  trouble swallowing: No  diarrhea: No  constipation: Yes  unexpected weight change: No  genital sore: No  dysuria: No  During the last 3 days, have you had a skin rash?: No  Bruises or bleeds easily: No  cough: No

## 2020-04-16 NOTE — PROGRESS NOTES
Rapid3 Question Responses and Scores 4/15/2020   MDHAQ Score 0.4   Psychologic Score 2.2   Pain Score 4   When you awakened in the morning OVER THE LAST WEEK, did you feel stiff? No   If Yes, please indicate the number of hours until you are as limber as you will be for the day -   Fatigue Score 2   Global Health Score 4   RAPID3 Score 3.11       Answers for HPI/ROS submitted by the patient on 4/15/2020   fever: No  eye redness: No  headaches: No  shortness of breath: No  chest pain: No  trouble swallowing: No  diarrhea: No  constipation: Yes  unexpected weight change: No  genital sore: No  dysuria: No  During the last 3 days, have you had a skin rash?: No  Bruises or bleeds easily: No  cough: No

## 2020-04-16 NOTE — PROGRESS NOTES
Chief Complaint   Patient presents with    Disease Management     psoriasis and psoriatic arthritis       History of presenting illness    The patient location is: home  The chief complaint leading to consultation is: psoriasis and psoriatic arthritis  Visit type: audiovisual  Total time spent with patient: 30 minutes   Each patient to whom he or she provides medical services by telemedicine is:  (1) informed of the relationship between the physician and patient and the respective role of any other health care provider with respect to management of the patient; and (2) notified that he or she may decline to receive medical services by telemedicine and may withdraw from such care at any time.      44 year old female has had psoriasis since 2005  Initially started on the scalp  Now on the hands,ears,feet,scalp  Dermatology confirmed    Tried  -mtx  -stelara  -otezla    On no meds for 6 months  Has active skin disease    In the past she did well on mtx 10 tabs weekly and she had stopped it  So we resumed it  Now she cannot tolerate it    Didn't repond to otezla or stelara    Joints   -right hip,knee and foot hurts  She gets bad sciatica from the low back  Low back hurts on the right side,not in the center    Massages dont help  Left side no pain  Its mostly coming from the back  -neck feels tense  Massages dont help much  Hands,wrists,elbows,shoulders ok    She had been diagnosed with right hip bursitis and she got steroid shot  She had right foot plantar fasciitis     EMS 1 hour    Pain wakes her up in the middle of the night  She cant lay on the right side    Activity helps  Rest makes it worse     No malar rash,photosensitivity   No telangiectasias   No calcinosis     No patchy alopecia   No oral and nasal ulcers     No sicca symptoms     No pleurisy or any cardiopulmonary complaints     No dysphagia,diplopia and dysphonia and muscle weakness     No n/v/d/c   No acid reflux+     No raynaud's+   No digital ulcers      No cytopenias   No renal issues     No blood clots     No fever,chills,night sweats,weight loss and loss of appetite   No pregnancy losses     No headaches   No recurrent conjunctivitis or uveitis     No chronic or bloody diarrhea with no u colitis or crohn's /inflammatory bowel disease     No vaginal and urethral d/c/STDs/no ulcers     Neg RF,CCP,HLAB27  CBC : mild thrombocytopenia  CMP nml  ESR,CRP nml    Pre dmard panel neg    Xrays    LS spine deg arthritis  SI joint nml  Hip xray nml  C spine deg arthritis  Knees,hands,wrists feet nml      She is now on humira and mtx 3 tabs weekly /folic acid    Past history : thyroid disease,depression,GERD,psoriasis     Family history : MS,cancer    Social history : current smoker         Review of Systems   Constitutional: Negative for activity change, appetite change, chills, diaphoresis, fatigue, fever and unexpected weight change.   HENT: Negative for congestion, dental problem, drooling, ear discharge, ear pain, facial swelling, hearing loss, mouth sores, nosebleeds, postnasal drip, rhinorrhea, sinus pressure, sinus pain, sneezing, sore throat, tinnitus, trouble swallowing and voice change.    Eyes: Negative for photophobia, pain, discharge, redness, itching and visual disturbance.   Respiratory: Negative for apnea, cough, choking, chest tightness, shortness of breath, wheezing and stridor.    Cardiovascular: Negative for chest pain, palpitations and leg swelling.   Gastrointestinal: Negative for abdominal distention, abdominal pain, anal bleeding, blood in stool, constipation, diarrhea, nausea, rectal pain and vomiting.   Endocrine: Negative for cold intolerance, heat intolerance, polydipsia, polyphagia and polyuria.   Genitourinary: Negative for decreased urine volume, difficulty urinating, dysuria, enuresis, flank pain, frequency, genital sores, hematuria and urgency.   Musculoskeletal: Positive for arthralgias. Negative for back pain, gait problem, joint  swelling, myalgias, neck pain and neck stiffness.   Skin: Positive for rash. Negative for color change, pallor and wound.   Allergic/Immunologic: Negative for environmental allergies, food allergies and immunocompromised state.   Neurological: Negative for dizziness, tremors, seizures, syncope, facial asymmetry, speech difficulty, weakness, light-headedness, numbness and headaches.   Hematological: Negative for adenopathy. Does not bruise/bleed easily.   Psychiatric/Behavioral: Negative for agitation, behavioral problems, confusion, decreased concentration, dysphoric mood, hallucinations, self-injury, sleep disturbance and suicidal ideas. The patient is not nervous/anxious and is not hyperactive.      Physical exam last time    Right hip laterally and posteriorly hurt with all ROM of the hip  LS spine is tender  SI joints not tender     Right foot 3rd MTP tender     Not done today    Assessment       44 year old female has had psoriasis since 2005  Past history : thyroid disease,depression,GERD,psoriasis     Initially started on the scalp  Now on the hands,ears,feet,scalp  Dermatology confirmed  Tried  -mtx  -stelara  -otezla    On no meds for 6 months  Has active skin disease    She did well on 10 pills weekly mtx  She doesn't know why they stopped it  Didn't repond to otezla or stelara  But when we resumed it she had severe GI side effects    She has joint pains in the right hip,knee,foot and low back  She gets bad sciatica from the low back  Low back hurts on the right side,not in the center    She had been diagnosed with right hip bursitis and she got steroid shot  She had right foot plantar fasciitis   EMS 1 hour  Pain wakes her up in the middle of the night  She cant lay on the right side    Activity helps  Rest makes it worse     She has extensive psoriasis on the hands and feet   I am not clear she has psoriatic arthritis    No enthesitis,dactylitis or synovitis  Right hip tender laterally and posteriorly    Right knee normal exam  LS spine tender  Right foot one MTP on the 3rd toe    Prior LS spine imaging shows deg arthritis    Her back pain seems inflammatory in nature    But labs and xrays dont show inflammatory arthritis yet    We are treating her for psoriasis and questionable psoriatic arthritis     Thankfully she has done remarkably well on humira 40 mg every other week and mtx 3 tabs weekly/folic acid     1. Psoriatic arthritis        f/u problem     Plan    Continue humira and  mtx 3 tabs weekly/folic acid     -lose weight    -follow with dermatology for the psoriasis    Labs today    rtc in 3 months       Lachelle Gunderson was seen today for disease management.    Diagnoses and all orders for this visit:    Psoriatic arthritis  -     CBC auto differential; Future  -     Comprehensive metabolic panel; Future  -     C-Reactive Protein; Future  -     Sedimentation rate; Future

## 2020-04-17 ENCOUNTER — LAB VISIT (OUTPATIENT)
Dept: LAB | Facility: HOSPITAL | Age: 45
End: 2020-04-17
Attending: INTERNAL MEDICINE
Payer: COMMERCIAL

## 2020-04-17 DIAGNOSIS — L40.50 PSORIATIC ARTHRITIS: ICD-10-CM

## 2020-04-17 LAB
ALBUMIN SERPL BCP-MCNC: 3.7 G/DL (ref 3.5–5.2)
ALP SERPL-CCNC: 77 U/L (ref 55–135)
ALT SERPL W/O P-5'-P-CCNC: 32 U/L (ref 10–44)
ANION GAP SERPL CALC-SCNC: 7 MMOL/L (ref 8–16)
AST SERPL-CCNC: 17 U/L (ref 10–40)
BASOPHILS # BLD AUTO: 0.02 K/UL (ref 0–0.2)
BASOPHILS NFR BLD: 0.4 % (ref 0–1.9)
BILIRUB SERPL-MCNC: 0.3 MG/DL (ref 0.1–1)
BUN SERPL-MCNC: 11 MG/DL (ref 6–20)
CALCIUM SERPL-MCNC: 8.8 MG/DL (ref 8.7–10.5)
CHLORIDE SERPL-SCNC: 109 MMOL/L (ref 95–110)
CO2 SERPL-SCNC: 24 MMOL/L (ref 23–29)
CREAT SERPL-MCNC: 0.9 MG/DL (ref 0.5–1.4)
CRP SERPL-MCNC: 0.3 MG/L (ref 0–8.2)
DIFFERENTIAL METHOD: ABNORMAL
EOSINOPHIL # BLD AUTO: 0 K/UL (ref 0–0.5)
EOSINOPHIL NFR BLD: 0.7 % (ref 0–8)
ERYTHROCYTE [DISTWIDTH] IN BLOOD BY AUTOMATED COUNT: 12.7 % (ref 11.5–14.5)
ERYTHROCYTE [SEDIMENTATION RATE] IN BLOOD BY WESTERGREN METHOD: <2 MM/HR (ref 0–36)
EST. GFR  (AFRICAN AMERICAN): >60 ML/MIN/1.73 M^2
EST. GFR  (NON AFRICAN AMERICAN): >60 ML/MIN/1.73 M^2
GLUCOSE SERPL-MCNC: 101 MG/DL (ref 70–110)
HCT VFR BLD AUTO: 43.9 % (ref 37–48.5)
HGB BLD-MCNC: 14.6 G/DL (ref 12–16)
IMM GRANULOCYTES # BLD AUTO: 0.02 K/UL (ref 0–0.04)
IMM GRANULOCYTES NFR BLD AUTO: 0.4 % (ref 0–0.5)
LYMPHOCYTES # BLD AUTO: 1.9 K/UL (ref 1–4.8)
LYMPHOCYTES NFR BLD: 33 % (ref 18–48)
MCH RBC QN AUTO: 31 PG (ref 27–31)
MCHC RBC AUTO-ENTMCNC: 33.3 G/DL (ref 32–36)
MCV RBC AUTO: 93 FL (ref 82–98)
MONOCYTES # BLD AUTO: 0.3 K/UL (ref 0.3–1)
MONOCYTES NFR BLD: 4.8 % (ref 4–15)
NEUTROPHILS # BLD AUTO: 3.4 K/UL (ref 1.8–7.7)
NEUTROPHILS NFR BLD: 60.7 % (ref 38–73)
NRBC BLD-RTO: 0 /100 WBC
PLATELET # BLD AUTO: 141 K/UL (ref 150–350)
PMV BLD AUTO: 11.5 FL (ref 9.2–12.9)
POTASSIUM SERPL-SCNC: 4.2 MMOL/L (ref 3.5–5.1)
PROT SERPL-MCNC: 6.5 G/DL (ref 6–8.4)
RBC # BLD AUTO: 4.71 M/UL (ref 4–5.4)
SODIUM SERPL-SCNC: 140 MMOL/L (ref 136–145)
WBC # BLD AUTO: 5.64 K/UL (ref 3.9–12.7)

## 2020-04-17 PROCEDURE — 80053 COMPREHEN METABOLIC PANEL: CPT

## 2020-04-17 PROCEDURE — 85652 RBC SED RATE AUTOMATED: CPT

## 2020-04-17 PROCEDURE — 85025 COMPLETE CBC W/AUTO DIFF WBC: CPT

## 2020-04-17 PROCEDURE — 36415 COLL VENOUS BLD VENIPUNCTURE: CPT

## 2020-04-17 PROCEDURE — 86140 C-REACTIVE PROTEIN: CPT

## 2020-05-12 ENCOUNTER — TELEPHONE (OUTPATIENT)
Dept: PHARMACY | Facility: CLINIC | Age: 45
End: 2020-05-12

## 2020-05-20 NOTE — TELEPHONE ENCOUNTER
RX call attempt 3 regarding Humira refill from [facility]. Patient was not reached, left voicemail. $5.00 copay. JANE

## 2020-05-22 ENCOUNTER — PATIENT MESSAGE (OUTPATIENT)
Dept: RHEUMATOLOGY | Facility: CLINIC | Age: 45
End: 2020-05-22

## 2020-05-22 ENCOUNTER — PATIENT MESSAGE (OUTPATIENT)
Dept: PHARMACY | Facility: CLINIC | Age: 45
End: 2020-05-22

## 2020-05-22 ENCOUNTER — TELEPHONE (OUTPATIENT)
Dept: PHARMACY | Facility: CLINIC | Age: 45
End: 2020-05-22

## 2020-05-27 ENCOUNTER — TELEPHONE (OUTPATIENT)
Dept: PHARMACY | Facility: CLINIC | Age: 45
End: 2020-05-27

## 2020-05-27 NOTE — TELEPHONE ENCOUNTER
Patient confirmed shipping of Humira on  to arrive . Address and  verified. $5 copay (004), [facility] 1088. Patient stated she has 1 pen on hand. Next dose due . She stated she was injecting every other Tuesday, but she had 1 late dose due to not feeling well. She stated if her stomach feels unsettled or she is not feeling well she will hold doses.Patient stated she has reviewed this with her provider in past. Advised patient to consider holding doses if running a fever or on antibiotics. Patient voiced understanding. She sated she notes her doses in her phone. She stated that she has not started any new medications or had any dose changes. She reported no new allergies or health conditions. Patient requested a new sharps today. Patient had no further questions or concerns.

## 2020-05-29 ENCOUNTER — TELEPHONE (OUTPATIENT)
Dept: BARIATRICS | Facility: CLINIC | Age: 45
End: 2020-05-29

## 2020-05-29 NOTE — TELEPHONE ENCOUNTER
Attempted to reach pt in regards to converting appt into virtual visit. No answer, LVM requesting a call back

## 2020-06-08 ENCOUNTER — OFFICE VISIT (OUTPATIENT)
Dept: BARIATRICS | Facility: CLINIC | Age: 45
End: 2020-06-08
Payer: COMMERCIAL

## 2020-06-08 DIAGNOSIS — E66.01 CLASS 2 SEVERE OBESITY WITH BODY MASS INDEX (BMI) OF 35 TO 39.9 WITH SERIOUS COMORBIDITY: Primary | ICD-10-CM

## 2020-06-08 DIAGNOSIS — R73.01 IFG (IMPAIRED FASTING GLUCOSE): ICD-10-CM

## 2020-06-08 PROCEDURE — 99213 OFFICE O/P EST LOW 20 MIN: CPT | Mod: 95,,, | Performed by: INTERNAL MEDICINE

## 2020-06-08 PROCEDURE — 99213 PR OFFICE/OUTPT VISIT, EST, LEVL III, 20-29 MIN: ICD-10-PCS | Mod: 95,,, | Performed by: INTERNAL MEDICINE

## 2020-06-08 RX ORDER — TOPIRAMATE 50 MG/1
50 TABLET, FILM COATED ORAL 2 TIMES DAILY
Qty: 180 TABLET | Refills: 1 | Status: SHIPPED | OUTPATIENT
Start: 2020-06-08 | End: 2021-05-31

## 2020-06-08 RX ORDER — PHENTERMINE HYDROCHLORIDE 37.5 MG/1
TABLET ORAL
Qty: 30 TABLET | Refills: 1 | Status: SHIPPED | OUTPATIENT
Start: 2020-06-08 | End: 2021-05-31

## 2020-06-08 RX ORDER — METFORMIN HYDROCHLORIDE 500 MG/1
500 TABLET, EXTENDED RELEASE ORAL
Qty: 90 TABLET | Refills: 1 | Status: SHIPPED | OUTPATIENT
Start: 2020-06-08 | End: 2022-11-08

## 2020-06-08 NOTE — PROGRESS NOTES
Subjective:       Patient ID: Lachelle Engel is a 44 y.o. female.    Chief Complaint: Follow-up    The patient location is: [place], [place]. Home   The chief complaint leading to consultation is: Patient presents with:  Follow-up       Visit type: audiovisual    Face to Face time with patient: 9 min  15 minutes of total time spent on the encounter, which includes face to face time and non-face to face time preparing to see the patient (eg, review of tests), Obtaining and/or reviewing separately obtained history, Documenting clinical information in the electronic or other health record, Independently interpreting results (not separately reported) and communicating results to the patient/family/caregiver, or Care coordination (not separately reported).         Each patient to whom he or she provides medical services by telemedicine is:  (1) informed of the relationship between the physician and patient and the respective role of any other health care provider with respect to management of the patient; and (2) notified that he or she may decline to receive medical services by telemedicine and may withdraw from such care at any time.    Notes:   Pt here today for follow-up. Has lost 8 more lbs, net neg 24 lbs. 4th OV with weight gain  Added LCHF diet and phentermine at last OV,. Last filled 5/11/2020. [place] before that.. Also metformin for IFG. She feels she is doing ok with eating. She has been exercising. Denies SE with metformin. Added topiramate at last OV. She does feel her appetite is down. Skipping metformin. She has gotten a bike.   States she had constipationand weight gain with the contrave. Tried topiramate. She gained weight. Was gaining weight last she took phentermine and diethylpropion        No results found for: HGBA1C  Lab Results       Component                Value               Date                       LDLCALC                  89.0                07/17/2012                 CREATININE                0.9                 04/17/2020              Prev 237. Current home weight 229#    Follow-up   Pertinent negatives include no arthralgias, chest pain, chills or fever.     Review of Systems   Constitutional: Negative for chills and fever.   Respiratory: Negative for shortness of breath.         + snores   Cardiovascular: Positive for leg swelling. Negative for chest pain.   Gastrointestinal: Negative for constipation and diarrhea.   Genitourinary: Negative for difficulty urinating and menstrual problem.        S/p ablation   Musculoskeletal: Negative for arthralgias and back pain.   Neurological: Negative for dizziness and light-headedness.   Psychiatric/Behavioral: Negative for dysphoric mood. The patient is not nervous/anxious.        Objective:     There were no vitals taken for this visit.    Physical Exam   Constitutional: She is oriented to person, place, and time. She appears well-developed. No distress.   Morbidly obese     HENT:   Head: Normocephalic and atraumatic.   Pulmonary/Chest: Effort normal.   Neurological: She is alert and oriented to person, place, and time.   Psychiatric: She has a normal mood and affect. Her behavior is normal. Judgment normal.   Vitals reviewed.      Assessment:       1. Class 2 severe obesity with body mass index (BMI) of 35 to 39.9 with serious comorbidity    2. IFG (impaired fasting glucose)        Plan:               Lachelle Gunderson was seen today for follow-up.    Diagnoses and all orders for this visit:    Class 2 severe obesity with body mass index (BMI) of 35 to 39.9 with serious comorbidity  -     phentermine (ADIPEX-P) 37.5 mg tablet; 1/2 tab po qam    IFG (impaired fasting glucose)  -     metFORMIN (GLUCOPHAGE-XR) 500 MG XR 24hr tablet; Take 1 tablet (500 mg total) by mouth daily with breakfast.    Other orders  -     topiramate (TOPAMAX) 50 MG tablet; Take 1 tablet (50 mg total) by mouth 2 (two) times daily.           Patient warned of common side effects of  phentermine including anxiety, insomnia, palpitations and increased blood pressure. It was also explained that it is for short-term usage along with diet and exercise, and that stopping the medication without making lifestyle changes will result in regain of weight. Patient states understanding.     Weight loss medications are controlled substances.  They require routine follow up. Prescription or pills that are lost or destroyed will not be replaced.     Patient was informed that topiramate is used for migraine prevention and seizures. Weight loss is a common side effect that is well documented. S/he understands this. S/he was informed of the potential side effects such as serious and possibly fatal rash in which case the medication should be discontinued immediately. Paresthesias, forgetfulness, fatigue, kidney stones, GI symptoms, and changes in lab values such as electrolytes, blood counts and kidney function.    Start topiramate  in the evening for 1 week, then morning and evening.     Start phentermine with 1/2 pill a day       3 meals a day made up of the following:  Unlimited green vegetables, tomatoes, mushrooms, spaghetti squash, cauliflower, meat, poultry, seafood, eggs and hard cheeses.   Milk and plain yogurt  Dressings, seasonings, condiments, etc should have less than 2 g sugars.   Beans (1-1.5 cups) or nuts (1/4 cup) can have 1 x a day.   1-2 servings of citrus fruits, berries, pineapple or melon a day (1/2 cup)  Avoid fried foods     No grains, rice, pasta, potatoes, bread, corn, peas, oatmeal, grits, tortillas, crackers, chips     No soda, sweet tea, juices or lemonade     Www.dietdoctor.com for recipes. Moderate carb intake.  Quarantine tips given.

## 2020-06-08 NOTE — PATIENT INSTRUCTIONS
Patient warned of common side effects of phentermine including anxiety, insomnia, palpitations and increased blood pressure. It was also explained that it is for short-term usage along with diet and exercise, and that stopping the medication without making lifestyle changes will result in regain of weight. Patient states understanding.     Weight loss medications are controlled substances.  They require routine follow up. Prescription or pills that are lost or destroyed will not be replaced.     Patient was informed that topiramate is used for migraine prevention and seizures. Weight loss is a common side effect that is well documented. S/he understands this. S/he was informed of the potential side effects such as serious and possibly fatal rash in which case the medication should be discontinued immediately. Paresthesias, forgetfulness, fatigue, kidney stones, GI symptoms, and changes in lab values such as electrolytes, blood counts and kidney function.    Start topiramate  in the evening for 1 week, then morning and evening.     Start phentermine with 1/2 pill a day       3 meals a day made up of the following:  Unlimited green vegetables, tomatoes, mushrooms, spaghetti squash, cauliflower, meat, poultry, seafood, eggs and hard cheeses.   Milk and plain yogurt  Dressings, seasonings, condiments, etc should have less than 2 g sugars.   Beans (1-1.5 cups) or nuts (1/4 cup) can have 1 x a day.   1-2 servings of citrus fruits, berries, pineapple or melon a day (1/2 cup)  Avoid fried foods     No grains, rice, pasta, potatoes, bread, corn, peas, oatmeal, grits, tortillas, crackers, chips     No soda, sweet tea, juices or lemonade     Www.dietdoctor.com for recipes. Moderate carb intake.      [facility]  Tips to Stay on Track During COVID-19      DO DON'T   Keep up with your food log  Maintaining your caloric intake and diet quality is critical for achieving your health and weight loss  goals.  Download the Geovanny Amitree and use [facility] Code 52325 to track food and fluid intake Feel Discouraged - You can do this!  There are a lot of changes happening in our world but don't let them discourage you. Focus on the future and remind yourself of all the work and effort you've put in so far.     Keep protein-rich foods stocked up  buy chicken and turkey in bulk and freeze them, keep dry or canned beans on hand, get the largest quantity of eggs available. Make sure you are getting your required protein intake (between  grams EACH DAY).   Drink fluid during meals or 30 minutes before/after eating  Your regular routine may have changed which may have caused some of your meal or snack times to change.  keep track of when you consume any liquid and time your meals accordingly. This will also help you make sure you are staying hydrated throughout the day   Continue with your protein drinks or bars  Order online or use grocery delivery service. Always make sure you order more WELL BEFORE you are running low, especially now when deliveries may take longer to arrive.  Remember to check to make sure your protein shakes or bars have 4 gms of sugar or less.     Keep table sugar around  You may be more tempted to add it to your drinks or food if it is visible and easily accessible.   Try new recipes  Use this time to experiment in the kitchen and find some different healthy dishes you enjoy - you can use the nutrition booklet to help guide you.  If you cannot find your copy, please download it from our website @ [email or web address]  Click here to download [facility]'s Nutrition Binder.   Add tough or crunchy foods back into your diet too quickly  Raw veggies are great snack foods but adding them in too quickly after surgery will cause pain and discomfort   Eat your meals slowly and intentionally  You shouldn't have to rush out to be  anywhere so really take your time and aim for each meal to last around 20-30 minutes.   Drink sugary/bubbly drinks or alcohol    It may be tempting especially if you are surrounded by others without these limitations, but these beverages will prevent weight loss and cause gastric  pain/discomfort     Listen to your body - try to recognize when you feel full.  Learning your body's signals can be difficult but it is a key step in your weight loss journey. While you are is this process of working on this step, be sure portion out the recommended quantities of foods and meals/snacks to prevent overeating.   Eat your meals using electronics  This is especially difficult at home where your use of computers, phones, and TVs are pretty much unregulated. Designate a meal spot or spots where there is limited distractions and you can focus on your food - maybe your kitchen table or on an outside porch or deck.   Continue taking vitamins and minerals  Take them at the same time each day and keep a log of when you take your supplements to make sure you don't miss any. These supplements are essential for preventing malnutrition and other health problems that will deter your progress.   'Save' your appetite   You may feel like holding out from food as long as possible so you can eat a large meal later on, but your body needs energy throughout the day in order to properly fuel itself and keep you alert.  Try eating small meals throughout the day - use these meals as mini breaks from work or projects you are doing at home.   Stay active!  It is so important for both your physical and mental health that you get regular physical activity. Even if you can no longer physically go to the gym or to workout classes there are tons of online resources to keep you moving. Incorporate a specific exercise time into your daily home routine and keep a journal of your activity.   Order food delivery or take out   Most places are now offering  delivery services, but it can still be difficult to find and choose healthy options. Choose to do a grocery store  or delivery instead- cooking food at home is less expensive then eating out!   Review the resources you've been provided and keep in touch with your healthcare team.  Let them know if you are struggling or experiencing any problems - they are here to help!  Call us to schedule a telehealth visit at [phone] Isolate yourself   It may be called 'social distancing' but that does not mean we can't still connect with one another. Phone calls or group video chats are great ways to keep in touch while staying at home. Any method of getting regular social time with friends and family will help to remind you that you're not in this alone.     Grocery List    Items to Keep Stocked in Your Kitchen  PROTEINS (Lean)    Eggs  Beans (canned and/or dried)  Skinless chicken/turkey   Tuna/Kalida (canned and/or pouch)  Tofu or Tempeh  Lissa Veggie Burgers  Fish or shrimp (fresh or frozen)  Ground Beef (90% lean)  Steaks  Chobani Greek Yogurt  Cabot Cottage Cheese  Hummus  Low-fat cheeses (Laughing Cow, Baby Bell, mozzarella string cheese)  Fairlife Non-fat Milk    Vegetables (non-starchy)  *veggies can be fresh or frozen    Broccoli   Cauliflower   Carrots   Onions   Cabbage   Radishes   Zucchini   Okra   Greens   Peppers   Spinach   Turnips   Mushrooms   Tomatoes   Celery   Lettuce   Asparagus  Eggplant   Green Onions   Kale    Fruits  *Fruits can be fresh or frozen    Apples  Oranges  Pears  Kiwi  Melon  Berries  Peaches  Unsweetened Applesauce    *Avoid fruits canned In syrup  *Stick to 1-2 servings of fruit/day   Vitamins    Flintstones Complete  Chewables    Super B-Complex tablets with 50 mg Thiamine    Nature's Way liquid Calcium Citrate + Vit D     Sublingual Vitamin B12   Other    Sugar-free Popsicles    Sugar-free Jello    Crystal Lite    Low Fat Condiments     Decaf Coffee/Tea           Foods  to Avoid Having Around the House  Butter/Margarine Cookies Candy Chips  Pretzels Grits  Granola Popcorn Mccarthy Corn  Bread Alcohol Soda  Pasta  Rice  Cake/Pie Sausage Potatoes Ice Cream                 Tricks to Prevent Emotional Eating During Quarantine      Keep 'trigger foods' out of the house   Keep yourself distracted with work, games, music, or whatever hobby you enjoy. This may be the time to try a new activity!   Try fighting stress with breathing techniques, yoga, meditation, or prayer   Mix up your meals with a variety of dishes   Keep up with your food diary   Call or video chat with a friend or family   Plan your meals for specific time and try for smaller meals throughout the day   Pre-portion all meals and snacks    Step outside for some fresh air or do a quick exercise activity to reset yourself    *Avoid negative thoughts about yourself - if you have a slip-up, you are not a failure. Forgive yourself and focus on learning from it so you can prevent it for the future              Ways to Stay Active While Staying Inside      Zonoffube Videos - free exercise and gym classes at any fitness level   Apps -tons of fitness apps are currently offering free trial periods and offer workouts that don't require equipment   Chores around the house, such as cleaning or gardening   Walk up and down the stairs   Video-chat with your regular workout andres and do an online class together   Make a playlist of your favorite fun songs and dance around - no need to worry about knowing any serious dance moves, just jump around get your heart rate up!    While you are limited to working out at home, you may find it easier to do short, mini workouts multiple times a day instead of all at once. Try to still get at least 30 minutes of physical activity in each day!   Physical Health = Mental Health    The health of both your mind and body are equally important -be sure you are taking the time to care for both. It  is likely that the current health concerns and quarantine mandates caused significant changes to your normal routine. Although this can feel overwhelming and seem difficult to manage, there are ways you can take to manage these feelings and keep your mental and physical health journey on track. Here are some tips for self-care during quarantine:     Meditate, take deep breaths - find any practice that will help you center yourself.   Move around your house throughout the day. Avoid staying in the same seat or room for too long and try to work in an area of your house that get lots of sunlight if possible.   Get fresh air for a bit every day - being outside is a great way to improve your mood! You don't necessarily have to go far from your house. You could even just hang out on your porch for a while or take a walk around the block.    Get good sleep and maintain a regular sleep schedule   Connect with others. While we aren't able to physically be with others right now it is still so important to socialize and interact with other people, even if it is being done remotely.    Keep yourself busy. Make a list of tasks that you want to complete around the house, start a new book, do some art projects, try journaling.                Bike Mendez  Bike Mendez Trainer Bicycle Indoor Trainer Exercise Machine Ride All Year Around With 850 Gram Machined Steel Flywheel for the Most Natural Pedal Feel

## 2020-06-23 ENCOUNTER — TELEPHONE (OUTPATIENT)
Dept: PHARMACY | Facility: CLINIC | Age: 45
End: 2020-06-23

## 2020-07-06 ENCOUNTER — TELEPHONE (OUTPATIENT)
Dept: PHARMACY | Facility: CLINIC | Age: 45
End: 2020-07-06

## 2020-09-23 ENCOUNTER — TELEPHONE (OUTPATIENT)
Dept: PHARMACY | Facility: CLINIC | Age: 45
End: 2020-09-23

## 2020-10-02 ENCOUNTER — TELEPHONE (OUTPATIENT)
Dept: PHARMACY | Facility: CLINIC | Age: 45
End: 2020-10-02

## 2020-10-21 ENCOUNTER — OFFICE VISIT (OUTPATIENT)
Dept: DERMATOLOGY | Facility: CLINIC | Age: 45
End: 2020-10-21
Payer: COMMERCIAL

## 2020-10-21 DIAGNOSIS — R21 RASH AND NONSPECIFIC SKIN ERUPTION: Primary | ICD-10-CM

## 2020-10-21 PROCEDURE — 99214 OFFICE O/P EST MOD 30 MIN: CPT | Mod: 25,S$GLB,, | Performed by: DERMATOLOGY

## 2020-10-21 PROCEDURE — 99999 PR PBB SHADOW E&M-EST. PATIENT-LVL III: ICD-10-PCS | Mod: PBBFAC,,, | Performed by: DERMATOLOGY

## 2020-10-21 PROCEDURE — 88305 TISSUE EXAM BY PATHOLOGIST: CPT | Performed by: PATHOLOGY

## 2020-10-21 PROCEDURE — 88312 PR  SPECIAL STAINS,GROUP I: ICD-10-PCS | Mod: 26,,, | Performed by: PATHOLOGY

## 2020-10-21 PROCEDURE — 88305 TISSUE EXAM BY PATHOLOGIST: ICD-10-PCS | Mod: 26,,, | Performed by: PATHOLOGY

## 2020-10-21 PROCEDURE — 88312 SPECIAL STAINS GROUP 1: CPT | Performed by: PATHOLOGY

## 2020-10-21 PROCEDURE — 99999 PR PBB SHADOW E&M-EST. PATIENT-LVL III: CPT | Mod: PBBFAC,,, | Performed by: DERMATOLOGY

## 2020-10-21 PROCEDURE — 11104 PR PUNCH BIOPSY, SKIN, SINGLE LESION: ICD-10-PCS | Mod: S$GLB,,, | Performed by: DERMATOLOGY

## 2020-10-21 PROCEDURE — 87070 CULTURE OTHR SPECIMN AEROBIC: CPT

## 2020-10-21 PROCEDURE — 11104 PUNCH BX SKIN SINGLE LESION: CPT | Mod: S$GLB,,, | Performed by: DERMATOLOGY

## 2020-10-21 PROCEDURE — 88312 SPECIAL STAINS GROUP 1: CPT | Mod: 26,,, | Performed by: PATHOLOGY

## 2020-10-21 PROCEDURE — 88305 TISSUE EXAM BY PATHOLOGIST: CPT | Mod: 26,,, | Performed by: PATHOLOGY

## 2020-10-21 PROCEDURE — 99214 PR OFFICE/OUTPT VISIT, EST, LEVL IV, 30-39 MIN: ICD-10-PCS | Mod: 25,S$GLB,, | Performed by: DERMATOLOGY

## 2020-10-21 RX ORDER — MUPIROCIN 20 MG/G
OINTMENT TOPICAL 3 TIMES DAILY
Qty: 1 TUBE | Refills: 2 | Status: SHIPPED | OUTPATIENT
Start: 2020-10-21

## 2020-10-21 NOTE — PROGRESS NOTES
Subjective:       Patient ID:  Lachelle Engel is a 44 y.o. female who presents for   Chief Complaint   Patient presents with    Lesion     left inner thigh (2)     Established pt - previously saw Dr Pereira on 9/13/2019 for psoriasis - given Dovonex as patient was already being followed by rheumatology at that time and being restarted on MTX. Patient now on Humira and MTX 5 mg weekly (patient states she takes 2 pills weekly). Other co morbidities include depression, GERD.     Today, patient c/o two growths  Location(s): L inner thigh   Duration: first lesion appeared 7 months ago, second lesion appeared in the last few months ; lesions seem to intermittent flare  Associated symptom(s): painful and tender to palpation   Prior treatment(s): no treatment aside from warm compresses (denies hx drainage)     Denies any oral or genital lesions; denies hx similar lesions at axillae, submammary, gluteal fold. Notes one boil/ingrown hair in nearby area; resolved without issue.       Review of Systems   Constitutional: Negative for fever, chills, fatigue and malaise.   Skin:        +lesion, pain   Hematologic/Lymphatic: Does not bruise/bleed easily.        Objective:    Physical Exam   Constitutional: She appears well-developed and well-nourished. She is obese.  No distress.   Neurological: She is alert and oriented to person, place, and time. She is not disoriented.   Psychiatric: She has a normal mood and affect.   Skin:   Areas Examined (abnormalities noted in diagram):   Head / Face Inspection Performed  Neck Inspection Performed  Chest / Axilla Inspection Performed  Abdomen Inspection Performed  RLE Inspected  LLE Inspection Performed              Diagram Legend     Erythematous scaling macule/papule c/w actinic keratosis       Vascular papule c/w angioma      Pigmented verrucoid papule/plaque c/w seborrheic keratosis      Yellow umbilicated papule c/w sebaceous hyperplasia      Irregularly shaped tan macule c/w  lentigo     1-2 mm smooth white papules consistent with Milia      Movable subcutaneous cyst with punctum c/w epidermal inclusion cyst      Subcutaneous movable cyst c/w pilar cyst      Firm pink to brown papule c/w dermatofibroma      Pedunculated fleshy papule(s) c/w skin tag(s)      Evenly pigmented macule c/w junctional nevus     Mildly variegated pigmented, slightly irregular-bordered macule c/w mildly atypical nevus      Flesh colored to evenly pigmented papule c/w intradermal nevus       Pink pearly papule/plaque c/w basal cell carcinoma      Erythematous hyperkeratotic cursted plaque c/w SCC      Surgical scar with no sign of skin cancer recurrence      Open and closed comedones      Inflammatory papules and pustules      Verrucoid papule consistent consistent with wart     Erythematous eczematous patches and plaques     Dystrophic onycholytic nail with subungual debris c/w onychomycosis     Umbilicated papule    Erythematous-base heme-crusted tan verrucoid plaque consistent with inflamed seborrheic keratosis     Erythematous Silvery Scaling Plaque c/w Psoriasis     See annotation              Assessment / Plan:      Pathology Orders:     Normal Orders This Visit    Specimen to Pathology, Dermatology     Comments:    Multiple pieces  Number of Specimens:->1  ------------------------->-------------------------  Spec 1 Procedure:->Biopsy  Spec 1 Clinical Impression:->hidradentitis/furuncle, less  likely pemphigus vegetans, crohns  Spec 1 Source:->left upper medial thigh    Questions:    Procedure Type: Dermatology and skin neoplasms    Number of Specimens: 1    ------------------------: -------------------------    Spec 1 Procedure: Biopsy    Spec 1 Clinical Impression: hidradentitis/furuncle, less likely pemphigus vegetans, crohns    Spec 1 Source: left upper medial thigh    Specimen to Pathology, Dermatology     Questions:    Procedure Type: Dermatology and skin neoplasms    Number of Specimens: 1     ------------------------: -------------------------    Spec 1 Procedure: Biopsy    Spec 1 Clinical Impression: hidradentitis/furuncle vs other infectious, less likely pemphigus vegetans, crohns    Spec 1 Source: left upper medial thigh        Rash and nonspecific skin eruption  -     Aerobic culture  -     Specimen to Pathology, Dermatology  -     mupirocin (BACTROBAN) 2 % ointment; Apply topically 3 (three) times daily.  Dispense: 1 Tube; Refill: 2    - Punch Biopsy Procedure Note: Discussed procedure with patient/patient's guardian including risks and benefits as well as treatment alternatives. Risks of procedure include pain, bleeding, infection, post-inflammatory pigmentary alteration, scar, recurrence. Patient informed that the purpose of a biopsy is sampling of condition in question rather than removal in entirety; further treatment may be necessary. Verbal consent obtained. Area to be biopsied marked and cleansed with alcohol. Local anesthesia achieved by injecting approximately 1 cc of 1% lidocaine with epinephrine. One punch biopsy performed using a 4 mm disposable punch; specimen submitted to pathology. Hemostasis and closure achieved with 4-0 Prolene sutures. Petroleum jelly and bandage applied to wound. Patient tolerated procedure well. After-visit wound care instructions reviewed and provided in writing. F/u 14 days for S/R.     - Punch biopsy performed and submitted; some serous drainage subsequent (no purulence) - culture still submitted.   - Patient to use mupirocin for wound care and wash area daily with Hibiclens. OK to c/w warm compresses.   - Further treatment pending results.            Follow up in about 2 weeks (around 11/4/2020) for pending pathology.

## 2020-10-23 ENCOUNTER — TELEPHONE (OUTPATIENT)
Dept: DERMATOLOGY | Facility: CLINIC | Age: 45
End: 2020-10-23

## 2020-10-23 NOTE — TELEPHONE ENCOUNTER
----- Message from Jessica Batista MD sent at 10/22/2020  8:06 AM CDT -----  Please call patient and reassure of negative culture result - no change in treatment at this time. Biopsy still pending.

## 2020-10-23 NOTE — TELEPHONE ENCOUNTER
Spoke to pt.Informed pt on culture that came back negative.also informed that Bx is still pending.pt verbalized understanding.

## 2020-10-24 LAB — BACTERIA SPEC AEROBE CULT: NO GROWTH

## 2020-10-27 LAB
FINAL PATHOLOGIC DIAGNOSIS: NORMAL
GROSS: NORMAL

## 2020-10-28 DIAGNOSIS — L73.2 HIDRADENITIS SUPPURATIVA: Primary | ICD-10-CM

## 2020-10-28 RX ORDER — CLINDAMYCIN PHOSPHATE 1 G/10ML
1 GEL TOPICAL 2 TIMES DAILY
Qty: 75 ML | Refills: 6 | Status: SHIPPED | OUTPATIENT
Start: 2020-10-28 | End: 2021-02-19

## 2020-10-30 ENCOUNTER — SPECIALTY PHARMACY (OUTPATIENT)
Dept: PHARMACY | Facility: CLINIC | Age: 45
End: 2020-10-30

## 2020-10-30 NOTE — TELEPHONE ENCOUNTER
Specialty Pharmacy - Refill Coordination    Specialty Medication Orders Linked to Encounter      Most Recent Value   Medication #1  adalimumab (HUMIRA PEN) 40 mg/0.8 mL PnKt (Order#[id number], Rx#[id number])          Refill Questions - Documented Responses      Most Recent Value   Relationship to patient of person spoken to?  Self   HIPAA/medical authority confirmed?  Yes   Any changes in contact preferences or allowed representatives?  No   Has the patient had any insurance changes?  No   Has the patient had any changes to specialty medication, dose, or instructions?  No   Has the patient started taking any new medications, herbals, or supplements?  No   Has the patient been diagnosed with any new medical conditions?  No   Does the patient have any new allergies to medications or foods?  No   Does the patient have any concerns about side effects?  No   Can the patient store medication/sharps container properly (at the correct temperature, away from children/pets, etc.)?  Yes   Can the patient call emergency services (911) in the event of an emergency?  Yes   Does the patient have any concerns or questions about taking or administering this medication as prescribed?  No   How many doses did the patient miss in the past 4 weeks or since the last fill?  0   How many doses does the patient have on hand?  0   How many days does the patient report on hand quantity will last?  0   Does the number of doses/days supply remaining match pharmacy expected amounts?  Yes   How will the patient receive the medication?  Mail   When does the patient need to receive the medication?  11/04/20   Shipping Address  Home   Address in [facility] confirmed and updated if neccessary?  Yes   Expected Copay ($)  5   Is the patient able to afford the medication copay?  Yes   Payment Method  CC on file   Days supply of Refill  28   Would patient like to speak to a pharmacist?  No   Do you want to trigger an intervention?  No   Do you  want to trigger an additional referral task?  No   Refill activity completed?  Yes   Refill activity plan  Refill scheduled   Shipment/Pickup Date:  11/02/20          Current Outpatient Medications   Medication Sig    adalimumab (HUMIRA PEN) 40 mg/0.8 mL PnKt Inject 1 pen (40 mg total) into the skin every 14 (fourteen) days.    azithromycin (ZITHROMAX Z-WESTLEY) 250 MG tablet Take 2 tablets (500 mg) on  Day 1,  followed by 1 tablet (250 mg) once daily on Days 2 through 5.    calcipotriene (DOVONOX) 0.005 % ointment Apply topically 2 (two) times daily.    clindamycin phosphate (CLINDAGEL) 1 % glqd Apply 1 application topically 2 (two) times daily.    folic acid (FOLVITE) 1 MG tablet Take 1 tablet (1 mg total) by mouth once daily.    metFORMIN (GLUCOPHAGE-XR) 500 MG XR 24hr tablet Take 1 tablet (500 mg total) by mouth daily with breakfast.    methotrexate 2.5 MG Tab Take 3 pills weekly    mupirocin (BACTROBAN) 2 % ointment Apply topically 3 (three) times daily.    omeprazole (PRILOSEC) 20 MG capsule Take 1 capsule (20 mg total) by mouth once daily.    ondansetron (ZOFRAN-ODT) 4 MG TbDL Take 1 tablet (4 mg total) by mouth every 6 (six) hours as needed.    phentermine (ADIPEX-P) 37.5 mg tablet 1/2 tab po qam    topiramate (TOPAMAX) 50 MG tablet Take 1 tablet (50 mg total) by mouth 2 (two) times daily.    valACYclovir (VALTREX) 500 MG tablet 1 tab po BID x 3 days. Begin at first sign of outbreak   Last reviewed on 10/30/2020  3:45 PM by Bright Duenas    Review of patient's allergies indicates:  No Known Allergies Last reviewed on  10/30/2020 3:45 PM by Bright Duenas      Tasks added this encounter   No tasks added.   Tasks due within next 3 months   No tasks due.     Bright Duenas  [facility]  [address] [place] [address]  Phone: [phone]  Fax: [phone]

## 2020-11-03 ENCOUNTER — CLINICAL SUPPORT (OUTPATIENT)
Dept: DERMATOLOGY | Facility: CLINIC | Age: 45
End: 2020-11-03
Payer: COMMERCIAL

## 2020-11-03 ENCOUNTER — OFFICE VISIT (OUTPATIENT)
Dept: OBSTETRICS AND GYNECOLOGY | Facility: CLINIC | Age: 45
End: 2020-11-03
Payer: COMMERCIAL

## 2020-11-03 VITALS — WEIGHT: 247.19 LBS | HEIGHT: 68 IN | BODY MASS INDEX: 37.46 KG/M2

## 2020-11-03 DIAGNOSIS — Z12.31 BREAST CANCER SCREENING BY MAMMOGRAM: ICD-10-CM

## 2020-11-03 DIAGNOSIS — Z00.00 HEALTHCARE MAINTENANCE: ICD-10-CM

## 2020-11-03 DIAGNOSIS — Z01.419 WOMEN'S ANNUAL ROUTINE GYNECOLOGICAL EXAMINATION: Primary | ICD-10-CM

## 2020-11-03 DIAGNOSIS — E03.9 HYPOTHYROIDISM, UNSPECIFIED TYPE: ICD-10-CM

## 2020-11-03 DIAGNOSIS — Z91.89 AT HIGH RISK FOR BREAST CANCER: ICD-10-CM

## 2020-11-03 DIAGNOSIS — Z12.4 PAP SMEAR FOR CERVICAL CANCER SCREENING: ICD-10-CM

## 2020-11-03 PROCEDURE — 3008F BODY MASS INDEX DOCD: CPT | Mod: CPTII,S$GLB,, | Performed by: NURSE PRACTITIONER

## 2020-11-03 PROCEDURE — 87624 HPV HI-RISK TYP POOLED RSLT: CPT

## 2020-11-03 PROCEDURE — 99386 PR PREVENTIVE VISIT,NEW,40-64: ICD-10-PCS | Mod: S$GLB,,, | Performed by: NURSE PRACTITIONER

## 2020-11-03 PROCEDURE — 99386 PREV VISIT NEW AGE 40-64: CPT | Mod: S$GLB,,, | Performed by: NURSE PRACTITIONER

## 2020-11-03 PROCEDURE — 3008F PR BODY MASS INDEX (BMI) DOCUMENTED: ICD-10-PCS | Mod: CPTII,S$GLB,, | Performed by: NURSE PRACTITIONER

## 2020-11-03 PROCEDURE — 99999 PR PBB SHADOW E&M-EST. PATIENT-LVL IV: ICD-10-PCS | Mod: PBBFAC,,, | Performed by: NURSE PRACTITIONER

## 2020-11-03 PROCEDURE — 99999 PR PBB SHADOW E&M-EST. PATIENT-LVL III: CPT | Mod: PBBFAC,,,

## 2020-11-03 PROCEDURE — 99999 PR PBB SHADOW E&M-EST. PATIENT-LVL III: ICD-10-PCS | Mod: PBBFAC,,,

## 2020-11-03 PROCEDURE — 88175 CYTOPATH C/V AUTO FLUID REDO: CPT

## 2020-11-03 PROCEDURE — 99999 PR PBB SHADOW E&M-EST. PATIENT-LVL IV: CPT | Mod: PBBFAC,,, | Performed by: NURSE PRACTITIONER

## 2020-11-03 NOTE — PROGRESS NOTES
CC: Annual  HPI: Pt is a 45 y.o.  female who presents for routine annual exam. She uses BLT/ablation for contraception. She does not want STD screening. The patient participates in regular exercise: Yes.  The patient does smoke, 8 cigarettes/ day.  The patient wears seatbelts.   Pt denies any domestic violence.    Last pap in 2014 with normal result, history of abnormal pap as young adult with procedure. Last mammo in 2014- normal. Last colonoscopy in 2014- reported normal. History of GERD, hypothyroidism, T2DM all without treatment for several years. Reports hair loss, skin dryness, brittle nails, weight gain.    Mother dx of breast cancer 2 years ago, negative BRCA 1/2 per patient.     FH:  Breast cancer: mother  Colon cancer: none  Ovarian cancer: maternal aunt  Endometrial cancer: maternal uncle    ROS:  GENERAL: Feeling well overall. Denies fever or chills. Hair loss, brittle nails, weight gain.  SKIN: Denies rash or lesions.   HEAD: Denies head injury or headache.   NODES: Denies enlarged lymph nodes.   CHEST: Denies chest pain or shortness of breath.   CARDIOVASCULAR: Denies palpitations or left sided chest pain.   ABDOMEN: No abdominal pain, constipation, diarrhea, nausea, vomiting or rectal bleeding.   URINARY: No dysuria, hematuria, or burning on urination.  REPRODUCTIVE: See HPI.   BREASTS: Denies pain, lumps, or nipple discharge.   HEMATOLOGIC: No easy bruisability or excessive bleeding.   MUSCULOSKELETAL: Denies joint pain or swelling.   NEUROLOGIC: Denies syncope or weakness.   PSYCHIATRIC: Denies depression, anxiety or mood swings.    PE:   APPEARANCE: Well nourished, well developed, White female in no acute distress.   NODES: no cervical, supraclavicular, or inguinal lymphadenopathy  BREASTS: Symmetrical, no skin changes or visible lesions. No palpable masses, nipple discharge or adenopathy bilaterally.  ABDOMEN: Soft. No tenderness or masses. No distention. No hernias palpated. No CVA  tenderness.  VULVA: No lesions. Normal external female genitalia.  URETHRAL MEATUS: Normal size and location, no lesions, no prolapse.  URETHRA: No masses, tenderness, or prolapse.  VAGINA: Moist. No lesions or lacerations noted. No abnormal discharge present. No odor present.   CERVIX: No lesions or discharge. No cervical motion tenderness.   UTERUS: Normal size, regular shape, mobile, non-tender.  ADNEXA: No tenderness. No fullness or masses palpated in the adnexal regions.   ANUS PERINEUM: Normal.      Diagnosis:  1. Women's annual routine gynecological examination    2. Healthcare maintenance    3. Breast cancer screening by mammogram    4. At high risk for breast cancer    5. Pap smear for cervical cancer screening    6. Hypothyroidism, unspecified type        Plan:     Orders Placed This Encounter    HPV High Risk Genotypes, PCR    Mammo Digital Screening Bilat w/ Patrick    Lipid panel    CBC Auto Differential    Comprehensive Metabolic Panel    TSH    T4, Free    T3, free    Follicle Stimulating Hormone    Estradiol    Hemoglobin A1C    Ambulatory referral/consult to Breast Surgery    Ambulatory referral/consult to Internal Medicine    Liquid-Based Pap Smear, Screening     Pap/HPV    Screening mammogram, referral to breast center due to high risk status.    IM referral for annual exam, management of chronic health conditions, and to discuss colonoscopy. Will perform annual labs prior to visit.     Patient was counseled today on the new ACS guidelines for cervical cytology screening as well as the current recommendations for breast cancer screening. She was counseled to follow up with her PCP for other routine health maintenance. Counseling session lasted approximately 10 minutes, and all her questions were answered.    Follow-up with me in 1 year for routine exam.      Brea Clay NP

## 2020-11-03 NOTE — PROGRESS NOTES
Suture Removal note:  CC: 45 y.o. female patient is here for suture removal.         HPI: Patient is s/p excision of punch biopsy from the left upper medial thigh on 11/03/2020.  Patient reports no problems.    WOUND PE:  Sutures extracted during daily activity, not present during visit.  Wound healing well.  Good approximation of skin edges.  No signs or symptoms of infection.    IMPRESSION:  Skin, left upper medial thigh, punch biopsy:   - MIXED SUPPURATIVE AND GRANULOMATOUS INFLAMMATION AND DERMAL FIBROSIS,   CONSISTENT WITH HIDRADENITIS SUPPURATIVA OR A REACTION TO A RUPTURED   FOLLICLULAR CYST.   - A SECONDARY BACTERIAL INFECTION CANNOT BE EXCLUDED.  - margins clear.    PLAN:  Sutures removed during daily activity - date unknown.  Continue wound care.    RTC: In 6 months, or as needed.

## 2020-11-03 NOTE — PROGRESS NOTES
Lachelle Engel is a 45 y.o. female patient.   No diagnosis found.  Past Medical History:   Diagnosis Date    Bradycardia     Depression     Dizziness     GERD (gastroesophageal reflux disease)     Impaired fasting blood sugar     Psoriasis     Thyroid disease     hyothyroidism     No past surgical history pertinent negatives on file.  Scheduled Meds:  Continuous Infusions:  PRN Meds:    Review of patient's allergies indicates:  No Known Allergies  There are no hospital problems to display for this patient.    There were no vitals taken for this visit.    Subjective  Objective   Assessment & Plan       Yesenia Sheridan LPN  11/3/2020

## 2020-11-06 ENCOUNTER — TELEPHONE (OUTPATIENT)
Dept: INTERNAL MEDICINE | Facility: CLINIC | Age: 45
End: 2020-11-06

## 2020-11-06 ENCOUNTER — HOSPITAL ENCOUNTER (OUTPATIENT)
Dept: RADIOLOGY | Facility: HOSPITAL | Age: 45
Discharge: HOME | End: 2020-11-06
Attending: NURSE PRACTITIONER
Payer: COMMERCIAL

## 2020-11-06 DIAGNOSIS — Z12.31 BREAST CANCER SCREENING BY MAMMOGRAM: ICD-10-CM

## 2020-11-06 PROCEDURE — 77063 MAMMO DIGITAL SCREENING BILAT WITH TOMO: ICD-10-PCS | Mod: 26,,, | Performed by: RADIOLOGY

## 2020-11-06 PROCEDURE — 77063 BREAST TOMOSYNTHESIS BI: CPT | Mod: 26,,, | Performed by: RADIOLOGY

## 2020-11-06 PROCEDURE — 77067 SCR MAMMO BI INCL CAD: CPT | Mod: 26,,, | Performed by: RADIOLOGY

## 2020-11-06 PROCEDURE — 77067 SCR MAMMO BI INCL CAD: CPT | Mod: TC

## 2020-11-06 PROCEDURE — 77067 MAMMO DIGITAL SCREENING BILAT WITH TOMO: ICD-10-PCS | Mod: 26,,, | Performed by: RADIOLOGY

## 2020-11-06 NOTE — TELEPHONE ENCOUNTER
Patient picked up at [facility] on 11/6.     Bhavin Sutton, PharmD  Clinical Pharmacist  [facility]  P: [phone]

## 2020-11-06 NOTE — TELEPHONE ENCOUNTER
Spoke with pt and let her know she is not accepting new patients.    ----- Message from Ramo Haynes sent at 11/6/2020  1:23 PM CST -----  Regarding: Internal Medicine Appointment  Pt has an active referral in the system ordered by Brea Clay NP  to see Internal Medicine for Healthcare maintenance, Hypothyroidism, unspecified type. Pt is requesting to see this provider at [facility]. There are no appointments showing in [facility] with this provider. Is it anyway she can be worked into the schedule for an appointment? Please contact the pt to advise at [phone].

## 2020-11-07 ENCOUNTER — LAB VISIT (OUTPATIENT)
Dept: LAB | Facility: HOSPITAL | Age: 45
End: 2020-11-07
Attending: NURSE PRACTITIONER
Payer: COMMERCIAL

## 2020-11-07 DIAGNOSIS — Z01.419 WOMEN'S ANNUAL ROUTINE GYNECOLOGICAL EXAMINATION: ICD-10-CM

## 2020-11-07 DIAGNOSIS — Z00.00 HEALTHCARE MAINTENANCE: ICD-10-CM

## 2020-11-07 LAB
ALBUMIN SERPL BCP-MCNC: 3.7 G/DL (ref 3.5–5.2)
ALP SERPL-CCNC: 79 U/L (ref 55–135)
ALT SERPL W/O P-5'-P-CCNC: 26 U/L (ref 10–44)
ANION GAP SERPL CALC-SCNC: 9 MMOL/L (ref 8–16)
AST SERPL-CCNC: 17 U/L (ref 10–40)
BASOPHILS # BLD AUTO: 0.01 K/UL (ref 0–0.2)
BASOPHILS NFR BLD: 0.2 % (ref 0–1.9)
BILIRUB SERPL-MCNC: 0.3 MG/DL (ref 0.1–1)
BUN SERPL-MCNC: 15 MG/DL (ref 6–20)
CALCIUM SERPL-MCNC: 8.6 MG/DL (ref 8.7–10.5)
CHLORIDE SERPL-SCNC: 109 MMOL/L (ref 95–110)
CHOLEST SERPL-MCNC: 176 MG/DL (ref 120–199)
CHOLEST/HDLC SERPL: 3.1 {RATIO} (ref 2–5)
CO2 SERPL-SCNC: 23 MMOL/L (ref 23–29)
CREAT SERPL-MCNC: 0.8 MG/DL (ref 0.5–1.4)
DIFFERENTIAL METHOD: NORMAL
EOSINOPHIL # BLD AUTO: 0.1 K/UL (ref 0–0.5)
EOSINOPHIL NFR BLD: 1.2 % (ref 0–8)
ERYTHROCYTE [DISTWIDTH] IN BLOOD BY AUTOMATED COUNT: 12.6 % (ref 11.5–14.5)
EST. GFR  (AFRICAN AMERICAN): >60 ML/MIN/1.73 M^2
EST. GFR  (NON AFRICAN AMERICAN): >60 ML/MIN/1.73 M^2
ESTIMATED AVG GLUCOSE: 97 MG/DL (ref 68–131)
ESTRADIOL SERPL-MCNC: 129 PG/ML
FSH SERPL-ACNC: 10.1 MIU/ML
GLUCOSE SERPL-MCNC: 76 MG/DL (ref 70–110)
HBA1C MFR BLD HPLC: 5 % (ref 4–5.6)
HCT VFR BLD AUTO: 42.9 % (ref 37–48.5)
HDLC SERPL-MCNC: 57 MG/DL (ref 40–75)
HDLC SERPL: 32.4 % (ref 20–50)
HGB BLD-MCNC: 13.8 G/DL (ref 12–16)
IMM GRANULOCYTES # BLD AUTO: 0.01 K/UL (ref 0–0.04)
IMM GRANULOCYTES NFR BLD AUTO: 0.2 % (ref 0–0.5)
LDLC SERPL CALC-MCNC: 102 MG/DL (ref 63–159)
LYMPHOCYTES # BLD AUTO: 1.9 K/UL (ref 1–4.8)
LYMPHOCYTES NFR BLD: 38.7 % (ref 18–48)
MCH RBC QN AUTO: 30.9 PG (ref 27–31)
MCHC RBC AUTO-ENTMCNC: 32.2 G/DL (ref 32–36)
MCV RBC AUTO: 96 FL (ref 82–98)
MONOCYTES # BLD AUTO: 0.3 K/UL (ref 0.3–1)
MONOCYTES NFR BLD: 5.3 % (ref 4–15)
NEUTROPHILS # BLD AUTO: 2.6 K/UL (ref 1.8–7.7)
NEUTROPHILS NFR BLD: 54.4 % (ref 38–73)
NONHDLC SERPL-MCNC: 119 MG/DL
NRBC BLD-RTO: 0 /100 WBC
PLATELET # BLD AUTO: 152 K/UL (ref 150–350)
PMV BLD AUTO: 11.1 FL (ref 9.2–12.9)
POTASSIUM SERPL-SCNC: 4.2 MMOL/L (ref 3.5–5.1)
PROT SERPL-MCNC: 6.5 G/DL (ref 6–8.4)
RBC # BLD AUTO: 4.47 M/UL (ref 4–5.4)
SODIUM SERPL-SCNC: 141 MMOL/L (ref 136–145)
T3FREE SERPL-MCNC: 2.8 PG/ML (ref 2.3–4.2)
T4 FREE SERPL-MCNC: 0.83 NG/DL (ref 0.71–1.51)
TRIGL SERPL-MCNC: 85 MG/DL (ref 30–150)
TSH SERPL DL<=0.005 MIU/L-ACNC: 2.12 UIU/ML (ref 0.4–4)
WBC # BLD AUTO: 4.86 K/UL (ref 3.9–12.7)

## 2020-11-07 PROCEDURE — 36415 COLL VENOUS BLD VENIPUNCTURE: CPT | Mod: PO

## 2020-11-07 PROCEDURE — 80061 LIPID PANEL: CPT

## 2020-11-07 PROCEDURE — 83036 HEMOGLOBIN GLYCOSYLATED A1C: CPT

## 2020-11-07 PROCEDURE — 80053 COMPREHEN METABOLIC PANEL: CPT

## 2020-11-07 PROCEDURE — 84481 FREE ASSAY (FT-3): CPT

## 2020-11-07 PROCEDURE — 82670 ASSAY OF TOTAL ESTRADIOL: CPT

## 2020-11-07 PROCEDURE — 85025 COMPLETE CBC W/AUTO DIFF WBC: CPT

## 2020-11-07 PROCEDURE — 84443 ASSAY THYROID STIM HORMONE: CPT

## 2020-11-07 PROCEDURE — 83001 ASSAY OF GONADOTROPIN (FSH): CPT

## 2020-11-07 PROCEDURE — 84439 ASSAY OF FREE THYROXINE: CPT

## 2020-11-09 ENCOUNTER — PATIENT MESSAGE (OUTPATIENT)
Dept: INTERNAL MEDICINE | Facility: CLINIC | Age: 45
End: 2020-11-09

## 2020-11-09 ENCOUNTER — TELEPHONE (OUTPATIENT)
Dept: INTERNAL MEDICINE | Facility: CLINIC | Age: 45
End: 2020-11-09

## 2020-11-09 ENCOUNTER — PATIENT MESSAGE (OUTPATIENT)
Dept: OBSTETRICS AND GYNECOLOGY | Facility: CLINIC | Age: 45
End: 2020-11-09

## 2020-11-09 NOTE — TELEPHONE ENCOUNTER
----- Message from Allyson Varghese sent at 11/9/2020  8:20 AM CST -----  Appointment Request From: Lachelle Engel    With Provider: Lyn Deras MD [Juanito sayra [facility]]    Preferred Date Range: 11/9/2020 - 11/20/2020    Preferred Times: Any Time    Reason for visit: Annual Check-up    Comments:  Thyroid medication    [phone] (Home)   [phone] (Mobile)

## 2020-11-11 LAB
HPV HR 12 DNA SPEC QL NAA+PROBE: NEGATIVE
HPV16 AG SPEC QL: NEGATIVE
HPV18 DNA SPEC QL NAA+PROBE: NEGATIVE

## 2020-11-25 ENCOUNTER — TELEPHONE (OUTPATIENT)
Dept: OBSTETRICS AND GYNECOLOGY | Facility: CLINIC | Age: 45
End: 2020-11-25

## 2020-11-25 DIAGNOSIS — R92.2 INCONCLUSIVE MAMMOGRAM: ICD-10-CM

## 2020-11-25 DIAGNOSIS — Z91.89 AT HIGH RISK FOR BREAST CANCER: Primary | ICD-10-CM

## 2020-11-25 DIAGNOSIS — R92.2 INCONCLUSIVE MAMMOGRAM: Primary | ICD-10-CM

## 2020-11-25 NOTE — TELEPHONE ENCOUNTER
----- Message from Brea Clay NP sent at 11/25/2020 10:51 AM CST -----  Regarding: Diagnostic Mammo and US  Please assist to schedule for both bilateral diagnostic mammogram and bilateral breast US. Preferably at [facility].    Thanks,  Brea

## 2020-11-25 NOTE — TELEPHONE ENCOUNTER
Spoke with pt. And she has already made her appt. At the [facility].   Labs/Imaging Studies

## 2020-12-02 ENCOUNTER — DOCUMENTATION ONLY (OUTPATIENT)
Dept: DERMATOLOGY | Facility: CLINIC | Age: 45
End: 2020-12-02

## 2020-12-02 ENCOUNTER — HOSPITAL ENCOUNTER (OUTPATIENT)
Dept: RADIOLOGY | Facility: HOSPITAL | Age: 45
Discharge: HOME | End: 2020-12-02
Attending: NURSE PRACTITIONER
Payer: COMMERCIAL

## 2020-12-02 ENCOUNTER — PATIENT MESSAGE (OUTPATIENT)
Dept: PHARMACY | Facility: CLINIC | Age: 45
End: 2020-12-02

## 2020-12-02 ENCOUNTER — SPECIALTY PHARMACY (OUTPATIENT)
Dept: PHARMACY | Facility: CLINIC | Age: 45
End: 2020-12-02

## 2020-12-02 DIAGNOSIS — R92.2 INCONCLUSIVE MAMMOGRAM: ICD-10-CM

## 2020-12-02 DIAGNOSIS — L40.9 PSORIASIS: Primary | ICD-10-CM

## 2020-12-02 PROCEDURE — 77062 BREAST TOMOSYNTHESIS BI: CPT | Mod: TC

## 2020-12-02 PROCEDURE — 77066 MAMMO DIGITAL DIAGNOSTIC BILAT WITH TOMO: ICD-10-PCS | Mod: 26,,, | Performed by: RADIOLOGY

## 2020-12-02 PROCEDURE — 77062 MAMMO DIGITAL DIAGNOSTIC BILAT WITH TOMO: ICD-10-PCS | Mod: 26,,, | Performed by: RADIOLOGY

## 2020-12-02 PROCEDURE — 77062 BREAST TOMOSYNTHESIS BI: CPT | Mod: 26,,, | Performed by: RADIOLOGY

## 2020-12-02 PROCEDURE — 77066 DX MAMMO INCL CAD BI: CPT | Mod: 26,,, | Performed by: RADIOLOGY

## 2020-12-02 NOTE — PROGRESS NOTES
[employer] denied Clindamycin phos 1% on 11-.  The pt must try generic Cleocin T gel (Step therapy).    PA ref #  [id number].

## 2020-12-04 NOTE — TELEPHONE ENCOUNTER
Specialty Pharmacy - Refill Coordination    Specialty Medication Orders Linked to Encounter      Most Recent Value   Medication #1  adalimumab (HUMIRA PEN) 40 mg/0.8 mL PnKt (Order#[id number], Rx#[id number])        Pt plans to pickup Humira from [facility] on . Name and  verified. $5 copay in 004. Pt is not in need of a new sharps container. Pt has 0 doses on hand, next dose due . Pt reported no missed doses. Pt did not start any new medications. Pt had no further questions or concerns.    Refill Questions - Documented Responses      Most Recent Value   Relationship to patient of person spoken to?  Self   HIPAA/medical authority confirmed?  Yes   Any changes in contact preferences or allowed representatives?  No   Has the patient had any insurance changes?  No   Has the patient had any changes to specialty medication, dose, or instructions?  No   Has the patient started taking any new medications, herbals, or supplements?  No   Has the patient been diagnosed with any new medical conditions?  No   Does the patient have any new allergies to medications or foods?  No   Does the patient have any concerns about side effects?  No   Can the patient store medication/sharps container properly (at the correct temperature, away from children/pets, etc.)?  Yes   Can the patient call emergency services (911) in the event of an emergency?  Yes   Does the patient have any concerns or questions about taking or administering this medication as prescribed?  No   How many doses did the patient miss in the past 4 weeks or since the last fill?  0   How many doses does the patient have on hand?  0   Does the number of doses/days supply remaining match pharmacy expected amounts?  Yes   Does the patient feel that this medication is effective?  Yes   How will the patient receive the medication?  Pickup   When does the patient need to receive the medication?  20   Expected Copay ($)  5   Is the patient able to afford the medication  copay?  Yes   Payment Method  at pickup   Days supply of Refill  28   Would patient like to speak to a pharmacist?  No   Do you want to trigger an intervention?  No   Do you want to trigger an additional referral task?  No   Refill activity completed?  Yes   Refill activity plan  Refill scheduled   Shipment/Pickup Date:  12/04/20          Current Outpatient Medications   Medication Sig    adalimumab (HUMIRA PEN) 40 mg/0.8 mL PnKt Inject 1 pen (40 mg total) into the skin every 14 (fourteen) days.    clindamycin phosphate (CLINDAGEL) 1 % glqd Apply 1 application topically 2 (two) times daily.    folic acid (FOLVITE) 1 MG tablet Take 1 tablet (1 mg total) by mouth once daily.    metFORMIN (GLUCOPHAGE-XR) 500 MG XR 24hr tablet Take 1 tablet (500 mg total) by mouth daily with breakfast.    mupirocin (BACTROBAN) 2 % ointment Apply topically 3 (three) times daily.    omeprazole (PRILOSEC) 20 MG capsule Take 1 capsule (20 mg total) by mouth once daily.    phentermine (ADIPEX-P) 37.5 mg tablet 1/2 tab po qam    topiramate (TOPAMAX) 50 MG tablet Take 1 tablet (50 mg total) by mouth 2 (two) times daily.   Last reviewed on 11/3/2020 12:18 PM by Brea Clay NP    Review of patient's allergies indicates:  No Known Allergies Last reviewed on  11/3/2020 12:18 PM by Brea Clay      Tasks added this encounter   12/25/2020 - Refill Call (Auto Added)  12/5/2020 - Pickup Reminder   Tasks due within next 3 months   No tasks due.     Alejandra Schilling, PharmD  [facility]  [address] [place] [id number]  Phone: [phone]  Fax: [phone]

## 2020-12-08 LAB
FINAL PATHOLOGIC DIAGNOSIS: NORMAL
Lab: NORMAL

## 2020-12-29 ENCOUNTER — SPECIALTY PHARMACY (OUTPATIENT)
Dept: PHARMACY | Facility: CLINIC | Age: 45
End: 2020-12-29

## 2020-12-29 NOTE — TELEPHONE ENCOUNTER
Specialty Pharmacy - Refill Coordination    Specialty Medication Orders Linked to Encounter      Most Recent Value   Medication #1  adalimumab (HUMIRA PEN) 40 mg/0.8 mL PnKt (Order#[id number], Rx#[id number])          Refill Questions - Documented Responses      Most Recent Value   Relationship to patient of person spoken to?  Self   HIPAA/medical authority confirmed?  Yes   Any changes in contact preferences or allowed representatives?  No   Has the patient had any insurance changes?  No   Has the patient had any changes to specialty medication, dose, or instructions?  No   Has the patient started taking any new medications, herbals, or supplements?  No   Has the patient been diagnosed with any new medical conditions?  No   Does the patient have any new allergies to medications or foods?  No   Does the patient have any concerns about side effects?  No   Can the patient store medication/sharps container properly (at the correct temperature, away from children/pets, etc.)?  Yes   Can the patient call emergency services (911) in the event of an emergency?  Yes   Does the patient have any concerns or questions about taking or administering this medication as prescribed?  No   How many doses did the patient miss in the past 4 weeks or since the last fill?  0   How many doses does the patient have on hand?  0   How many days does the patient report on hand quantity will last?  0   Does the number of doses/days supply remaining match pharmacy expected amounts?  Yes   Does the patient feel that this medication is effective?  Yes   During the past 4 weeks, has patient missed any activities due to condition or medication?  No   During the past 4 weeks, did patient have any of the following urgent care visits?  None   How will the patient receive the medication?  Pickup   When does the patient need to receive the medication?  01/04/21   Shipping Address  Home   Address in [facility] confirmed and updated if neccessary?   Yes   Expected Copay ($)  5   Is the patient able to afford the medication copay?  Yes   Payment Method  CC on file   Days supply of Refill  28   Would patient like to speak to a pharmacist?  No   Do you want to trigger an intervention?  No   Do you want to trigger an additional referral task?  No   Refill activity completed?  Yes   Refill activity plan  Refill scheduled   Shipment/Pickup Date:  12/30/20          Current Outpatient Medications   Medication Sig    adalimumab (HUMIRA PEN) 40 mg/0.8 mL PnKt Inject 1 pen (40 mg total) into the skin every 14 (fourteen) days.    clindamycin phosphate (CLINDAGEL) 1 % glqd Apply 1 application topically 2 (two) times daily.    folic acid (FOLVITE) 1 MG tablet Take 1 tablet (1 mg total) by mouth once daily.    metFORMIN (GLUCOPHAGE-XR) 500 MG XR 24hr tablet Take 1 tablet (500 mg total) by mouth daily with breakfast.    mupirocin (BACTROBAN) 2 % ointment Apply topically 3 (three) times daily.    omeprazole (PRILOSEC) 20 MG capsule Take 1 capsule (20 mg total) by mouth once daily.    phentermine (ADIPEX-P) 37.5 mg tablet 1/2 tab po qam    topiramate (TOPAMAX) 50 MG tablet Take 1 tablet (50 mg total) by mouth 2 (two) times daily.   Last reviewed on 11/3/2020 12:18 PM by Brea Clay NP    Review of patient's allergies indicates:  No Known Allergies Last reviewed on  11/3/2020 12:18 PM by Brea Clay      Tasks added this encounter   1/21/2021 - Refill Call (Auto Added)  12/31/2020 - Pickup Reminder   Tasks due within next 3 months   No tasks due.     [facility]  [address] [facility] [address]  Phone: [phone]  Fax: [phone]

## 2021-01-05 ENCOUNTER — PATIENT MESSAGE (OUTPATIENT)
Dept: RHEUMATOLOGY | Facility: CLINIC | Age: 46
End: 2021-01-05

## 2021-01-30 RX ORDER — ADALIMUMAB 40MG/0.8ML
40 KIT SUBCUTANEOUS
Qty: 2 PEN | Refills: 11 | OUTPATIENT
Start: 2021-01-30 | End: 2021-03-01

## 2021-02-01 ENCOUNTER — TELEPHONE (OUTPATIENT)
Dept: RHEUMATOLOGY | Facility: CLINIC | Age: 46
End: 2021-02-01

## 2021-02-05 ENCOUNTER — PATIENT MESSAGE (OUTPATIENT)
Dept: PHARMACY | Facility: CLINIC | Age: 46
End: 2021-02-05

## 2021-02-18 ENCOUNTER — OFFICE VISIT (OUTPATIENT)
Dept: SPORTS MEDICINE | Facility: CLINIC | Age: 46
End: 2021-02-18
Payer: COMMERCIAL

## 2021-02-18 ENCOUNTER — HOSPITAL ENCOUNTER (OUTPATIENT)
Dept: RADIOLOGY | Facility: HOSPITAL | Age: 46
Discharge: HOME | End: 2021-02-18
Attending: PHYSICIAN ASSISTANT
Payer: COMMERCIAL

## 2021-02-18 ENCOUNTER — SPECIALTY PHARMACY (OUTPATIENT)
Dept: PHARMACY | Facility: CLINIC | Age: 46
End: 2021-02-18

## 2021-02-18 VITALS
BODY MASS INDEX: 37.89 KG/M2 | HEIGHT: 68 IN | DIASTOLIC BLOOD PRESSURE: 79 MMHG | HEART RATE: 77 BPM | WEIGHT: 250 LBS | SYSTOLIC BLOOD PRESSURE: 134 MMHG

## 2021-02-18 DIAGNOSIS — G89.29 CHRONIC PAIN OF RIGHT KNEE: ICD-10-CM

## 2021-02-18 DIAGNOSIS — M25.561 RIGHT KNEE PAIN, UNSPECIFIED CHRONICITY: ICD-10-CM

## 2021-02-18 DIAGNOSIS — M25.561 CHRONIC PAIN OF RIGHT KNEE: ICD-10-CM

## 2021-02-18 DIAGNOSIS — M25.461 EFFUSION OF RIGHT KNEE: Primary | ICD-10-CM

## 2021-02-18 PROCEDURE — 73564 X-RAY EXAM KNEE 4 OR MORE: CPT | Mod: 26,,, | Performed by: RADIOLOGY

## 2021-02-18 PROCEDURE — 3008F PR BODY MASS INDEX (BMI) DOCUMENTED: ICD-10-PCS | Mod: CPTII,S$GLB,, | Performed by: PHYSICIAN ASSISTANT

## 2021-02-18 PROCEDURE — 99204 PR OFFICE/OUTPT VISIT, NEW, LEVL IV, 45-59 MIN: ICD-10-PCS | Mod: S$GLB,,, | Performed by: PHYSICIAN ASSISTANT

## 2021-02-18 PROCEDURE — 99999 PR PBB SHADOW E&M-EST. PATIENT-LVL III: CPT | Mod: PBBFAC,,, | Performed by: PHYSICIAN ASSISTANT

## 2021-02-18 PROCEDURE — 1125F AMNT PAIN NOTED PAIN PRSNT: CPT | Mod: S$GLB,,, | Performed by: PHYSICIAN ASSISTANT

## 2021-02-18 PROCEDURE — 1125F PR PAIN SEVERITY QUANTIFIED, PAIN PRESENT: ICD-10-PCS | Mod: S$GLB,,, | Performed by: PHYSICIAN ASSISTANT

## 2021-02-18 PROCEDURE — 73564 XR KNEE ORTHO BILAT WITH FLEXION: ICD-10-PCS | Mod: 26,,, | Performed by: RADIOLOGY

## 2021-02-18 PROCEDURE — 99204 OFFICE O/P NEW MOD 45 MIN: CPT | Mod: S$GLB,,, | Performed by: PHYSICIAN ASSISTANT

## 2021-02-18 PROCEDURE — 3008F BODY MASS INDEX DOCD: CPT | Mod: CPTII,S$GLB,, | Performed by: PHYSICIAN ASSISTANT

## 2021-02-18 PROCEDURE — 73564 X-RAY EXAM KNEE 4 OR MORE: CPT | Mod: TC,50

## 2021-02-18 PROCEDURE — 99999 PR PBB SHADOW E&M-EST. PATIENT-LVL III: ICD-10-PCS | Mod: PBBFAC,,, | Performed by: PHYSICIAN ASSISTANT

## 2021-02-18 RX ORDER — ADALIMUMAB 40MG/0.8ML
40 KIT SUBCUTANEOUS
Qty: 2 PEN | Refills: 11 | OUTPATIENT
Start: 2021-02-18 | End: 2021-03-20

## 2021-02-19 ENCOUNTER — OFFICE VISIT (OUTPATIENT)
Dept: RHEUMATOLOGY | Facility: CLINIC | Age: 46
End: 2021-02-19
Payer: COMMERCIAL

## 2021-02-19 DIAGNOSIS — L40.9 PSORIASIS: Primary | ICD-10-CM

## 2021-02-19 DIAGNOSIS — Z13.820 SCREENING FOR OSTEOPOROSIS: ICD-10-CM

## 2021-02-19 DIAGNOSIS — L40.50 PSORIATIC ARTHRITIS: ICD-10-CM

## 2021-02-19 PROCEDURE — 99214 PR OFFICE/OUTPT VISIT, EST, LEVL IV, 30-39 MIN: ICD-10-PCS | Mod: 95,,, | Performed by: INTERNAL MEDICINE

## 2021-02-19 PROCEDURE — 1125F AMNT PAIN NOTED PAIN PRSNT: CPT | Mod: ,,, | Performed by: INTERNAL MEDICINE

## 2021-02-19 PROCEDURE — 1125F PR PAIN SEVERITY QUANTIFIED, PAIN PRESENT: ICD-10-PCS | Mod: ,,, | Performed by: INTERNAL MEDICINE

## 2021-02-19 PROCEDURE — 99214 OFFICE O/P EST MOD 30 MIN: CPT | Mod: 95,,, | Performed by: INTERNAL MEDICINE

## 2021-02-19 RX ORDER — METHOTREXATE 2.5 MG/1
TABLET ORAL
Qty: 15 TABLET | Refills: 4 | Status: SHIPPED | OUTPATIENT
Start: 2021-02-19 | End: 2021-11-15

## 2021-02-19 RX ORDER — ADALIMUMAB 40MG/0.8ML
40 KIT SUBCUTANEOUS
Qty: 2 PEN | Refills: 11 | Status: SHIPPED | OUTPATIENT
Start: 2021-02-19 | End: 2021-11-15

## 2021-02-19 RX ORDER — FOLIC ACID 1 MG/1
1 TABLET ORAL DAILY
Qty: 30 TABLET | Refills: 4 | Status: SHIPPED | OUTPATIENT
Start: 2021-02-19 | End: 2021-11-15

## 2021-02-22 ENCOUNTER — HOSPITAL ENCOUNTER (OUTPATIENT)
Dept: RADIOLOGY | Facility: CLINIC | Age: 46
Discharge: HOME | End: 2021-02-22
Attending: INTERNAL MEDICINE
Payer: COMMERCIAL

## 2021-02-22 DIAGNOSIS — Z13.820 SCREENING FOR OSTEOPOROSIS: ICD-10-CM

## 2021-02-22 PROCEDURE — 77080 DXA BONE DENSITY AXIAL: CPT | Mod: 26,,, | Performed by: INTERNAL MEDICINE

## 2021-02-22 PROCEDURE — 77080 DXA BONE DENSITY AXIAL: CPT | Mod: TC

## 2021-02-22 PROCEDURE — 77080 DEXA BONE DENSITY SPINE HIP: ICD-10-PCS | Mod: 26,,, | Performed by: INTERNAL MEDICINE

## 2021-02-23 ENCOUNTER — HOSPITAL ENCOUNTER (OUTPATIENT)
Dept: RADIOLOGY | Facility: HOSPITAL | Age: 46
Discharge: HOME | End: 2021-02-23
Attending: PHYSICIAN ASSISTANT
Payer: COMMERCIAL

## 2021-02-23 ENCOUNTER — PATIENT MESSAGE (OUTPATIENT)
Dept: RHEUMATOLOGY | Facility: CLINIC | Age: 46
End: 2021-02-23

## 2021-02-23 DIAGNOSIS — M25.461 EFFUSION OF RIGHT KNEE: ICD-10-CM

## 2021-02-23 DIAGNOSIS — G89.29 CHRONIC PAIN OF RIGHT KNEE: ICD-10-CM

## 2021-02-23 DIAGNOSIS — M25.561 CHRONIC PAIN OF RIGHT KNEE: ICD-10-CM

## 2021-02-23 PROCEDURE — 73721 MRI KNEE WITHOUT CONTRAST RIGHT: ICD-10-PCS | Mod: 26,RT,, | Performed by: RADIOLOGY

## 2021-02-23 PROCEDURE — 73721 MRI JNT OF LWR EXTRE W/O DYE: CPT | Mod: 26,RT,, | Performed by: RADIOLOGY

## 2021-02-23 PROCEDURE — 73721 MRI JNT OF LWR EXTRE W/O DYE: CPT | Mod: TC,RT

## 2021-02-25 ENCOUNTER — OFFICE VISIT (OUTPATIENT)
Dept: SPORTS MEDICINE | Facility: CLINIC | Age: 46
End: 2021-02-25
Payer: COMMERCIAL

## 2021-02-25 VITALS
SYSTOLIC BLOOD PRESSURE: 135 MMHG | HEART RATE: 83 BPM | BODY MASS INDEX: 37.89 KG/M2 | HEIGHT: 68 IN | DIASTOLIC BLOOD PRESSURE: 82 MMHG | WEIGHT: 250 LBS

## 2021-02-25 DIAGNOSIS — M25.561 ACUTE PAIN OF RIGHT KNEE: ICD-10-CM

## 2021-02-25 DIAGNOSIS — S83.231D COMPLEX TEAR OF MEDIAL MENISCUS OF RIGHT KNEE AS CURRENT INJURY, SUBSEQUENT ENCOUNTER: Primary | ICD-10-CM

## 2021-02-25 DIAGNOSIS — M22.41 CHONDROMALACIA PATELLAE, RIGHT KNEE: ICD-10-CM

## 2021-02-25 PROCEDURE — 3008F PR BODY MASS INDEX (BMI) DOCUMENTED: ICD-10-PCS | Mod: CPTII,S$GLB,, | Performed by: PHYSICIAN ASSISTANT

## 2021-02-25 PROCEDURE — 1126F AMNT PAIN NOTED NONE PRSNT: CPT | Mod: S$GLB,,, | Performed by: PHYSICIAN ASSISTANT

## 2021-02-25 PROCEDURE — 1126F PR PAIN SEVERITY QUANTIFIED, NO PAIN PRESENT: ICD-10-PCS | Mod: S$GLB,,, | Performed by: PHYSICIAN ASSISTANT

## 2021-02-25 PROCEDURE — 3008F BODY MASS INDEX DOCD: CPT | Mod: CPTII,S$GLB,, | Performed by: PHYSICIAN ASSISTANT

## 2021-02-25 PROCEDURE — 99214 PR OFFICE/OUTPT VISIT, EST, LEVL IV, 30-39 MIN: ICD-10-PCS | Mod: S$GLB,,, | Performed by: PHYSICIAN ASSISTANT

## 2021-02-25 PROCEDURE — 99999 PR PBB SHADOW E&M-EST. PATIENT-LVL III: CPT | Mod: PBBFAC,,, | Performed by: PHYSICIAN ASSISTANT

## 2021-02-25 PROCEDURE — 99214 OFFICE O/P EST MOD 30 MIN: CPT | Mod: S$GLB,,, | Performed by: PHYSICIAN ASSISTANT

## 2021-02-25 PROCEDURE — 99999 PR PBB SHADOW E&M-EST. PATIENT-LVL III: ICD-10-PCS | Mod: PBBFAC,,, | Performed by: PHYSICIAN ASSISTANT

## 2021-03-18 ENCOUNTER — PATIENT MESSAGE (OUTPATIENT)
Dept: PHARMACY | Facility: CLINIC | Age: 46
End: 2021-03-18

## 2021-03-23 ENCOUNTER — PATIENT MESSAGE (OUTPATIENT)
Dept: PHARMACY | Facility: CLINIC | Age: 46
End: 2021-03-23

## 2021-04-05 ENCOUNTER — PATIENT MESSAGE (OUTPATIENT)
Dept: PHARMACY | Facility: CLINIC | Age: 46
End: 2021-04-05

## 2021-04-06 ENCOUNTER — SPECIALTY PHARMACY (OUTPATIENT)
Dept: PHARMACY | Facility: CLINIC | Age: 46
End: 2021-04-06

## 2021-04-26 ENCOUNTER — PATIENT MESSAGE (OUTPATIENT)
Dept: DERMATOLOGY | Facility: CLINIC | Age: 46
End: 2021-04-26

## 2021-04-27 ENCOUNTER — PATIENT MESSAGE (OUTPATIENT)
Dept: PHARMACY | Facility: CLINIC | Age: 46
End: 2021-04-27

## 2021-05-03 ENCOUNTER — PATIENT MESSAGE (OUTPATIENT)
Dept: PHARMACY | Facility: CLINIC | Age: 46
End: 2021-05-03

## 2021-05-19 ENCOUNTER — LAB VISIT (OUTPATIENT)
Dept: LAB | Facility: HOSPITAL | Age: 46
End: 2021-05-19
Attending: INTERNAL MEDICINE
Payer: COMMERCIAL

## 2021-05-19 DIAGNOSIS — L40.50 PSORIATIC ARTHRITIS: ICD-10-CM

## 2021-05-19 DIAGNOSIS — L40.9 PSORIASIS: ICD-10-CM

## 2021-05-19 LAB
25(OH)D3+25(OH)D2 SERPL-MCNC: 46 NG/ML (ref 30–96)
ALBUMIN SERPL BCP-MCNC: 4 G/DL (ref 3.5–5.2)
ALP SERPL-CCNC: 63 U/L (ref 55–135)
ALT SERPL W/O P-5'-P-CCNC: 40 U/L (ref 10–44)
ANION GAP SERPL CALC-SCNC: 10 MMOL/L (ref 8–16)
AST SERPL-CCNC: 28 U/L (ref 10–40)
BASOPHILS # BLD AUTO: 0.01 K/UL (ref 0–0.2)
BASOPHILS NFR BLD: 0.3 % (ref 0–1.9)
BILIRUB SERPL-MCNC: 0.5 MG/DL (ref 0.1–1)
BUN SERPL-MCNC: 13 MG/DL (ref 6–20)
CALCIUM SERPL-MCNC: 9.6 MG/DL (ref 8.7–10.5)
CHLORIDE SERPL-SCNC: 106 MMOL/L (ref 95–110)
CO2 SERPL-SCNC: 24 MMOL/L (ref 23–29)
CREAT SERPL-MCNC: 0.8 MG/DL (ref 0.5–1.4)
CRP SERPL-MCNC: 0.6 MG/L (ref 0–8.2)
DIFFERENTIAL METHOD: ABNORMAL
EOSINOPHIL # BLD AUTO: 0.1 K/UL (ref 0–0.5)
EOSINOPHIL NFR BLD: 1.8 % (ref 0–8)
ERYTHROCYTE [DISTWIDTH] IN BLOOD BY AUTOMATED COUNT: 12.6 % (ref 11.5–14.5)
ERYTHROCYTE [SEDIMENTATION RATE] IN BLOOD BY WESTERGREN METHOD: <2 MM/HR (ref 0–36)
EST. GFR  (AFRICAN AMERICAN): >60 ML/MIN/1.73 M^2
EST. GFR  (NON AFRICAN AMERICAN): >60 ML/MIN/1.73 M^2
GLUCOSE SERPL-MCNC: 94 MG/DL (ref 70–110)
HBV CORE AB SERPL QL IA: NEGATIVE
HBV SURFACE AB SER-ACNC: NEGATIVE M[IU]/ML
HBV SURFACE AG SERPL QL IA: NEGATIVE
HCT VFR BLD AUTO: 41.3 % (ref 37–48.5)
HGB BLD-MCNC: 13.9 G/DL (ref 12–16)
IMM GRANULOCYTES # BLD AUTO: 0 K/UL (ref 0–0.04)
IMM GRANULOCYTES NFR BLD AUTO: 0 % (ref 0–0.5)
LYMPHOCYTES # BLD AUTO: 1.4 K/UL (ref 1–4.8)
LYMPHOCYTES NFR BLD: 35.4 % (ref 18–48)
MCH RBC QN AUTO: 30.5 PG (ref 27–31)
MCHC RBC AUTO-ENTMCNC: 33.7 G/DL (ref 32–36)
MCV RBC AUTO: 91 FL (ref 82–98)
MONOCYTES # BLD AUTO: 0.2 K/UL (ref 0.3–1)
MONOCYTES NFR BLD: 5.8 % (ref 4–15)
NEUTROPHILS # BLD AUTO: 2.2 K/UL (ref 1.8–7.7)
NEUTROPHILS NFR BLD: 56.7 % (ref 38–73)
NRBC BLD-RTO: 0 /100 WBC
PLATELET # BLD AUTO: 122 K/UL (ref 150–450)
PMV BLD AUTO: 11.7 FL (ref 9.2–12.9)
POTASSIUM SERPL-SCNC: 3.7 MMOL/L (ref 3.5–5.1)
PROT SERPL-MCNC: 6.7 G/DL (ref 6–8.4)
RBC # BLD AUTO: 4.56 M/UL (ref 4–5.4)
SODIUM SERPL-SCNC: 140 MMOL/L (ref 136–145)
WBC # BLD AUTO: 3.81 K/UL (ref 3.9–12.7)

## 2021-05-19 PROCEDURE — 87340 HEPATITIS B SURFACE AG IA: CPT | Performed by: INTERNAL MEDICINE

## 2021-05-19 PROCEDURE — 85652 RBC SED RATE AUTOMATED: CPT | Performed by: INTERNAL MEDICINE

## 2021-05-19 PROCEDURE — 86140 C-REACTIVE PROTEIN: CPT | Performed by: INTERNAL MEDICINE

## 2021-05-19 PROCEDURE — 86706 HEP B SURFACE ANTIBODY: CPT | Performed by: INTERNAL MEDICINE

## 2021-05-19 PROCEDURE — 85025 COMPLETE CBC W/AUTO DIFF WBC: CPT | Performed by: INTERNAL MEDICINE

## 2021-05-19 PROCEDURE — 82306 VITAMIN D 25 HYDROXY: CPT | Performed by: INTERNAL MEDICINE

## 2021-05-19 PROCEDURE — 86704 HEP B CORE ANTIBODY TOTAL: CPT | Performed by: INTERNAL MEDICINE

## 2021-05-19 PROCEDURE — 80053 COMPREHEN METABOLIC PANEL: CPT | Performed by: INTERNAL MEDICINE

## 2021-05-21 ENCOUNTER — SPECIALTY PHARMACY (OUTPATIENT)
Dept: PHARMACY | Facility: CLINIC | Age: 46
End: 2021-05-21

## 2021-05-31 ENCOUNTER — OFFICE VISIT (OUTPATIENT)
Dept: RHEUMATOLOGY | Facility: CLINIC | Age: 46
End: 2021-05-31
Payer: COMMERCIAL

## 2021-05-31 DIAGNOSIS — L40.50 PSORIATIC ARTHRITIS: ICD-10-CM

## 2021-05-31 DIAGNOSIS — L40.9 PSORIASIS: Primary | ICD-10-CM

## 2021-05-31 PROCEDURE — 99214 PR OFFICE/OUTPT VISIT, EST, LEVL IV, 30-39 MIN: ICD-10-PCS | Mod: 95,,, | Performed by: INTERNAL MEDICINE

## 2021-05-31 PROCEDURE — 1126F PR PAIN SEVERITY QUANTIFIED, NO PAIN PRESENT: ICD-10-PCS | Mod: ,,, | Performed by: INTERNAL MEDICINE

## 2021-05-31 PROCEDURE — 1126F AMNT PAIN NOTED NONE PRSNT: CPT | Mod: ,,, | Performed by: INTERNAL MEDICINE

## 2021-05-31 PROCEDURE — 99214 OFFICE O/P EST MOD 30 MIN: CPT | Mod: 95,,, | Performed by: INTERNAL MEDICINE

## 2021-06-11 ENCOUNTER — PATIENT MESSAGE (OUTPATIENT)
Dept: PHARMACY | Facility: CLINIC | Age: 46
End: 2021-06-11

## 2021-06-30 ENCOUNTER — SPECIALTY PHARMACY (OUTPATIENT)
Dept: PHARMACY | Facility: CLINIC | Age: 46
End: 2021-06-30

## 2021-07-10 ENCOUNTER — IMMUNIZATION (OUTPATIENT)
Dept: INTERNAL MEDICINE | Facility: CLINIC | Age: 46
End: 2021-07-10
Payer: COMMERCIAL

## 2021-07-10 DIAGNOSIS — Z23 NEED FOR VACCINATION: Primary | ICD-10-CM

## 2021-07-10 PROCEDURE — 91300 COVID-19, MRNA, LNP-S, PF, 30 MCG/0.3 ML DOSE VACCINE: CPT | Mod: PBBFAC | Performed by: INTERNAL MEDICINE

## 2021-07-28 ENCOUNTER — PATIENT MESSAGE (OUTPATIENT)
Dept: PHARMACY | Facility: CLINIC | Age: 46
End: 2021-07-28

## 2021-07-31 ENCOUNTER — IMMUNIZATION (OUTPATIENT)
Dept: INTERNAL MEDICINE | Facility: CLINIC | Age: 46
End: 2021-07-31
Payer: COMMERCIAL

## 2021-07-31 DIAGNOSIS — Z23 NEED FOR VACCINATION: Primary | ICD-10-CM

## 2021-07-31 PROCEDURE — 91300 COVID-19, MRNA, LNP-S, PF, 30 MCG/0.3 ML DOSE VACCINE: CPT | Mod: PBBFAC | Performed by: INTERNAL MEDICINE

## 2021-07-31 PROCEDURE — 0002A COVID-19, MRNA, LNP-S, PF, 30 MCG/0.3 ML DOSE VACCINE: CPT | Mod: PBBFAC | Performed by: INTERNAL MEDICINE

## 2021-08-03 ENCOUNTER — PATIENT MESSAGE (OUTPATIENT)
Dept: PHARMACY | Facility: CLINIC | Age: 46
End: 2021-08-03

## 2021-08-09 ENCOUNTER — SPECIALTY PHARMACY (OUTPATIENT)
Dept: PHARMACY | Facility: CLINIC | Age: 46
End: 2021-08-09

## 2021-11-10 ENCOUNTER — PATIENT MESSAGE (OUTPATIENT)
Dept: RHEUMATOLOGY | Facility: CLINIC | Age: 46
End: 2021-11-10
Payer: COMMERCIAL

## 2021-11-12 ENCOUNTER — LAB VISIT (OUTPATIENT)
Dept: LAB | Facility: HOSPITAL | Age: 46
End: 2021-11-12
Attending: INTERNAL MEDICINE
Payer: COMMERCIAL

## 2021-11-12 DIAGNOSIS — L40.9 PSORIASIS: ICD-10-CM

## 2021-11-12 DIAGNOSIS — L40.50 PSORIATIC ARTHRITIS: ICD-10-CM

## 2021-11-12 LAB
ALBUMIN SERPL BCP-MCNC: 4.4 G/DL (ref 3.5–5.2)
ALP SERPL-CCNC: 80 U/L (ref 55–135)
ALT SERPL W/O P-5'-P-CCNC: 24 U/L (ref 10–44)
ANION GAP SERPL CALC-SCNC: 7 MMOL/L (ref 8–16)
AST SERPL-CCNC: 21 U/L (ref 10–40)
BASOPHILS # BLD AUTO: 0.01 K/UL (ref 0–0.2)
BASOPHILS NFR BLD: 0.2 % (ref 0–1.9)
BILIRUB SERPL-MCNC: 0.4 MG/DL (ref 0.1–1)
BUN SERPL-MCNC: 19 MG/DL (ref 6–20)
CALCIUM SERPL-MCNC: 9.8 MG/DL (ref 8.7–10.5)
CHLORIDE SERPL-SCNC: 105 MMOL/L (ref 95–110)
CO2 SERPL-SCNC: 28 MMOL/L (ref 23–29)
CREAT SERPL-MCNC: 1.1 MG/DL (ref 0.5–1.4)
CRP SERPL-MCNC: 0.4 MG/L (ref 0–8.2)
DIFFERENTIAL METHOD: ABNORMAL
EOSINOPHIL # BLD AUTO: 0.1 K/UL (ref 0–0.5)
EOSINOPHIL NFR BLD: 1.8 % (ref 0–8)
ERYTHROCYTE [DISTWIDTH] IN BLOOD BY AUTOMATED COUNT: 11.9 % (ref 11.5–14.5)
ERYTHROCYTE [SEDIMENTATION RATE] IN BLOOD BY WESTERGREN METHOD: 8 MM/HR (ref 0–36)
EST. GFR  (AFRICAN AMERICAN): >60 ML/MIN/1.73 M^2
EST. GFR  (NON AFRICAN AMERICAN): >60 ML/MIN/1.73 M^2
GLUCOSE SERPL-MCNC: 82 MG/DL (ref 70–110)
HCT VFR BLD AUTO: 42.5 % (ref 37–48.5)
HGB BLD-MCNC: 14.3 G/DL (ref 12–16)
IMM GRANULOCYTES # BLD AUTO: 0.01 K/UL (ref 0–0.04)
IMM GRANULOCYTES NFR BLD AUTO: 0.2 % (ref 0–0.5)
LYMPHOCYTES # BLD AUTO: 2.2 K/UL (ref 1–4.8)
LYMPHOCYTES NFR BLD: 40 % (ref 18–48)
MCH RBC QN AUTO: 31.6 PG (ref 27–31)
MCHC RBC AUTO-ENTMCNC: 33.6 G/DL (ref 32–36)
MCV RBC AUTO: 94 FL (ref 82–98)
MONOCYTES # BLD AUTO: 0.2 K/UL (ref 0.3–1)
MONOCYTES NFR BLD: 4 % (ref 4–15)
NEUTROPHILS # BLD AUTO: 3 K/UL (ref 1.8–7.7)
NEUTROPHILS NFR BLD: 53.8 % (ref 38–73)
NRBC BLD-RTO: 0 /100 WBC
PLATELET # BLD AUTO: 138 K/UL (ref 150–450)
PMV BLD AUTO: 10.8 FL (ref 9.2–12.9)
POTASSIUM SERPL-SCNC: 4.1 MMOL/L (ref 3.5–5.1)
PROT SERPL-MCNC: 7.1 G/DL (ref 6–8.4)
RBC # BLD AUTO: 4.52 M/UL (ref 4–5.4)
SODIUM SERPL-SCNC: 140 MMOL/L (ref 136–145)
WBC # BLD AUTO: 5.48 K/UL (ref 3.9–12.7)

## 2021-11-12 PROCEDURE — 36415 COLL VENOUS BLD VENIPUNCTURE: CPT | Performed by: INTERNAL MEDICINE

## 2021-11-12 PROCEDURE — 80053 COMPREHEN METABOLIC PANEL: CPT | Performed by: INTERNAL MEDICINE

## 2021-11-12 PROCEDURE — 86140 C-REACTIVE PROTEIN: CPT | Performed by: INTERNAL MEDICINE

## 2021-11-12 PROCEDURE — 85652 RBC SED RATE AUTOMATED: CPT | Performed by: INTERNAL MEDICINE

## 2021-11-12 PROCEDURE — 85025 COMPLETE CBC W/AUTO DIFF WBC: CPT | Performed by: INTERNAL MEDICINE

## 2021-11-15 ENCOUNTER — PATIENT MESSAGE (OUTPATIENT)
Dept: PHARMACY | Facility: CLINIC | Age: 46
End: 2021-11-15
Payer: COMMERCIAL

## 2021-11-15 ENCOUNTER — SPECIALTY PHARMACY (OUTPATIENT)
Dept: PHARMACY | Facility: CLINIC | Age: 46
End: 2021-11-15
Payer: COMMERCIAL

## 2021-11-15 ENCOUNTER — OFFICE VISIT (OUTPATIENT)
Dept: RHEUMATOLOGY | Facility: CLINIC | Age: 46
End: 2021-11-15
Payer: COMMERCIAL

## 2021-11-15 VITALS — WEIGHT: 211.44 LBS | BODY MASS INDEX: 32.15 KG/M2

## 2021-11-15 DIAGNOSIS — M70.71 BURSITIS OF OTHER BURSA OF RIGHT HIP: ICD-10-CM

## 2021-11-15 DIAGNOSIS — M72.2 PLANTAR FASCIITIS OF RIGHT FOOT: ICD-10-CM

## 2021-11-15 DIAGNOSIS — L40.9 PSORIASIS: Primary | ICD-10-CM

## 2021-11-15 DIAGNOSIS — M47.816 SPONDYLOSIS OF LUMBAR REGION WITHOUT MYELOPATHY OR RADICULOPATHY: ICD-10-CM

## 2021-11-15 DIAGNOSIS — M23.203 OLD TEAR OF MEDIAL MENISCUS OF RIGHT KNEE, UNSPECIFIED TEAR TYPE: ICD-10-CM

## 2021-11-15 PROCEDURE — 99999 PR PBB SHADOW E&M-EST. PATIENT-LVL II: CPT | Mod: PBBFAC,,, | Performed by: INTERNAL MEDICINE

## 2021-11-15 PROCEDURE — 3008F PR BODY MASS INDEX (BMI) DOCUMENTED: ICD-10-PCS | Mod: CPTII,S$GLB,, | Performed by: INTERNAL MEDICINE

## 2021-11-15 PROCEDURE — 1159F MED LIST DOCD IN RCRD: CPT | Mod: CPTII,S$GLB,, | Performed by: INTERNAL MEDICINE

## 2021-11-15 PROCEDURE — 3008F BODY MASS INDEX DOCD: CPT | Mod: CPTII,S$GLB,, | Performed by: INTERNAL MEDICINE

## 2021-11-15 PROCEDURE — 1159F PR MEDICATION LIST DOCUMENTED IN MEDICAL RECORD: ICD-10-PCS | Mod: CPTII,S$GLB,, | Performed by: INTERNAL MEDICINE

## 2021-11-15 PROCEDURE — 99214 OFFICE O/P EST MOD 30 MIN: CPT | Mod: S$GLB,,, | Performed by: INTERNAL MEDICINE

## 2021-11-15 PROCEDURE — 99214 PR OFFICE/OUTPT VISIT, EST, LEVL IV, 30-39 MIN: ICD-10-PCS | Mod: S$GLB,,, | Performed by: INTERNAL MEDICINE

## 2021-11-15 PROCEDURE — 99999 PR PBB SHADOW E&M-EST. PATIENT-LVL II: ICD-10-PCS | Mod: PBBFAC,,, | Performed by: INTERNAL MEDICINE

## 2021-11-15 RX ORDER — ADALIMUMAB 40MG/0.8ML
40 KIT SUBCUTANEOUS
Qty: 2 PEN | Refills: 11 | Status: SHIPPED | OUTPATIENT
Start: 2021-11-15 | End: 2022-12-08 | Stop reason: SDUPTHER

## 2021-11-15 RX ORDER — FOLIC ACID 1 MG/1
1 TABLET ORAL DAILY
Qty: 30 TABLET | Refills: 4 | Status: SHIPPED | OUTPATIENT
Start: 2021-11-15 | End: 2023-02-02

## 2021-11-15 RX ORDER — METHOTREXATE 2.5 MG/1
TABLET ORAL
Qty: 15 TABLET | Refills: 4 | Status: SHIPPED | OUTPATIENT
Start: 2021-11-15 | End: 2022-10-24

## 2021-12-10 ENCOUNTER — PATIENT MESSAGE (OUTPATIENT)
Dept: PHARMACY | Facility: CLINIC | Age: 46
End: 2021-12-10
Payer: COMMERCIAL

## 2021-12-14 ENCOUNTER — SPECIALTY PHARMACY (OUTPATIENT)
Dept: PHARMACY | Facility: CLINIC | Age: 46
End: 2021-12-14
Payer: COMMERCIAL

## 2021-12-16 ENCOUNTER — PATIENT MESSAGE (OUTPATIENT)
Dept: PHARMACY | Facility: CLINIC | Age: 46
End: 2021-12-16
Payer: COMMERCIAL

## 2021-12-21 ENCOUNTER — SPECIALTY PHARMACY (OUTPATIENT)
Dept: PHARMACY | Facility: CLINIC | Age: 46
End: 2021-12-21
Payer: COMMERCIAL

## 2021-12-27 ENCOUNTER — OFFICE VISIT (OUTPATIENT)
Dept: OTOLARYNGOLOGY | Facility: CLINIC | Age: 46
End: 2021-12-27
Payer: COMMERCIAL

## 2021-12-27 ENCOUNTER — CLINICAL SUPPORT (OUTPATIENT)
Dept: AUDIOLOGY | Facility: CLINIC | Age: 46
End: 2021-12-27
Payer: COMMERCIAL

## 2021-12-27 VITALS — DIASTOLIC BLOOD PRESSURE: 84 MMHG | SYSTOLIC BLOOD PRESSURE: 121 MMHG | HEART RATE: 55 BPM

## 2021-12-27 DIAGNOSIS — H93.8X1 PRESSURE SENSATION IN RIGHT EAR: ICD-10-CM

## 2021-12-27 DIAGNOSIS — H90.5 HIGH FREQUENCY SENSORINEURAL HEARING LOSS OF RIGHT EAR: ICD-10-CM

## 2021-12-27 DIAGNOSIS — H93.8X1 PRESSURE SENSATION IN RIGHT EAR: Primary | ICD-10-CM

## 2021-12-27 DIAGNOSIS — H91.91 HIGH FREQUENCY HEARING LOSS, RIGHT: Primary | ICD-10-CM

## 2021-12-27 PROCEDURE — 92567 TYMPANOMETRY: CPT | Mod: S$GLB,,, | Performed by: AUDIOLOGIST

## 2021-12-27 PROCEDURE — 99203 OFFICE O/P NEW LOW 30 MIN: CPT | Mod: S$GLB,,, | Performed by: NURSE PRACTITIONER

## 2021-12-27 PROCEDURE — 1160F RVW MEDS BY RX/DR IN RCRD: CPT | Mod: CPTII,S$GLB,, | Performed by: NURSE PRACTITIONER

## 2021-12-27 PROCEDURE — 1159F PR MEDICATION LIST DOCUMENTED IN MEDICAL RECORD: ICD-10-PCS | Mod: CPTII,S$GLB,, | Performed by: NURSE PRACTITIONER

## 2021-12-27 PROCEDURE — 1159F MED LIST DOCD IN RCRD: CPT | Mod: CPTII,S$GLB,, | Performed by: NURSE PRACTITIONER

## 2021-12-27 PROCEDURE — 99999 PR PBB SHADOW E&M-EST. PATIENT-LVL III: CPT | Mod: PBBFAC,,, | Performed by: NURSE PRACTITIONER

## 2021-12-27 PROCEDURE — 92557 COMPREHENSIVE HEARING TEST: CPT | Mod: S$GLB,,, | Performed by: AUDIOLOGIST

## 2021-12-27 PROCEDURE — 1160F PR REVIEW ALL MEDS BY PRESCRIBER/CLIN PHARMACIST DOCUMENTED: ICD-10-PCS | Mod: CPTII,S$GLB,, | Performed by: NURSE PRACTITIONER

## 2021-12-27 PROCEDURE — 3074F PR MOST RECENT SYSTOLIC BLOOD PRESSURE < 130 MM HG: ICD-10-PCS | Mod: CPTII,S$GLB,, | Performed by: NURSE PRACTITIONER

## 2021-12-27 PROCEDURE — 3074F SYST BP LT 130 MM HG: CPT | Mod: CPTII,S$GLB,, | Performed by: NURSE PRACTITIONER

## 2021-12-27 PROCEDURE — 92557 PR COMPREHENSIVE HEARING TEST: ICD-10-PCS | Mod: S$GLB,,, | Performed by: AUDIOLOGIST

## 2021-12-27 PROCEDURE — 99999 PR PBB SHADOW E&M-EST. PATIENT-LVL III: ICD-10-PCS | Mod: PBBFAC,,, | Performed by: NURSE PRACTITIONER

## 2021-12-27 PROCEDURE — 99203 PR OFFICE/OUTPT VISIT, NEW, LEVL III, 30-44 MIN: ICD-10-PCS | Mod: S$GLB,,, | Performed by: NURSE PRACTITIONER

## 2021-12-27 PROCEDURE — 3079F PR MOST RECENT DIASTOLIC BLOOD PRESSURE 80-89 MM HG: ICD-10-PCS | Mod: CPTII,S$GLB,, | Performed by: NURSE PRACTITIONER

## 2021-12-27 PROCEDURE — 92567 PR TYMPA2METRY: ICD-10-PCS | Mod: S$GLB,,, | Performed by: AUDIOLOGIST

## 2021-12-27 PROCEDURE — 3079F DIAST BP 80-89 MM HG: CPT | Mod: CPTII,S$GLB,, | Performed by: NURSE PRACTITIONER

## 2021-12-27 RX ORDER — METHYLPREDNISOLONE 4 MG/1
TABLET ORAL
Qty: 21 EACH | Refills: 0 | Status: SHIPPED | OUTPATIENT
Start: 2021-12-27 | End: 2022-01-17

## 2021-12-27 RX ORDER — FLUTICASONE PROPIONATE 50 MCG
2 SPRAY, SUSPENSION (ML) NASAL DAILY
Qty: 11.1 ML | Refills: 2 | Status: SHIPPED | OUTPATIENT
Start: 2021-12-27 | End: 2022-03-27

## 2021-12-28 ENCOUNTER — OFFICE VISIT (OUTPATIENT)
Dept: OBSTETRICS AND GYNECOLOGY | Facility: CLINIC | Age: 46
End: 2021-12-28
Payer: COMMERCIAL

## 2021-12-28 VITALS
HEIGHT: 68 IN | BODY MASS INDEX: 31.34 KG/M2 | DIASTOLIC BLOOD PRESSURE: 80 MMHG | SYSTOLIC BLOOD PRESSURE: 120 MMHG | WEIGHT: 206.81 LBS

## 2021-12-28 DIAGNOSIS — N95.1 PERIMENOPAUSAL: ICD-10-CM

## 2021-12-28 DIAGNOSIS — Z91.89 AT HIGH RISK FOR BREAST CANCER: ICD-10-CM

## 2021-12-28 DIAGNOSIS — Z01.419 WOMEN'S ANNUAL ROUTINE GYNECOLOGICAL EXAMINATION: Primary | ICD-10-CM

## 2021-12-28 DIAGNOSIS — Z12.31 BREAST CANCER SCREENING BY MAMMOGRAM: ICD-10-CM

## 2021-12-28 DIAGNOSIS — R23.2 HOT FLASHES: ICD-10-CM

## 2021-12-28 PROCEDURE — 1160F RVW MEDS BY RX/DR IN RCRD: CPT | Mod: CPTII,S$GLB,, | Performed by: NURSE PRACTITIONER

## 2021-12-28 PROCEDURE — 99396 PR PREVENTIVE VISIT,EST,40-64: ICD-10-PCS | Mod: S$GLB,,, | Performed by: NURSE PRACTITIONER

## 2021-12-28 PROCEDURE — 99999 PR PBB SHADOW E&M-EST. PATIENT-LVL III: ICD-10-PCS | Mod: PBBFAC,,, | Performed by: NURSE PRACTITIONER

## 2021-12-28 PROCEDURE — 3079F PR MOST RECENT DIASTOLIC BLOOD PRESSURE 80-89 MM HG: ICD-10-PCS | Mod: CPTII,S$GLB,, | Performed by: NURSE PRACTITIONER

## 2021-12-28 PROCEDURE — 3079F DIAST BP 80-89 MM HG: CPT | Mod: CPTII,S$GLB,, | Performed by: NURSE PRACTITIONER

## 2021-12-28 PROCEDURE — 3008F PR BODY MASS INDEX (BMI) DOCUMENTED: ICD-10-PCS | Mod: CPTII,S$GLB,, | Performed by: NURSE PRACTITIONER

## 2021-12-28 PROCEDURE — 99396 PREV VISIT EST AGE 40-64: CPT | Mod: S$GLB,,, | Performed by: NURSE PRACTITIONER

## 2021-12-28 PROCEDURE — 99999 PR PBB SHADOW E&M-EST. PATIENT-LVL III: CPT | Mod: PBBFAC,,, | Performed by: NURSE PRACTITIONER

## 2021-12-28 PROCEDURE — 1159F MED LIST DOCD IN RCRD: CPT | Mod: CPTII,S$GLB,, | Performed by: NURSE PRACTITIONER

## 2021-12-28 PROCEDURE — 1160F PR REVIEW ALL MEDS BY PRESCRIBER/CLIN PHARMACIST DOCUMENTED: ICD-10-PCS | Mod: CPTII,S$GLB,, | Performed by: NURSE PRACTITIONER

## 2021-12-28 PROCEDURE — 3008F BODY MASS INDEX DOCD: CPT | Mod: CPTII,S$GLB,, | Performed by: NURSE PRACTITIONER

## 2021-12-28 PROCEDURE — 1159F PR MEDICATION LIST DOCUMENTED IN MEDICAL RECORD: ICD-10-PCS | Mod: CPTII,S$GLB,, | Performed by: NURSE PRACTITIONER

## 2021-12-28 PROCEDURE — 3074F SYST BP LT 130 MM HG: CPT | Mod: CPTII,S$GLB,, | Performed by: NURSE PRACTITIONER

## 2021-12-28 PROCEDURE — 3074F PR MOST RECENT SYSTOLIC BLOOD PRESSURE < 130 MM HG: ICD-10-PCS | Mod: CPTII,S$GLB,, | Performed by: NURSE PRACTITIONER

## 2022-01-03 ENCOUNTER — PATIENT MESSAGE (OUTPATIENT)
Dept: OBSTETRICS AND GYNECOLOGY | Facility: CLINIC | Age: 47
End: 2022-01-03
Payer: COMMERCIAL

## 2022-01-03 NOTE — PROGRESS NOTES
CC: Annual  HPI: Pt is a 46 y.o.  female who presents for routine annual exam. Reports hot flashes, night sweats, irregular cycles. The patient participates in regular exercise: Yes.  The patient does not smoke.  The patient wears seatbelts.   Pt denies any domestic violence.    Last pap in 2020 with normal result. Last mammogram in 2020 with normal result- has not established with breast specialist, known high risk status.     FH:  Breast cancer: mother, BRCA negative, ER/NE+  Colon cancer: none  Ovarian cancer: none  Endometrial cancer: none    ROS:  GENERAL: Feeling well overall. Denies fever or chills.   SKIN: Denies rash or lesions.   HEAD: Denies head injury or headache.   NODES: Denies enlarged lymph nodes.   CHEST: Denies chest pain or shortness of breath.   CARDIOVASCULAR: Denies palpitations or left sided chest pain.   ABDOMEN: No abdominal pain, constipation, diarrhea, nausea, vomiting or rectal bleeding.   URINARY: No dysuria, hematuria, or burning on urination.  REPRODUCTIVE: See HPI.   BREASTS: Denies pain, lumps, or nipple discharge.   HEMATOLOGIC: No easy bruisability or excessive bleeding.   MUSCULOSKELETAL: Denies joint pain or swelling.   NEUROLOGIC: Denies syncope or weakness.   PSYCHIATRIC: Denies depression, anxiety or mood swings.    PE:   APPEARANCE: Well nourished, well developed, White female in no acute distress.  NODES: no cervical, supraclavicular, or inguinal lymphadenopathy  BREASTS: Symmetrical, no skin changes or visible lesions. No palpable masses, nipple discharge or adenopathy bilaterally. Dense tissue bilaterally.  ABDOMEN: Soft. No tenderness or masses. No distention. No hernias palpated. No CVA tenderness.  VULVA: No lesions. Normal external female genitalia.  URETHRAL MEATUS: Normal size and location, no lesions, no prolapse.  URETHRA: No masses, tenderness, or prolapse.  VAGINA: Moist. No lesions or lacerations noted. No abnormal discharge present. No odor present.   CERVIX:  No lesions or discharge. No cervical motion tenderness.   UTERUS: Normal size, regular shape, mobile, non-tender.  ADNEXA: No tenderness. No fullness or masses palpated in the adnexal regions.   ANUS PERINEUM: Normal.      Diagnosis:  1. Women's annual routine gynecological examination    2. At high risk for breast cancer    3. Breast cancer screening by mammogram    4. Hot flashes    5. Perimenopausal        Plan:     Orders Placed This Encounter    Mammo Digital Screening Bilat w/ Patrick    Ambulatory referral/consult to Breast Surgery     Annual screening mammogram ordered, referral to breast specialist to discuss further screening options with high risk status.    Discussed perimenopausal symptoms. No plans for HRT due to +ER/ME familial breast cancer status. Declines Paxil script. Given recommendations for supplements that may improve symptoms.    Patient was counseled today on the new ACS guidelines for cervical cytology screening as well as the current recommendations for breast cancer screening. She was counseled to follow up with her PCP for other routine health maintenance. Counseling session lasted approximately 10 minutes, and all her questions were answered.    Follow-up with me in 1 year for routine exam.      DANII Lynn

## 2022-01-08 ENCOUNTER — SPECIALTY PHARMACY (OUTPATIENT)
Dept: PHARMACY | Facility: CLINIC | Age: 47
End: 2022-01-08
Payer: COMMERCIAL

## 2022-01-08 NOTE — TELEPHONE ENCOUNTER
Specialty Pharmacy - Refill Coordination    Specialty Medication Orders Linked to Encounter    Flowsheet Row Most Recent Value   Medication #1 adalimumab (HUMIRA PEN) PnKt injection (Order#[id number], Rx#[id number])          Refill Questions - Documented Responses    Flowsheet Row Most Recent Value   Patient Availability and HIPAA Verification    Does patient want to proceed with activity? Yes   HIPAA/medical authority confirmed? Yes   Relationship to patient of person spoken to? Self   Refill Screening Questions    Changes to allergies? No   Changes to medications? No   New conditions since last clinic visit? No   Unplanned office visit, urgent care, ED, or hospital admission in the last 4 weeks? No   How does patient/caregiver feel medication is working? Good   Financial problems or insurance changes? No   How many doses of your specialty medications were missed in the last 4 weeks? 0   Would patient like to speak to a pharmacist? No   When does the patient need to receive the medication? 01/14/22   Refill Delivery Questions    How will the patient receive the medication? Delivery Mayra   When does the patient need to receive the medication? 01/14/22   Shipping Address Home   Address in [facility] confirmed and updated if neccessary? Yes   Expected Copay ($) 5   Is the patient able to afford the medication copay? Yes   Payment Method CC on file   Days supply of Refill 28   Supplies needed? No supplies needed   Refill activity completed? Yes   Refill activity plan Refill scheduled   Shipment/Pickup Date: 01/11/22          Current Outpatient Medications   Medication Sig    adalimumab (HUMIRA PEN) PnKt injection Inject 1 pen (40 mg total) into the skin every 14 (fourteen) days.    fluticasone propionate (FLONASE) 50 mcg/actuation nasal spray 2 sprays (100 mcg total) by Each Nostril route once daily.    folic acid (FOLVITE) 1 MG tablet Take 1 tablet (1 mg total) by mouth once daily.    metFORMIN  (GLUCOPHAGE-XR) 500 MG XR 24hr tablet Take 1 tablet (500 mg total) by mouth daily with breakfast.    methotrexate 2.5 MG Tab 3 tabs weekly    methylPREDNISolone (MEDROL DOSEPACK) 4 mg tablet use as directed    mupirocin (BACTROBAN) 2 % ointment Apply topically 3 (three) times daily.    omeprazole (PRILOSEC) 20 MG capsule Take 1 capsule (20 mg total) by mouth once daily.    valACYclovir (VALTREX) 500 MG tablet TAKE 1 TABLET BY MOUTH TWICE DAILY FOR 3 DAYS. BEGIN AT FIRST SIGN OF OUTBREAK   Last reviewed on 12/28/2021 10:48 AM by Brea Clay NP    Review of patient's allergies indicates:  No Known Allergies Last reviewed on  12/28/2021 10:39 AM by Giana Carney      Tasks added this encounter   No tasks added.   Tasks due within next 3 months   1/7/2022 - Refill Call (Auto Added)     [facility]  [address] [facility] [address]  Phone: [phone]  Fax: [phone]

## 2022-01-11 ENCOUNTER — PATIENT MESSAGE (OUTPATIENT)
Dept: RHEUMATOLOGY | Facility: CLINIC | Age: 47
End: 2022-01-11
Payer: COMMERCIAL

## 2022-01-15 RX ORDER — METHYLPREDNISOLONE 4 MG/1
TABLET ORAL
Qty: 1 EACH | Refills: 0 | Status: SHIPPED | OUTPATIENT
Start: 2022-01-15 | End: 2022-11-08

## 2022-01-24 ENCOUNTER — HOSPITAL ENCOUNTER (OUTPATIENT)
Dept: RADIOLOGY | Facility: HOSPITAL | Age: 47
Discharge: HOME | End: 2022-01-24
Attending: NURSE PRACTITIONER
Payer: COMMERCIAL

## 2022-01-24 DIAGNOSIS — Z91.89 AT HIGH RISK FOR BREAST CANCER: ICD-10-CM

## 2022-01-24 DIAGNOSIS — Z12.31 BREAST CANCER SCREENING BY MAMMOGRAM: ICD-10-CM

## 2022-01-24 PROCEDURE — 77067 SCR MAMMO BI INCL CAD: CPT | Mod: TC

## 2022-01-24 PROCEDURE — 77067 SCR MAMMO BI INCL CAD: CPT | Mod: 26,,, | Performed by: RADIOLOGY

## 2022-01-24 PROCEDURE — 77063 MAMMO DIGITAL SCREENING BILAT WITH TOMO: ICD-10-PCS | Mod: 26,,, | Performed by: RADIOLOGY

## 2022-01-24 PROCEDURE — 77067 MAMMO DIGITAL SCREENING BILAT WITH TOMO: ICD-10-PCS | Mod: 26,,, | Performed by: RADIOLOGY

## 2022-01-24 PROCEDURE — 77063 BREAST TOMOSYNTHESIS BI: CPT | Mod: 26,,, | Performed by: RADIOLOGY

## 2022-01-24 PROCEDURE — 77063 BREAST TOMOSYNTHESIS BI: CPT | Mod: TC

## 2022-01-27 ENCOUNTER — PATIENT MESSAGE (OUTPATIENT)
Dept: OBSTETRICS AND GYNECOLOGY | Facility: CLINIC | Age: 47
End: 2022-01-27
Payer: COMMERCIAL

## 2022-02-07 ENCOUNTER — SPECIALTY PHARMACY (OUTPATIENT)
Dept: PHARMACY | Facility: CLINIC | Age: 47
End: 2022-02-07
Payer: COMMERCIAL

## 2022-02-25 ENCOUNTER — PATIENT MESSAGE (OUTPATIENT)
Dept: PHARMACY | Facility: CLINIC | Age: 47
End: 2022-02-25
Payer: COMMERCIAL

## 2022-03-10 ENCOUNTER — PATIENT MESSAGE (OUTPATIENT)
Dept: SPORTS MEDICINE | Facility: CLINIC | Age: 47
End: 2022-03-10
Payer: COMMERCIAL

## 2022-03-15 ENCOUNTER — TELEPHONE (OUTPATIENT)
Dept: HEMATOLOGY/ONCOLOGY | Facility: CLINIC | Age: 47
End: 2022-03-15
Payer: COMMERCIAL

## 2022-03-16 ENCOUNTER — TELEPHONE (OUTPATIENT)
Dept: SURGERY | Facility: CLINIC | Age: 47
End: 2022-03-16
Payer: COMMERCIAL

## 2022-03-17 ENCOUNTER — SPECIALTY PHARMACY (OUTPATIENT)
Dept: PHARMACY | Facility: CLINIC | Age: 47
End: 2022-03-17
Payer: COMMERCIAL

## 2022-03-17 NOTE — TELEPHONE ENCOUNTER
Specialty Pharmacy - Refill Coordination    Specialty Medication Orders Linked to Encounter    Flowsheet Row Most Recent Value   Medication #1 adalimumab (HUMIRA PEN) PnKt injection (Order#[id number], Rx#[id number])        Refill Questions - Documented Responses    Flowsheet Row Most Recent Value   Patient Availability and HIPAA Verification    Does patient want to proceed with activity? Unable to Reach        We have had multiple attempts to the patient and have been unsuccessful to reach the patient. We will stop reaching out to the patient but in the event that the patient needs the med and contacts us, we will communicate and begin dispensing for the patient. At your next visit with the patient, please review the importance of being in contact with our specialty pharmacy as a part of our care team.      Alejandra Schilling, PharmD  [facility]  [address] [facility] [address]  Phone: [phone]  Fax: [phone]

## 2022-04-18 ENCOUNTER — PATIENT MESSAGE (OUTPATIENT)
Dept: ADMINISTRATIVE | Facility: OTHER | Age: 47
End: 2022-04-18
Payer: COMMERCIAL

## 2022-04-28 ENCOUNTER — OFFICE VISIT (OUTPATIENT)
Dept: HEMATOLOGY/ONCOLOGY | Facility: CLINIC | Age: 47
End: 2022-04-28
Payer: COMMERCIAL

## 2022-04-28 VITALS
DIASTOLIC BLOOD PRESSURE: 70 MMHG | HEART RATE: 71 BPM | HEIGHT: 65 IN | SYSTOLIC BLOOD PRESSURE: 120 MMHG | WEIGHT: 215.38 LBS | BODY MASS INDEX: 35.88 KG/M2

## 2022-04-28 DIAGNOSIS — Z12.31 SCREENING MAMMOGRAM, ENCOUNTER FOR: ICD-10-CM

## 2022-04-28 DIAGNOSIS — Z80.3 FAMILY HISTORY OF BREAST CANCER: ICD-10-CM

## 2022-04-28 DIAGNOSIS — Z91.89 INCREASED RISK OF BREAST CANCER: Primary | ICD-10-CM

## 2022-04-28 DIAGNOSIS — Z12.39 BREAST CANCER SCREENING, HIGH RISK PATIENT: ICD-10-CM

## 2022-04-28 DIAGNOSIS — Z91.89 AT HIGH RISK FOR BREAST CANCER: ICD-10-CM

## 2022-04-28 DIAGNOSIS — R92.30 DENSE BREASTS: ICD-10-CM

## 2022-04-28 PROCEDURE — 3008F BODY MASS INDEX DOCD: CPT | Mod: CPTII,S$GLB,, | Performed by: PHYSICIAN ASSISTANT

## 2022-04-28 PROCEDURE — 99214 PR OFFICE/OUTPT VISIT, EST, LEVL IV, 30-39 MIN: ICD-10-PCS | Mod: S$GLB,,, | Performed by: PHYSICIAN ASSISTANT

## 2022-04-28 PROCEDURE — 3074F PR MOST RECENT SYSTOLIC BLOOD PRESSURE < 130 MM HG: ICD-10-PCS | Mod: CPTII,S$GLB,, | Performed by: PHYSICIAN ASSISTANT

## 2022-04-28 PROCEDURE — 3074F SYST BP LT 130 MM HG: CPT | Mod: CPTII,S$GLB,, | Performed by: PHYSICIAN ASSISTANT

## 2022-04-28 PROCEDURE — 99999 PR PBB SHADOW E&M-EST. PATIENT-LVL IV: ICD-10-PCS | Mod: PBBFAC,,, | Performed by: PHYSICIAN ASSISTANT

## 2022-04-28 PROCEDURE — 1159F PR MEDICATION LIST DOCUMENTED IN MEDICAL RECORD: ICD-10-PCS | Mod: CPTII,S$GLB,, | Performed by: PHYSICIAN ASSISTANT

## 2022-04-28 PROCEDURE — 3008F PR BODY MASS INDEX (BMI) DOCUMENTED: ICD-10-PCS | Mod: CPTII,S$GLB,, | Performed by: PHYSICIAN ASSISTANT

## 2022-04-28 PROCEDURE — 1159F MED LIST DOCD IN RCRD: CPT | Mod: CPTII,S$GLB,, | Performed by: PHYSICIAN ASSISTANT

## 2022-04-28 PROCEDURE — 99214 OFFICE O/P EST MOD 30 MIN: CPT | Mod: S$GLB,,, | Performed by: PHYSICIAN ASSISTANT

## 2022-04-28 PROCEDURE — 3078F PR MOST RECENT DIASTOLIC BLOOD PRESSURE < 80 MM HG: ICD-10-PCS | Mod: CPTII,S$GLB,, | Performed by: PHYSICIAN ASSISTANT

## 2022-04-28 PROCEDURE — 1160F PR REVIEW ALL MEDS BY PRESCRIBER/CLIN PHARMACIST DOCUMENTED: ICD-10-PCS | Mod: CPTII,S$GLB,, | Performed by: PHYSICIAN ASSISTANT

## 2022-04-28 PROCEDURE — 3078F DIAST BP <80 MM HG: CPT | Mod: CPTII,S$GLB,, | Performed by: PHYSICIAN ASSISTANT

## 2022-04-28 PROCEDURE — 1160F RVW MEDS BY RX/DR IN RCRD: CPT | Mod: CPTII,S$GLB,, | Performed by: PHYSICIAN ASSISTANT

## 2022-04-28 PROCEDURE — 99999 PR PBB SHADOW E&M-EST. PATIENT-LVL IV: CPT | Mod: PBBFAC,,, | Performed by: PHYSICIAN ASSISTANT

## 2022-04-28 RX ORDER — DULAGLUTIDE 0.75 MG/.5ML
INJECTION, SOLUTION SUBCUTANEOUS
COMMUNITY
End: 2022-08-09

## 2022-04-28 NOTE — PROGRESS NOTES
History:     Reason For Consultation:   Increased lifetime risk of breast cancer    Referring Provider:   Brea Clay, ANDREI  [address]  [address]  [place] [address]    HPI:   Lachelle Engel presents for consultation of increased risk of breast cancer. She presented for screening mammogram which was benign but revealed a Tyrer-Cuzick score of 25.03%. Denies breast mass, changes in skin or nipple, or drainage.    Personal history of cancer: no  Prior chest wall radiation at ages 10-30: no  Genetic testing: None; Mom had genetic testing and was negative.  Ashkenazi inheritance: no  OB/Gyn history:    Number of pregnancies & age at first gestation: ; First live birth at 24 y/o   Age of menarche: 11 y/o  Age of menopause: In menopause now. Having symptoms. No period since uterine alblation   Body mass index is 35.84 kg/m².   HRT Use: None   Breastfeeding: Yes   Number of previous biopsies: None   Breast Density: Heterogeneously dense    Family History:  Mother- Breast cancer at 64. Genetic testing negative (still living at 68)  Maternal Aunt- Ovarian cancer at 24 (still living at 64)  Maternal Uncle- Colon Cancer  Father- Prostate cancer    Tyrer-Cuzick Score: 16.4-18.8% pending menopausal status (re-calculated in clinic today).   Flower Model 5 Year Risk: 1.7%    Past Medical   Past Medical History:   Diagnosis Date    Bradycardia     Depression     Diabetes mellitus     type 2     Dizziness     GERD (gastroesophageal reflux disease)     Hypertension     Impaired fasting blood sugar     Psoriasis     Thyroid disease     hyothyroidism     Patient Active Problem List   Diagnosis    Depression    Psoriasis    Impaired fasting blood sugar    GERD (gastroesophageal reflux disease)    Acute appendicitis    Abdominal pain       Social History   Social History     Tobacco Use    Smoking status: Light Tobacco Smoker     Types: Cigarettes    Smokeless tobacco: Never Used    Tobacco  comment: 1 pack every 4 days   Substance Use Topics    Alcohol use: Yes     Comment: occasionally    Drug use: No       Family History  Family History   Problem Relation Age of Onset    Breast cancer Mother     Cancer Maternal Aunt 30        colon cancer    Multiple sclerosis Maternal Aunt     Ovarian cancer Maternal Aunt     Colon cancer Maternal Uncle     Cancer Father         prostate    Prostate cancer Father     Melanoma Neg Hx        Medications    Current Outpatient Medications:     dulaglutide (TRULICITY) 0.75 mg/0.5 mL pen injector, Inject into the skin every 7 days., Disp: , Rfl:     methotrexate 2.5 MG Tab, 3 tabs weekly, Disp: 15 tablet, Rfl: 4    methylPREDNISolone (MEDROL DOSEPACK) 4 mg tablet, use as directed, Disp: 1 each, Rfl: 0    mupirocin (BACTROBAN) 2 % ointment, Apply topically 3 (three) times daily., Disp: 1 Tube, Rfl: 2    valACYclovir (VALTREX) 500 MG tablet, TAKE 1 TABLET BY MOUTH TWICE DAILY FOR 3 DAYS. BEGIN AT FIRST SIGN OF OUTBREAK, Disp: 6 tablet, Rfl: 4    adalimumab (HUMIRA PEN) PnKt injection, Inject 1 pen (40 mg total) into the skin every 14 (fourteen) days., Disp: 2 pen, Rfl: 11    folic acid (FOLVITE) 1 MG tablet, Take 1 tablet (1 mg total) by mouth once daily., Disp: 30 tablet, Rfl: 4    metFORMIN (GLUCOPHAGE-XR) 500 MG XR 24hr tablet, Take 1 tablet (500 mg total) by mouth daily with breakfast., Disp: 90 tablet, Rfl: 1    omeprazole (PRILOSEC) 20 MG capsule, Take 1 capsule (20 mg total) by mouth once daily., Disp: 30 capsule, Rfl: 11    Allergies  Review of patient's allergies indicates:  No Known Allergies    Review of Systems  General: No fever, chills, or weight loss.  Chest: No chest pain, shortness of breath, or palpitations.  Breast: No pain, masses, or nipple discharge.  Vulva: No pain, lesions, or itching.  Vagina: No relaxation, itching, discharge, or lesions.  Abdomen: No pain, nausea, vomiting, diarrhea, or constipation.  Urinary: No incontinence,  "nocturia, frequency, or dysuria.  Extremities:  No leg cramps, edema, or calf pain.  Neurologic: No headaches, dizziness, or visual changes.    Objective:     Vitals:    04/28/22 1524   BP: 120/70   Pulse: 71   Weight: 97.7 kg (215 lb 6.2 oz)   Height: 5' 5" (1.651 m)     Body mass index is 35.84 kg/m².    GENERAL:  Well-developed, well-nourished female in no acute distress. Vital signs reviewed.   SKIN:  Warm, dry without rashes, purpura or petechiae.  EYES:  EOMs intact.  Conjunctivae normal.  HEAD:  Normocephalic.  EARS/NOSE/MOUTH/THROAT:  Hearing intact.   NECK:  Supple with good range of motion; no masses  PSYCHIATRIC:  Normal affect and mood.  Alert and Oriented x 3.   BREASTS:Symmetrical, no skin changes or visible lesions.  No palpable masses, nipple discharge bilaterally.    BREAST IMAGING  Mammogram: 1/24/2022      Assessment:     Increased risk of breast cancer: This is a 46 y.o. female with an intermediate lifetime risk of breast cancer based on Tyrer Cuzick score of 16.4-18.8% but is at  increased risk for breast cancer in the next 5 years based on a Flower Model score of 1.7%.  We discussed that she would benefit from annual MRI's in addition to mammograms, alternating imaging every 6 months until age 75.  Pros and cons of MRI screening were discussed.  She would like to proceed with additional screening with breast MRI pending cost.     Plan:   Schedule baseline breast MRI in 7/2022 pending cost.  Continue annual screening mammograms, due 1/2023  I also recommended two physical exams per year, one can be with her OB/GYN.      Risk reduction options with chemoprevention such as Tamoxifen or Raloxifene were discussed.  These have been shown to lower the risk of breast cancer incidence, however there is no survival benefit in patients who don't have breast cancer.  I explained the most common side effects and risks including hot flashes, thromboembolism, stroke, endometrial cancer, weight changes, and " pain. She declines Chemoprevention at this time.    Reviewed lifestyle modifications that have shown benefit of reducing breast cancer risk.   Including: Limit alcohol to less than one drink per day, Exercise at least 150 minutes per week of moderate intensity aerobic activity or at least 75 minutes of vigorous activity, Maintaining a healthy weight, Limit red meat to no more than 2-3x per week, Reduce processed foods and processed meat. Also encouraged wide variety of fruits and vegetables, specifically cruciferous vegetables.    Follow up in 1 year for CBE.     I spent a total of 30 minutes on the day of the visit.This includes face to face time and non-face to face time preparing to see the patient (eg, review of tests), obtaining and/or reviewing separately obtained history, documenting clinical information in the electronic or other health record, independently interpreting results and communicating results to the patient/family/caregiver, or care coordinator.

## 2022-05-16 ENCOUNTER — LAB VISIT (OUTPATIENT)
Dept: LAB | Facility: HOSPITAL | Age: 47
End: 2022-05-16
Attending: INTERNAL MEDICINE
Payer: COMMERCIAL

## 2022-05-16 DIAGNOSIS — M23.203 OLD TEAR OF MEDIAL MENISCUS OF RIGHT KNEE, UNSPECIFIED TEAR TYPE: ICD-10-CM

## 2022-05-16 DIAGNOSIS — M47.816 SPONDYLOSIS OF LUMBAR REGION WITHOUT MYELOPATHY OR RADICULOPATHY: ICD-10-CM

## 2022-05-16 DIAGNOSIS — L40.9 PSORIASIS: ICD-10-CM

## 2022-05-16 LAB
ALBUMIN SERPL BCP-MCNC: 3.9 G/DL (ref 3.5–5.2)
ALP SERPL-CCNC: 58 U/L (ref 55–135)
ALT SERPL W/O P-5'-P-CCNC: 15 U/L (ref 10–44)
ANION GAP SERPL CALC-SCNC: 8 MMOL/L (ref 8–16)
AST SERPL-CCNC: 16 U/L (ref 10–40)
BASOPHILS # BLD AUTO: 0.02 K/UL (ref 0–0.2)
BASOPHILS NFR BLD: 0.3 % (ref 0–1.9)
BILIRUB SERPL-MCNC: 0.4 MG/DL (ref 0.1–1)
BUN SERPL-MCNC: 17 MG/DL (ref 6–20)
CALCIUM SERPL-MCNC: 9.4 MG/DL (ref 8.7–10.5)
CHLORIDE SERPL-SCNC: 106 MMOL/L (ref 95–110)
CO2 SERPL-SCNC: 26 MMOL/L (ref 23–29)
CREAT SERPL-MCNC: 0.8 MG/DL (ref 0.5–1.4)
CRP SERPL-MCNC: 0.4 MG/L (ref 0–8.2)
DIFFERENTIAL METHOD: ABNORMAL
EOSINOPHIL # BLD AUTO: 0.1 K/UL (ref 0–0.5)
EOSINOPHIL NFR BLD: 1 % (ref 0–8)
ERYTHROCYTE [DISTWIDTH] IN BLOOD BY AUTOMATED COUNT: 12 % (ref 11.5–14.5)
ERYTHROCYTE [SEDIMENTATION RATE] IN BLOOD BY WESTERGREN METHOD: <2 MM/HR (ref 0–36)
EST. GFR  (AFRICAN AMERICAN): >60 ML/MIN/1.73 M^2
EST. GFR  (NON AFRICAN AMERICAN): >60 ML/MIN/1.73 M^2
GLUCOSE SERPL-MCNC: 73 MG/DL (ref 70–110)
HCT VFR BLD AUTO: 40.4 % (ref 37–48.5)
HGB BLD-MCNC: 13 G/DL (ref 12–16)
IMM GRANULOCYTES # BLD AUTO: 0.01 K/UL (ref 0–0.04)
IMM GRANULOCYTES NFR BLD AUTO: 0.2 % (ref 0–0.5)
LYMPHOCYTES # BLD AUTO: 2.1 K/UL (ref 1–4.8)
LYMPHOCYTES NFR BLD: 34 % (ref 18–48)
MCH RBC QN AUTO: 30 PG (ref 27–31)
MCHC RBC AUTO-ENTMCNC: 32.2 G/DL (ref 32–36)
MCV RBC AUTO: 93 FL (ref 82–98)
MONOCYTES # BLD AUTO: 0.3 K/UL (ref 0.3–1)
MONOCYTES NFR BLD: 4.6 % (ref 4–15)
NEUTROPHILS # BLD AUTO: 3.6 K/UL (ref 1.8–7.7)
NEUTROPHILS NFR BLD: 59.9 % (ref 38–73)
NRBC BLD-RTO: 0 /100 WBC
PLATELET # BLD AUTO: 132 K/UL (ref 150–450)
PLATELET BLD QL SMEAR: ABNORMAL
PMV BLD AUTO: 10.9 FL (ref 9.2–12.9)
POTASSIUM SERPL-SCNC: 4.3 MMOL/L (ref 3.5–5.1)
PROT SERPL-MCNC: 6.5 G/DL (ref 6–8.4)
RBC # BLD AUTO: 4.33 M/UL (ref 4–5.4)
SODIUM SERPL-SCNC: 140 MMOL/L (ref 136–145)
WBC # BLD AUTO: 6.05 K/UL (ref 3.9–12.7)

## 2022-05-16 PROCEDURE — 85652 RBC SED RATE AUTOMATED: CPT | Performed by: INTERNAL MEDICINE

## 2022-05-16 PROCEDURE — 85025 COMPLETE CBC W/AUTO DIFF WBC: CPT | Performed by: INTERNAL MEDICINE

## 2022-05-16 PROCEDURE — 36415 COLL VENOUS BLD VENIPUNCTURE: CPT | Performed by: INTERNAL MEDICINE

## 2022-05-16 PROCEDURE — 86140 C-REACTIVE PROTEIN: CPT | Performed by: INTERNAL MEDICINE

## 2022-05-16 PROCEDURE — 80053 COMPREHEN METABOLIC PANEL: CPT | Performed by: INTERNAL MEDICINE

## 2022-06-30 ENCOUNTER — SPECIALTY PHARMACY (OUTPATIENT)
Dept: PHARMACY | Facility: CLINIC | Age: 47
End: 2022-06-30
Payer: COMMERCIAL

## 2022-06-30 DIAGNOSIS — L40.9 PSORIASIS: Primary | ICD-10-CM

## 2022-06-30 NOTE — TELEPHONE ENCOUNTER
Specialty Pharmacy - Patient Intervention    Patient education provided:  Drug therapy adherence: Drug administration and Knowledge deficits    Impact on patient care:  Potentially improved therapy adherence    Additional Notes  Incoming call from the patient for refill on Humira. Patient was closed out at [facility] due to non-contact. Patient states she stopped Humira as she is a  and had multiple COVID outbreaks. She resumed in April and she has been using stock at home. She has one on hand for 7/4 injection and would need refill for 7/18 injection. Advised [facility] will call on 7/11 for refill. Discussed [facility] is unable to dispense Humira without speaking to her. No autoship or mychart refills. No other questions or concerns.

## 2022-07-11 ENCOUNTER — SPECIALTY PHARMACY (OUTPATIENT)
Dept: PHARMACY | Facility: CLINIC | Age: 47
End: 2022-07-11
Payer: COMMERCIAL

## 2022-07-11 ENCOUNTER — PATIENT MESSAGE (OUTPATIENT)
Dept: PHARMACY | Facility: CLINIC | Age: 47
End: 2022-07-11
Payer: COMMERCIAL

## 2022-07-11 NOTE — TELEPHONE ENCOUNTER
Specialty Pharmacy - Refill Coordination    Specialty Medication Orders Linked to Encounter    Flowsheet Row Most Recent Value   Medication #1 adalimumab (HUMIRA PEN) PnKt injection (Order#[id number], Rx#[id number])          Refill Questions - Documented Responses    Flowsheet Row Most Recent Value   Patient Availability and HIPAA Verification    Does patient want to proceed with activity? Yes   HIPAA/medical authority confirmed? Yes   Relationship to patient of person spoken to? Self   Refill Screening Questions    Changes to allergies? No   Changes to medications? No   New conditions since last clinic visit? No   Unplanned office visit, urgent care, ED, or hospital admission in the last 4 weeks? No   How does patient/caregiver feel medication is working? Good   Financial problems or insurance changes? No   How many doses of your specialty medications were missed in the last 4 weeks? 0   Would patient like to speak to a pharmacist? No   When does the patient need to receive the medication? 07/18/22   Refill Delivery Questions    How will the patient receive the medication? Delivery Mayra   When does the patient need to receive the medication? 07/18/22   Shipping Address Home   Address in [facility] confirmed and updated if neccessary? Yes   Expected Copay ($) 5   Is the patient able to afford the medication copay? Yes   Payment Method new CC added to file   Days supply of Refill 28   Supplies needed? No supplies needed   Refill activity completed? Yes   Refill activity plan Refill scheduled   Shipment/Pickup Date: 07/14/22          Current Outpatient Medications   Medication Sig    adalimumab (HUMIRA PEN) PnKt injection Inject 1 pen (40 mg total) into the skin every 14 (fourteen) days.    dulaglutide (TRULICITY) 0.75 mg/0.5 mL pen injector Inject into the skin every 7 days.    folic acid (FOLVITE) 1 MG tablet Take 1 tablet (1 mg total) by mouth once daily.    metFORMIN (GLUCOPHAGE-XR) 500 MG XR 24hr  tablet Take 1 tablet (500 mg total) by mouth daily with breakfast.    methotrexate 2.5 MG Tab 3 tabs weekly    methylPREDNISolone (MEDROL DOSEPACK) 4 mg tablet use as directed    mupirocin (BACTROBAN) 2 % ointment Apply topically 3 (three) times daily.    omeprazole (PRILOSEC) 20 MG capsule Take 1 capsule (20 mg total) by mouth once daily.    valACYclovir (VALTREX) 500 MG tablet TAKE 1 TABLET BY MOUTH TWICE DAILY FOR 3 DAYS. BEGIN AT FIRST SIGN OF OUTBREAK   Last reviewed on 4/28/2022  4:03 PM by Anastasia Morrell PA-C    Review of patient's allergies indicates:  No Known Allergies Last reviewed on  4/28/2022 4:03 PM by Anastasia Morrell      Tasks added this encounter   8/5/2022 - Refill Call (Auto Added)   Tasks due within next 3 months   No tasks due.     Sweta Deras, PharmD  [facility]  [address] [facility] [address]  Phone: [phone]  Fax: [phone]

## 2022-07-22 ENCOUNTER — HOSPITAL ENCOUNTER (OUTPATIENT)
Dept: RADIOLOGY | Facility: HOSPITAL | Age: 47
Discharge: HOME | End: 2022-07-22
Attending: PHYSICIAN ASSISTANT
Payer: COMMERCIAL

## 2022-07-22 DIAGNOSIS — Z12.39 BREAST CANCER SCREENING, HIGH RISK PATIENT: ICD-10-CM

## 2022-07-22 DIAGNOSIS — Z80.3 FAMILY HISTORY OF BREAST CANCER: ICD-10-CM

## 2022-07-22 DIAGNOSIS — R92.30 DENSE BREASTS: ICD-10-CM

## 2022-07-22 DIAGNOSIS — Z91.89 INCREASED RISK OF BREAST CANCER: ICD-10-CM

## 2022-07-22 DIAGNOSIS — Z91.89 AT HIGH RISK FOR BREAST CANCER: ICD-10-CM

## 2022-07-22 PROCEDURE — 77049 MRI BREAST C-+ W/CAD BI: CPT | Mod: TC

## 2022-07-22 PROCEDURE — 25500020 PHARM REV CODE 255: Performed by: PHYSICIAN ASSISTANT

## 2022-07-22 PROCEDURE — 77049 MRI BREAST C-+ W/CAD BI: CPT | Mod: 26,,, | Performed by: RADIOLOGY

## 2022-07-22 PROCEDURE — 77049 MRI BREAST W/WO CONTRAST, W/CAD, BILATERAL: ICD-10-PCS | Mod: 26,,, | Performed by: RADIOLOGY

## 2022-07-22 PROCEDURE — A9577 INJ MULTIHANCE: HCPCS | Performed by: PHYSICIAN ASSISTANT

## 2022-07-22 RX ADMIN — GADOBENATE DIMEGLUMINE 20 ML: 529 INJECTION, SOLUTION INTRAVENOUS at 02:07

## 2022-08-09 ENCOUNTER — SPECIALTY PHARMACY (OUTPATIENT)
Dept: PHARMACY | Facility: CLINIC | Age: 47
End: 2022-08-09
Payer: COMMERCIAL

## 2022-08-09 NOTE — TELEPHONE ENCOUNTER
Specialty Pharmacy - Refill Coordination    Specialty Medication Orders Linked to Encounter    Flowsheet Row Most Recent Value   Medication #1 adalimumab (HUMIRA PEN) PnKt injection (Order#[id number], Rx#[id number])          Refill Questions - Documented Responses    Flowsheet Row Most Recent Value   Patient Availability and HIPAA Verification    Does patient want to proceed with activity? Yes   HIPAA/medical authority confirmed? Yes   Relationship to patient of person spoken to? Self   Refill Screening Questions    Changes to allergies? No   Changes to medications? Yes  [Change from Trulicity to Ozempic no DDI with Humira]   New conditions since last clinic visit? No   Unplanned office visit, urgent care, ED, or hospital admission in the last 4 weeks? No   How does patient/caregiver feel medication is working? Good   Financial problems or insurance changes? No   How many doses of your specialty medications were missed in the last 4 weeks? 0   Would patient like to speak to a pharmacist? No   When does the patient need to receive the medication? 08/15/22   Refill Delivery Questions    How will the patient receive the medication? Delivery Mayra   When does the patient need to receive the medication? 08/15/22   Shipping Address Home   Address in [facility] confirmed and updated if neccessary? Yes   Expected Copay ($) 5   Is the patient able to afford the medication copay? Yes   Payment Method CC on file   Days supply of Refill 28   Supplies needed? No supplies needed   Refill activity completed? Yes   Refill activity plan Refill scheduled   Shipment/Pickup Date: 08/10/22          Current Outpatient Medications   Medication Sig    semaglutide (OZEMPIC SUBQ) Inject into the skin.    adalimumab (HUMIRA PEN) PnKt injection Inject 1 pen (40 mg total) into the skin every 14 (fourteen) days.    folic acid (FOLVITE) 1 MG tablet Take 1 tablet (1 mg total) by mouth once daily.    metFORMIN (GLUCOPHAGE-XR) 500 MG XR  24hr tablet Take 1 tablet (500 mg total) by mouth daily with breakfast.    methotrexate 2.5 MG Tab 3 tabs weekly    methylPREDNISolone (MEDROL DOSEPACK) 4 mg tablet use as directed    mupirocin (BACTROBAN) 2 % ointment Apply topically 3 (three) times daily.    omeprazole (PRILOSEC) 20 MG capsule Take 1 capsule (20 mg total) by mouth once daily.    valACYclovir (VALTREX) 500 MG tablet TAKE 1 TABLET BY MOUTH TWICE DAILY FOR 3 DAYS. BEGIN AT FIRST SIGN OF OUTBREAK   Last reviewed on 4/28/2022  4:03 PM by Anastasia Morrell PA-C    Review of patient's allergies indicates:  No Known Allergies Last reviewed on  7/22/2022 2:25 PM by Seema Zhao      Tasks added this encounter   9/5/2022 - Refill Call (Auto Added)   Tasks due within next 3 months   No tasks due.     Judi Izaguirre, PharmD  [facility]  [address] [facility] [address]  Phone: [phone]  Fax: [phone]

## 2022-08-10 ENCOUNTER — PATIENT MESSAGE (OUTPATIENT)
Dept: OBSTETRICS AND GYNECOLOGY | Facility: CLINIC | Age: 47
End: 2022-08-10
Payer: COMMERCIAL

## 2022-08-17 ENCOUNTER — PATIENT MESSAGE (OUTPATIENT)
Dept: HEMATOLOGY/ONCOLOGY | Facility: CLINIC | Age: 47
End: 2022-08-17
Payer: COMMERCIAL

## 2022-09-02 ENCOUNTER — PATIENT MESSAGE (OUTPATIENT)
Dept: OBSTETRICS AND GYNECOLOGY | Facility: CLINIC | Age: 47
End: 2022-09-02
Payer: COMMERCIAL

## 2022-09-06 ENCOUNTER — SPECIALTY PHARMACY (OUTPATIENT)
Dept: PHARMACY | Facility: CLINIC | Age: 47
End: 2022-09-06
Payer: COMMERCIAL

## 2022-09-06 NOTE — TELEPHONE ENCOUNTER
Spoke with patient regarding Humira refill. New PA needed. Patient due to inject 9/12. Routing to assigned [facility].

## 2022-09-07 NOTE — TELEPHONE ENCOUNTER
Specialty Pharmacy - Refill Coordination  Specialty Pharmacy - Medication/Referral Authorization    Specialty Medication Orders Linked to Encounter      Flowsheet Row Most Recent Value   Medication #1 adalimumab (HUMIRA PEN) PnKt injection (Order#[id number], Rx#[id number])            Refill Questions - Documented Responses      Flowsheet Row Most Recent Value   Patient Availability and HIPAA Verification    Does patient want to proceed with activity? Yes   HIPAA/medical authority confirmed? Yes   Relationship to patient of person spoken to? Self   Refill Screening Questions    Changes to allergies? No   Changes to medications? No   New conditions since last clinic visit? No   Unplanned office visit, urgent care, ED, or hospital admission in the last 4 weeks? No   How does patient/caregiver feel medication is working? Excellent   Financial problems or insurance changes? No   How many doses of your specialty medications were missed in the last 4 weeks? 0   Would patient like to speak to a pharmacist? No   When does the patient need to receive the medication? 09/12/22   Refill Delivery Questions    How will the patient receive the medication? Delivery Mayra   When does the patient need to receive the medication? 09/12/22   Shipping Address Prescription   Address in [facility] confirmed and updated if neccessary? Yes   Expected Copay ($) 5   Is the patient able to afford the medication copay? Yes   Payment Method CC on file   Days supply of Refill 28   Supplies needed? Alcohol Swabs   Refill activity completed? Yes   Refill activity plan Refill scheduled   Shipment/Pickup Date: 09/08/22            Current Outpatient Medications   Medication Sig    adalimumab (HUMIRA PEN) PnKt injection Inject 1 pen (40 mg total) into the skin every 14 (fourteen) days.    folic acid (FOLVITE) 1 MG tablet Take 1 tablet (1 mg total) by mouth once daily.    metFORMIN (GLUCOPHAGE-XR) 500 MG XR 24hr tablet Take 1 tablet (500 mg total)  by mouth daily with breakfast.    methotrexate 2.5 MG Tab 3 tabs weekly    methylPREDNISolone (MEDROL DOSEPACK) 4 mg tablet use as directed    mupirocin (BACTROBAN) 2 % ointment Apply topically 3 (three) times daily.    omeprazole (PRILOSEC) 20 MG capsule Take 1 capsule (20 mg total) by mouth once daily.    semaglutide (OZEMPIC SUBQ) Inject into the skin.    valACYclovir (VALTREX) 500 MG tablet TAKE 1 TABLET BY MOUTH TWICE DAILY FOR 3 DAYS. BEGIN AT FIRST SIGN OF OUTBREAK   Last reviewed on 4/28/2022  4:03 PM by Anastasia Morrell PA-C    Review of patient's allergies indicates:  No Known Allergies Last reviewed on  7/22/2022 2:25 PM by Seema Zhao      Tasks added this encounter   10/3/2022 - Refill Call (Auto Added)   Tasks due within next 3 months   No tasks due.     Adilene Bahena, PharmD  [facility]  [address] [place] [address]  Phone: [phone]  Fax: [phone]

## 2022-09-09 ENCOUNTER — LAB VISIT (OUTPATIENT)
Dept: LAB | Facility: OTHER | Age: 47
End: 2022-09-09
Attending: PHYSICIAN ASSISTANT
Payer: COMMERCIAL

## 2022-09-09 ENCOUNTER — OFFICE VISIT (OUTPATIENT)
Dept: OBSTETRICS AND GYNECOLOGY | Facility: CLINIC | Age: 47
End: 2022-09-09
Payer: COMMERCIAL

## 2022-09-09 VITALS
WEIGHT: 217.19 LBS | SYSTOLIC BLOOD PRESSURE: 125 MMHG | DIASTOLIC BLOOD PRESSURE: 83 MMHG | BODY MASS INDEX: 36.19 KG/M2 | HEIGHT: 65 IN

## 2022-09-09 DIAGNOSIS — N95.1 MENOPAUSAL SYMPTOMS: Primary | ICD-10-CM

## 2022-09-09 DIAGNOSIS — Z91.89 AT HIGH RISK FOR BREAST CANCER: ICD-10-CM

## 2022-09-09 DIAGNOSIS — N95.1 MENOPAUSAL SYMPTOMS: ICD-10-CM

## 2022-09-09 LAB
ESTRADIOL SERPL-MCNC: <10 PG/ML
FSH SERPL-ACNC: 75.18 MIU/ML
PROGEST SERPL-MCNC: 0.3 NG/ML

## 2022-09-09 PROCEDURE — 3079F DIAST BP 80-89 MM HG: CPT | Mod: CPTII,S$GLB,, | Performed by: PHYSICIAN ASSISTANT

## 2022-09-09 PROCEDURE — 3008F BODY MASS INDEX DOCD: CPT | Mod: CPTII,S$GLB,, | Performed by: PHYSICIAN ASSISTANT

## 2022-09-09 PROCEDURE — 83001 ASSAY OF GONADOTROPIN (FSH): CPT | Performed by: PHYSICIAN ASSISTANT

## 2022-09-09 PROCEDURE — 99999 PR PBB SHADOW E&M-EST. PATIENT-LVL IV: CPT | Mod: PBBFAC,,, | Performed by: PHYSICIAN ASSISTANT

## 2022-09-09 PROCEDURE — 1159F MED LIST DOCD IN RCRD: CPT | Mod: CPTII,S$GLB,, | Performed by: PHYSICIAN ASSISTANT

## 2022-09-09 PROCEDURE — 3079F PR MOST RECENT DIASTOLIC BLOOD PRESSURE 80-89 MM HG: ICD-10-PCS | Mod: CPTII,S$GLB,, | Performed by: PHYSICIAN ASSISTANT

## 2022-09-09 PROCEDURE — 36415 COLL VENOUS BLD VENIPUNCTURE: CPT | Performed by: PHYSICIAN ASSISTANT

## 2022-09-09 PROCEDURE — 99214 PR OFFICE/OUTPT VISIT, EST, LEVL IV, 30-39 MIN: ICD-10-PCS | Mod: S$GLB,,, | Performed by: PHYSICIAN ASSISTANT

## 2022-09-09 PROCEDURE — 84402 ASSAY OF FREE TESTOSTERONE: CPT | Performed by: PHYSICIAN ASSISTANT

## 2022-09-09 PROCEDURE — 99999 PR PBB SHADOW E&M-EST. PATIENT-LVL IV: ICD-10-PCS | Mod: PBBFAC,,, | Performed by: PHYSICIAN ASSISTANT

## 2022-09-09 PROCEDURE — 3074F PR MOST RECENT SYSTOLIC BLOOD PRESSURE < 130 MM HG: ICD-10-PCS | Mod: CPTII,S$GLB,, | Performed by: PHYSICIAN ASSISTANT

## 2022-09-09 PROCEDURE — 1159F PR MEDICATION LIST DOCUMENTED IN MEDICAL RECORD: ICD-10-PCS | Mod: CPTII,S$GLB,, | Performed by: PHYSICIAN ASSISTANT

## 2022-09-09 PROCEDURE — 1160F RVW MEDS BY RX/DR IN RCRD: CPT | Mod: CPTII,S$GLB,, | Performed by: PHYSICIAN ASSISTANT

## 2022-09-09 PROCEDURE — 3074F SYST BP LT 130 MM HG: CPT | Mod: CPTII,S$GLB,, | Performed by: PHYSICIAN ASSISTANT

## 2022-09-09 PROCEDURE — 82670 ASSAY OF TOTAL ESTRADIOL: CPT | Performed by: PHYSICIAN ASSISTANT

## 2022-09-09 PROCEDURE — 1160F PR REVIEW ALL MEDS BY PRESCRIBER/CLIN PHARMACIST DOCUMENTED: ICD-10-PCS | Mod: CPTII,S$GLB,, | Performed by: PHYSICIAN ASSISTANT

## 2022-09-09 PROCEDURE — 99214 OFFICE O/P EST MOD 30 MIN: CPT | Mod: S$GLB,,, | Performed by: PHYSICIAN ASSISTANT

## 2022-09-09 PROCEDURE — 84144 ASSAY OF PROGESTERONE: CPT | Performed by: PHYSICIAN ASSISTANT

## 2022-09-09 PROCEDURE — 3008F PR BODY MASS INDEX (BMI) DOCUMENTED: ICD-10-PCS | Mod: CPTII,S$GLB,, | Performed by: PHYSICIAN ASSISTANT

## 2022-09-09 RX ORDER — PROGESTERONE 100 MG/1
100 CAPSULE ORAL DAILY
Qty: 30 CAPSULE | Refills: 11 | Status: SHIPPED | OUTPATIENT
Start: 2022-09-09 | End: 2023-10-02

## 2022-09-09 NOTE — PROGRESS NOTES
Subjective:      Lachelle Engel is a 46 y.o. female who presents for HRT consult.  Menarche at age 10. She is . Had an uterine ablation in  for menorrhagia and has not had vaginal bleeding since. Started having hot flashes and insomnia about 1 year go but recently symptoms have become much worse. She can not sleep at night. Hot during the day and at night. Mood is ok but does have some increased anxiety. Reports a low libido. She has lost 50 pounds in the last year with diet and exercise. Eating small regular meals and walking regularly for exercise. Hesitant to do anything that could cause weight gain. She does smoke tobacco occasionally.   No prior cardiac history, family history of MI prior to age 50, or personal history of gestational DM/pre-eclampsia. She denies the following contraindications to HRT:  Vaginal bleeding, history of VTE/PE, thrombophilia,  breast cancer, or active liver disease.     I have seen her previously in the [facility]. Found to be at intermediate risk for breast cancer based on TC score of 16.4-18.8%. She opted for increased screening with breast MRI.     22 Labs from [facility]  TSH: 3.10  Free T4: 1.1    PCP: Dr. Hickey    Routine labs: 22 - at [facility]  WWE: 2021  Pap smear: 2020  Mammogram: 22 negative; Breast MRI 22- negative      No visits with results within 3 Month(s) from this visit.   Latest known visit with results is:   Lab Visit on 2022   Component Date Value Ref Range Status    WBC 2022 6.05  3.90 - 12.70 K/uL Final    RBC 2022 4.33  4.00 - 5.40 M/uL Final    Hemoglobin 2022 13.0  12.0 - 16.0 g/dL Final    Hematocrit 2022 40.4  37.0 - 48.5 % Final    MCV 2022 93  82 - 98 fL Final    MCH 2022 30.0  27.0 - 31.0 pg Final    MCHC 2022 32.2  32.0 - 36.0 g/dL Final    RDW 2022 12.0  11.5 - 14.5 % Final    Platelets 2022 132 (L)  150 - 450 K/uL Final    MPV 2022  10.9  9.2 - 12.9 fL Final    Immature Granulocytes 05/16/2022 0.2  0.0 - 0.5 % Final    Gran # (ANC) 05/16/2022 3.6  1.8 - 7.7 K/uL Final    Immature Grans (Abs) 05/16/2022 0.01  0.00 - 0.04 K/uL Final    Lymph # 05/16/2022 2.1  1.0 - 4.8 K/uL Final    Mono # 05/16/2022 0.3  0.3 - 1.0 K/uL Final    Eos # 05/16/2022 0.1  0.0 - 0.5 K/uL Final    Baso # 05/16/2022 0.02  0.00 - 0.20 K/uL Final    nRBC 05/16/2022 0  0 /100 WBC Final    Gran % 05/16/2022 59.9  38.0 - 73.0 % Final    Lymph % 05/16/2022 34.0  18.0 - 48.0 % Final    Mono % 05/16/2022 4.6  4.0 - 15.0 % Final    Eosinophil % 05/16/2022 1.0  0.0 - 8.0 % Final    Basophil % 05/16/2022 0.3  0.0 - 1.9 % Final    Platelet Estimate 05/16/2022 Decreased (A)   Final    Differential Method 05/16/2022 Automated   Final    Sodium 05/16/2022 140  136 - 145 mmol/L Final    Potassium 05/16/2022 4.3  3.5 - 5.1 mmol/L Final    Chloride 05/16/2022 106  95 - 110 mmol/L Final    CO2 05/16/2022 26  23 - 29 mmol/L Final    Glucose 05/16/2022 73  70 - 110 mg/dL Final    BUN 05/16/2022 17  6 - 20 mg/dL Final    Creatinine 05/16/2022 0.8  0.5 - 1.4 mg/dL Final    Calcium 05/16/2022 9.4  8.7 - 10.5 mg/dL Final    Total Protein 05/16/2022 6.5  6.0 - 8.4 g/dL Final    Albumin 05/16/2022 3.9  3.5 - 5.2 g/dL Final    Total Bilirubin 05/16/2022 0.4  0.1 - 1.0 mg/dL Final    Alkaline Phosphatase 05/16/2022 58  55 - 135 U/L Final    AST 05/16/2022 16  10 - 40 U/L Final    ALT 05/16/2022 15  10 - 44 U/L Final    Anion Gap 05/16/2022 8  8 - 16 mmol/L Final    eGFR if African American 05/16/2022 >60.0  >60 mL/min/1.73 m^2 Final    eGFR if non African American 05/16/2022 >60.0  >60 mL/min/1.73 m^2 Final    Sed Rate 05/16/2022 <2  0 - 36 mm/Hr Final    CRP 05/16/2022 0.4  0.0 - 8.2 mg/L Final       Past Medical History:   Diagnosis Date    Bradycardia     Depression     Diabetes mellitus     type 2     Dizziness     GERD (gastroesophageal reflux disease)     Hypertension     Impaired fasting  blood sugar     Psoriasis     Thyroid disease     hyothyroidism     Past Surgical History:   Procedure Laterality Date    APPENDECTOMY       SECTION      CHOLECYSTECTOMY      ENDOMETRIAL ABLATION      ESOPHAGOGASTRODUODENOSCOPY N/A 2020    Procedure: EGD (ESOPHAGOGASTRODUODENOSCOPY);  Surgeon: Venkat Be MD;  Location: [address]);  Service: Endoscopy;  Laterality: N/A;  rice or smith ok      INCONTINENCE SURGERY      TUBAL LIGATION       Social History     Tobacco Use    Smoking status: Light Smoker     Types: Cigarettes    Smokeless tobacco: Never    Tobacco comments:     1 pack every 4 days   Substance Use Topics    Alcohol use: Yes     Comment: occasionally    Drug use: No     Family History   Problem Relation Age of Onset    Breast cancer Mother     Cancer Maternal Aunt 30        colon cancer    Multiple sclerosis Maternal Aunt     Ovarian cancer Maternal Aunt     Colon cancer Maternal Uncle     Cancer Father         prostate    Prostate cancer Father     Melanoma Neg Hx      OB History    Para Term  AB Living   2 2 2     2   SAB IAB Ectopic Multiple Live Births           2      # Outcome Date GA Lbr Robert/2nd Weight Sex Delivery Anes PTL Lv   2 Term         NATI   1 Term      CS-LTranv   NATI       Current Outpatient Medications:     adalimumab (HUMIRA PEN) PnKt injection, Inject 1 pen (40 mg total) into the skin every 14 (fourteen) days., Disp: 2 pen, Rfl: 11    folic acid (FOLVITE) 1 MG tablet, Take 1 tablet (1 mg total) by mouth once daily., Disp: 30 tablet, Rfl: 4    metFORMIN (GLUCOPHAGE-XR) 500 MG XR 24hr tablet, Take 1 tablet (500 mg total) by mouth daily with breakfast., Disp: 90 tablet, Rfl: 1    methotrexate 2.5 MG Tab, 3 tabs weekly, Disp: 15 tablet, Rfl: 4    methylPREDNISolone (MEDROL DOSEPACK) 4 mg tablet, use as directed, Disp: 1 each, Rfl: 0    mupirocin (BACTROBAN) 2 % ointment, Apply topically 3 (three) times daily., Disp: 1 Tube, Rfl: 2     "omeprazole (PRILOSEC) 20 MG capsule, Take 1 capsule (20 mg total) by mouth once daily., Disp: 30 capsule, Rfl: 11    progesterone (PROMETRIUM) 100 MG capsule, Take 1 capsule (100 mg total) by mouth once daily., Disp: 30 capsule, Rfl: 11    semaglutide (OZEMPIC SUBQ), Inject into the skin., Disp: , Rfl:     valACYclovir (VALTREX) 500 MG tablet, TAKE 1 TABLET BY MOUTH TWICE DAILY FOR 3 DAYS. BEGIN AT FIRST SIGN OF OUTBREAK, Disp: 6 tablet, Rfl: 4    The 10-year ASCVD risk score (Kiki JERRY, et al., 2019) is: 2.1%    Values used to calculate the score:      Age: 46 years      Sex: Female      Is Non- : No      Diabetic: No      Tobacco smoker: Yes      Systolic Blood Pressure: 125 mmHg      Is BP treated: No      HDL Cholesterol: 57 mg/dL      Total Cholesterol: 176 mg/dL    Review of Systems:  General: No fever, chills, or weight loss.  Chest: No chest pain, shortness of breath, or palpitations.  Breast: No pain, masses, or nipple discharge.  Vulva: No pain, lesions, or itching.  Vagina: No relaxation, itching, discharge, or lesions.  Abdomen: No pain, nausea, vomiting, diarrhea, or constipation.  Urinary: No incontinence, nocturia, frequency, or dysuria.  Extremities:  No leg cramps, edema, or calf pain.  Neurologic: No headaches, dizziness, or visual changes.    Objective:     Vitals:    09/09/22 1430   BP: 125/83   Weight: 98.5 kg (217 lb 3.2 oz)   Height: 5' 5" (1.651 m)   PainSc: 0-No pain     Body mass index is 36.14 kg/m².      Physical Exam: Deferred      Assessment:      Menopausal symptoms: Hormone replacement therapy options, including bioidentical versus non-bioidentical hormones, as well as alternatives discussed. She is would like to star HRT. Risks and benefits of hormone replacement therapy were discussed. Will start with Progesterone 100mg QHS to help with sleep and anxiety. Hormone levels today. If menopausal, will start vivelle dot 0.05mg BIW. If perimenopausal, consider " adding testosterone therapy vs continuing progesterone only. Discussed increased risk for thrombosis with tobacco use. Will need transdermal estradiol for slightly reduced risk compared to oral.   -     progesterone (PROMETRIUM) 100 MG capsule; Take 1 capsule (100 mg total) by mouth once daily.  Dispense: 30 capsule; Refill: 11  -     Estradiol; Future; Expected date: 09/09/2022  -     Testosterone, free; Future; Expected date: 09/09/2022  -     Follicle Stimulating Hormone; Future; Expected date: 09/09/2022  -     Progesterone; Future; Expected date: 09/09/2022    At high risk for breast cancer      Plan:   Hormone levels today.  Start Progesterone 100mg QHS.  Consider adding vivelle dot 0.05mg BIW pending labs.  High risk follow up scheduled.  Continue regular exercise and balanced diet.  Follow up in 6 weeks for HRT.    Instructed patient to call if she experiences any side effects or has any questions.    I spent a total of 35 minutes on the day of the visit.This includes face to face time and non-face to face time preparing to see the patient (eg, review of tests), obtaining and/or reviewing separately obtained history, documenting clinical information in the electronic or other health record, independently interpreting results and communicating results to the patient/family/caregiver, or care coordinator.    A full discussion of the benefit-risk ratio of hormonal replacement therapy was carried out. Improvement in vasomotor and other climacteric symptoms is discussed, including possible improvements in sleep and mood. Reduction of risk for osteoporosis was explained. We discussed the study data showing increased risk of thrombo-embolic events such as myocardial infarction, stroke and also possibly breast cancer with estrogen replacement, and how this might affect her. The range of side effects such as breast tenderness, weight gain and including possible increases in lifetime risk of breast cancer and possible  thrombotic complications was discussed. We also discussed ACOG's recommendation to use hormone replacement therapy for the relief of hot flashes alone and to be on the lowest dose possible for the shortest amount of time.  Alternative such as herbal and soy-based products were reviewed. All of her questions about this therapy were answered.

## 2022-09-12 LAB — TESTOST FREE SERPL-MCNC: 0.9 PG/ML

## 2022-09-13 DIAGNOSIS — N95.1 MENOPAUSAL SYMPTOMS: Primary | ICD-10-CM

## 2022-09-13 RX ORDER — ESTRADIOL 0.05 MG/D
1 FILM, EXTENDED RELEASE TRANSDERMAL
Qty: 8 PATCH | Refills: 11 | Status: SHIPPED | OUTPATIENT
Start: 2022-09-13 | End: 2023-10-02

## 2022-09-21 ENCOUNTER — PATIENT MESSAGE (OUTPATIENT)
Dept: RHEUMATOLOGY | Facility: CLINIC | Age: 47
End: 2022-09-21
Payer: COMMERCIAL

## 2022-09-26 ENCOUNTER — PATIENT MESSAGE (OUTPATIENT)
Dept: BARIATRICS | Facility: CLINIC | Age: 47
End: 2022-09-26
Payer: COMMERCIAL

## 2022-10-06 ENCOUNTER — TELEPHONE (OUTPATIENT)
Dept: BARIATRICS | Facility: CLINIC | Age: 47
End: 2022-10-06
Payer: COMMERCIAL

## 2022-10-06 ENCOUNTER — SPECIALTY PHARMACY (OUTPATIENT)
Dept: PHARMACY | Facility: CLINIC | Age: 47
End: 2022-10-06
Payer: COMMERCIAL

## 2022-10-06 NOTE — TELEPHONE ENCOUNTER
Called pt to advise script was sent to [facility]. Specialty Pharmacy - Refill Coordination    Specialty Medication Orders Linked to Encounter      Flowsheet Row Most Recent Value   Medication #1 adalimumab (HUMIRA PEN) PnKt injection (Order#[id number], Rx#[id number])            Refill Questions - Documented Responses      Flowsheet Row Most Recent Value   Patient Availability and HIPAA Verification    Does patient want to proceed with activity? Yes   HIPAA/medical authority confirmed? Yes   Relationship to patient of person spoken to? Self   Refill Screening Questions    Changes to allergies? No   Changes to medications? No   New conditions since last clinic visit? No   Unplanned office visit, urgent care, ED, or hospital admission in the last 4 weeks? No   How does patient/caregiver feel medication is working? Good   Financial problems or insurance changes? No   How many doses of your specialty medications were missed in the last 4 weeks? 0   Would patient like to speak to a pharmacist? No   When does the patient need to receive the medication? 10/10/22   Refill Delivery Questions    How will the patient receive the medication? Pickup   When does the patient need to receive the medication? 10/10/22   Address in [facility] confirmed and updated if neccessary? Yes   Expected Copay ($) 5   Is the patient able to afford the medication copay? Yes   Payment Method CC on file   Days supply of Refill 28   Supplies needed? No supplies needed   Refill activity completed? Yes   Refill activity plan Refill scheduled   Shipment/Pickup Date: 10/07/22            Current Outpatient Medications   Medication Sig    adalimumab (HUMIRA PEN) PnKt injection Inject 1 pen (40 mg total) into the skin every 14 (fourteen) days.    estradiol 0.05 mg/24 hr td ptsw (VIVELLE-DOT) 0.05 mg/24 hr Place 1 patch onto the skin twice a week.    folic acid (FOLVITE) 1 MG tablet Take 1 tablet (1 mg total) by mouth once daily.    metFORMIN (GLUCOPHAGE-XR) 500 MG XR 24hr tablet Take 1 tablet (500  mg total) by mouth daily with breakfast.    methotrexate 2.5 MG Tab 3 tabs weekly    methylPREDNISolone (MEDROL DOSEPACK) 4 mg tablet use as directed    mupirocin (BACTROBAN) 2 % ointment Apply topically 3 (three) times daily.    omeprazole (PRILOSEC) 20 MG capsule Take 1 capsule (20 mg total) by mouth once daily.    progesterone (PROMETRIUM) 100 MG capsule Take 1 capsule (100 mg total) by mouth once daily.    semaglutide (OZEMPIC SUBQ) Inject into the skin.    valACYclovir (VALTREX) 500 MG tablet TAKE 1 TABLET BY MOUTH TWICE DAILY FOR 3 DAYS. BEGIN AT FIRST SIGN OF OUTBREAK   Last reviewed on 9/9/2022  4:02 PM by Anastasia Morrell PA-C    Review of patient's allergies indicates:  No Known Allergies Last reviewed on  9/13/2022 8:34 AM by Anastasia Morrell      Tasks added this encounter   10/31/2022 - Refill Call (Auto Added)  10/8/2022 - Pickup Reminder   Tasks due within next 3 months   No tasks due.     Linda Solomon [facility] - [facility]  [address] [address]  Phone: [phone]  Fax: [phone]

## 2022-10-31 ENCOUNTER — SPECIALTY PHARMACY (OUTPATIENT)
Dept: PHARMACY | Facility: CLINIC | Age: 47
End: 2022-10-31
Payer: COMMERCIAL

## 2022-10-31 NOTE — TELEPHONE ENCOUNTER
Outgoing call to pt regarding humira refill. Pt stated she is injecting today 10/31. Next injection 11/14. Will follow up on 11/7 for refill.

## 2022-11-07 NOTE — TELEPHONE ENCOUNTER
Specialty Pharmacy - Refill Coordination    Specialty Medication Orders Linked to Encounter      Flowsheet Row Most Recent Value   Medication #1 adalimumab (HUMIRA PEN) PnKt injection (Order#[id number], Rx#[id number])            Refill Questions - Documented Responses      Flowsheet Row Most Recent Value   Patient Availability and HIPAA Verification    Does patient want to proceed with activity? Yes   HIPAA/medical authority confirmed? Yes   Relationship to patient of person spoken to? Self   Refill Screening Questions    Changes to allergies? No   Changes to medications? No   New conditions since last clinic visit? No   Unplanned office visit, urgent care, ED, or hospital admission in the last 4 weeks? No   How does patient/caregiver feel medication is working? Good   Financial problems or insurance changes? No   How many doses of your specialty medications were missed in the last 4 weeks? 0   Would patient like to speak to a pharmacist? No   When does the patient need to receive the medication? 11/14/22   Refill Delivery Questions    How will the patient receive the medication? [facility]   When does the patient need to receive the medication? 11/14/22   Shipping Address Prescription   Address in [facility] confirmed and updated if neccessary? Yes   Expected Copay ($) 5   Is the patient able to afford the medication copay? Yes   Payment Method CC on file   Days supply of Refill 28   Supplies needed? No supplies needed   Refill activity completed? Yes   Refill activity plan Refill scheduled   Shipment/Pickup Date: 11/09/22            Current Outpatient Medications   Medication Sig    adalimumab (HUMIRA PEN) PnKt injection Inject 1 pen (40 mg total) into the skin every 14 (fourteen) days.    estradiol 0.05 mg/24 hr td ptsw (VIVELLE-DOT) 0.05 mg/24 hr Place 1 patch onto the skin twice a week.    folic acid (FOLVITE) 1 MG tablet Take 1 tablet (1 mg total) by mouth once daily.    metFORMIN (GLUCOPHAGE-XR) 500 MG XR  24hr tablet Take 1 tablet (500 mg total) by mouth daily with breakfast.    methotrexate 2.5 MG Tab TAKE 3 TABLETS BY MOUTH WEEKLY    methylPREDNISolone (MEDROL DOSEPACK) 4 mg tablet use as directed    mupirocin (BACTROBAN) 2 % ointment Apply topically 3 (three) times daily.    omeprazole (PRILOSEC) 20 MG capsule Take 1 capsule (20 mg total) by mouth once daily.    progesterone (PROMETRIUM) 100 MG capsule Take 1 capsule (100 mg total) by mouth once daily.    semaglutide (OZEMPIC SUBQ) Inject into the skin.    valACYclovir (VALTREX) 500 MG tablet TAKE 1 TABLET BY MOUTH TWICE DAILY FOR 3 DAYS. BEGIN AT FIRST SIGN OF OUTBREAK   Last reviewed on 9/9/2022  4:02 PM by Anastasia Morrell PA-C    Review of patient's allergies indicates:  No Known Allergies Last reviewed on  9/13/2022 8:34 AM by Anastasia Morrell      Tasks added this encounter   12/5/2022 - Refill Call (Auto Added)   Tasks due within next 3 months   No tasks due.     Soumya Solomon [facility] - Specialty Pharmacy  [address] [address]  Phone: [phone]  Fax: [phone]

## 2022-11-08 ENCOUNTER — OFFICE VISIT (OUTPATIENT)
Dept: OBSTETRICS AND GYNECOLOGY | Facility: CLINIC | Age: 47
End: 2022-11-08
Payer: COMMERCIAL

## 2022-11-08 VITALS
BODY MASS INDEX: 37.49 KG/M2 | DIASTOLIC BLOOD PRESSURE: 80 MMHG | WEIGHT: 225 LBS | HEIGHT: 65 IN | HEART RATE: 83 BPM | SYSTOLIC BLOOD PRESSURE: 117 MMHG

## 2022-11-08 DIAGNOSIS — R68.82 LOW LIBIDO: Primary | ICD-10-CM

## 2022-11-08 DIAGNOSIS — N95.1 MENOPAUSAL SYMPTOMS: ICD-10-CM

## 2022-11-08 PROCEDURE — 3079F DIAST BP 80-89 MM HG: CPT | Mod: CPTII,S$GLB,, | Performed by: PHYSICIAN ASSISTANT

## 2022-11-08 PROCEDURE — 1160F PR REVIEW ALL MEDS BY PRESCRIBER/CLIN PHARMACIST DOCUMENTED: ICD-10-PCS | Mod: CPTII,S$GLB,, | Performed by: PHYSICIAN ASSISTANT

## 2022-11-08 PROCEDURE — 99999 PR PBB SHADOW E&M-EST. PATIENT-LVL IV: ICD-10-PCS | Mod: PBBFAC,,, | Performed by: PHYSICIAN ASSISTANT

## 2022-11-08 PROCEDURE — 3008F PR BODY MASS INDEX (BMI) DOCUMENTED: ICD-10-PCS | Mod: CPTII,S$GLB,, | Performed by: PHYSICIAN ASSISTANT

## 2022-11-08 PROCEDURE — 1160F RVW MEDS BY RX/DR IN RCRD: CPT | Mod: CPTII,S$GLB,, | Performed by: PHYSICIAN ASSISTANT

## 2022-11-08 PROCEDURE — 1159F MED LIST DOCD IN RCRD: CPT | Mod: CPTII,S$GLB,, | Performed by: PHYSICIAN ASSISTANT

## 2022-11-08 PROCEDURE — 3074F SYST BP LT 130 MM HG: CPT | Mod: CPTII,S$GLB,, | Performed by: PHYSICIAN ASSISTANT

## 2022-11-08 PROCEDURE — 3079F PR MOST RECENT DIASTOLIC BLOOD PRESSURE 80-89 MM HG: ICD-10-PCS | Mod: CPTII,S$GLB,, | Performed by: PHYSICIAN ASSISTANT

## 2022-11-08 PROCEDURE — 99213 PR OFFICE/OUTPT VISIT, EST, LEVL III, 20-29 MIN: ICD-10-PCS | Mod: S$GLB,,, | Performed by: PHYSICIAN ASSISTANT

## 2022-11-08 PROCEDURE — 1159F PR MEDICATION LIST DOCUMENTED IN MEDICAL RECORD: ICD-10-PCS | Mod: CPTII,S$GLB,, | Performed by: PHYSICIAN ASSISTANT

## 2022-11-08 PROCEDURE — 99213 OFFICE O/P EST LOW 20 MIN: CPT | Mod: S$GLB,,, | Performed by: PHYSICIAN ASSISTANT

## 2022-11-08 PROCEDURE — 3008F BODY MASS INDEX DOCD: CPT | Mod: CPTII,S$GLB,, | Performed by: PHYSICIAN ASSISTANT

## 2022-11-08 PROCEDURE — 99999 PR PBB SHADOW E&M-EST. PATIENT-LVL IV: CPT | Mod: PBBFAC,,, | Performed by: PHYSICIAN ASSISTANT

## 2022-11-08 PROCEDURE — 3074F PR MOST RECENT SYSTOLIC BLOOD PRESSURE < 130 MM HG: ICD-10-PCS | Mod: CPTII,S$GLB,, | Performed by: PHYSICIAN ASSISTANT

## 2022-11-08 RX ORDER — SEMAGLUTIDE 1.34 MG/ML
0.5 INJECTION, SOLUTION SUBCUTANEOUS
COMMUNITY
Start: 2022-10-21 | End: 2023-06-02

## 2022-11-08 RX ORDER — LEVOTHYROXINE SODIUM 137 UG/1
137 TABLET ORAL DAILY
COMMUNITY
Start: 2022-08-03

## 2022-11-08 NOTE — PROGRESS NOTES
Subjective:      Lachelle Engel is a 47 y.o. female who is here for follow-up of hormone replacement therapy.  At her last visit on 2022, she complained of insomnia, hot flahses, low libido and increased anxiety. Labs were consistent with menopause and started on HRT.    22 Labs  Estradiol <10  Free Testosterone 0.9  FSH 75.18  Progesterone 0.3      PLAN on :  Progesterone 100mg QHS  Vivelle dot 0.05mg BIW.    Hot flashes have resolved and she is sleeping better. However, she has gained about 10 pounds and reports central abdominal bloating. No changes in bowels, diet or exercise. Still feeling fatigued and low libido.     Lab Visit on 2022   Component Date Value Ref Range Status    Estradiol 2022 <10 (A)  See Text pg/mL Final    Testosterone, Free 2022 0.9  pg/mL Final    Follicle Stimulating Hormone 2022 75.18  See Text mIU/mL Final    Progesterone 2022 0.3  See Text ng/mL Final       Past Medical History:   Diagnosis Date    Abnormal Pap smear of cervix     Bradycardia     Depression     Diabetes mellitus     type 2     Dizziness     GERD (gastroesophageal reflux disease)     Hypertension     Impaired fasting blood sugar     Psoriasis     Thyroid disease     hyothyroidism     Past Surgical History:   Procedure Laterality Date    APPENDECTOMY       SECTION      CHOLECYSTECTOMY      CRYOTHERAPY      ENDOMETRIAL ABLATION      ESOPHAGOGASTRODUODENOSCOPY N/A 2020    Procedure: EGD (ESOPHAGOGASTRODUODENOSCOPY);  Surgeon: Venkat Be MD;  Location: [address]);  Service: Endoscopy;  Laterality: N/A;  rice or smith ok      INCONTINENCE SURGERY      TUBAL LIGATION       Social History     Tobacco Use    Smoking status: Light Smoker     Types: Cigarettes    Smokeless tobacco: Current    Tobacco comments:     1 pack every 4 days   Substance Use Topics    Alcohol use: Yes     Comment: occasionally    Drug use: No     Family History   Problem Relation  Age of Onset    Breast cancer Mother     Cancer Maternal Aunt 30        colon cancer    Multiple sclerosis Maternal Aunt     Ovarian cancer Maternal Aunt     Colon cancer Maternal Uncle     Cancer Father         prostate    Prostate cancer Father     Melanoma Neg Hx      OB History    Para Term  AB Living   2 2 2     2   SAB IAB Ectopic Multiple Live Births           2      # Outcome Date GA Lbr Robert/2nd Weight Sex Delivery Anes PTL Lv   2 Term      CS-LTranv   NATI   1 Term      CS-LTranv   NATI       Current Outpatient Medications:     adalimumab (HUMIRA PEN) PnKt injection, Inject 1 pen (40 mg total) into the skin every 14 (fourteen) days., Disp: 2 pen, Rfl: 11    estradiol 0.05 mg/24 hr td ptsw (VIVELLE-DOT) 0.05 mg/24 hr, Place 1 patch onto the skin twice a week., Disp: 8 patch, Rfl: 11    folic acid (FOLVITE) 1 MG tablet, Take 1 tablet (1 mg total) by mouth once daily., Disp: 30 tablet, Rfl: 4    levothyroxine (SYNTHROID) 137 MCG Tab tablet, Take 137 mcg by mouth once daily., Disp: , Rfl:     methotrexate 2.5 MG Tab, TAKE 3 TABLETS BY MOUTH WEEKLY, Disp: 15 tablet, Rfl: 4    mupirocin (BACTROBAN) 2 % ointment, Apply topically 3 (three) times daily., Disp: 1 Tube, Rfl: 2    omeprazole (PRILOSEC) 20 MG capsule, Take 1 capsule (20 mg total) by mouth once daily., Disp: 30 capsule, Rfl: 11    OZEMPIC 0.25 mg or 0.5 mg(2 mg/1.5 mL) pen injector, Inject 0.5 mg into the skin every 7 days., Disp: , Rfl:     progesterone (PROMETRIUM) 100 MG capsule, Take 1 capsule (100 mg total) by mouth once daily., Disp: 30 capsule, Rfl: 11    UNABLE TO FIND, medication name: Testosterone 1% gel 1-2 clicks to upper inner thigh daily, Disp: 30 g, Rfl: 5    valACYclovir (VALTREX) 500 MG tablet, TAKE 1 TABLET BY MOUTH TWICE DAILY FOR 3 DAYS. BEGIN AT FIRST SIGN OF OUTBREAK, Disp: 6 tablet, Rfl: 4    Review of Systems:  General: No fever, chills, or weight loss.  Chest: No chest pain, shortness of breath, or  "palpitations.  Breast: No pain, masses, or nipple discharge.  Vulva: No pain, lesions, or itching.  Vagina: No relaxation, itching, discharge, or lesions.  Abdomen: No pain, nausea, vomiting, diarrhea, or constipation.  Urinary: No incontinence, nocturia, frequency, or dysuria.  Extremities:  No leg cramps, edema, or calf pain.  Neurologic: No headaches, dizziness, or visual changes.    Objective:     Vitals:    11/08/22 0813   BP: 117/80   Pulse: 83   Weight: 102.1 kg (225 lb)   Height: 5' 5" (1.651 m)   PainSc: 0-No pain     Body mass index is 37.44 kg/m².      Physical Exam -deferred       Assessment:    Low libido  -     UNABLE TO FIND; medication name: Testosterone 1% gel 1-2 clicks to upper inner thigh daily  Dispense: 30 g; Refill: 5  -     Testosterone, free; Future; Expected date: 11/08/2022    Menopausal symptoms  -     UNABLE TO FIND; medication name: Testosterone 1% gel 1-2 clicks to upper inner thigh daily  Dispense: 30 g; Refill: 5  -     Estradiol; Future; Expected date: 11/08/2022  -     Testosterone, free; Future; Expected date: 11/08/2022      Plan:   Reviewed options for testosterone therapy. It is difficult for her to come in monthly for injections so will use transdermal.  Add Testosterone 1% gel 1 click to upper inner thigh daily- faxed to [facility].  Increase to 2 clicks daily in 4 weeks if not seeing improvement.  Reviewed side effects with testosterone therapy.  Would not expect weight loss with starting HRT. Encouraged strength exercises to help.  Continue Progesterone 100mg and Vivelle dot 0.05mg BIW.  Scheduled for [employer] on 12/27/22  Repeat hormone levels one week prior.    Instructed patient to call if she experiences any side effects or has any questions.  I spent 25 minutes with s patient today, >50% counseling.    "

## 2022-12-06 RX ORDER — ADALIMUMAB 40MG/0.8ML
40 KIT SUBCUTANEOUS
Qty: 2 PEN | Refills: 11 | OUTPATIENT
Start: 2022-12-06 | End: 2023-01-05

## 2022-12-08 ENCOUNTER — SPECIALTY PHARMACY (OUTPATIENT)
Dept: PHARMACY | Facility: CLINIC | Age: 47
End: 2022-12-08
Payer: COMMERCIAL

## 2022-12-08 ENCOUNTER — PATIENT MESSAGE (OUTPATIENT)
Dept: RHEUMATOLOGY | Facility: CLINIC | Age: 47
End: 2022-12-08
Payer: COMMERCIAL

## 2022-12-08 RX ORDER — ADALIMUMAB 40MG/0.8ML
40 KIT SUBCUTANEOUS
Qty: 2 PEN | Refills: 11 | Status: SHIPPED | OUTPATIENT
Start: 2022-12-08 | End: 2022-12-08

## 2022-12-08 RX ORDER — ADALIMUMAB 40MG/0.8ML
40 KIT SUBCUTANEOUS
Qty: 2 PEN | Refills: 11 | Status: SHIPPED | OUTPATIENT
Start: 2022-12-08 | End: 2023-02-02

## 2022-12-08 NOTE — TELEPHONE ENCOUNTER
Spoke w pt regarding Humira refill. Refill request was denied (no reason given), informed pt. Pt stated she has been trying to make an appt with provider for a while but she is all booked up. Pt stated she is going to e-mail provider tomorrow morning. Asked pt if we could follow up with her tomorrow afternoon to see if she gets a response from provider.

## 2022-12-19 ENCOUNTER — PATIENT MESSAGE (OUTPATIENT)
Dept: PHARMACY | Facility: CLINIC | Age: 47
End: 2022-12-19
Payer: COMMERCIAL

## 2022-12-20 ENCOUNTER — LAB VISIT (OUTPATIENT)
Dept: LAB | Facility: HOSPITAL | Age: 47
End: 2022-12-20
Payer: COMMERCIAL

## 2022-12-20 DIAGNOSIS — R68.82 LOW LIBIDO: ICD-10-CM

## 2022-12-20 DIAGNOSIS — N95.1 MENOPAUSAL SYMPTOMS: ICD-10-CM

## 2022-12-20 LAB — ESTRADIOL SERPL-MCNC: 135 PG/ML

## 2022-12-20 PROCEDURE — 36415 COLL VENOUS BLD VENIPUNCTURE: CPT | Performed by: PHYSICIAN ASSISTANT

## 2022-12-20 PROCEDURE — 82670 ASSAY OF TOTAL ESTRADIOL: CPT | Performed by: PHYSICIAN ASSISTANT

## 2022-12-20 PROCEDURE — 84402 ASSAY OF FREE TESTOSTERONE: CPT | Performed by: PHYSICIAN ASSISTANT

## 2022-12-20 NOTE — TELEPHONE ENCOUNTER
Specialty Pharmacy - Refill Coordination    Patient has one dose on hand, had one extra in fridge. Requested that refill is sent out now, will store properly in fridge. No further questions or concerns at this time.     Specialty Medication Orders Linked to Encounter      Flowsheet Row Most Recent Value   Medication #1 adalimumab (HUMIRA PEN) PnKt injection (Order#[id number], Rx#[id number])            Refill Questions - Documented Responses      Flowsheet Row Most Recent Value   Patient Availability and HIPAA Verification    Does patient want to proceed with activity? Yes   HIPAA/medical authority confirmed? Yes   Relationship to patient of person spoken to? Self   Refill Screening Questions    Changes to allergies? No   Changes to medications? No   New conditions since last clinic visit? No   Unplanned office visit, urgent care, ED, or hospital admission in the last 4 weeks? No   How does patient/caregiver feel medication is working? Very good   Financial problems or insurance changes? No   How many doses of your specialty medications were missed in the last 4 weeks? 0   Would patient like to speak to a pharmacist? No   When does the patient need to receive the medication? 01/09/23   Refill Delivery Questions    How will the patient receive the medication? [facility]   When does the patient need to receive the medication? 01/09/23   Shipping Address Prescription   Address in [facility] confirmed and updated if neccessary? Yes   Expected Copay ($) 5   Is the patient able to afford the medication copay? Yes   Payment Method CC on file   Days supply of Refill 28   Supplies needed? No supplies needed   Refill activity completed? Yes   Refill activity plan Refill scheduled   Shipment/Pickup Date: 12/27/22            Current Outpatient Medications   Medication Sig    adalimumab (HUMIRA PEN) PnKt injection Inject 1 pen (40 mg total) into the skin every 14 (fourteen) days.    estradiol 0.05 mg/24 hr td ptsw (VIVELLE-DOT)  0.05 mg/24 hr Place 1 patch onto the skin twice a week.    folic acid (FOLVITE) 1 MG tablet Take 1 tablet (1 mg total) by mouth once daily.    levothyroxine (SYNTHROID) 137 MCG Tab tablet Take 137 mcg by mouth once daily.    methotrexate 2.5 MG Tab TAKE 3 TABLETS BY MOUTH WEEKLY    mupirocin (BACTROBAN) 2 % ointment Apply topically 3 (three) times daily.    omeprazole (PRILOSEC) 20 MG capsule Take 1 capsule (20 mg total) by mouth once daily.    OZEMPIC 0.25 mg or 0.5 mg(2 mg/1.5 mL) pen injector Inject 0.5 mg into the skin every 7 days.    progesterone (PROMETRIUM) 100 MG capsule Take 1 capsule (100 mg total) by mouth once daily.    semaglutide (OZEMPIC) 0.25 mg or 0.5 mg(2 mg/1.5 mL) pen injector Inject 0.5 mg into the skin once a week.    UNABLE TO FIND medication name: Testosterone 1% gel 1-2 clicks to upper inner thigh daily    valACYclovir (VALTREX) 500 MG tablet TAKE 1 TABLET BY MOUTH TWICE DAILY FOR 3 DAYS. BEGIN AT FIRST SIGN OF OUTBREAK   Last reviewed on 11/8/2022  8:39 AM by Anastasia Morrell PA-C    Review of patient's allergies indicates:  No Known Allergies Last reviewed on  12/8/2022 10:19 PM by Sumeet Echols      Tasks added this encounter   1/27/2023 - Refill Call (Auto Added)   Tasks due within next 3 months   No tasks due.     Linda Gunter, PharmD  [facility]  [address] [place] [id number]  Phone: [phone]  Fax: [phone]

## 2022-12-28 DIAGNOSIS — N95.1 MENOPAUSAL SYMPTOMS: Primary | ICD-10-CM

## 2023-01-07 ENCOUNTER — LAB VISIT (OUTPATIENT)
Dept: LAB | Facility: HOSPITAL | Age: 48
End: 2023-01-07
Payer: COMMERCIAL

## 2023-01-07 DIAGNOSIS — N95.1 MENOPAUSAL SYMPTOMS: ICD-10-CM

## 2023-01-07 PROCEDURE — 84402 ASSAY OF FREE TESTOSTERONE: CPT | Performed by: PHYSICIAN ASSISTANT

## 2023-01-07 PROCEDURE — 36415 COLL VENOUS BLD VENIPUNCTURE: CPT | Performed by: PHYSICIAN ASSISTANT

## 2023-01-11 LAB — TESTOST FREE SERPL-MCNC: 1.1 PG/ML

## 2023-01-13 ENCOUNTER — OFFICE VISIT (OUTPATIENT)
Dept: OBSTETRICS AND GYNECOLOGY | Facility: CLINIC | Age: 48
End: 2023-01-13
Payer: COMMERCIAL

## 2023-01-13 VITALS
WEIGHT: 225 LBS | BODY MASS INDEX: 37.49 KG/M2 | DIASTOLIC BLOOD PRESSURE: 80 MMHG | HEART RATE: 78 BPM | HEIGHT: 65 IN | SYSTOLIC BLOOD PRESSURE: 118 MMHG

## 2023-01-13 DIAGNOSIS — N95.1 MENOPAUSAL SYMPTOMS: ICD-10-CM

## 2023-01-13 DIAGNOSIS — Z01.419 ROUTINE GYNECOLOGICAL EXAMINATION: Primary | ICD-10-CM

## 2023-01-13 DIAGNOSIS — Z12.39 BREAST CANCER SCREENING, HIGH RISK PATIENT: ICD-10-CM

## 2023-01-13 DIAGNOSIS — Z12.31 BREAST CANCER SCREENING BY MAMMOGRAM: ICD-10-CM

## 2023-01-13 DIAGNOSIS — Z91.89 AT HIGH RISK FOR BREAST CANCER: ICD-10-CM

## 2023-01-13 DIAGNOSIS — Z80.3 FAMILY HISTORY OF BREAST CANCER: ICD-10-CM

## 2023-01-13 PROCEDURE — 87624 HPV HI-RISK TYP POOLED RSLT: CPT | Performed by: PHYSICIAN ASSISTANT

## 2023-01-13 PROCEDURE — 99396 PREV VISIT EST AGE 40-64: CPT | Mod: S$GLB,,, | Performed by: PHYSICIAN ASSISTANT

## 2023-01-13 PROCEDURE — 1160F PR REVIEW ALL MEDS BY PRESCRIBER/CLIN PHARMACIST DOCUMENTED: ICD-10-PCS | Mod: CPTII,S$GLB,, | Performed by: PHYSICIAN ASSISTANT

## 2023-01-13 PROCEDURE — 3074F PR MOST RECENT SYSTOLIC BLOOD PRESSURE < 130 MM HG: ICD-10-PCS | Mod: CPTII,S$GLB,, | Performed by: PHYSICIAN ASSISTANT

## 2023-01-13 PROCEDURE — 3079F PR MOST RECENT DIASTOLIC BLOOD PRESSURE 80-89 MM HG: ICD-10-PCS | Mod: CPTII,S$GLB,, | Performed by: PHYSICIAN ASSISTANT

## 2023-01-13 PROCEDURE — 3008F PR BODY MASS INDEX (BMI) DOCUMENTED: ICD-10-PCS | Mod: CPTII,S$GLB,, | Performed by: PHYSICIAN ASSISTANT

## 2023-01-13 PROCEDURE — 3074F SYST BP LT 130 MM HG: CPT | Mod: CPTII,S$GLB,, | Performed by: PHYSICIAN ASSISTANT

## 2023-01-13 PROCEDURE — 99999 PR PBB SHADOW E&M-EST. PATIENT-LVL IV: CPT | Mod: PBBFAC,,, | Performed by: PHYSICIAN ASSISTANT

## 2023-01-13 PROCEDURE — 1159F MED LIST DOCD IN RCRD: CPT | Mod: CPTII,S$GLB,, | Performed by: PHYSICIAN ASSISTANT

## 2023-01-13 PROCEDURE — 99999 PR PBB SHADOW E&M-EST. PATIENT-LVL IV: ICD-10-PCS | Mod: PBBFAC,,, | Performed by: PHYSICIAN ASSISTANT

## 2023-01-13 PROCEDURE — 3079F DIAST BP 80-89 MM HG: CPT | Mod: CPTII,S$GLB,, | Performed by: PHYSICIAN ASSISTANT

## 2023-01-13 PROCEDURE — 1160F RVW MEDS BY RX/DR IN RCRD: CPT | Mod: CPTII,S$GLB,, | Performed by: PHYSICIAN ASSISTANT

## 2023-01-13 PROCEDURE — 88175 CYTOPATH C/V AUTO FLUID REDO: CPT | Performed by: PHYSICIAN ASSISTANT

## 2023-01-13 PROCEDURE — 1159F PR MEDICATION LIST DOCUMENTED IN MEDICAL RECORD: ICD-10-PCS | Mod: CPTII,S$GLB,, | Performed by: PHYSICIAN ASSISTANT

## 2023-01-13 PROCEDURE — 99396 PR PREVENTIVE VISIT,EST,40-64: ICD-10-PCS | Mod: S$GLB,,, | Performed by: PHYSICIAN ASSISTANT

## 2023-01-13 PROCEDURE — 3008F BODY MASS INDEX DOCD: CPT | Mod: CPTII,S$GLB,, | Performed by: PHYSICIAN ASSISTANT

## 2023-01-13 NOTE — PROGRESS NOTES
CC: Well woman exam    Lachelle Engel is a 47 y.o. female  presents for well woman exam.  LMP: No LMP recorded. Patient has had an ablation. She is taking Continue Progesterone 100mg and Vivelle dot 0.05mg BIW. Recently added testosterone 1% gel for low libido using 2 clicks daily. Tolerating well, no adverse side effects or vaginal bleeding. Occasional hot flash again and had some nights of poor sleep. Feeling better then before. Would like to continue as is for now and she how she does.     PCP: Dr. Hickey    [facility]ine [facility]: [facility] - [facility]  WWE: 2021  Pap smear: 2020  Mammogram: 22 negative; Breast MRI 22- negative  DEXA: 21 normal      Past Medical History:   Diagnosis Date    Abnormal Pap smear of cervix     Bradycardia     Depression     Diabetes mellitus     type 2     Dizziness     GERD (gastroesophageal reflux disease)     Hypertension     Impaired fasting blood sugar     Psoriasis     Thyroid disease     hyothyroidism     Past Surgical History:   Procedure Laterality Date    APPENDECTOMY       SECTION      CHOLECYSTECTOMY      CRYOTHERAPY      ENDOMETRIAL ABLATION      ESOPHAGOGASTRODUODENOSCOPY N/A 2020    Procedure: EGD (ESOPHAGOGASTRODUODENOSCOPY);  Surgeon: Venkat Be MD;  Location: [address]);  Service: Endoscopy;  Laterality: N/A;  rice or smith ok      INCONTINENCE SURGERY      TUBAL LIGATION       Social History     Socioeconomic History    Marital status: Single   Occupational History     Employer: [employer]   Tobacco Use    Smoking status: Light Smoker     Types: Cigarettes    Smokeless tobacco: Current    Tobacco comments:     1 pack every 4 days   Substance and Sexual Activity    Alcohol use: Yes     Comment: occasionally    Drug use: No    Sexual activity: Yes     Birth control/protection: Other-see comments     Comment: tubal   Other Topics Concern    Are you pregnant or think you may be? No     Breast-feeding No     Family History   Problem Relation Age of Onset    Breast cancer Mother     Cancer Maternal Aunt 30        colon cancer    Multiple sclerosis Maternal Aunt     Ovarian cancer Maternal Aunt     Colon cancer Maternal Uncle     Cancer Father         prostate    Prostate cancer Father     Melanoma Neg Hx      OB History          2    Para   2    Term   2            AB        Living   2         SAB        IAB        Ectopic        Multiple        Live Births   2                 Current Outpatient Medications:     adalimumab (HUMIRA PEN) PnKt injection, Inject 1 pen (40 mg total) into the skin every 14 (fourteen) days., Disp: 2 pen, Rfl: 11    estradiol 0.05 mg/24 hr td ptsw (VIVELLE-DOT) 0.05 mg/24 hr, Place 1 patch onto the skin twice a week., Disp: 8 patch, Rfl: 11    folic acid (FOLVITE) 1 MG tablet, Take 1 tablet (1 mg total) by mouth once daily., Disp: 30 tablet, Rfl: 4    levothyroxine (SYNTHROID) 137 MCG Tab tablet, Take 137 mcg by mouth once daily., Disp: , Rfl:     methotrexate 2.5 MG Tab, TAKE 3 TABLETS BY MOUTH WEEKLY, Disp: 15 tablet, Rfl: 4    mupirocin (BACTROBAN) 2 % ointment, Apply topically 3 (three) times daily., Disp: 1 Tube, Rfl: 2    omeprazole (PRILOSEC) 20 MG capsule, Take 1 capsule (20 mg total) by mouth once daily., Disp: 30 capsule, Rfl: 11    OZEMPIC 0.25 mg or 0.5 mg(2 mg/1.5 mL) pen injector, Inject 0.5 mg into the skin every 7 days., Disp: , Rfl:     progesterone (PROMETRIUM) 100 MG capsule, Take 1 capsule (100 mg total) by mouth once daily., Disp: 30 capsule, Rfl: 11    semaglutide (OZEMPIC) 0.25 mg or 0.5 mg(2 mg/1.5 mL) pen injector, Inject 0.5 mg into the skin once a week., Disp: 2 pen, Rfl: 11    UNABLE TO FIND, medication name: Testosterone 1% gel 1-2 clicks to upper inner thigh daily, Disp: 30 g, Rfl: 5    valACYclovir (VALTREX) 500 MG tablet, TAKE 1 TABLET BY MOUTH TWICE DAILY FOR 3 DAYS. BEGIN AT FIRST SIGN OF OUTBREAK, Disp: 6 tablet, Rfl:  "4    The 10-year ASCVD risk score (Kiki JERRY, et al., 2019) is: 2%    Values used to calculate the score:      Age: 47 years      Sex: Female      Is Non- : No      Diabetic: No      Tobacco smoker: Yes      Systolic Blood Pressure: 118 mmHg      Is BP treated: No      HDL Cholesterol: 57 mg/dL      Total Cholesterol: 176 mg/dL    /80   Pulse 78   Ht 5' 5" (1.651 m)   Wt 102.1 kg (225 lb)   BMI 37.44 kg/m²       ROS:  GENERAL: Denies weight gain or weight loss. Feeling well overall.   SKIN: Denies rash or lesions.   HEAD: Denies head injury or headache.   NODES: Denies enlarged lymph nodes.   CHEST: Denies chest pain or shortness of breath.   CARDIOVASCULAR: Denies palpitations or left sided chest pain.   ABDOMEN: No abdominal pain, constipation, diarrhea, nausea, vomiting or rectal bleeding.   URINARY: No frequency, dysuria, hematuria, or burning on urination.  REPRODUCTIVE: See HPI.   BREASTS: Denies pain, lumps, or nipple discharge.   HEMATOLOGIC: No easy bruisability or excessive bleeding.   MUSCULOSKELETAL: Denies joint pain or swelling.   NEUROLOGIC: Denies syncope or weakness.   PSYCHIATRIC: Denies depression, anxiety or mood swings.    PHYSICAL EXAM:  APPEARANCE: Well nourished, well developed, in no acute distress.  AFFECT: WNL, alert and oriented x 3  CHEST: Good respiratory effect  ABDOMEN: Soft.  No tenderness or masses.  No hepatosplenomegaly.  No hernias.  BREASTS: Symmetrical, no skin changes or visible lesions.  No palpable masses, nipple discharge bilaterally.  PELVIC: Normal external genitalia without lesions.  Normal hair distribution.  Adequate perineal body, normal urethral meatus.  Vagina moist and well rugated without lesions or discharge.  Cervix pink, without lesions, discharge or tenderness.  No significant cystocele or rectocele.  Bimanual exam shows uterus to be normal size, regular, mobile and nontender.  Adnexa without masses or tenderness.  "   EXTREMITIES: No edema.    ASSESSMENT:   Routine gynecological examination  -     Liquid-Based Pap Smear, Screening  -     HPV High Risk Genotypes, PCR    At high risk for breast cancer  -     MRI Breast w/wo Contrast, w/CAD, Bilateral; Future; Expected date: 01/13/2023    Breast cancer screening by mammogram  -     MRI Breast w/wo Contrast, w/CAD, Bilateral; Future; Expected date: 01/13/2023    Breast cancer screening, high risk patient  -     MRI Breast w/wo Contrast, w/CAD, Bilateral; Future; Expected date: 01/13/2023    Family history of breast cancer  -     MRI Breast w/wo Contrast, w/CAD, Bilateral; Future; Expected date: 01/13/2023    Menopausal symptoms        PLAN:   Pap/HPV  Mammogram scheduled.  Breast MRI in 7/2023  Continue Progesterone 100mg and Vivelle dot 0.05mg BIW  Continue testosterone 1% gel for low libido using 2 clicks daily  Follow up in 6 months for CBE (high risk) and HRT. Follow up sooner pRN.    Patient was counseled today on A.C.S. Pap guidelines and recommendations for yearly pelvic exams, mammograms and monthly self breast exams; to see her PCP for other health maintenance.

## 2023-01-17 ENCOUNTER — OFFICE VISIT (OUTPATIENT)
Dept: INTERNAL MEDICINE | Facility: CLINIC | Age: 48
End: 2023-01-17
Payer: COMMERCIAL

## 2023-01-17 VITALS
DIASTOLIC BLOOD PRESSURE: 76 MMHG | HEIGHT: 65 IN | WEIGHT: 225.06 LBS | BODY MASS INDEX: 37.5 KG/M2 | OXYGEN SATURATION: 97 % | HEART RATE: 91 BPM | SYSTOLIC BLOOD PRESSURE: 118 MMHG

## 2023-01-17 DIAGNOSIS — H60.90 OTITIS EXTERNA, UNSPECIFIED CHRONICITY, UNSPECIFIED LATERALITY, UNSPECIFIED TYPE: Primary | ICD-10-CM

## 2023-01-17 DIAGNOSIS — L40.9 PSORIASIS: ICD-10-CM

## 2023-01-17 DIAGNOSIS — E11.9 TYPE 2 DIABETES MELLITUS WITHOUT COMPLICATION, WITHOUT LONG-TERM CURRENT USE OF INSULIN: ICD-10-CM

## 2023-01-17 PROCEDURE — 3078F PR MOST RECENT DIASTOLIC BLOOD PRESSURE < 80 MM HG: ICD-10-PCS | Mod: CPTII,S$GLB,, | Performed by: INTERNAL MEDICINE

## 2023-01-17 PROCEDURE — 3074F SYST BP LT 130 MM HG: CPT | Mod: CPTII,S$GLB,, | Performed by: INTERNAL MEDICINE

## 2023-01-17 PROCEDURE — 3078F DIAST BP <80 MM HG: CPT | Mod: CPTII,S$GLB,, | Performed by: INTERNAL MEDICINE

## 2023-01-17 PROCEDURE — 3008F PR BODY MASS INDEX (BMI) DOCUMENTED: ICD-10-PCS | Mod: CPTII,S$GLB,, | Performed by: INTERNAL MEDICINE

## 2023-01-17 PROCEDURE — 1159F MED LIST DOCD IN RCRD: CPT | Mod: CPTII,S$GLB,, | Performed by: INTERNAL MEDICINE

## 2023-01-17 PROCEDURE — 3008F BODY MASS INDEX DOCD: CPT | Mod: CPTII,S$GLB,, | Performed by: INTERNAL MEDICINE

## 2023-01-17 PROCEDURE — 3074F PR MOST RECENT SYSTOLIC BLOOD PRESSURE < 130 MM HG: ICD-10-PCS | Mod: CPTII,S$GLB,, | Performed by: INTERNAL MEDICINE

## 2023-01-17 PROCEDURE — 99999 PR PBB SHADOW E&M-EST. PATIENT-LVL IV: ICD-10-PCS | Mod: PBBFAC,,, | Performed by: INTERNAL MEDICINE

## 2023-01-17 PROCEDURE — 99214 OFFICE O/P EST MOD 30 MIN: CPT | Mod: S$GLB,,, | Performed by: INTERNAL MEDICINE

## 2023-01-17 PROCEDURE — 99214 PR OFFICE/OUTPT VISIT, EST, LEVL IV, 30-39 MIN: ICD-10-PCS | Mod: S$GLB,,, | Performed by: INTERNAL MEDICINE

## 2023-01-17 PROCEDURE — 1159F PR MEDICATION LIST DOCUMENTED IN MEDICAL RECORD: ICD-10-PCS | Mod: CPTII,S$GLB,, | Performed by: INTERNAL MEDICINE

## 2023-01-17 PROCEDURE — 99999 PR PBB SHADOW E&M-EST. PATIENT-LVL IV: CPT | Mod: PBBFAC,,, | Performed by: INTERNAL MEDICINE

## 2023-01-17 RX ORDER — NEOMYCIN SULFATE, POLYMYXIN B SULFATE AND HYDROCORTISONE 10; 3.5; 1 MG/ML; MG/ML; [USP'U]/ML
3 SUSPENSION/ DROPS AURICULAR (OTIC) 3 TIMES DAILY
Qty: 30 ML | Refills: 0 | Status: SHIPPED | OUTPATIENT
Start: 2023-01-17 | End: 2023-01-24

## 2023-01-17 NOTE — PROGRESS NOTES
Chief Complaint: ear pressure and drainage from ear canal    HPI: This is a 47 year old woman who presents due to drainage from her ear canals this  morning. She had a reddish fluid on her Q tips this morning. She has had more sinus congestion lately.  Some pressure behind her ears.  She has been taking mucinex D with some relief.     SHe takes Humira injection every 2 weeks and MTX 2.5 mg 3 tablets once a week with folic acid 1 mg daily- right now psoriasis is stable.     She is taking Ozempic 0.25 once a week for her diabetes. She sees Dr sen Hickey at [facility] for her diabetes and hyprtension.     Past Medical History:   Diagnosis Date    Abnormal Pap smear of cervix     Bradycardia     Depression     Diabetes mellitus     type 2     Dizziness     GERD (gastroesophageal reflux disease)     Hypertension     Impaired fasting blood sugar     Psoriasis     Thyroid disease     hyothyroidism     Past Surgical History:   Procedure Laterality Date    APPENDECTOMY       SECTION      CHOLECYSTECTOMY      CRYOTHERAPY      ENDOMETRIAL ABLATION      ESOPHAGOGASTRODUODENOSCOPY N/A 2020    Procedure: EGD (ESOPHAGOGASTRODUODENOSCOPY);  Surgeon: Venkat Be MD;  Location: [address];  Service: Endoscopy;  Laterality: N/A;  rice or smith ok      INCONTINENCE SURGERY      TUBAL LIGATION       Social History     Socioeconomic History    Marital status: Single   Occupational History     Employer: [employer]   Tobacco Use    Smoking status: Light Smoker     Types: Cigarettes    Smokeless tobacco: Current    Tobacco comments:     1 pack every 4 days   Substance and Sexual Activity    Alcohol use: Yes     Comment: occasionally    Drug use: No    Sexual activity: Yes     Birth control/protection: Other-see comments     Comment: tubal   Other Topics Concern    Are you pregnant or think you may be? No    Breast-feeding No           Meds and allergies: updated on Epic    REVIEW OF SYSTEMS:  "No fevers, chills, night sweats, fatigue, visual change, hearing loss, sinus congestion, sore throat, chest pain, shortness of breath, nausea, vomiting, constipation, diarrhea, dysuria, hematuria, polydipsia, polyuria, joint pain, muscle pain, headaches, anxiety, depression, insomnia.     Physical exam:  /76 (BP Location: Right arm, Patient Position: Sitting)   Pulse 91   Ht 5' 5" (1.651 m)   Wt 102.1 kg (225 lb 1.4 oz)   SpO2 97%   BMI 37.46 kg/m²     General: alert, oriented x 3, no apparent distress.  Affect normal  HEENT: Conjunctivae: anicteric, PERRL, EOMI, TM clear, External auditory canals with dryness and some dried blood in the right external auditory canal.  Oralpharynx clear  Neck: supple, no thyroid enlargement, no cervical lymphadenopathy  Resp: effort normal, lungs clear bilaterally  CV: Regular rate and rhythm without murmurs, gallops or rubs, no lower extremity edema, no carotid bruits,        Assessment/Plan:  Early otiits externa - could be from psoriasis. Cotisporin otic drops- 3 drops in each ear three times daily for 7 days.  Loratadine 10 mg daily for sinus pressure  DM - follow up with endocrinologist at [facility]  Psoriasis- follow up with Derm.      "

## 2023-01-20 LAB
FINAL PATHOLOGIC DIAGNOSIS: NORMAL
Lab: NORMAL

## 2023-01-26 ENCOUNTER — HOSPITAL ENCOUNTER (OUTPATIENT)
Dept: RADIOLOGY | Facility: HOSPITAL | Age: 48
Discharge: HOME | End: 2023-01-26
Attending: PHYSICIAN ASSISTANT
Payer: COMMERCIAL

## 2023-01-26 DIAGNOSIS — Z12.31 SCREENING MAMMOGRAM, ENCOUNTER FOR: ICD-10-CM

## 2023-01-26 PROCEDURE — 77067 SCR MAMMO BI INCL CAD: CPT | Mod: TC

## 2023-01-26 PROCEDURE — 77067 SCR MAMMO BI INCL CAD: CPT | Mod: 26,,, | Performed by: RADIOLOGY

## 2023-01-26 PROCEDURE — 77063 MAMMO DIGITAL SCREENING BILAT WITH TOMO: ICD-10-PCS | Mod: 26,,, | Performed by: RADIOLOGY

## 2023-01-26 PROCEDURE — 77063 BREAST TOMOSYNTHESIS BI: CPT | Mod: 26,,, | Performed by: RADIOLOGY

## 2023-01-26 PROCEDURE — 77067 MAMMO DIGITAL SCREENING BILAT WITH TOMO: ICD-10-PCS | Mod: 26,,, | Performed by: RADIOLOGY

## 2023-01-30 ENCOUNTER — SPECIALTY PHARMACY (OUTPATIENT)
Dept: PHARMACY | Facility: CLINIC | Age: 48
End: 2023-01-30
Payer: COMMERCIAL

## 2023-01-30 ENCOUNTER — PATIENT MESSAGE (OUTPATIENT)
Dept: OBSTETRICS AND GYNECOLOGY | Facility: CLINIC | Age: 48
End: 2023-01-30
Payer: COMMERCIAL

## 2023-01-30 DIAGNOSIS — R68.82 LOW LIBIDO: ICD-10-CM

## 2023-01-30 DIAGNOSIS — N95.1 MENOPAUSAL SYMPTOMS: ICD-10-CM

## 2023-01-30 NOTE — TELEPHONE ENCOUNTER
Outgoing call regarding humira refill; per pt, she's due to inject today and has a pen on hand; next injection is on 2/13, and pt was informed that [facility] will follow up on 2/3 to schedule delivery

## 2023-02-02 ENCOUNTER — LAB VISIT (OUTPATIENT)
Dept: LAB | Facility: HOSPITAL | Age: 48
End: 2023-02-02
Attending: INTERNAL MEDICINE
Payer: COMMERCIAL

## 2023-02-02 ENCOUNTER — OFFICE VISIT (OUTPATIENT)
Dept: RHEUMATOLOGY | Facility: CLINIC | Age: 48
End: 2023-02-02
Payer: COMMERCIAL

## 2023-02-02 VITALS
WEIGHT: 232.56 LBS | HEART RATE: 76 BPM | DIASTOLIC BLOOD PRESSURE: 84 MMHG | BODY MASS INDEX: 38.75 KG/M2 | SYSTOLIC BLOOD PRESSURE: 128 MMHG | HEIGHT: 65 IN

## 2023-02-02 DIAGNOSIS — M47.816 SPONDYLOSIS OF LUMBAR REGION WITHOUT MYELOPATHY OR RADICULOPATHY: ICD-10-CM

## 2023-02-02 DIAGNOSIS — L40.50 PSORIATIC ARTHRITIS: ICD-10-CM

## 2023-02-02 DIAGNOSIS — L40.9 PSORIASIS: Primary | ICD-10-CM

## 2023-02-02 DIAGNOSIS — M70.71 BURSITIS OF OTHER BURSA OF RIGHT HIP: ICD-10-CM

## 2023-02-02 DIAGNOSIS — M72.2 PLANTAR FASCIITIS OF RIGHT FOOT: ICD-10-CM

## 2023-02-02 DIAGNOSIS — M17.11 PRIMARY OSTEOARTHRITIS OF RIGHT KNEE: ICD-10-CM

## 2023-02-02 DIAGNOSIS — L40.9 PSORIASIS: ICD-10-CM

## 2023-02-02 LAB
ALBUMIN SERPL BCP-MCNC: 4.3 G/DL (ref 3.5–5.2)
ALP SERPL-CCNC: 89 U/L (ref 55–135)
ALT SERPL W/O P-5'-P-CCNC: 20 U/L (ref 10–44)
ANION GAP SERPL CALC-SCNC: 9 MMOL/L (ref 8–16)
AST SERPL-CCNC: 17 U/L (ref 10–40)
BASOPHILS # BLD AUTO: 0.02 K/UL (ref 0–0.2)
BASOPHILS NFR BLD: 0.4 % (ref 0–1.9)
BILIRUB SERPL-MCNC: 0.4 MG/DL (ref 0.1–1)
BUN SERPL-MCNC: 16 MG/DL (ref 6–20)
CALCIUM SERPL-MCNC: 9.7 MG/DL (ref 8.7–10.5)
CHLORIDE SERPL-SCNC: 106 MMOL/L (ref 95–110)
CO2 SERPL-SCNC: 25 MMOL/L (ref 23–29)
CREAT SERPL-MCNC: 0.8 MG/DL (ref 0.5–1.4)
CRP SERPL-MCNC: 0.5 MG/L (ref 0–8.2)
DIFFERENTIAL METHOD: ABNORMAL
EOSINOPHIL # BLD AUTO: 0.1 K/UL (ref 0–0.5)
EOSINOPHIL NFR BLD: 1 % (ref 0–8)
ERYTHROCYTE [DISTWIDTH] IN BLOOD BY AUTOMATED COUNT: 11.9 % (ref 11.5–14.5)
ERYTHROCYTE [SEDIMENTATION RATE] IN BLOOD BY PHOTOMETRIC METHOD: 4 MM/HR (ref 0–36)
EST. GFR  (NO RACE VARIABLE): >60 ML/MIN/1.73 M^2
GLUCOSE SERPL-MCNC: 81 MG/DL (ref 70–110)
HBV CORE AB SERPL QL IA: NORMAL
HBV SURFACE AB SER-ACNC: <3 MIU/ML
HBV SURFACE AB SER-ACNC: NORMAL M[IU]/ML
HBV SURFACE AG SERPL QL IA: NORMAL
HCT VFR BLD AUTO: 44.5 % (ref 37–48.5)
HGB BLD-MCNC: 14.5 G/DL (ref 12–16)
IMM GRANULOCYTES # BLD AUTO: 0.01 K/UL (ref 0–0.04)
IMM GRANULOCYTES NFR BLD AUTO: 0.2 % (ref 0–0.5)
LYMPHOCYTES # BLD AUTO: 1.7 K/UL (ref 1–4.8)
LYMPHOCYTES NFR BLD: 35.3 % (ref 18–48)
MCH RBC QN AUTO: 30.7 PG (ref 27–31)
MCHC RBC AUTO-ENTMCNC: 32.6 G/DL (ref 32–36)
MCV RBC AUTO: 94 FL (ref 82–98)
MONOCYTES # BLD AUTO: 0.3 K/UL (ref 0.3–1)
MONOCYTES NFR BLD: 5.2 % (ref 4–15)
NEUTROPHILS # BLD AUTO: 2.8 K/UL (ref 1.8–7.7)
NEUTROPHILS NFR BLD: 57.9 % (ref 38–73)
NRBC BLD-RTO: 0 /100 WBC
PLATELET # BLD AUTO: 149 K/UL (ref 150–450)
PMV BLD AUTO: 11 FL (ref 9.2–12.9)
POTASSIUM SERPL-SCNC: 4.2 MMOL/L (ref 3.5–5.1)
PROT SERPL-MCNC: 7.3 G/DL (ref 6–8.4)
RBC # BLD AUTO: 4.72 M/UL (ref 4–5.4)
SODIUM SERPL-SCNC: 140 MMOL/L (ref 136–145)
WBC # BLD AUTO: 4.79 K/UL (ref 3.9–12.7)

## 2023-02-02 PROCEDURE — 80053 COMPREHEN METABOLIC PANEL: CPT | Performed by: INTERNAL MEDICINE

## 2023-02-02 PROCEDURE — 3074F SYST BP LT 130 MM HG: CPT | Mod: CPTII,S$GLB,, | Performed by: INTERNAL MEDICINE

## 2023-02-02 PROCEDURE — 99214 OFFICE O/P EST MOD 30 MIN: CPT | Mod: S$GLB,,, | Performed by: INTERNAL MEDICINE

## 2023-02-02 PROCEDURE — 3008F BODY MASS INDEX DOCD: CPT | Mod: CPTII,S$GLB,, | Performed by: INTERNAL MEDICINE

## 2023-02-02 PROCEDURE — 86140 C-REACTIVE PROTEIN: CPT | Performed by: INTERNAL MEDICINE

## 2023-02-02 PROCEDURE — 3079F PR MOST RECENT DIASTOLIC BLOOD PRESSURE 80-89 MM HG: ICD-10-PCS | Mod: CPTII,S$GLB,, | Performed by: INTERNAL MEDICINE

## 2023-02-02 PROCEDURE — 86706 HEP B SURFACE ANTIBODY: CPT | Mod: 91 | Performed by: INTERNAL MEDICINE

## 2023-02-02 PROCEDURE — 1159F MED LIST DOCD IN RCRD: CPT | Mod: CPTII,S$GLB,, | Performed by: INTERNAL MEDICINE

## 2023-02-02 PROCEDURE — 85652 RBC SED RATE AUTOMATED: CPT | Performed by: INTERNAL MEDICINE

## 2023-02-02 PROCEDURE — 3074F PR MOST RECENT SYSTOLIC BLOOD PRESSURE < 130 MM HG: ICD-10-PCS | Mod: CPTII,S$GLB,, | Performed by: INTERNAL MEDICINE

## 2023-02-02 PROCEDURE — 85025 COMPLETE CBC W/AUTO DIFF WBC: CPT | Performed by: INTERNAL MEDICINE

## 2023-02-02 PROCEDURE — 3008F PR BODY MASS INDEX (BMI) DOCUMENTED: ICD-10-PCS | Mod: CPTII,S$GLB,, | Performed by: INTERNAL MEDICINE

## 2023-02-02 PROCEDURE — 87340 HEPATITIS B SURFACE AG IA: CPT | Performed by: INTERNAL MEDICINE

## 2023-02-02 PROCEDURE — 99999 PR PBB SHADOW E&M-EST. PATIENT-LVL III: CPT | Mod: PBBFAC,,, | Performed by: INTERNAL MEDICINE

## 2023-02-02 PROCEDURE — 1160F RVW MEDS BY RX/DR IN RCRD: CPT | Mod: CPTII,S$GLB,, | Performed by: INTERNAL MEDICINE

## 2023-02-02 PROCEDURE — 99999 PR PBB SHADOW E&M-EST. PATIENT-LVL III: ICD-10-PCS | Mod: PBBFAC,,, | Performed by: INTERNAL MEDICINE

## 2023-02-02 PROCEDURE — 99214 PR OFFICE/OUTPT VISIT, EST, LEVL IV, 30-39 MIN: ICD-10-PCS | Mod: S$GLB,,, | Performed by: INTERNAL MEDICINE

## 2023-02-02 PROCEDURE — 1159F PR MEDICATION LIST DOCUMENTED IN MEDICAL RECORD: ICD-10-PCS | Mod: CPTII,S$GLB,, | Performed by: INTERNAL MEDICINE

## 2023-02-02 PROCEDURE — 36415 COLL VENOUS BLD VENIPUNCTURE: CPT | Performed by: INTERNAL MEDICINE

## 2023-02-02 PROCEDURE — 1160F PR REVIEW ALL MEDS BY PRESCRIBER/CLIN PHARMACIST DOCUMENTED: ICD-10-PCS | Mod: CPTII,S$GLB,, | Performed by: INTERNAL MEDICINE

## 2023-02-02 PROCEDURE — 86704 HEP B CORE ANTIBODY TOTAL: CPT | Performed by: INTERNAL MEDICINE

## 2023-02-02 PROCEDURE — 3079F DIAST BP 80-89 MM HG: CPT | Mod: CPTII,S$GLB,, | Performed by: INTERNAL MEDICINE

## 2023-02-02 RX ORDER — FOLIC ACID 1 MG/1
3 TABLET ORAL DAILY
Qty: 90 TABLET | Refills: 4 | Status: SHIPPED | OUTPATIENT
Start: 2023-02-02 | End: 2023-04-12

## 2023-02-02 RX ORDER — METHOTREXATE 2.5 MG/1
TABLET ORAL
Qty: 35 TABLET | Refills: 5 | Status: SHIPPED | OUTPATIENT
Start: 2023-02-02

## 2023-02-02 RX ORDER — ADALIMUMAB 40MG/0.8ML
40 KIT SUBCUTANEOUS
Qty: 4 PEN | Refills: 11 | Status: SHIPPED | OUTPATIENT
Start: 2023-02-02 | End: 2023-07-19

## 2023-02-02 NOTE — TELEPHONE ENCOUNTER
Humira dose increased to weekly. New insurance PBM. No additional PA required. PA active through 9/7/2023.    LOV 10/31/22 - chronic pain of right knee.   Visco injections were discussed.   He did see Dr. Tamayo 12/23/22 and TKR was recommended.   Ok to place preauth order per PA if patient is looking to hold off on surgery. He does need to be aware though that he will need to wait 3 months between injections and surgery if he is looking to go ahead with TKR in the future.   Patient agrees with plan. Has decided not to  surgery at this time.

## 2023-02-02 NOTE — PROGRESS NOTES
Chief Complaint   Patient presents with    Disease Management       History of presenting illness    The chief complaint leading to consultation is: psoriasis and psoriatic arthritis    47 year old female has had psoriasis since 2005  Initially started on the scalp  Now on the hands,ears,feet,scalp  Dermatology confirmed    Tried  -mtx  -stelara  -otezla    On no meds for 6 months  Has active skin disease    In the past she did well on mtx 10 tabs weekly and she had stopped it  So we resumed it  She cannot tolerate the higher dose of mtx    Didn't repond to otezla or stelara    Joints   -right hip,knee and foot hurts  She gets bad sciatica from the low back  Low back hurts on the right side,not in the center    Massages dont help  Left side no pain  Its mostly coming from the back  -neck feels tense  Massages dont help much  Hands,wrists,elbows,shoulders ok    She had been diagnosed with right hip bursitis and she got steroid shot  She had right foot plantar fasciitis     EMS 1 hour    Pain wakes her up in the middle of the night  She cant lay on the right side    Activity helps  Rest makes it worse     No malar rash,photosensitivity   No telangiectasias   No calcinosis     No patchy alopecia   No oral and nasal ulcers     No sicca symptoms     No pleurisy or any cardiopulmonary complaints     No dysphagia,diplopia and dysphonia and muscle weakness     No n/v/d/c   No acid reflux+     No raynaud's+   No digital ulcers     No cytopenias   No renal issues     No blood clots     No fever,chills,night sweats,weight loss and loss of appetite   No pregnancy losses     No headaches   No recurrent conjunctivitis or uveitis     No chronic or bloody diarrhea with no u colitis or crohn's /inflammatory bowel disease     No vaginal and urethral d/c/STDs/no ulcers     Neg RF,CCP,HLAB27  CBC : mild thrombocytopenia  CMP nml  ESR,CRP nml    Pre dmard panel neg    Xrays    LS spine deg arthritis  SI joint nml  Hip xray nml  C  spine deg arthritis  Knees,hands,wrists feet nml      She is now on humira and mtx 3 tabs weekly /folic acid         2/2021 she had acute onset right knee pain    She did MRI  1. Complex tear body segment/posterior horn medial meniscus with a predominant horizontal component and a small peripheral parameniscal cyst.  2. Mild patellar chondral fissuring.  3. Small joint effusion.  4. Mild distal semimembranosus tendinosis with surrounding bursal fluid.    Knee xrays  Focal area of endosteal sclerotic change, right anterior femoral metaphysis stable.  Bone, joint soft tissues otherwise normal.    She has been seeing ortho-braces help,etodolac helps  Surgical intervention when she is ready    11/2021    Off humira since august  On mtx 3 pills weekly/folic acid  Wants to go back on humira     Labs utd-normal  CBC,CMP,ESR,CRP nml    2/2023    Hands,feet have been flaring -psoriasis  On humira 40 mg every other week and mtx 3 tabs weekly,folic acid  She has psoriasis in her ears-manifesting as otitis externa    No joint pains - except right knee and right hip pain    Her hands have been weak- opening jars and bottles is hard    Past history : thyroid disease,depression,GERD,psoriasis     Family history : MS,cancer    Social history : current smoker         Review of Systems   Constitutional:  Negative for activity change, appetite change, chills, diaphoresis, fatigue, fever and unexpected weight change.   HENT:  Negative for congestion, dental problem, drooling, ear discharge, ear pain, facial swelling, hearing loss, mouth sores, nosebleeds, postnasal drip, rhinorrhea, sinus pressure, sinus pain, sneezing, sore throat, tinnitus, trouble swallowing and voice change.    Eyes:  Negative for photophobia, pain, discharge, redness, itching and visual disturbance.   Respiratory:  Negative for apnea, cough, choking, chest tightness, shortness of breath, wheezing and stridor.    Cardiovascular:  Negative for chest pain,  palpitations and leg swelling.   Gastrointestinal:  Negative for abdominal distention, abdominal pain, anal bleeding, blood in stool, constipation, diarrhea, nausea, rectal pain and vomiting.   Endocrine: Negative for cold intolerance, heat intolerance, polydipsia, polyphagia and polyuria.   Genitourinary:  Negative for decreased urine volume, difficulty urinating, dysuria, enuresis, flank pain, frequency, genital sores, hematuria and urgency.   Musculoskeletal:  Positive for arthralgias. Negative for back pain, gait problem, joint swelling, myalgias, neck pain and neck stiffness.   Skin:  Positive for rash. Negative for color change, pallor and wound.   Allergic/Immunologic: Negative for environmental allergies, food allergies and immunocompromised state.   Neurological:  Negative for dizziness, tremors, seizures, syncope, facial asymmetry, speech difficulty, weakness, light-headedness, numbness and headaches.   Hematological:  Negative for adenopathy. Does not bruise/bleed easily.   Psychiatric/Behavioral:  Negative for agitation, behavioral problems, confusion, decreased concentration, dysphoric mood, hallucinations, self-injury, sleep disturbance and suicidal ideas. The patient is not nervous/anxious and is not hyperactive.      Physical exam     Palmar and plantar psoriasis b/l   Ears  Scalp      Assessment       47 year old female has had psoriasis since 2005  Past history : thyroid disease,depression,GERD,psoriasis     Initially started on the scalp  Now on the hands,ears,feet,scalp  Dermatology confirmed  Tried  -mtx  -stelara  -otezla    She did well on 10 pills weekly mtx  She doesn't know why they stopped it  Didn't repond to otezla or stelara    But when we resumed it she had severe GI side effects    She has joint pains in the right hip,knee,foot and low back  She gets bad sciatica from the low back  Low back hurts on the right side,not in the center    She had been diagnosed with right hip bursitis and  she got steroid shot  She had right foot plantar fasciitis   EMS 1 hour  Pain wakes her up in the middle of the night  She cant lay on the right side    Activity helps  Rest makes it worse     She has extensive psoriasis on the hands and feet and ears and scalp  I am not clear she has psoriatic arthritis    No enthesitis,dactylitis or synovitis initially     Right hip tender laterally and posteriorly   Right knee normal exam  LS spine tender  Right foot one MTP on the 3rd toe    Prior LS spine imaging shows deg arthritis  Her back pain seems inflammatory in nature-we never did MRI since she didn't complain much about her back after the first visit    Right knee-meniscal repair/patellar fissuring    But labs and xrays dont show inflammatory arthritis yet    We are treating her for psoriasis and questionable psoriatic arthritis     On exam- psoriasis > joint involvement -right hip and knee not swollen today      1. Psoriasis    2. Psoriatic arthritis    3. Bursitis of other bursa of right hip    4. Spondylosis of lumbar region without myelopathy or radiculopathy    5. Plantar fasciitis of right foot    6. Primary osteoarthritis of right knee          f/u problem     Plan    Continue humira- dose can be increased to 40 mg weekly    Offer mtx - increase- 5 tabs weekly for 4 weeks  7 tabs weekly after that    Folic acid 3 pills daily    Send her to derm    Labs today and in 3 months    Vaccines : flu,pneumonia-refuses  covid vaccines x 2  Needs booster    Dannie and vit d   Vit d  dexa nml    Right knee meniscal tear needs surgical repair -ortho  Right hip bursitis-ortho  Right foot plantar fasciitis- shoes help    Lachelle Gunderson was seen today for disease management.    Diagnoses and all orders for this visit:    Psoriasis  -     CBC Auto Differential; Future  -     Comprehensive Metabolic Panel; Future  -     Sedimentation rate; Future  -     C-Reactive Protein; Future  -     Quantiferon Gold TB; Future  -     Hepatitis B  Surface Ab, Qualitative; Future  -     Hepatitis B Surface Antigen; Future  -     Hepatitis B Core Antibody, Total; Future  -     methotrexate 2.5 MG Tab; 5 tablets weekly for 4 weeks and then 7 tablets weekly after that  -     CBC Auto Differential; Future  -     Comprehensive Metabolic Panel; Future  -     Sedimentation rate; Future  -     C-Reactive Protein; Future    Psoriatic arthritis  -     CBC Auto Differential; Future  -     Comprehensive Metabolic Panel; Future  -     Sedimentation rate; Future  -     C-Reactive Protein; Future  -     Quantiferon Gold TB; Future  -     Hepatitis B Surface Ab, Qualitative; Future  -     Hepatitis B Surface Antigen; Future  -     Hepatitis B Core Antibody, Total; Future  -     methotrexate 2.5 MG Tab; 5 tablets weekly for 4 weeks and then 7 tablets weekly after that  -     CBC Auto Differential; Future  -     Comprehensive Metabolic Panel; Future  -     Sedimentation rate; Future  -     C-Reactive Protein; Future    Bursitis of other bursa of right hip    Spondylosis of lumbar region without myelopathy or radiculopathy    Plantar fasciitis of right foot    Primary osteoarthritis of right knee    Other orders  -     folic acid (FOLVITE) 1 MG tablet; Take 3 tablets (3 mg total) by mouth once daily.  -     adalimumab (HUMIRA PEN) PnKt injection; Inject 1 pen (40 mg total) into the skin every 7 days.

## 2023-02-03 NOTE — TELEPHONE ENCOUNTER
Specialty Pharmacy - Refill Coordination  Specialty Pharmacy - Clinical Intervention    Specialty Medication Orders Linked to Encounter      Flowsheet Row Most Recent Value   Medication #1 adalimumab (HUMIRA PEN) PnKt injection (Order#[id number], Rx#[id number])            Refill Questions - Documented Responses      Flowsheet Row Most Recent Value   Patient Availability and HIPAA Verification    Does patient want to proceed with activity? Yes   HIPAA/medical authority confirmed? Yes   Relationship to patient of person spoken to? Self   Refill Screening Questions    Changes to allergies? No   Changes to medications? Yes  [Humira dose frequency increased]   New conditions since last clinic visit? No   Unplanned office visit, urgent care, ED, or hospital admission in the last 4 weeks? No   How does patient/caregiver feel medication is working? Good   Financial problems or insurance changes? No   How many doses of your specialty medications were missed in the last 4 weeks? 0   Would patient like to speak to a pharmacist? No   When does the patient need to receive the medication? 02/06/23   Refill Delivery Questions    How will the patient receive the medication? Pickup   When does the patient need to receive the medication? 02/06/23   Address in [facility] confirmed and updated if neccessary? No   Expected Copay ($) 5   Is the patient able to afford the medication copay? Yes   Payment Method at pickup   Days supply of Refill 28   Supplies needed? Alcohol Swabs   Refill activity completed? Yes   Refill activity plan Refill scheduled   Shipment/Pickup Date: 02/03/23            Current Outpatient Medications   Medication Sig    adalimumab (HUMIRA PEN) PnKt injection Inject 1 pen (40 mg total) into the skin every 7 days.    estradiol 0.05 mg/24 hr td ptsw (VIVELLE-DOT) 0.05 mg/24 hr Place 1 patch onto the skin twice a week.    folic acid (FOLVITE) 1 MG tablet Take 3 tablets (3 mg total) by mouth once daily.     levothyroxine (SYNTHROID) 137 MCG Tab tablet Take 137 mcg by mouth once daily.    methotrexate 2.5 MG Tab 5 tablets weekly for 4 weeks and then 7 tablets weekly after that    mupirocin (BACTROBAN) 2 % ointment Apply topically 3 (three) times daily.    omeprazole (PRILOSEC) 20 MG capsule Take 1 capsule (20 mg total) by mouth once daily.    OZEMPIC 0.25 mg or 0.5 mg(2 mg/1.5 mL) pen injector Inject 0.5 mg into the skin every 7 days.    progesterone (PROMETRIUM) 100 MG capsule Take 1 capsule (100 mg total) by mouth once daily.    semaglutide (OZEMPIC) 0.25 mg or 0.5 mg(2 mg/1.5 mL) pen injector Inject 0.5 mg into the skin once a week.    UNABLE TO FIND medication name: Testosterone 3% gel 1 click to upper inner thigh daily    valACYclovir (VALTREX) 500 MG tablet TAKE 1 TABLET BY MOUTH TWICE DAILY FOR 3 DAYS. BEGIN AT FIRST SIGN OF OUTBREAK   Last reviewed on 2/2/2023 10:22 AM by Sumeet Echols MD    Review of patient's allergies indicates:  No Known Allergies Last reviewed on  2/2/2023 9:09 AM by Rosana Fierro    Interventions added this encounter   Closed: [facility] Patient Intervention - Patient educational resources: adalimumab (HUMIRA PEN) PnKt injection     Tasks added this encounter   2/27/2023 - Refill Call (Auto Added)  2/4/2023 - Pickup Reminder   Tasks due within next 3 months   No tasks due.     Adilene Bahena, PharmD  [facility] - Specialty Pharmacy  [address] [place] [id number]  Phone: [phone]  Fax: [phone]

## 2023-02-06 ENCOUNTER — PATIENT MESSAGE (OUTPATIENT)
Dept: PHARMACY | Facility: CLINIC | Age: 48
End: 2023-02-06
Payer: COMMERCIAL

## 2023-02-13 ENCOUNTER — OFFICE VISIT (OUTPATIENT)
Dept: DERMATOLOGY | Facility: CLINIC | Age: 48
End: 2023-02-13
Payer: COMMERCIAL

## 2023-02-13 DIAGNOSIS — D18.01 ANGIOMA OF SKIN: ICD-10-CM

## 2023-02-13 DIAGNOSIS — L82.1 SEBORRHEIC KERATOSIS: ICD-10-CM

## 2023-02-13 DIAGNOSIS — L40.0 PSORIASIS VULGARIS: Primary | ICD-10-CM

## 2023-02-13 PROCEDURE — 99214 OFFICE O/P EST MOD 30 MIN: CPT | Mod: S$GLB,,, | Performed by: DERMATOLOGY

## 2023-02-13 PROCEDURE — 1159F PR MEDICATION LIST DOCUMENTED IN MEDICAL RECORD: ICD-10-PCS | Mod: CPTII,S$GLB,, | Performed by: DERMATOLOGY

## 2023-02-13 PROCEDURE — 1160F PR REVIEW ALL MEDS BY PRESCRIBER/CLIN PHARMACIST DOCUMENTED: ICD-10-PCS | Mod: CPTII,S$GLB,, | Performed by: DERMATOLOGY

## 2023-02-13 PROCEDURE — 99214 PR OFFICE/OUTPT VISIT, EST, LEVL IV, 30-39 MIN: ICD-10-PCS | Mod: S$GLB,,, | Performed by: DERMATOLOGY

## 2023-02-13 PROCEDURE — 1160F RVW MEDS BY RX/DR IN RCRD: CPT | Mod: CPTII,S$GLB,, | Performed by: DERMATOLOGY

## 2023-02-13 PROCEDURE — 1159F MED LIST DOCD IN RCRD: CPT | Mod: CPTII,S$GLB,, | Performed by: DERMATOLOGY

## 2023-02-13 RX ORDER — BETAMETHASONE VALERATE 0.1 %
LOTION (ML) TOPICAL
Qty: 60 ML | Refills: 3 | Status: SHIPPED | OUTPATIENT
Start: 2023-02-13

## 2023-02-13 RX ORDER — BETAMETHASONE DIPROPIONATE 0.5 MG/G
CREAM TOPICAL
Qty: 50 G | Refills: 5 | Status: SHIPPED | OUTPATIENT
Start: 2023-02-13

## 2023-02-13 NOTE — PROGRESS NOTES
Patient Information  Name: Lachelle Engel  : 1975  MRN: [id number]     Referring Physician:  No ref. provider found   Primary Care Physician:  Primary Doctor No   Date of Visit: 2023      Subjective:     History of Present lllness:    Lachelle Engel is a 47 y.o. female who presents with a chief complaint of dark growth, psoriasis and red spot.    Diagnosis: psoriasis  Location: palms, elbows, ears, frontal scalp and behind ears  Signs/Symptoms: plaques, dry cracking hands  Symptom course: unchanged  Current treatment: Humira once weekly, MTX once weekly, OTC Mg217 coal tar    Seb Keratosis  Location: midline upper back  Duration: unsure  Signs/Symptoms: dark raised growth  Relieving factors/Prior treatments: none    Angioma  Location: L eyelid  Duration: unsure  Signs/Symptoms: red spot  Relieving factors/Prior treatments: none    Clinical documentation obtained by nursing staff reviewed.    Review of Systems   Constitutional:  Negative for fever.   HENT:  Negative for sore throat.    Gastrointestinal:  Negative for diarrhea.   Musculoskeletal:  Negative for arthralgias.   Skin:  Positive for itching, rash and dry skin.     Objective:   Physical Exam   Constitutional: She appears well-developed and well-nourished. No distress.   Neurological: She is alert and oriented to person, place, and time. She is not disoriented.   Psychiatric: She has a normal mood and affect.   Skin:   Areas Examined (abnormalities noted in diagram):   Head / Face Inspection Performed  Back Inspection Performed  RUE Inspected  LUE Inspection Performed               Diagram Legend     Erythematous scaling macule/papule c/w actinic keratosis       Vascular papule c/w angioma      Pigmented verrucoid papule/plaque c/w seborrheic keratosis      Yellow umbilicated papule c/w sebaceous hyperplasia      Irregularly shaped tan macule c/w lentigo     1-2 mm smooth white papules consistent with Milia      Movable subcutaneous  cyst with punctum c/w epidermal inclusion cyst      Subcutaneous movable cyst c/w pilar cyst      Firm pink to brown papule c/w dermatofibroma      Pedunculated fleshy papule(s) c/w skin tag(s)      Evenly pigmented macule c/w junctional nevus     Mildly variegated pigmented, slightly irregular-bordered macule c/w mildly atypical nevus      Flesh colored to evenly pigmented papule c/w intradermal nevus       Pink pearly papule/plaque c/w basal cell carcinoma      Erythematous hyperkeratotic cursted plaque c/w SCC      Surgical scar with no sign of skin cancer recurrence      Open and closed comedones      Inflammatory papules and pustules      Verrucoid papule consistent consistent with wart     Erythematous eczematous patches and plaques     Dystrophic onycholytic nail with subungual debris c/w onychomycosis     Umbilicated papule    Erythematous-base heme-crusted tan verrucoid plaque consistent with inflamed seborrheic keratosis     Erythematous Silvery Scaling Plaque c/w Psoriasis     See annotation    No images are attached to the encounter or orders placed in the encounter.      [] Data reviewed  [] Prior external notes reviewed  [] Independent review of test  [] Management discussed with another provider  [] Independent historian    Assessment / Plan:        Psoriasis vulgaris  - chronic problem with exacerbation/progression  Continue Humira and MTX per rheum.  -     augmented betamethasone dipropionate (DIPROLENE-AF) 0.05 % cream; Apply to affected areas of body BID prn psoriasis.  Dispense: 50 g; Refill: 5  -     betamethasone valerate 0.1% (VALISONE) 0.1 % Lotn; Apply to affected areas of scalp/ears BID prn psoriasis.  Dispense: 60 mL; Refill: 3  Side effects from the overuse of topical steroids include thinning of skin, easy tearing/bruising of skin, stretch marks, spider veins, etc. Use the topical steroid no more than 2 days per week if used long-term and/or take breaks from the topical steroid,  especially if any of the above side effects are noticed.    Seborrheic keratosis  These are benign, inherited growths without a malignant potential. Reassurance given to patient. No treatment is necessary.    Angioma of skin  These are benign vascular lesions that are inherited. Treatment is not necessary.    Follow up in about 3 months (around 5/13/2023) for follow up, or sooner if symptoms worsening or not improving.      Ximena Maloney MD, FAAD  [facility]

## 2023-02-27 ENCOUNTER — PATIENT MESSAGE (OUTPATIENT)
Dept: PHARMACY | Facility: CLINIC | Age: 48
End: 2023-02-27
Payer: COMMERCIAL

## 2023-03-08 ENCOUNTER — SPECIALTY PHARMACY (OUTPATIENT)
Dept: PHARMACY | Facility: CLINIC | Age: 48
End: 2023-03-08
Payer: COMMERCIAL

## 2023-03-08 NOTE — TELEPHONE ENCOUNTER
Specialty Pharmacy - Refill Coordination    Specialty Medication Orders Linked to Encounter      Flowsheet Row Most Recent Value   Medication #1 adalimumab (HUMIRA PEN) PnKt injection (Order#[id number], Rx#[id number])          Refill Questions - Documented Responses      Flowsheet Row Most Recent Value   Patient Availability and HIPAA Verification    Does patient want to proceed with activity? Unable to Reach          We have made multiple attempts to the patient and have been unsuccessful to reach the patient. We will stop reaching out to the patient but in the event that the patient needs the med and contacts us, we will communicate and begin dispensing for the patient. At your next visit with the patient, please review the importance of being in contact with our specialty pharmacy as a part of our care team.    Interventions added this encounter   Closed: [facility] Provider Intervention - Drug therapy adherence: adalimumab (HUMIRA PEN) PnKt injection     Adilene Bahena, PharmD  [facility]  [address] [facility] [address]  Phone: [phone]  Fax: [phone]

## 2023-03-27 NOTE — TELEPHONE ENCOUNTER
Specialty Pharmacy - Refill Coordination    Specialty Medication Orders Linked to Encounter      Flowsheet Row Most Recent Value   Medication #1 adalimumab (HUMIRA PEN) PnKt injection (Order#[id number], Rx#[id number])            Refill Questions - Documented Responses      Flowsheet Row Most Recent Value   Patient Availability and HIPAA Verification    Does patient want to proceed with activity? Yes   HIPAA/medical authority confirmed? Yes   Relationship to patient of person spoken to? Self   Refill Screening Questions    Changes to allergies? No   Changes to medications? No   New conditions since last clinic visit? No   Unplanned office visit, urgent care, ED, or hospital admission in the last 4 weeks? No   How does patient/caregiver feel medication is working? Very good   Financial problems or insurance changes? No   How many doses of your specialty medications were missed in the last 4 weeks? 0   Would patient like to speak to a pharmacist? No   When does the patient need to receive the medication? 03/28/23   Refill Delivery Questions    How will the patient receive the medication? Pickup   When does the patient need to receive the medication? 03/28/23   Address in [facility] confirmed and updated if neccessary? Yes   Expected Copay ($) 5   Is the patient able to afford the medication copay? Yes   Payment Method at pickup   Days supply of Refill 28   Supplies needed? No supplies needed   Refill activity completed? Yes   Refill activity plan Refill scheduled   Shipment/Pickup Date: 03/27/23            Current Outpatient Medications   Medication Sig    adalimumab (HUMIRA PEN) PnKt injection Inject 1 pen (40 mg total) into the skin every 7 days.    augmented betamethasone dipropionate (DIPROLENE-AF) 0.05 % cream Apply to affected areas of body BID prn psoriasis.    betamethasone valerate 0.1% (VALISONE) 0.1 % Lotn Apply to affected areas of scalp/ears BID prn psoriasis.    estradiol 0.05 mg/24 hr td ptsw  (VIVELLE-DOT) 0.05 mg/24 hr Place 1 patch onto the skin twice a week.    folic acid (FOLVITE) 1 MG tablet Take 3 tablets (3 mg total) by mouth once daily.    levothyroxine (SYNTHROID) 137 MCG Tab tablet Take 137 mcg by mouth once daily.    methotrexate 2.5 MG Tab 5 tablets weekly for 4 weeks and then 7 tablets weekly after that    mupirocin (BACTROBAN) 2 % ointment Apply topically 3 (three) times daily.    omeprazole (PRILOSEC) 20 MG capsule Take 1 capsule (20 mg total) by mouth once daily.    OZEMPIC 0.25 mg or 0.5 mg(2 mg/1.5 mL) pen injector Inject 0.5 mg into the skin every 7 days.    progesterone (PROMETRIUM) 100 MG capsule Take 1 capsule (100 mg total) by mouth once daily.    semaglutide (OZEMPIC) 0.25 mg or 0.5 mg(2 mg/1.5 mL) pen injector Inject 0.5 mg into the skin once a week.    semaglutide (OZEMPIC) 1 mg/dose (4 mg/3 mL) Inject 1 mg into the skin every 7 days    UNABLE TO FIND medication name: Testosterone 3% gel 1 click to upper inner thigh daily    valACYclovir (VALTREX) 500 MG tablet TAKE 1 TABLET BY MOUTH TWICE DAILY FOR 3 DAYS. BEGIN AT FIRST SIGN OF OUTBREAK   Last reviewed on 2/13/2023 12:01 PM by Ximena Maloney MD    Review of patient's allergies indicates:  No Known Allergies Last reviewed on  2/13/2023 12:01 PM by Ximena Maloney    Interventions added this encounter   Closed: [facility] Provider Intervention - Drug therapy adherence: adalimumab (HUMIRA PEN) PnKt injection     Tasks added this encounter   4/18/2023 - Refill Call (Auto Added)   Tasks due within next 3 months   No tasks due.     Dennis Menendez, PharmD  [facility]  [address] [place] [id number]  Phone: [phone]  Fax: [phone]

## 2023-04-12 ENCOUNTER — OFFICE VISIT (OUTPATIENT)
Dept: ALLERGY | Facility: CLINIC | Age: 48
End: 2023-04-12
Payer: COMMERCIAL

## 2023-04-12 ENCOUNTER — LAB VISIT (OUTPATIENT)
Dept: LAB | Facility: HOSPITAL | Age: 48
End: 2023-04-12
Attending: STUDENT IN AN ORGANIZED HEALTH CARE EDUCATION/TRAINING PROGRAM
Payer: COMMERCIAL

## 2023-04-12 VITALS
HEART RATE: 73 BPM | OXYGEN SATURATION: 97 % | BODY MASS INDEX: 37.57 KG/M2 | DIASTOLIC BLOOD PRESSURE: 74 MMHG | SYSTOLIC BLOOD PRESSURE: 116 MMHG | WEIGHT: 225.75 LBS

## 2023-04-12 DIAGNOSIS — J31.0 RHINITIS, UNSPECIFIED TYPE: Primary | ICD-10-CM

## 2023-04-12 DIAGNOSIS — L30.9 ECZEMA, UNSPECIFIED TYPE: ICD-10-CM

## 2023-04-12 DIAGNOSIS — J31.0 CHRONIC RHINITIS: ICD-10-CM

## 2023-04-12 PROCEDURE — 3008F PR BODY MASS INDEX (BMI) DOCUMENTED: ICD-10-PCS | Mod: CPTII,S$GLB,, | Performed by: STUDENT IN AN ORGANIZED HEALTH CARE EDUCATION/TRAINING PROGRAM

## 2023-04-12 PROCEDURE — 3078F PR MOST RECENT DIASTOLIC BLOOD PRESSURE < 80 MM HG: ICD-10-PCS | Mod: CPTII,S$GLB,, | Performed by: STUDENT IN AN ORGANIZED HEALTH CARE EDUCATION/TRAINING PROGRAM

## 2023-04-12 PROCEDURE — 3074F SYST BP LT 130 MM HG: CPT | Mod: CPTII,S$GLB,, | Performed by: STUDENT IN AN ORGANIZED HEALTH CARE EDUCATION/TRAINING PROGRAM

## 2023-04-12 PROCEDURE — 1159F PR MEDICATION LIST DOCUMENTED IN MEDICAL RECORD: ICD-10-PCS | Mod: CPTII,S$GLB,, | Performed by: STUDENT IN AN ORGANIZED HEALTH CARE EDUCATION/TRAINING PROGRAM

## 2023-04-12 PROCEDURE — 3008F BODY MASS INDEX DOCD: CPT | Mod: CPTII,S$GLB,, | Performed by: STUDENT IN AN ORGANIZED HEALTH CARE EDUCATION/TRAINING PROGRAM

## 2023-04-12 PROCEDURE — 99203 OFFICE O/P NEW LOW 30 MIN: CPT | Mod: S$GLB,,, | Performed by: STUDENT IN AN ORGANIZED HEALTH CARE EDUCATION/TRAINING PROGRAM

## 2023-04-12 PROCEDURE — 1159F MED LIST DOCD IN RCRD: CPT | Mod: CPTII,S$GLB,, | Performed by: STUDENT IN AN ORGANIZED HEALTH CARE EDUCATION/TRAINING PROGRAM

## 2023-04-12 PROCEDURE — 82785 ASSAY OF IGE: CPT | Performed by: STUDENT IN AN ORGANIZED HEALTH CARE EDUCATION/TRAINING PROGRAM

## 2023-04-12 PROCEDURE — 86003 ALLG SPEC IGE CRUDE XTRC EA: CPT | Mod: 59 | Performed by: STUDENT IN AN ORGANIZED HEALTH CARE EDUCATION/TRAINING PROGRAM

## 2023-04-12 PROCEDURE — 99203 PR OFFICE/OUTPT VISIT, NEW, LEVL III, 30-44 MIN: ICD-10-PCS | Mod: S$GLB,,, | Performed by: STUDENT IN AN ORGANIZED HEALTH CARE EDUCATION/TRAINING PROGRAM

## 2023-04-12 PROCEDURE — 1160F RVW MEDS BY RX/DR IN RCRD: CPT | Mod: CPTII,S$GLB,, | Performed by: STUDENT IN AN ORGANIZED HEALTH CARE EDUCATION/TRAINING PROGRAM

## 2023-04-12 PROCEDURE — 1160F PR REVIEW ALL MEDS BY PRESCRIBER/CLIN PHARMACIST DOCUMENTED: ICD-10-PCS | Mod: CPTII,S$GLB,, | Performed by: STUDENT IN AN ORGANIZED HEALTH CARE EDUCATION/TRAINING PROGRAM

## 2023-04-12 PROCEDURE — 3078F DIAST BP <80 MM HG: CPT | Mod: CPTII,S$GLB,, | Performed by: STUDENT IN AN ORGANIZED HEALTH CARE EDUCATION/TRAINING PROGRAM

## 2023-04-12 PROCEDURE — 99999 PR PBB SHADOW E&M-EST. PATIENT-LVL IV: ICD-10-PCS | Mod: PBBFAC,,, | Performed by: STUDENT IN AN ORGANIZED HEALTH CARE EDUCATION/TRAINING PROGRAM

## 2023-04-12 PROCEDURE — 99999 PR PBB SHADOW E&M-EST. PATIENT-LVL IV: CPT | Mod: PBBFAC,,, | Performed by: STUDENT IN AN ORGANIZED HEALTH CARE EDUCATION/TRAINING PROGRAM

## 2023-04-12 PROCEDURE — 36415 COLL VENOUS BLD VENIPUNCTURE: CPT | Performed by: STUDENT IN AN ORGANIZED HEALTH CARE EDUCATION/TRAINING PROGRAM

## 2023-04-12 PROCEDURE — 3074F PR MOST RECENT SYSTOLIC BLOOD PRESSURE < 130 MM HG: ICD-10-PCS | Mod: CPTII,S$GLB,, | Performed by: STUDENT IN AN ORGANIZED HEALTH CARE EDUCATION/TRAINING PROGRAM

## 2023-04-12 PROCEDURE — 86003 ALLG SPEC IGE CRUDE XTRC EA: CPT | Performed by: STUDENT IN AN ORGANIZED HEALTH CARE EDUCATION/TRAINING PROGRAM

## 2023-04-12 NOTE — PATIENT INSTRUCTIONS
Testing  Blood work for allergy testing today       Check [facility] in one week for results or call [phone]       Contact me with questions or concerns       I will contact you if anything needs immediate attention.      Follow up  If testing positive, follow up in 2 weeks.  If testing is entirely negative, OK to cancel apt.

## 2023-04-12 NOTE — PROGRESS NOTES
Allergy Clinic Note  [facility]    This note was created by combination of typed  and M-Modal dictation. Transcription errors may be present.  If there are any questions, please contact me.    Subjective:      Patient ID: Lachelle Engel is a 47 y.o. female.    Chief Complaint: Allergies (First consultation for allergies and wants to know what is causing the flare up.  HX: psoriasis.  Have not been able to eat Bell peppers because it causes a flare up. )      Referring Provider:      History of Present Illness: Lachelle Engel is a 47 y.o. female with concerns for food allergy.  She is here alone, and she is a good historian    Related medications and other interventions  (Humira)  (MTX)  (omeprazole)      4/12/23:  At initial visit, client wants to assess triggers for psoriasis/eczema overlap syndrome.  She reports that some foods, such as bell peppers, appear to be associated with flare of cutaneous symptoms about 1-2 days later.      She also complains of GI symptoms and asks about allergy testing to find triggers.  Discussed lack of utility of either IgE or IgG food testing in the setting.    Pt also has h/o rhinitis since childhood.  Currently she experiences sx at her mother's house where there is a cat.     MEDICAL HISTORY      Significant past medical history: psoriasis, DM, GERD, thyroid dz  ENT surgery:  None    Significant family history: chil[darien] with allergies.  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  Smoking Hx:  Client  reports that she has been smoking cigarettes. She has a 3.75 pack-year smoking history. She uses smokeless tobacco.      Asthma: No  Eczema: vs posriasis  Rhinitis: See HPI  Drug allergy/intolerance: NKDA  Venom allergy:  No  Latex allergy:  No    Patient Active Problem List   Diagnosis    Depression    Psoriasis    Impaired fasting blood sugar    GERD (gastroesophageal reflux  disease)    Acute appendicitis    Abdominal pain     Medication List with Changes/Refills   Current Medications    ADALIMUMAB (HUMIRA PEN) PNKT INJECTION    Inject 1 pen (40 mg total) into the skin every 7 days.       Start Date: 2/2/2023  End Date: 4/24/2023    AUGMENTED BETAMETHASONE DIPROPIONATE (DIPROLENE-AF) 0.05 % CREAM    Apply to affected areas of body BID prn psoriasis.       Start Date: 2/13/2023 End Date: --    BETAMETHASONE VALERATE 0.1% (VALISONE) 0.1 % LOTN    Apply to affected areas of scalp/ears BID prn psoriasis.       Start Date: 2/13/2023 End Date: --    ESTRADIOL 0.05 MG/24 HR TD PTSW (VIVELLE-DOT) 0.05 MG/24 HR    Place 1 patch onto the skin twice a week.       Start Date: 9/13/2022 End Date: 9/13/2023    FOLIC ACID (FOLVITE) 1 MG TABLET    Take 3 tablets (3 mg total) by mouth once daily.       Start Date: 2/2/2023  End Date: 3/4/2023    LEVOTHYROXINE (SYNTHROID) 137 MCG TAB TABLET    Take 137 mcg by mouth once daily.       Start Date: 8/3/2022  End Date: --    METHOTREXATE 2.5 MG TAB    5 tablets weekly for 4 weeks and then 7 tablets weekly after that       Start Date: 2/2/2023  End Date: --    MUPIROCIN (BACTROBAN) 2 % OINTMENT    Apply topically 3 (three) times daily.       Start Date: 10/21/2020End Date: --    OMEPRAZOLE (PRILOSEC) 20 MG CAPSULE    Take 1 capsule (20 mg total) by mouth once daily.       Start Date: 1/13/2020 End Date: 1/17/2023    OZEMPIC 0.25 MG OR 0.5 MG(2 MG/1.5 ML) PEN INJECTOR    Inject 0.5 mg into the skin every 7 days.       Start Date: 10/21/2022End Date: --    PROGESTERONE (PROMETRIUM) 100 MG CAPSULE    Take 1 capsule (100 mg total) by mouth once daily.       Start Date: 9/9/2022  End Date: 9/9/2023    SEMAGLUTIDE (OZEMPIC) 0.25 MG OR 0.5 MG(2 MG/1.5 ML) PEN INJECTOR    Inject 0.5 mg into the skin once a week.       Start Date: 8/5/2022  End Date: --    SEMAGLUTIDE (OZEMPIC) 1 MG/DOSE (4 MG/3 ML)    Inject 1 mg into the skin every 7 days       Start Date: 2/13/2023  End Date: --    UNABLE TO FIND    medication name: Testosterone 3% gel 1 click to upper inner thigh daily       Start Date: 1/30/2023 End Date: --    VALACYCLOVIR (VALTREX) 500 MG TABLET    TAKE 1 TABLET BY MOUTH TWICE DAILY FOR 3 DAYS. BEGIN AT FIRST SIGN OF OUTBREAK       Start Date: 8/27/2021 End Date: --         REVIEW OF SYSTEMS      CONST: no F/C/NS, no unintentional weight changes  NEURO:  no tremor, no weakness  EYES: no discharge, no pruritus, no erythema  EARS: no hearing loss, no sensation of fullness  NOSE: no congestion, no rhinorrhea, no sneezing  PULM:  no SOB, no wheezing, no cough  CV: no CP, no palpitations, no leg swelling  GI:  no abdominal pain, no blood in stool  :  no dysuria, no hematuria  DERM: no rashes, no skin breaks    PHYSICAL EXAM     /74 (BP Location: Left arm, Patient Position: Sitting, BP Method: Medium (Automatic))   Pulse 73   Wt 102.4 kg (225 lb 12 oz)   SpO2 97%   BMI 37.57 kg/m²   GEN: Awake and alert, no distress  DERM: No flushing, No rashes  EYE:  No occular discharge  HEENT: No nasal discharge, no hoarseness  PULM: Normal work of breathing, no cough  NEURO:  No focal deficit, speech fluent and logical  PSYCH: appropriate affect, normal behavior    MEDICAL DECISION MAKING     Data reviewed:      Allergy Testing            Lab results      EO count 100, 2023      Imaging and other diagnostics            Medical records review          MEDICAL DECISION-MAKING       Diagnoses:     Lachelle Engel is a 47 y.o. female. with  1. Rhinitis, unspecified type    2. Eczema, unspecified type        Plan:     Rhinitis, unspecified type  -     IgE; Future; Expected date: 04/12/2023  -     Dermatophagoides Harper; Future; Expected date: 04/12/2023  -     Dermatophagoides Pteronyssinus; Future; Expected date: 04/12/2023  -     [place]; Future; Expected date: 04/12/2023  -     Carlos; Future; Expected date: 04/12/2023  -     [place]; Future; Expected date: 04/12/2023  -      English Plantain; Future; Expected date: 04/12/2023  -     [place] Pecan; Future; Expected date: 04/12/2023  -     Pecan; Future; Expected date: 04/12/2023  -     Ragweed; Future; Expected date: 04/12/2023  -     Alternaria; Future; Expected date: 04/12/2023  -     Aspergillus; Future; Expected date: 04/12/2023  -     Cat; Future; Expected date: 04/12/2023  -     Cockroach; Future; Expected date: 04/12/2023  -     Dog; Future; Expected date: 04/12/2023  -     ALLERGEN PENICILLIUM; Future; Expected date: 04/12/2023    Eczema, unspecified type  -     Chicken IgE; Future; Expected date: 04/12/2023  -     Beef IgE; Future; Expected date: 04/12/2023  -     Pork IgE; Future; Expected date: 04/12/2023  -     Milk IgE; Future; Expected date: 04/12/2023  -     Wheat IgE; Future; Expected date: 04/12/2023  -     ALLERGEN GLUTEN IGE; Future; Expected date: 04/12/2023  -     Tomato IgE; Future; Expected date: 04/12/2023  -     Allergen - Grapefruit; Future; Expected date: 04/12/2023  -     Lemon IgE; Future; Expected date: 04/12/2023  -     Orange IgE; Future; Expected date: 04/12/2023  -     Almonds IgE; Future; Expected date: 04/19/2023          PATIENT INSTRUCTIONS AND FOLLOW-UP     Patient Instructions   Testing  Blood work for allergy testing today       Check [facility] in one week for results or call [phone]       Contact me with questions or concerns       I will contact you if anything needs immediate attention.      Follow up  If testing positive, follow up in 2 weeks.  If testing is entirely negative, OK to cancel apt.      Follow up 2 weeks only if Immunocaps positive    Tamiko Paz MD  Allergy, Asthma and Immunology

## 2023-04-13 LAB — IGE SERPL-ACNC: <35 IU/ML (ref 0–100)

## 2023-04-17 LAB
A ALTERNATA IGE QN: <0.1 KU/L
A FUMIGATUS IGE QN: <0.1 KU/L
ALMOND IGE QN: <0.1 KU/L
BEEF IGE QN: <0.1 KU/L
BERMUDA GRASS IGE QN: <0.1 KU/L
CAT DANDER IGE QN: <0.1 KU/L
CEDAR IGE QN: <0.1 KU/L
CHICKEN CLASS: NORMAL
CHICKEN IGE QN: <0.1 KU/L
COW MILK IGE QN: <0.1 KU/L
D FARINAE IGE QN: <0.1 KU/L
D PTERONYSS IGE QN: <0.1 KU/L
DEPRECATED A ALTERNATA IGE RAST QL: NORMAL
DEPRECATED A FUMIGATUS IGE RAST QL: NORMAL
DEPRECATED ALMOND IGE RAST QL: NORMAL
DEPRECATED BEEF IGE RAST QL: NORMAL
DEPRECATED BERMUDA GRASS IGE RAST QL: NORMAL
DEPRECATED CAT DANDER IGE RAST QL: NORMAL
DEPRECATED CEDAR IGE RAST QL: NORMAL
DEPRECATED COW MILK IGE RAST QL: NORMAL
DEPRECATED D FARINAE IGE RAST QL: NORMAL
DEPRECATED D PTERONYSS IGE RAST QL: NORMAL
DEPRECATED DOG DANDER IGE RAST QL: NORMAL
DEPRECATED ENGL PLANTAIN IGE RAST QL: NORMAL
DEPRECATED GLUTEN IGE RAST QL: NORMAL
DEPRECATED GRAPEFRUIT IGE RAST QL: NORMAL
DEPRECATED LEMON IGE RAST QL: NORMAL
DEPRECATED ORANGE IGE RAST QL: NORMAL
DEPRECATED P NOTATUM IGE RAST QL: NORMAL
DEPRECATED PECAN/HICK TREE IGE RAST QL: NORMAL
DEPRECATED PORK IGE RAST QL: NORMAL
DEPRECATED ROACH IGE RAST QL: NORMAL
DEPRECATED TIMOTHY IGE RAST QL: NORMAL
DEPRECATED TOMATO IGE RAST QL: NORMAL
DEPRECATED WEST RAGWEED IGE RAST QL: NORMAL
DEPRECATED WHEAT IGE RAST QL: NORMAL
DEPRECATED WHITE OAK IGE RAST QL: NORMAL
DOG DANDER IGE QN: <0.1 KU/L
ENGL PLANTAIN IGE QN: <0.1 KU/L
GLUTEN IGE QN: <0.1 KU/L
GRAPEFRUIT IGE QN: <0.1 KU/L
LEMON IGE QN: <0.1 KU/L
ORANGE IGE QN: <0.1 KU/L
P NOTATUM IGE QN: <0.1 KU/L
PECAN/HICK TREE IGE QN: <0.1 KU/L
PORK IGE QN: <0.1 KU/L
ROACH IGE QN: <0.1 KU/L
TIMOTHY IGE QN: <0.1 KU/L
TOMATO IGE QN: <0.1 KU/L
WEST RAGWEED IGE QN: <0.1 KU/L
WHEAT IGE QN: <0.1 KU/L
WHITE OAK IGE QN: <0.1 KU/L

## 2023-04-18 ENCOUNTER — SPECIALTY PHARMACY (OUTPATIENT)
Dept: PHARMACY | Facility: CLINIC | Age: 48
End: 2023-04-18

## 2023-04-18 ENCOUNTER — PATIENT MESSAGE (OUTPATIENT)
Dept: ALLERGY | Facility: CLINIC | Age: 48
End: 2023-04-18
Payer: COMMERCIAL

## 2023-04-18 NOTE — TELEPHONE ENCOUNTER
Outgoing call: Patient is due to inject on 4/26, I informed her that a PA was required and once approved [facility] will follow up. Routing to [facility].

## 2023-04-21 ENCOUNTER — PATIENT MESSAGE (OUTPATIENT)
Dept: RHEUMATOLOGY | Facility: CLINIC | Age: 48
End: 2023-04-21
Payer: COMMERCIAL

## 2023-04-21 ENCOUNTER — LAB VISIT (OUTPATIENT)
Dept: LAB | Facility: HOSPITAL | Age: 48
End: 2023-04-21
Attending: INTERNAL MEDICINE
Payer: COMMERCIAL

## 2023-04-21 DIAGNOSIS — L40.50 PSORIATIC ARTHRITIS: ICD-10-CM

## 2023-04-21 DIAGNOSIS — L40.9 PSORIASIS: ICD-10-CM

## 2023-04-21 LAB
ALBUMIN SERPL BCP-MCNC: 4 G/DL (ref 3.5–5.2)
ALP SERPL-CCNC: 90 U/L (ref 55–135)
ALT SERPL W/O P-5'-P-CCNC: 19 U/L (ref 10–44)
ANION GAP SERPL CALC-SCNC: 9 MMOL/L (ref 8–16)
AST SERPL-CCNC: 17 U/L (ref 10–40)
BASOPHILS # BLD AUTO: 0.01 K/UL (ref 0–0.2)
BASOPHILS NFR BLD: 0.2 % (ref 0–1.9)
BILIRUB SERPL-MCNC: 0.3 MG/DL (ref 0.1–1)
BUN SERPL-MCNC: 15 MG/DL (ref 6–20)
CALCIUM SERPL-MCNC: 9.1 MG/DL (ref 8.7–10.5)
CHLORIDE SERPL-SCNC: 107 MMOL/L (ref 95–110)
CO2 SERPL-SCNC: 23 MMOL/L (ref 23–29)
CREAT SERPL-MCNC: 1 MG/DL (ref 0.5–1.4)
CRP SERPL-MCNC: 0.3 MG/L (ref 0–8.2)
DIFFERENTIAL METHOD: NORMAL
EOSINOPHIL # BLD AUTO: 0.1 K/UL (ref 0–0.5)
EOSINOPHIL NFR BLD: 1.1 % (ref 0–8)
ERYTHROCYTE [DISTWIDTH] IN BLOOD BY AUTOMATED COUNT: 11.9 % (ref 11.5–14.5)
ERYTHROCYTE [SEDIMENTATION RATE] IN BLOOD BY PHOTOMETRIC METHOD: 6 MM/HR (ref 0–36)
EST. GFR  (NO RACE VARIABLE): >60 ML/MIN/1.73 M^2
GLUCOSE SERPL-MCNC: 90 MG/DL (ref 70–110)
HCT VFR BLD AUTO: 43 % (ref 37–48.5)
HGB BLD-MCNC: 14.1 G/DL (ref 12–16)
IMM GRANULOCYTES # BLD AUTO: 0.01 K/UL (ref 0–0.04)
IMM GRANULOCYTES NFR BLD AUTO: 0.2 % (ref 0–0.5)
LYMPHOCYTES # BLD AUTO: 1.9 K/UL (ref 1–4.8)
LYMPHOCYTES NFR BLD: 30.8 % (ref 18–48)
MCH RBC QN AUTO: 30.7 PG (ref 27–31)
MCHC RBC AUTO-ENTMCNC: 32.8 G/DL (ref 32–36)
MCV RBC AUTO: 94 FL (ref 82–98)
MONOCYTES # BLD AUTO: 0.3 K/UL (ref 0.3–1)
MONOCYTES NFR BLD: 4.9 % (ref 4–15)
NEUTROPHILS # BLD AUTO: 3.9 K/UL (ref 1.8–7.7)
NEUTROPHILS NFR BLD: 62.8 % (ref 38–73)
NRBC BLD-RTO: 0 /100 WBC
PLATELET # BLD AUTO: 157 K/UL (ref 150–450)
PMV BLD AUTO: 10.7 FL (ref 9.2–12.9)
POTASSIUM SERPL-SCNC: 3.9 MMOL/L (ref 3.5–5.1)
PROT SERPL-MCNC: 6.8 G/DL (ref 6–8.4)
RBC # BLD AUTO: 4.6 M/UL (ref 4–5.4)
SODIUM SERPL-SCNC: 139 MMOL/L (ref 136–145)
WBC # BLD AUTO: 6.17 K/UL (ref 3.9–12.7)

## 2023-04-21 PROCEDURE — 85025 COMPLETE CBC W/AUTO DIFF WBC: CPT | Performed by: INTERNAL MEDICINE

## 2023-04-21 PROCEDURE — 80053 COMPREHEN METABOLIC PANEL: CPT | Performed by: INTERNAL MEDICINE

## 2023-04-21 PROCEDURE — 86140 C-REACTIVE PROTEIN: CPT | Performed by: INTERNAL MEDICINE

## 2023-04-21 PROCEDURE — 85652 RBC SED RATE AUTOMATED: CPT | Performed by: INTERNAL MEDICINE

## 2023-04-21 PROCEDURE — 36415 COLL VENOUS BLD VENIPUNCTURE: CPT | Performed by: INTERNAL MEDICINE

## 2023-04-27 ENCOUNTER — PATIENT MESSAGE (OUTPATIENT)
Dept: PHARMACY | Facility: CLINIC | Age: 48
End: 2023-04-27
Payer: COMMERCIAL

## 2023-04-27 NOTE — TELEPHONE ENCOUNTER
Incoming call from patient and informed that the first appeal was denied by her plan. [facility] submitted an external appeal today. Offered to have pt sign up for the bridge program via [employer] Humira Complete. Pt requested form sent via [employer] message. Sending via [employer] and will fax the provider their portion to sign.     Pending determination for external appeal and Humira Complete sign up.

## 2023-04-27 NOTE — TELEPHONE ENCOUNTER
1st level appeal denied. External appeal submitted today. Called patient to inform of status. No answer, LVM.

## 2023-05-05 ENCOUNTER — PATIENT MESSAGE (OUTPATIENT)
Dept: RHEUMATOLOGY | Facility: CLINIC | Age: 48
End: 2023-05-05
Payer: COMMERCIAL

## 2023-05-08 NOTE — TELEPHONE ENCOUNTER
Outgoing call to patient to see if she received the Humira complete form. She did receive it and filled it out now wondering how to get it back to [facility]. Sent pt an email so she can scan it and attached it. Will ask for provider portion if completed as assigned [facility] is out of office this week.

## 2023-05-12 NOTE — TELEPHONE ENCOUNTER
Received both patient and provider portions of Humira application. Faxed to [employer] in attempt to sign pt up for bridge program while external appeal is processing.

## 2023-05-16 ENCOUNTER — PATIENT MESSAGE (OUTPATIENT)
Dept: OBSTETRICS AND GYNECOLOGY | Facility: CLINIC | Age: 48
End: 2023-05-16
Payer: COMMERCIAL

## 2023-05-18 NOTE — TELEPHONE ENCOUNTER
Outgoing call to offer PAP. Patient stated that she called [phone]. She was told that she was approved with a $5 co-pay and that they confirmed her shipping address and when she needed a fill.    I called [facility] again and spoke with Meli. She reiterated that they do not have an active prescription and that their PA is pending. The PA that we submitted was denied and we are currently waiting for the appeal to be reviewed.    Called patient back to offer PAP, but she did not answer. If she calls back, please try to explain the situation and ask her if she would like to apply for PAP (this is my recommendation since [facility] will not get an approved PA). I will need her HH size, HH income, and if she wants us to mail or e-mail the application.

## 2023-05-18 NOTE — TELEPHONE ENCOUNTER
Patient called to clarify e-mails that she received from [facility].     Spoke with Lubna at [facility]. She stated that thy have not received anything for this patient. Spoke with Jeniffer at [facility]. She stated that they do not offer a bridge program for Humira and referred the patient to [facility] instead. I noted that the patient has commercial insurance but that the coverage was denied. She confirmed that the patient needs to apply to [employer]. I was placed on hold and the rep never returned to the line. I called [facility] again and spoke with Chela who stated that they forwarded the Rx to [facility] because that is the insurance plan's preferred pharmacy. She also confirmed that she needs to apply to [facility].     I called the patient back to provide an update. Mrs. Engel said that her insurance approved Humira as long as she fills with [facility]. She stated that they told her that her co-pay will be $5. I called [facility] to make sure that they had an approved PA since the claim is still rejecting here and spoke with Roxanne.She told me that they do not have a prescription or an approved PA for this patient.

## 2023-05-18 NOTE — TELEPHONE ENCOUNTER
Called appeals dept to check on the status of external review and rep informed that the request was not received, although a fax confirmation was received on 4/28. Re-faxed letter of external appeal today.

## 2023-05-22 NOTE — TELEPHONE ENCOUNTER
Humira is approved via external appeal through 5/19/2024. PA #: [id number]. [facility] is not in network with plan. Rx has been routed to [facility]. Spoke to patient and informed. Messaged provider and informed as well. Dis-enrolling patient from [facility] services.

## 2023-06-02 ENCOUNTER — OFFICE VISIT (OUTPATIENT)
Dept: OBSTETRICS AND GYNECOLOGY | Facility: CLINIC | Age: 48
End: 2023-06-02
Payer: COMMERCIAL

## 2023-06-02 VITALS
DIASTOLIC BLOOD PRESSURE: 80 MMHG | WEIGHT: 225.81 LBS | BODY MASS INDEX: 37.62 KG/M2 | HEIGHT: 65 IN | SYSTOLIC BLOOD PRESSURE: 121 MMHG | HEART RATE: 80 BPM

## 2023-06-02 DIAGNOSIS — E11.69 TYPE 2 DIABETES MELLITUS WITH OTHER SPECIFIED COMPLICATION, WITHOUT LONG-TERM CURRENT USE OF INSULIN: Primary | ICD-10-CM

## 2023-06-02 DIAGNOSIS — Z91.89 AT HIGH RISK FOR BREAST CANCER: ICD-10-CM

## 2023-06-02 DIAGNOSIS — N95.1 MENOPAUSAL SYMPTOMS: ICD-10-CM

## 2023-06-02 PROCEDURE — 3008F PR BODY MASS INDEX (BMI) DOCUMENTED: ICD-10-PCS | Mod: CPTII,S$GLB,, | Performed by: PHYSICIAN ASSISTANT

## 2023-06-02 PROCEDURE — 1159F MED LIST DOCD IN RCRD: CPT | Mod: CPTII,S$GLB,, | Performed by: PHYSICIAN ASSISTANT

## 2023-06-02 PROCEDURE — 3008F BODY MASS INDEX DOCD: CPT | Mod: CPTII,S$GLB,, | Performed by: PHYSICIAN ASSISTANT

## 2023-06-02 PROCEDURE — 99214 PR OFFICE/OUTPT VISIT, EST, LEVL IV, 30-39 MIN: ICD-10-PCS | Mod: S$GLB,,, | Performed by: PHYSICIAN ASSISTANT

## 2023-06-02 PROCEDURE — 99214 OFFICE O/P EST MOD 30 MIN: CPT | Mod: S$GLB,,, | Performed by: PHYSICIAN ASSISTANT

## 2023-06-02 PROCEDURE — 3079F DIAST BP 80-89 MM HG: CPT | Mod: CPTII,S$GLB,, | Performed by: PHYSICIAN ASSISTANT

## 2023-06-02 PROCEDURE — 1160F PR REVIEW ALL MEDS BY PRESCRIBER/CLIN PHARMACIST DOCUMENTED: ICD-10-PCS | Mod: CPTII,S$GLB,, | Performed by: PHYSICIAN ASSISTANT

## 2023-06-02 PROCEDURE — 99999 PR PBB SHADOW E&M-EST. PATIENT-LVL III: CPT | Mod: PBBFAC,,, | Performed by: PHYSICIAN ASSISTANT

## 2023-06-02 PROCEDURE — 99999 PR PBB SHADOW E&M-EST. PATIENT-LVL III: ICD-10-PCS | Mod: PBBFAC,,, | Performed by: PHYSICIAN ASSISTANT

## 2023-06-02 PROCEDURE — 1160F RVW MEDS BY RX/DR IN RCRD: CPT | Mod: CPTII,S$GLB,, | Performed by: PHYSICIAN ASSISTANT

## 2023-06-02 PROCEDURE — 3079F PR MOST RECENT DIASTOLIC BLOOD PRESSURE 80-89 MM HG: ICD-10-PCS | Mod: CPTII,S$GLB,, | Performed by: PHYSICIAN ASSISTANT

## 2023-06-02 PROCEDURE — 3074F PR MOST RECENT SYSTOLIC BLOOD PRESSURE < 130 MM HG: ICD-10-PCS | Mod: CPTII,S$GLB,, | Performed by: PHYSICIAN ASSISTANT

## 2023-06-02 PROCEDURE — 1159F PR MEDICATION LIST DOCUMENTED IN MEDICAL RECORD: ICD-10-PCS | Mod: CPTII,S$GLB,, | Performed by: PHYSICIAN ASSISTANT

## 2023-06-02 PROCEDURE — 3074F SYST BP LT 130 MM HG: CPT | Mod: CPTII,S$GLB,, | Performed by: PHYSICIAN ASSISTANT

## 2023-06-02 RX ORDER — TIRZEPATIDE 5 MG/.5ML
5 INJECTION, SOLUTION SUBCUTANEOUS
Qty: 4 PEN | Refills: 5 | Status: SHIPPED | OUTPATIENT
Start: 2023-06-02 | End: 2023-09-05

## 2023-06-02 RX ORDER — TIRZEPATIDE 2.5 MG/.5ML
2.5 INJECTION, SOLUTION SUBCUTANEOUS
Qty: 4 PEN | Refills: 5 | Status: SHIPPED | OUTPATIENT
Start: 2023-06-02 | End: 2023-06-02

## 2023-06-02 NOTE — PROGRESS NOTES
Subjective:      Lachelle Engel is a 47 y.o. female who presents to discuss weight loss. Started gaining weight with menopause. Feels great with HRT but continues to gain. Did optivia and los about 50 pounds but not sustainable and gaining weight back. History of type 2 DM and has taken metformin, Trulicity and Ozmepic. However, Ozempic was denied most recently by her insurance. Went to external clinic and started on compounded Tirzepatide 2.5mg sc weekly x 1 month. Last injection 3 days ago. Thinks is this helping but would prefer to be managed here.     Sleep:7-8 hours nightly     Stress: None    Exercise: Walking     A typical day consists of:  Breakfast: Fruit  Lunch: Chicken  Dinner: Shrimp pasta  Snack: Fruit  Beverages: coffee, water, diet coke, loaded tea; alcohol- about 1-2 per week      Lab Visit on 04/21/2023   Component Date Value Ref Range Status    WBC 04/21/2023 6.17  3.90 - 12.70 K/uL Final    RBC 04/21/2023 4.60  4.00 - 5.40 M/uL Final    Hemoglobin 04/21/2023 14.1  12.0 - 16.0 g/dL Final    Hematocrit 04/21/2023 43.0  37.0 - 48.5 % Final    MCV 04/21/2023 94  82 - 98 fL Final    MCH 04/21/2023 30.7  27.0 - 31.0 pg Final    MCHC 04/21/2023 32.8  32.0 - 36.0 g/dL Final    RDW 04/21/2023 11.9  11.5 - 14.5 % Final    Platelets 04/21/2023 157  150 - 450 K/uL Final    MPV 04/21/2023 10.7  9.2 - 12.9 fL Final    Immature Granulocytes 04/21/2023 0.2  0.0 - 0.5 % Final    Gran # (ANC) 04/21/2023 3.9  1.8 - 7.7 K/uL Final    Immature Grans (Abs) 04/21/2023 0.01  0.00 - 0.04 K/uL Final    Lymph # 04/21/2023 1.9  1.0 - 4.8 K/uL Final    Mono # 04/21/2023 0.3  0.3 - 1.0 K/uL Final    Eos # 04/21/2023 0.1  0.0 - 0.5 K/uL Final    Baso # 04/21/2023 0.01  0.00 - 0.20 K/uL Final    nRBC 04/21/2023 0  0 /100 WBC Final    Gran % 04/21/2023 62.8  38.0 - 73.0 % Final    Lymph % 04/21/2023 30.8  18.0 - 48.0 % Final    Mono % 04/21/2023 4.9  4.0 - 15.0 % Final    Eosinophil % 04/21/2023 1.1  0.0 - 8.0 % Final     Basophil % 04/21/2023 0.2  0.0 - 1.9 % Final    Differential Method 04/21/2023 Automated   Final    Sodium 04/21/2023 139  136 - 145 mmol/L Final    Potassium 04/21/2023 3.9  3.5 - 5.1 mmol/L Final    Chloride 04/21/2023 107  95 - 110 mmol/L Final    CO2 04/21/2023 23  23 - 29 mmol/L Final    Glucose 04/21/2023 90  70 - 110 mg/dL Final    BUN 04/21/2023 15  6 - 20 mg/dL Final    Creatinine 04/21/2023 1.0  0.5 - 1.4 mg/dL Final    Calcium 04/21/2023 9.1  8.7 - 10.5 mg/dL Final    Total Protein 04/21/2023 6.8  6.0 - 8.4 g/dL Final    Albumin 04/21/2023 4.0  3.5 - 5.2 g/dL Final    Total Bilirubin 04/21/2023 0.3  0.1 - 1.0 mg/dL Final    Alkaline Phosphatase 04/21/2023 90  55 - 135 U/L Final    AST 04/21/2023 17  10 - 40 U/L Final    ALT 04/21/2023 19  10 - 44 U/L Final    Anion Gap 04/21/2023 9  8 - 16 mmol/L Final    eGFR 04/21/2023 >60.0  >60 mL/min/1.73 m^2 Final    Sed Rate 04/21/2023 6  0 - 36 mm/Hr Final    CRP 04/21/2023 0.3  0.0 - 8.2 mg/L Final   Lab Visit on 04/12/2023   Component Date Value Ref Range Status    IgE 04/12/2023 <35  0 - 100 IU/mL Final    D. farinae 04/12/2023 <0.10  <0.10 kU/L Final    D. farinae Class 04/12/2023 CLASS 0   Final    Mite Dust Pteronyssinus IgE 04/12/2023 <0.10  <0.10 kU/L Final    D. pteronyssinus Class 04/12/2023 CLASS 0   Final    [place] Grass 04/12/2023 <0.10  <0.10 kU/L Final    [place] Grass Class 04/12/2023 CLASS 0   Final    Carlos Grass 04/12/2023 <0.10  <0.10 kU/L Final    Carlos Grass Class 04/12/2023 CLASS 0   Final    [place] IgE 04/12/2023 <0.10  <0.10 kU/L Final    [place] Class 04/12/2023 CLASS 0   Final    Plantain 04/12/2023 <0.10  <0.10 kU/L Final    English Plantain Class 04/12/2023 CLASS 0   Final    [place](Quercus alba) IgE 04/12/2023 <0.10  <0.10 kU/L Final    [place], Class 04/12/2023 CLASS 0   Final    Pecan [place] Tree 04/12/2023 <0.10  <0.10 kU/L Final    Pecan, Class 04/12/2023 CLASS 0   Final    Ragweed, Western IgE 04/12/2023 <0.10  <0.10 kU/L Final     Ragweed, Western, Class 04/12/2023 CLASS 0   Final    Alternaria alternata 04/12/2023 <0.10  <0.10 kU/L Final    Altern. alternata Class 04/12/2023 CLASS 0   Final    Aspergillus Fumigatus IgE 04/12/2023 <0.10  <0.10 kU/L Final    A. fumigatus Class 04/12/2023 CLASS 0   Final    Cat Dander 04/12/2023 <0.10  <0.10 kU/L Final    Cat Epithelium Class 04/12/2023 CLASS 0   Final    Cockroach, IgE 04/12/2023 <0.10  <0.10 kU/L Final    Cockroach, IgE 04/12/2023 CLASS 0   Final    Dog Dander, IgE 04/12/2023 <0.10  <0.10 kU/L Final    Dog Dander Class 04/12/2023 CLASS 0   Final    Chicken 04/12/2023 <0.10  <0.10 kU/L Final    Chicken Class 04/12/2023 CLASS 0   Final    Beef 04/12/2023 <0.10  <0.10 kU/L Final    Beef Class 04/12/2023 CLASS 0   Final    Allergen, Pork 04/12/2023 <0.10  <0.10 kU/L Final    Pork Class 04/12/2023 CLASS 0   Final    Milk IgE 04/12/2023 <0.10  <0.10 kU/L Final    Cow's Milk Class 04/12/2023 CLASS 0   Final    Wheat IgE 04/12/2023 <0.10  <0.10 kU/L Final    Wheat Class 04/12/2023 CLASS 0   Final    ALLERGEN GLUTEN IGE 04/12/2023 <0.10  <0.10 kU/L Final    Gluten Class 04/12/2023 CLASS 0   Final    Tomato IgE 04/12/2023 <0.10  <0.10 kU/L Final    Tomato Class 04/12/2023 CLASS 0   Final    Allergen Grapefruit IgE 04/12/2023 <0.10  <0.10 kU/L Final    Grapefruit Class 04/12/2023 CLASS 0   Final    Lemon 04/12/2023 <0.10  <0.10 kU/L Final    Lemon Class 04/12/2023 CLASS 0   Final    Orange 04/12/2023 <0.10  <0.10 kU/L Final    [place] Class 04/12/2023 CLASS 0   Final    Almonds 04/12/2023 <0.10  <0.10 kU/L Final    [place] Class 04/12/2023 CLASS 0   Final    Penicillium, IgE 04/12/2023 <0.10  <0.10 kU/L Final    Penicillium Class 04/12/2023 CLASS 0   Final       Past Medical History:   Diagnosis Date    Abnormal Pap smear of cervix     Allergy     Angio-edema     Bradycardia     Depression     Diabetes mellitus     type 2     Dizziness     GERD (gastroesophageal reflux disease)     Hypertension      Impaired fasting blood sugar     Joint pain     Psoriasis     Recurrent upper respiratory infection (URI)     Skin disease     Thyroid disease     hyothyroidism     Past Surgical History:   Procedure Laterality Date    APPENDECTOMY       SECTION      CHOLECYSTECTOMY      CRYOTHERAPY      ENDOMETRIAL ABLATION      ESOPHAGOGASTRODUODENOSCOPY N/A 2020    Procedure: EGD (ESOPHAGOGASTRODUODENOSCOPY);  Surgeon: Venkat Be MD;  Location: [address]);  Service: Endoscopy;  Laterality: N/A;  rice or smith ok      INCONTINENCE SURGERY      TUBAL LIGATION       Social History     Tobacco Use    Smoking status: Some Days     Packs/day: 0.25     Years: 15.00     Pack years: 3.75     Types: Cigarettes    Smokeless tobacco: Current    Tobacco comments:     1 pack every 4 days   Substance Use Topics    Alcohol use: Yes     Alcohol/week: 4.0 standard drinks     Types: 4 Drinks containing 0.5 oz of alcohol per week     Comment: occasionally    Drug use: No     Family History   Problem Relation Age of Onset    Breast cancer Mother     Cancer Father         prostate    Prostate cancer Father     Cancer Maternal Aunt 30        colon cancer    Multiple sclerosis Maternal Aunt     Ovarian cancer Maternal Aunt     Colon cancer Maternal Uncle     Eczema Daughter     Melanoma Neg Hx      OB History    Para Term  AB Living   2 2 2     2   SAB IAB Ectopic Multiple Live Births           2      # Outcome Date GA Lbr Robert/2nd Weight Sex Delivery Anes PTL Lv   2 Term      CS-LTranv   NATI   1 Term      CS-LTranv   NATI       Current Outpatient Medications:     adalimumab (HUMIRA PEN) PnKt injection, Inject 1 pen (40 mg total) into the skin every 7 days., Disp: 4 pen, Rfl: 11    augmented betamethasone dipropionate (DIPROLENE-AF) 0.05 % cream, Apply to affected areas of body BID prn psoriasis., Disp: 50 g, Rfl: 5    betamethasone valerate 0.1% (VALISONE) 0.1 % Lotn, Apply to affected areas of scalp/ears  "BID prn psoriasis., Disp: 60 mL, Rfl: 3    estradiol 0.05 mg/24 hr td ptsw (VIVELLE-DOT) 0.05 mg/24 hr, Place 1 patch onto the skin twice a week., Disp: 8 patch, Rfl: 11    levothyroxine (SYNTHROID) 137 MCG Tab tablet, Take 137 mcg by mouth once daily., Disp: , Rfl:     methotrexate 2.5 MG Tab, 5 tablets weekly for 4 weeks and then 7 tablets weekly after that, Disp: 35 tablet, Rfl: 5    mupirocin (BACTROBAN) 2 % ointment, Apply topically 3 (three) times daily., Disp: 1 Tube, Rfl: 2    omeprazole (PRILOSEC) 20 MG capsule, Take 1 capsule (20 mg total) by mouth once daily., Disp: 30 capsule, Rfl: 11    progesterone (PROMETRIUM) 100 MG capsule, Take 1 capsule (100 mg total) by mouth once daily., Disp: 30 capsule, Rfl: 11    tirzepatide (MOUNJARO) 5 mg/0.5 mL PnIj, Inject 5 mg into the skin every 7 days., Disp: 4 pen, Rfl: 5    UNABLE TO FIND, medication name: Testosterone 3% gel 1 click to upper inner thigh daily, Disp: 30 g, Rfl: 5    valACYclovir (VALTREX) 500 MG tablet, TAKE 1 TABLET BY MOUTH TWICE DAILY FOR 3 DAYS. BEGIN AT FIRST SIGN OF OUTBREAK, Disp: 6 tablet, Rfl: 4    Review of Systems:  General: No fever, chills, or weight loss.  Chest: No chest pain, shortness of breath, or palpitations.  Breast: No pain, masses, or nipple discharge.  Vulva: No pain, lesions, or itching.  Vagina: No relaxation, itching, discharge, or lesions.  Abdomen: No pain, nausea, vomiting, diarrhea, or constipation.  Urinary: No incontinence, nocturia, frequency, or dysuria.  Extremities:  No leg cramps, edema, or calf pain.  Neurologic: No headaches, dizziness, or visual changes.    Objective:     Vitals:    06/02/23 1113   BP: 121/80   Pulse: 80   Weight: 102.4 kg (225 lb 12.8 oz)   Height: 5' 5" (1.651 m)   PainSc: 0-No pain     Body mass index is 37.58 kg/m².    PHYSICAL EXAM:  APPEARANCE: Well nourished, well developed, in no acute distress.  AFFECT: WNL, alert and oriented x 3  SKIN: No acne or hirsutism  CHEST: Good respiratory " effect    Assessment:    Type 2 diabetes mellitus with other specified complication, without long-term current use of insulin  -     Discontinue: tirzepatide (MOUNJARO) 2.5 mg/0.5 mL PnIj; Inject 2.5 mg into the skin every 7 days.  Dispense: 4 pen; Refill: 5  -     tirzepatide (MOUNJARO) 5 mg/0.5 mL PnIj; Inject 5 mg into the skin every 7 days.  Dispense: 4 pen; Refill: 5    Menopausal symptoms    At high risk for breast cancer      BMR calculated at 1656 calories per day.  Plan:   Encouraged lean protein with every meal.  Wide variety of fruits and vegetables. Limit starch to 1/3 cup or 1 piece of bread per meal.  Log in robert with goal of 1500 calories a day (35% protein, 35% carbs (mostly vegetables/fruits), 30% fat)  Increase Mounjaro to 5mg Sc weekly. Counseled on use.  Reviewed side effects and risks of medications. No family or personal history of thyroid cancer or pancreatitis.   Mounjaro has only been studied in type 2 diabetes and not for weight loss despite seeing great weight loss in these clinical trials.  Maintain hydration  Strength workouts 3x per week.  Schedule breast MRI for 7/2023  Follow up in 6 weeks for weight loss and CBE/high risk breast clinic.    Instructed patient to call if she experiences any side effects or has any questions.    I spent a total of 30 minutes on the day of the visit.This includes face to face time and non-face to face time preparing to see the patient (eg, review of tests), obtaining and/or reviewing separately obtained history, documenting clinical information in the electronic or other health record, independently interpreting results and communicating results to the patient/family/caregiver, or care coordinator.

## 2023-06-05 ENCOUNTER — TELEPHONE (OUTPATIENT)
Dept: PHARMACY | Facility: CLINIC | Age: 48
End: 2023-06-05
Payer: COMMERCIAL

## 2023-06-05 ENCOUNTER — OFFICE VISIT (OUTPATIENT)
Dept: RHEUMATOLOGY | Facility: CLINIC | Age: 48
End: 2023-06-05
Payer: COMMERCIAL

## 2023-06-05 VITALS
BODY MASS INDEX: 37.49 KG/M2 | HEART RATE: 87 BPM | WEIGHT: 225 LBS | DIASTOLIC BLOOD PRESSURE: 76 MMHG | HEIGHT: 65 IN | SYSTOLIC BLOOD PRESSURE: 112 MMHG

## 2023-06-05 DIAGNOSIS — L40.9 PSORIASIS: Primary | ICD-10-CM

## 2023-06-05 DIAGNOSIS — M23.203 OLD TEAR OF MEDIAL MENISCUS OF RIGHT KNEE, UNSPECIFIED TEAR TYPE: ICD-10-CM

## 2023-06-05 DIAGNOSIS — M17.11 PRIMARY OSTEOARTHRITIS OF RIGHT KNEE: ICD-10-CM

## 2023-06-05 DIAGNOSIS — M47.816 SPONDYLOSIS OF LUMBAR REGION WITHOUT MYELOPATHY OR RADICULOPATHY: ICD-10-CM

## 2023-06-05 DIAGNOSIS — L40.50 PSORIATIC ARTHRITIS: ICD-10-CM

## 2023-06-05 PROCEDURE — 3074F PR MOST RECENT SYSTOLIC BLOOD PRESSURE < 130 MM HG: ICD-10-PCS | Mod: CPTII,S$GLB,, | Performed by: INTERNAL MEDICINE

## 2023-06-05 PROCEDURE — 99999 PR PBB SHADOW E&M-EST. PATIENT-LVL III: CPT | Mod: PBBFAC,,, | Performed by: INTERNAL MEDICINE

## 2023-06-05 PROCEDURE — 99999 PR PBB SHADOW E&M-EST. PATIENT-LVL III: ICD-10-PCS | Mod: PBBFAC,,, | Performed by: INTERNAL MEDICINE

## 2023-06-05 PROCEDURE — 3078F DIAST BP <80 MM HG: CPT | Mod: CPTII,S$GLB,, | Performed by: INTERNAL MEDICINE

## 2023-06-05 PROCEDURE — 3008F PR BODY MASS INDEX (BMI) DOCUMENTED: ICD-10-PCS | Mod: CPTII,S$GLB,, | Performed by: INTERNAL MEDICINE

## 2023-06-05 PROCEDURE — 1159F PR MEDICATION LIST DOCUMENTED IN MEDICAL RECORD: ICD-10-PCS | Mod: CPTII,S$GLB,, | Performed by: INTERNAL MEDICINE

## 2023-06-05 PROCEDURE — 3008F BODY MASS INDEX DOCD: CPT | Mod: CPTII,S$GLB,, | Performed by: INTERNAL MEDICINE

## 2023-06-05 PROCEDURE — 99214 OFFICE O/P EST MOD 30 MIN: CPT | Mod: S$GLB,,, | Performed by: INTERNAL MEDICINE

## 2023-06-05 PROCEDURE — 3078F PR MOST RECENT DIASTOLIC BLOOD PRESSURE < 80 MM HG: ICD-10-PCS | Mod: CPTII,S$GLB,, | Performed by: INTERNAL MEDICINE

## 2023-06-05 PROCEDURE — 1159F MED LIST DOCD IN RCRD: CPT | Mod: CPTII,S$GLB,, | Performed by: INTERNAL MEDICINE

## 2023-06-05 PROCEDURE — 99214 PR OFFICE/OUTPT VISIT, EST, LEVL IV, 30-39 MIN: ICD-10-PCS | Mod: S$GLB,,, | Performed by: INTERNAL MEDICINE

## 2023-06-05 PROCEDURE — 3074F SYST BP LT 130 MM HG: CPT | Mod: CPTII,S$GLB,, | Performed by: INTERNAL MEDICINE

## 2023-06-05 NOTE — PROGRESS NOTES
Chief Complaint   Patient presents with    Disease Management       History of presenting illness    The chief complaint leading to consultation is: psoriasis and psoriatic arthritis    47 year old female has had psoriasis since 2005  Initially started on the scalp  Now on the hands,ears,feet,scalp  Dermatology confirmed    Tried  -mtx  -stelara  -otezla    On no meds for 6 months  Has active skin disease    In the past she did well on mtx 10 tabs weekly and she had stopped it  So we resumed it  She cannot tolerate the higher dose of mtx    Didn't repond to otezla or stelara    Joints   -right hip,knee and foot hurts  She gets bad sciatica from the low back  Low back hurts on the right side,not in the center    Massages dont help  Left side no pain  Its mostly coming from the back  -neck feels tense  Massages dont help much  Hands,wrists,elbows,shoulders ok    She had been diagnosed with right hip bursitis and she got steroid shot  She had right foot plantar fasciitis     EMS 1 hour    Pain wakes her up in the middle of the night  She cant lay on the right side    Activity helps  Rest makes it worse     No malar rash,photosensitivity   No telangiectasias   No calcinosis     No patchy alopecia   No oral and nasal ulcers     No sicca symptoms     No pleurisy or any cardiopulmonary complaints     No dysphagia,diplopia and dysphonia and muscle weakness     No n/v/d/c   No acid reflux+     No raynaud's+   No digital ulcers     No cytopenias   No renal issues     No blood clots     No fever,chills,night sweats,weight loss and loss of appetite   No pregnancy losses     No headaches   No recurrent conjunctivitis or uveitis     No chronic or bloody diarrhea with no u colitis or crohn's /inflammatory bowel disease     No vaginal and urethral d/c/STDs/no ulcers     Neg RF,CCP,HLAB27  CBC : mild thrombocytopenia  CMP nml  ESR,CRP nml    Pre dmard panel neg    Xrays    LS spine deg arthritis  SI joint nml  Hip xray nml  C  spine deg arthritis  Knees,hands,wrists feet nml      She is now on humira and mtx 3 tabs weekly /folic acid         2/2021 she had acute onset right knee pain    She did MRI  1. Complex tear body segment/posterior horn medial meniscus with a predominant horizontal component and a small peripheral parameniscal cyst.  2. Mild patellar chondral fissuring.  3. Small joint effusion.  4. Mild distal semimembranosus tendinosis with surrounding bursal fluid.    Knee xrays  Focal area of endosteal sclerotic change, right anterior femoral metaphysis stable.  Bone, joint soft tissues otherwise normal.    She has been seeing ortho-braces help,etodolac helps  Surgical intervention when she is ready    11/2021    Off humira since august  On mtx 3 pills weekly/folic acid  Wants to go back on humira     Labs utd-normal  CBC,CMP,ESR,CRP nml    2/2023    Hands,feet have been flaring -psoriasis  On humira 40 mg every other week and mtx 3 tabs weekly,folic acid  She has psoriasis in her ears-manifesting as otitis externa    No joint pains - except right knee and right hip pain    Her hands have been weak- opening jars and bottles is hard    6/2023    Skin - joints didn't great on humira 40 mg weekly,mtx 7 tabs weekly,folic acid 1 mg daily  April labs -nml CBC,CMP,ESR,CRP    Past history : thyroid disease,depression,GERD,psoriasis     Family history : MS,cancer    Social history : current smoker         Review of Systems   Constitutional:  Negative for activity change, appetite change, chills, diaphoresis, fatigue, fever and unexpected weight change.   HENT:  Negative for congestion, dental problem, drooling, ear discharge, ear pain, facial swelling, hearing loss, mouth sores, nosebleeds, postnasal drip, rhinorrhea, sinus pressure, sinus pain, sneezing, sore throat, tinnitus, trouble swallowing and voice change.    Eyes:  Negative for photophobia, pain, discharge, redness, itching and visual disturbance.   Respiratory:  Negative for  apnea, cough, choking, chest tightness, shortness of breath, wheezing and stridor.    Cardiovascular:  Negative for chest pain, palpitations and leg swelling.   Gastrointestinal:  Negative for abdominal distention, abdominal pain, anal bleeding, blood in stool, constipation, diarrhea, nausea, rectal pain and vomiting.   Endocrine: Negative for cold intolerance, heat intolerance, polydipsia, polyphagia and polyuria.   Genitourinary:  Negative for decreased urine volume, difficulty urinating, dysuria, enuresis, flank pain, frequency, genital sores, hematuria and urgency.   Musculoskeletal:  Positive for arthralgias. Negative for back pain, gait problem, joint swelling, myalgias, neck pain and neck stiffness.   Skin:  Positive for rash. Negative for color change, pallor and wound.   Allergic/Immunologic: Negative for environmental allergies, food allergies and immunocompromised state.   Neurological:  Negative for dizziness, tremors, seizures, syncope, facial asymmetry, speech difficulty, weakness, light-headedness, numbness and headaches.   Hematological:  Negative for adenopathy. Does not bruise/bleed easily.   Psychiatric/Behavioral:  Negative for agitation, behavioral problems, confusion, decreased concentration, dysphoric mood, hallucinations, self-injury, sleep disturbance and suicidal ideas. The patient is not nervous/anxious and is not hyperactive.      Physical exam     Palmar and plantar psoriasis b/l   Ears  Scalp      Assessment       47 year old female has had psoriasis since 2005  Past history : thyroid disease,depression,GERD,psoriasis     Initially started on the scalp  Now on the hands,ears,feet,scalp  Dermatology confirmed  Tried  -mtx  -stelara  -otezla    She did well on 10 pills weekly mtx  She doesn't know why they stopped it  Didn't repond to otezla or stelara    But when we resumed it she had severe GI side effects    She has joint pains in the right hip,knee,foot and low back  She gets bad  sciatica from the low back  Low back hurts on the right side,not in the center    She had been diagnosed with right hip bursitis and she got steroid shot  She had right foot plantar fasciitis   EMS 1 hour  Pain wakes her up in the middle of the night  She cant lay on the right side    Activity helps  Rest makes it worse     She has extensive psoriasis on the hands and feet and ears and scalp  I am not clear she has psoriatic arthritis    No enthesitis,dactylitis or synovitis initially     Right hip tender laterally and posteriorly   Right knee normal exam  LS spine tender  Right foot one MTP on the 3rd toe    Prior LS spine imaging shows deg arthritis  Her back pain seems inflammatory in nature-we never did MRI since she didn't complain much about her back after the first visit    Right knee-meniscal repair/patellar fissuring    But labs and xrays dont show inflammatory arthritis yet    We are treating her for psoriasis and questionable psoriatic arthritis     On exam- psoriasis > joint involvement -right hip and knee not swollen today    2/2023    We increased humira to 40 mg weekly,added mtx 7 tabs weekly,folic acid daily  This got approved  Unfortunately she has not received her humira since April    Dermatology us giving topicals    Right knee pain- meniscus tear  She will do synvisc injections this week    We tried calling insurance to sort this out-unsuccessfully  We will seek the help of [facility] again      1. Psoriasis    2. Psoriatic arthritis    3. Primary osteoarthritis of right knee    4. Spondylosis of lumbar region without myelopathy or radiculopathy    5. Old tear of medial meniscus of right knee, unspecified tear type            f/u problem     Plan    Continue humira-  40 mg weekly    On mtx 7 tabs weekly   Folic acid 3 pills daily    Derm follow ups    Labs today and in 3 months    Vaccines : flu,pneumonia-refuses  covid vaccines x 2  Needs booster    Dannie and vit d   Vit d  dexa nml    Right knee  meniscal tear needs surgical repair -ortho  Right hip bursitis-ortho  Right foot plantar fasciitis- shoes help    Lachelle Gunderson was seen today for disease management.    Diagnoses and all orders for this visit:    Psoriasis  -     CBC Auto Differential; Future  -     Comprehensive Metabolic Panel; Future  -     Sedimentation rate; Future  -     C-Reactive Protein; Future    Psoriatic arthritis  -     CBC Auto Differential; Future  -     Comprehensive Metabolic Panel; Future  -     Sedimentation rate; Future  -     C-Reactive Protein; Future    Primary osteoarthritis of right knee  -     CBC Auto Differential; Future  -     Comprehensive Metabolic Panel; Future  -     Sedimentation rate; Future  -     C-Reactive Protein; Future    Spondylosis of lumbar region without myelopathy or radiculopathy  -     CBC Auto Differential; Future  -     Comprehensive Metabolic Panel; Future  -     Sedimentation rate; Future  -     C-Reactive Protein; Future    Old tear of medial meniscus of right knee, unspecified tear type  -     CBC Auto Differential; Future  -     Comprehensive Metabolic Panel; Future  -     Sedimentation rate; Future  -     C-Reactive Protein; Future

## 2023-06-06 ENCOUNTER — PATIENT MESSAGE (OUTPATIENT)
Dept: OBSTETRICS AND GYNECOLOGY | Facility: CLINIC | Age: 48
End: 2023-06-06
Payer: COMMERCIAL

## 2023-06-09 ENCOUNTER — HOSPITAL ENCOUNTER (OUTPATIENT)
Dept: RADIOLOGY | Facility: HOSPITAL | Age: 48
Discharge: HOME | End: 2023-06-09
Attending: PHYSICIAN ASSISTANT
Payer: COMMERCIAL

## 2023-06-09 ENCOUNTER — OFFICE VISIT (OUTPATIENT)
Dept: SPORTS MEDICINE | Facility: CLINIC | Age: 48
End: 2023-06-09
Payer: COMMERCIAL

## 2023-06-09 VITALS
HEIGHT: 65 IN | HEART RATE: 72 BPM | BODY MASS INDEX: 37.65 KG/M2 | SYSTOLIC BLOOD PRESSURE: 121 MMHG | DIASTOLIC BLOOD PRESSURE: 78 MMHG | WEIGHT: 226 LBS

## 2023-06-09 DIAGNOSIS — G89.29 CHRONIC PAIN OF RIGHT KNEE: ICD-10-CM

## 2023-06-09 DIAGNOSIS — M25.561 CHRONIC PAIN OF RIGHT KNEE: ICD-10-CM

## 2023-06-09 DIAGNOSIS — S83.231D COMPLEX TEAR OF MEDIAL MENISCUS OF RIGHT KNEE AS CURRENT INJURY, SUBSEQUENT ENCOUNTER: Primary | ICD-10-CM

## 2023-06-09 DIAGNOSIS — M25.561 RIGHT KNEE PAIN, UNSPECIFIED CHRONICITY: ICD-10-CM

## 2023-06-09 PROCEDURE — 73564 X-RAY EXAM KNEE 4 OR MORE: CPT | Mod: TC,50

## 2023-06-09 PROCEDURE — 3074F PR MOST RECENT SYSTOLIC BLOOD PRESSURE < 130 MM HG: ICD-10-PCS | Mod: CPTII,S$GLB,, | Performed by: PHYSICIAN ASSISTANT

## 2023-06-09 PROCEDURE — 3078F PR MOST RECENT DIASTOLIC BLOOD PRESSURE < 80 MM HG: ICD-10-PCS | Mod: CPTII,S$GLB,, | Performed by: PHYSICIAN ASSISTANT

## 2023-06-09 PROCEDURE — 1159F PR MEDICATION LIST DOCUMENTED IN MEDICAL RECORD: ICD-10-PCS | Mod: CPTII,S$GLB,, | Performed by: PHYSICIAN ASSISTANT

## 2023-06-09 PROCEDURE — 1159F MED LIST DOCD IN RCRD: CPT | Mod: CPTII,S$GLB,, | Performed by: PHYSICIAN ASSISTANT

## 2023-06-09 PROCEDURE — 20610 PR DRAIN/INJECT LARGE JOINT/BURSA: ICD-10-PCS | Mod: RT,S$GLB,, | Performed by: PHYSICIAN ASSISTANT

## 2023-06-09 PROCEDURE — 99214 PR OFFICE/OUTPT VISIT, EST, LEVL IV, 30-39 MIN: ICD-10-PCS | Mod: 25,S$GLB,, | Performed by: PHYSICIAN ASSISTANT

## 2023-06-09 PROCEDURE — 3008F BODY MASS INDEX DOCD: CPT | Mod: CPTII,S$GLB,, | Performed by: PHYSICIAN ASSISTANT

## 2023-06-09 PROCEDURE — 3074F SYST BP LT 130 MM HG: CPT | Mod: CPTII,S$GLB,, | Performed by: PHYSICIAN ASSISTANT

## 2023-06-09 PROCEDURE — 1160F PR REVIEW ALL MEDS BY PRESCRIBER/CLIN PHARMACIST DOCUMENTED: ICD-10-PCS | Mod: CPTII,S$GLB,, | Performed by: PHYSICIAN ASSISTANT

## 2023-06-09 PROCEDURE — 20610 DRAIN/INJ JOINT/BURSA W/O US: CPT | Mod: RT,S$GLB,, | Performed by: PHYSICIAN ASSISTANT

## 2023-06-09 PROCEDURE — 3008F PR BODY MASS INDEX (BMI) DOCUMENTED: ICD-10-PCS | Mod: CPTII,S$GLB,, | Performed by: PHYSICIAN ASSISTANT

## 2023-06-09 PROCEDURE — 1160F RVW MEDS BY RX/DR IN RCRD: CPT | Mod: CPTII,S$GLB,, | Performed by: PHYSICIAN ASSISTANT

## 2023-06-09 PROCEDURE — 99999 PR PBB SHADOW E&M-EST. PATIENT-LVL IV: ICD-10-PCS | Mod: PBBFAC,,, | Performed by: PHYSICIAN ASSISTANT

## 2023-06-09 PROCEDURE — 3078F DIAST BP <80 MM HG: CPT | Mod: CPTII,S$GLB,, | Performed by: PHYSICIAN ASSISTANT

## 2023-06-09 PROCEDURE — 73564 X-RAY EXAM KNEE 4 OR MORE: CPT | Mod: 26,,, | Performed by: RADIOLOGY

## 2023-06-09 PROCEDURE — 99999 PR PBB SHADOW E&M-EST. PATIENT-LVL IV: CPT | Mod: PBBFAC,,, | Performed by: PHYSICIAN ASSISTANT

## 2023-06-09 PROCEDURE — 73564 XR KNEE ORTHO BILAT WITH FLEXION: ICD-10-PCS | Mod: 26,,, | Performed by: RADIOLOGY

## 2023-06-09 PROCEDURE — 99214 OFFICE O/P EST MOD 30 MIN: CPT | Mod: 25,S$GLB,, | Performed by: PHYSICIAN ASSISTANT

## 2023-06-09 RX ORDER — TRIAMCINOLONE ACETONIDE 40 MG/ML
40 INJECTION, SUSPENSION INTRA-ARTICULAR; INTRAMUSCULAR
Status: COMPLETED | OUTPATIENT
Start: 2023-06-09 | End: 2023-06-09

## 2023-06-09 RX ORDER — BUPIVACAINE HYDROCHLORIDE 2.5 MG/ML
3 INJECTION, SOLUTION INFILTRATION; PERINEURAL
Status: COMPLETED | OUTPATIENT
Start: 2023-06-09 | End: 2023-06-09

## 2023-06-09 RX ADMIN — BUPIVACAINE HYDROCHLORIDE 7.5 MG: 2.5 INJECTION, SOLUTION INFILTRATION; PERINEURAL at 11:06

## 2023-06-09 RX ADMIN — TRIAMCINOLONE ACETONIDE 40 MG: 40 INJECTION, SUSPENSION INTRA-ARTICULAR; INTRAMUSCULAR at 11:06

## 2023-06-09 NOTE — PROGRESS NOTES
Subjective:          Chief Complaint: Lachelle Engel is a 47 y.o. female who had concerns including Pain of the Right Knee.    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 She has a known complex medial meniscus tear from MRI in February 2021 and is not interested in surgery.  She would like to discuss injections today. Denies any history of injections in her knee. Denies a specific mechanism of injury. Pain is located along the medial aspect of her right knee. Reports occasional swelling in her right knee. Denies instability and mechanical symptoms.  Pain increases with prolonged standing and walking.  Pain today is 6/10. She has been taking ibuprofen and Tylenol as needed for pain.            Review of Systems   Constitutional: Negative. Negative for chills, fever, weight gain and weight loss.   HENT:  Negative for congestion and sore throat.    Eyes:  Negative for blurred vision and double vision.   Cardiovascular:  Negative for chest pain, leg swelling and palpitations.   Respiratory:  Negative for cough and shortness of breath.    Hematologic/Lymphatic: Does not bruise/bleed easily.   Skin:  Negative for itching, poor wound healing and rash.   Musculoskeletal:  Positive for joint pain, joint swelling and stiffness. Negative for back pain, muscle weakness and myalgias.   Gastrointestinal:  Negative for abdominal pain, constipation, diarrhea, nausea and vomiting.   Genitourinary: Negative.  Negative for frequency and hematuria.   Neurological:  Negative for dizziness, headaches, numbness, paresthesias and sensory change.   Psychiatric/Behavioral:  Negative for altered mental status and depression. The patient is not nervous/anxious.    Allergic/Immunologic: Negative for hives.                 Objective:        General: Lachelle Gunderson is well-developed, well-nourished,  appears stated age, in no acute distress, alert and oriented to time, place and person.     General    Vitals reviewed.  Constitutional: She is oriented to person, place, and time. She appears well-developed and well-nourished. No distress.   HENT:   Head: Normocephalic and atraumatic.   Eyes: EOM are normal.   Cardiovascular:  Normal rate and regular rhythm.            Pulmonary/Chest: Effort normal. No respiratory distress.   Neurological: She is alert and oriented to person, place, and time. She has normal reflexes. No cranial nerve deficit. Coordination normal.   Psychiatric: She has a normal mood and affect. Her behavior is normal. Judgment and thought content normal.     General Musculoskeletal Exam   Gait: abnormal and antalgic       Right Knee Exam     Inspection   Erythema: absent  Scars: absent  Swelling: absent  Effusion: present  Deformity: absent  Bruising: absent    Tenderness   The patient is tender to palpation of the medial joint line.    Range of Motion   Extension:  0 normal   Flexion:  130 normal     Tests   Meniscus   Yenifer:  Medial - positive Lateral - negative  Ligament Examination   Lachman: normal (-1 to 2mm)   PCL-Posterior Drawer: normal (0 to 2mm)     MCL - Valgus: normal (0 to 2mm)  LCL - Varus: normal  Pivot Shift: normal (Equal)  Reverse Pivot Shift: normal (Equal)  Posterolateral Corner: stable  Patella   Passive Patellar Tilt: neutral  Patellar Tracking: normal  Patellar Glide (quadrants): Lateral - 1   Medial - 2  Patellar Grind: negative    Other   Sensation: normal    Left Knee Exam     Inspection   Erythema: absent  Scars: absent  Swelling: absent  Effusion: absent  Deformity: absent  Bruising: absent    Tenderness   The patient is experiencing no tenderness.     Range of Motion   Extension:  0 normal   Flexion:  130 normal     Tests   Meniscus   Yenifer:  Medial - negative Lateral - negative  Stability   Lachman: normal (-1 to 2mm)   PCL-Posterior Drawer: normal (0 to  2mm)  MCL - Valgus: normal (0 to 2mm)  LCL - Varus: normal (0 to 2mm)  Pivot Shift: normal (Equal)  Reverse Pivot Shift: normal (Equal)  Posterolateral Corner: stable  Patella   Passive Patellar Tilt: neutral  Patellar Tracking: normal  Patellar Glide (Quadrants): Lateral - 1 Medial - 2  Patellar Grind: negative    Other   Sensation: normal    Muscle Strength   Right Lower Extremity   Hip Abduction: 5/5   Quadriceps:  5/5   Hamstrin/5   Left Lower Extremity   Hip Abduction: 5/5   Quadriceps:  5/5   Hamstrin/5     Reflexes     Left Side  Achilles:  2+  Quadriceps:  2+    Right Side   Achilles:  2+  Quadriceps:  2+    Vascular Exam     Right Pulses  Dorsalis Pedis:      2+  Posterior Tibial:      2+        Left Pulses  Dorsalis Pedis:      2+  Posterior Tibial:      2+      RADIOGRAPHS:23  Bilateral knees:  FINDINGS:  Right knee:     No acute fractures.  No definite narrowing of medial or lateral tibiofemoral or patellofemoral articulations and no periarticular spurring.  No suprapatellar bursal effusion or prepatellar soft tissue swelling.     Left knee:     No acute fractures.  No definite narrowing of medial or lateral tibiofemoral or patellofemoral articulations and no periarticular spurring.  No suprapatellar bursal effusion or prepatellar soft tissue swelling.    MRI right knee: 21  FINDINGS:  Menisci: There is a complex tear of the body segment/posterior horn of the medial meniscus with a predominant horizontal component that extends into the superior articular surface and a small associated peripheral parameniscal cyst.  Lateral meniscus is intact.  Anterior and posterior root ligaments are within normal limits.     Ligaments: ACL, PCL, MCL and posterior-lateral corner structures are normal.     Extensor Mechanism: Patellofemoral alignment is maintained.  Quadriceps and patellar tendons are intact.  MPFL and medial/lateral retinacula are normal.     Cartilage:     *Patellofemoral:  Superficial chondral fissuring overlies the medial patellar facet.  Trochlear cartilage is preserved.  No subchondral edema.  *Medial tibiofemoral: Intact without partial or full-thickness defects.  No subchondral edema.  *Lateral tibiofemoral: Intact without partial or full-thickness defects.  No subchondral edema.  Bones: Low signal intensity focus noted within the anterior aspect of the medial distal femoral metaphysis, likely a bone island and corresponding with sclerotic focus identified on previous radiograph.  No fractures.  No avascular necrosis.  No marrow infiltrative process.     Miscellaneous: There is a small joint effusion.  There is distal semimembranosus tendinosis with trace surrounding bursal fluid.  Distal iliotibial band is intact.  Visualized pes anserine tendons are within normal limits.  Origin of the medial and lateral gastrocnemius appears intact.  There is mild muscular edema within the medial and lateral gastrocnemius, possibly related to delayed onset muscle soreness.  There is synovitis along the expected course of the infrapatellar plica.  There is subcutaneous edema throughout the knee.  Visualized neurovascular structures appear normal.      Assessment:       Encounter Diagnoses   Name Primary?    Chronic pain of right knee     Complex tear of medial meniscus of right knee as current injury, subsequent encounter Yes          Plan:       1. I made the decision to obtain old records of the patient including previous notes and imaging. New imaging was ordered today of the extremity or extremities evaluated. I independently reviewed and interpreted the radiographs today as well as prior imaging.  Reviewed radiographs and MRI with patient in detail. Discussed treatment options including conservative vs surgical. It is recommended to move forward with right knee arthroscopy. However, patient would like to try a CSI in her right knee and potentially move forward with knee arthroscopy in  December 2023.    2.  NSAIDs p.r.n. pain    3. We reviewed with Lachelle Gunderson today, the pathology and natural history of her diagnosis. We have discussed a variety of treatment options including medications, physical therapy and other alternative treatments. I also explained the indications, risks and benefits of surgery. After discussion, Lachelle Gunderson decided to proceed with surgery. The decision was made to go forward with: NOT AT THIS TIME   1. Right knee arthroscopic medial meniscectomy   2. Right knee arthroscopic chondroplasty    The details of the surgical procedure were explained, including the location of probable incisions and a description of likely hardware and/or grafts to be used.  The patient understands the likely convalescence after surgery.  Also, we have thoroughly discussed the risks, benefits and alternatives to surgery, including, but not limited to, the risk of infection, joint stiffness, blood clot (including DVT and/or pulmonary embolus), neurologic and vascular injury.  It was explained that, if tissue has been repaired or reconstructed, there is a chance of failure, which may require further management.    All of the patient's questions were answered and informed consent was obtained. The patient will contact us if they have any questions or concerns in the interim.    4. No PCP clearance needed    5. Injection Procedure right knee  A time out was performed, including verification of patient ID, procedure, site and side, availability of information and equipment, review of safety issues, and agreement with consent, the procedure site was marked.    After time out was performed, the patient was prepped aseptically with povidone-iodine swabsticks. A diagnostic and therapeutic injection of 1:3cc Kenalog/Marcaine was given under sterile technique using a 22g x 1.5 needle from the Superolateral  aspect of the right Knee Joint in the supine position.      Lachelle Gunderson Engel had no adverse  reactions to the medication. Pain decreased. She was instructed to apply ice to the joint for 20 minutes and avoid strenuous activities for 24-36 hours following the injection. She was warned of possible blood sugar and/or blood pressure changes during that time. Following that time, she can resume regular activities.    She was reminded to call the clinic immediately for any adverse side effects as explained in clinic today.     6. RTC prn                  Patient questionnaires may have been collected.

## 2023-06-15 ENCOUNTER — PATIENT MESSAGE (OUTPATIENT)
Dept: OBSTETRICS AND GYNECOLOGY | Facility: CLINIC | Age: 48
End: 2023-06-15
Payer: COMMERCIAL

## 2023-06-19 DIAGNOSIS — B00.9 HSV (HERPES SIMPLEX VIRUS) INFECTION: ICD-10-CM

## 2023-06-23 RX ORDER — VALACYCLOVIR HYDROCHLORIDE 500 MG/1
TABLET, FILM COATED ORAL
Qty: 6 TABLET | Refills: 4 | Status: SHIPPED | OUTPATIENT
Start: 2023-06-23

## 2023-07-03 ENCOUNTER — HOSPITAL ENCOUNTER (OUTPATIENT)
Dept: RADIOLOGY | Facility: HOSPITAL | Age: 48
Discharge: HOME | End: 2023-07-03
Attending: PHYSICIAN ASSISTANT
Payer: COMMERCIAL

## 2023-07-03 DIAGNOSIS — Z91.89 AT HIGH RISK FOR BREAST CANCER: ICD-10-CM

## 2023-07-03 DIAGNOSIS — Z12.39 BREAST CANCER SCREENING, HIGH RISK PATIENT: ICD-10-CM

## 2023-07-03 DIAGNOSIS — Z12.31 BREAST CANCER SCREENING BY MAMMOGRAM: ICD-10-CM

## 2023-07-03 DIAGNOSIS — Z80.3 FAMILY HISTORY OF BREAST CANCER: ICD-10-CM

## 2023-07-03 PROCEDURE — 77049 MRI BREAST C-+ W/CAD BI: CPT | Mod: TC

## 2023-07-03 PROCEDURE — 77049 MRI BREAST W/WO CONTRAST, W/CAD, BILATERAL: ICD-10-PCS | Mod: 26,,, | Performed by: RADIOLOGY

## 2023-07-03 PROCEDURE — 77049 MRI BREAST C-+ W/CAD BI: CPT | Mod: 26,,, | Performed by: RADIOLOGY

## 2023-07-03 PROCEDURE — A9577 INJ MULTIHANCE: HCPCS | Performed by: PHYSICIAN ASSISTANT

## 2023-07-03 PROCEDURE — 25500020 PHARM REV CODE 255: Performed by: PHYSICIAN ASSISTANT

## 2023-07-03 RX ADMIN — GADOBENATE DIMEGLUMINE 20 ML: 529 INJECTION, SOLUTION INTRAVENOUS at 11:07

## 2023-07-11 ENCOUNTER — OFFICE VISIT (OUTPATIENT)
Dept: OBSTETRICS AND GYNECOLOGY | Facility: CLINIC | Age: 48
End: 2023-07-11
Payer: COMMERCIAL

## 2023-07-11 VITALS
BODY MASS INDEX: 37.35 KG/M2 | SYSTOLIC BLOOD PRESSURE: 116 MMHG | WEIGHT: 224.19 LBS | HEIGHT: 65 IN | DIASTOLIC BLOOD PRESSURE: 79 MMHG

## 2023-07-11 DIAGNOSIS — N95.1 MENOPAUSAL SYMPTOMS: Primary | ICD-10-CM

## 2023-07-11 DIAGNOSIS — Z91.89 AT HIGH RISK FOR BREAST CANCER: ICD-10-CM

## 2023-07-11 DIAGNOSIS — Z12.31 SCREENING MAMMOGRAM, ENCOUNTER FOR: ICD-10-CM

## 2023-07-11 DIAGNOSIS — Z80.3 FAMILY HISTORY OF BREAST CANCER: ICD-10-CM

## 2023-07-11 DIAGNOSIS — Z12.39 BREAST CANCER SCREENING, HIGH RISK PATIENT: ICD-10-CM

## 2023-07-11 DIAGNOSIS — Z12.31 BREAST CANCER SCREENING BY MAMMOGRAM: ICD-10-CM

## 2023-07-11 PROCEDURE — 99999 PR PBB SHADOW E&M-EST. PATIENT-LVL IV: ICD-10-PCS | Mod: PBBFAC,,, | Performed by: PHYSICIAN ASSISTANT

## 2023-07-11 PROCEDURE — 99214 OFFICE O/P EST MOD 30 MIN: CPT | Mod: S$GLB,,, | Performed by: PHYSICIAN ASSISTANT

## 2023-07-11 PROCEDURE — 99999 PR PBB SHADOW E&M-EST. PATIENT-LVL IV: CPT | Mod: PBBFAC,,, | Performed by: PHYSICIAN ASSISTANT

## 2023-07-11 PROCEDURE — 3008F PR BODY MASS INDEX (BMI) DOCUMENTED: ICD-10-PCS | Mod: CPTII,S$GLB,, | Performed by: PHYSICIAN ASSISTANT

## 2023-07-11 PROCEDURE — 1160F PR REVIEW ALL MEDS BY PRESCRIBER/CLIN PHARMACIST DOCUMENTED: ICD-10-PCS | Mod: CPTII,S$GLB,, | Performed by: PHYSICIAN ASSISTANT

## 2023-07-11 PROCEDURE — 99214 PR OFFICE/OUTPT VISIT, EST, LEVL IV, 30-39 MIN: ICD-10-PCS | Mod: S$GLB,,, | Performed by: PHYSICIAN ASSISTANT

## 2023-07-11 PROCEDURE — 3074F SYST BP LT 130 MM HG: CPT | Mod: CPTII,S$GLB,, | Performed by: PHYSICIAN ASSISTANT

## 2023-07-11 PROCEDURE — 1159F PR MEDICATION LIST DOCUMENTED IN MEDICAL RECORD: ICD-10-PCS | Mod: CPTII,S$GLB,, | Performed by: PHYSICIAN ASSISTANT

## 2023-07-11 PROCEDURE — 1159F MED LIST DOCD IN RCRD: CPT | Mod: CPTII,S$GLB,, | Performed by: PHYSICIAN ASSISTANT

## 2023-07-11 PROCEDURE — 3008F BODY MASS INDEX DOCD: CPT | Mod: CPTII,S$GLB,, | Performed by: PHYSICIAN ASSISTANT

## 2023-07-11 PROCEDURE — 1160F RVW MEDS BY RX/DR IN RCRD: CPT | Mod: CPTII,S$GLB,, | Performed by: PHYSICIAN ASSISTANT

## 2023-07-11 PROCEDURE — 3078F PR MOST RECENT DIASTOLIC BLOOD PRESSURE < 80 MM HG: ICD-10-PCS | Mod: CPTII,S$GLB,, | Performed by: PHYSICIAN ASSISTANT

## 2023-07-11 PROCEDURE — 3078F DIAST BP <80 MM HG: CPT | Mod: CPTII,S$GLB,, | Performed by: PHYSICIAN ASSISTANT

## 2023-07-11 PROCEDURE — 3074F PR MOST RECENT SYSTOLIC BLOOD PRESSURE < 130 MM HG: ICD-10-PCS | Mod: CPTII,S$GLB,, | Performed by: PHYSICIAN ASSISTANT

## 2023-07-11 RX ORDER — TESTOSTERONE CYPIONATE 200 MG/ML
50 INJECTION, SOLUTION INTRAMUSCULAR
Status: COMPLETED | OUTPATIENT
Start: 2023-07-11 | End: 2023-12-26

## 2023-07-11 NOTE — PROGRESS NOTES
History:       HPI:   Lachelle Engel presents for follow up of increased risk of breast cancer. Previously calculated her Tyrer-Cuzick score to be 16.4%-18.8% and 5 year gina model of 1.7%. No significant changes in her personal or family medical history since last seen.  Denies breast mass, changes in skin or nipple, or drainage. Breast Mri on 7/3/2023 was negative.  Recently increased Mounjaro to 5mg SC weekly for weight loss. Was able to fill through pharmacy but then planning to transition to compounding pharmacy. Hit a plateau but continues to eat well and exercise.   She is on HRT, taking Progesterone 100mg and Vivelle dot 0.05mg BIW. She is also using compounded testosterone 1% gel for low libido using 2 clicks daily. She is feeling well with this but interested in transitioning to testosterone IM monthly. She is tired of using the gel. Denies vaginal bleeding.     BREAST IMAGING  Bilateral Screening Mammogram: 2023 negative  Breast MRI: 7/3/2023 - negative    Personal history of cancer: no  Prior chest wall radiation at ages 10-30: no  Genetic testing: None; Mom had genetic testing and was negative.  Ashkenazi inheritance: no  OB/Gyn history:               Number of pregnancies & age at first gestation: ; First live birth at 24 y/o              Age of menarche: 13 y/o  Age of menopause: 47 y/o              Body mass index is 37.31 kg/m².              HRT Use: yes, combined. Started in               Breastfeeding: Yes              Number of previous biopsies: None              Breast Density: Heterogeneously dense     Family History:  Mother- Breast cancer at 64. Genetic testing negative (still living at 70)  Maternal Aunt- Ovarian cancer at 24 (still living at 66)  Maternal Uncle- Colon Cancer  Father- Prostate cancer     Tyrer-Cuzick Score: 22.1% (re-calculated in clinic today).   Gina Model 5 Year Risk: 1.7% (average for a female her age is 1.1%)      Past Medical   Past Medical  History:   Diagnosis Date    Abnormal Pap smear of cervix     Allergy     Angio-edema     Bradycardia     Depression     Diabetes mellitus     type 2     Dizziness     GERD (gastroesophageal reflux disease)     Hypertension     Impaired fasting blood sugar     Joint pain     Psoriasis     Recurrent upper respiratory infection (URI)     Skin disease     Thyroid disease     hyothyroidism     Patient Active Problem List   Diagnosis    Depression    Psoriasis    Impaired fasting blood sugar    GERD (gastroesophageal reflux disease)    Acute appendicitis    Abdominal pain       Social History   Social History     Tobacco Use    Smoking status: Some Days     Packs/day: 0.25     Years: 15.00     Pack years: 3.75     Types: Cigarettes    Smokeless tobacco: Current    Tobacco comments:     1 pack every 4 days   Substance Use Topics    Alcohol use: Yes     Alcohol/week: 4.0 standard drinks     Types: 4 Drinks containing 0.5 oz of alcohol per week     Comment: occasionally    Drug use: No       Family History  Family History   Problem Relation Age of Onset    Breast cancer Mother     Cancer Father         prostate    Prostate cancer Father     Cancer Maternal Aunt 30        colon cancer    Multiple sclerosis Maternal Aunt     Ovarian cancer Maternal Aunt     Colon cancer Maternal Uncle     Eczema Daughter     Melanoma Neg Hx        Medications    Current Outpatient Medications:     adalimumab (HUMIRA PEN) PnKt injection, Inject 1 pen (40 mg total) into the skin every 7 days., Disp: 4 pen, Rfl: 11    augmented betamethasone dipropionate (DIPROLENE-AF) 0.05 % cream, Apply to affected areas of body BID prn psoriasis., Disp: 50 g, Rfl: 5    betamethasone valerate 0.1% (VALISONE) 0.1 % Lotn, Apply to affected areas of scalp/ears BID prn psoriasis., Disp: 60 mL, Rfl: 3    estradiol 0.05 mg/24 hr td ptsw (VIVELLE-DOT) 0.05 mg/24 hr, Place 1 patch onto the skin twice a week., Disp: 8 patch, Rfl: 11    levothyroxine (SYNTHROID) 137 MCG  "Tab tablet, Take 137 mcg by mouth once daily., Disp: , Rfl:     methotrexate 2.5 MG Tab, 5 tablets weekly for 4 weeks and then 7 tablets weekly after that, Disp: 35 tablet, Rfl: 5    mupirocin (BACTROBAN) 2 % ointment, Apply topically 3 (three) times daily., Disp: 1 Tube, Rfl: 2    omeprazole (PRILOSEC) 20 MG capsule, Take 1 capsule (20 mg total) by mouth once daily., Disp: 30 capsule, Rfl: 11    progesterone (PROMETRIUM) 100 MG capsule, Take 1 capsule (100 mg total) by mouth once daily., Disp: 30 capsule, Rfl: 11    tirzepatide (MOUNJARO) 5 mg/0.5 mL PnIj, Inject 5 mg into the skin every 7 days., Disp: 4 pen, Rfl: 5    UNABLE TO FIND, medication name: Testosterone 3% gel 1 click to upper inner thigh daily, Disp: 30 g, Rfl: 5    valACYclovir (VALTREX) 500 MG tablet, TAKE 1 TABLET BY MOUTH TWICE DAILY FOR 3 DAYS. BEGIN AT FIRST SIGN OF OUTBREAK, Disp: 6 tablet, Rfl: 4    Allergies  Review of patient's allergies indicates:  No Known Allergies    Review of Systems  General: No fever, chills.  Chest: No chest pain, shortness of breath, or palpitations.  Breast: No pain, masses, or nipple discharge.  Vulva: No pain, lesions, or itching.  Vagina: No relaxation, itching, discharge, or lesions.  Abdomen: No pain, nausea, vomiting, diarrhea, or constipation.  Urinary: No incontinence, nocturia, frequency, or dysuria.  Extremities:  No leg cramps, edema, or calf pain.  Neurologic: No headaches, dizziness, or visual changes.    Objective:     Vitals:    07/11/23 0900   BP: 116/79   Weight: 101.7 kg (224 lb 3.2 oz)   Height: 5' 5" (1.651 m)     Body mass index is 37.31 kg/m².    GENERAL:  Well-developed, well-nourished female in no acute distress. Vital signs reviewed.   SKIN:  Warm, dry without rashes, purpura or petechiae.  EYES:  EOMs intact.  Conjunctivae normal.  HEAD:  Normocephalic.  EARS/NOSE/MOUTH/THROAT:  Hearing intact.   NECK:  Supple with good range of motion; no masses  PSYCHIATRIC:  Normal affect and mood.  Alert " and Oriented x 3.   BREASTS:Symmetrical, no skin changes or visible lesions.  No palpable masses, nipple discharge bilaterally.      Assessment:     Increased risk of breast cancer: This is a 47 y.o. female who presents for follow up of increased risk of breast cancer based on an elevated Tyrer Cuzick score.     Metabolic Syndrome/Obesity    Menopausal Symptoms     Plan:   Continue annual screening mammograms and breast MRI, alternating imaging every 6 months until age 75.    Screening mammogram due 1/2024  Breast MRI in 7/2024  Two physical exams per year, she will follow up in 1/2024 for WWE  We did review benefits of chemoprevention. However, would not recommend while on HRT. She declines chemoprevention.    Continue Mounjaro 5mg SC weekly.  Will transition to compounded tirzepatide if unable to fill through insurance  Continue log glycemic diet with lean protein at each meal.  Strength workouts 3x per week.    Continue Progesterone 100mg and Vivelle dot 0.05mg BIW  D/c Testosterone gel  Start Testosterone 50mg IM [id number].    Follow up in 6 months for WWE or sooner PRN.    I spent a total of 35 minutes on the day of the visit.This includes face to face time and non-face to face time preparing to see the patient (eg, review of tests), obtaining and/or reviewing separately obtained history, documenting clinical information in the electronic or other health record, independently interpreting results and communicating results to the patient/family/caregiver, or care coordinator.

## 2023-07-19 DIAGNOSIS — L40.50 PSORIATIC ARTHRITIS: Primary | ICD-10-CM

## 2023-07-19 RX ORDER — ADALIMUMAB 40MG/0.8ML
KIT SUBCUTANEOUS
Qty: 2 PEN | Refills: 0 | Status: SHIPPED | OUTPATIENT
Start: 2023-07-19 | End: 2023-08-15

## 2023-07-20 ENCOUNTER — CLINICAL SUPPORT (OUTPATIENT)
Dept: OBSTETRICS AND GYNECOLOGY | Facility: CLINIC | Age: 48
End: 2023-07-20
Payer: COMMERCIAL

## 2023-07-20 DIAGNOSIS — N95.1 MENOPAUSAL SYMPTOMS: Primary | ICD-10-CM

## 2023-07-20 PROCEDURE — 96372 PR INJECTION,THERAP/PROPH/DIAG2ST, IM OR SUBCUT: ICD-10-PCS | Mod: S$GLB,,, | Performed by: PHYSICIAN ASSISTANT

## 2023-07-20 PROCEDURE — 99999 PR PBB SHADOW E&M-EST. PATIENT-LVL I: ICD-10-PCS | Mod: PBBFAC,,,

## 2023-07-20 PROCEDURE — 99999 PR PBB SHADOW E&M-EST. PATIENT-LVL I: CPT | Mod: PBBFAC,,,

## 2023-07-20 PROCEDURE — 96372 THER/PROPH/DIAG INJ SC/IM: CPT | Mod: S$GLB,,, | Performed by: PHYSICIAN ASSISTANT

## 2023-07-20 RX ADMIN — TESTOSTERONE CYPIONATE 50 MG: 200 INJECTION, SOLUTION INTRAMUSCULAR at 08:07

## 2023-07-27 ENCOUNTER — PATIENT MESSAGE (OUTPATIENT)
Dept: OBSTETRICS AND GYNECOLOGY | Facility: CLINIC | Age: 48
End: 2023-07-27
Payer: COMMERCIAL

## 2023-08-14 DIAGNOSIS — L40.50 PSORIATIC ARTHRITIS: ICD-10-CM

## 2023-08-15 RX ORDER — ADALIMUMAB 40MG/0.8ML
KIT SUBCUTANEOUS
Qty: 2 PEN | Refills: 11 | Status: SHIPPED | OUTPATIENT
Start: 2023-08-15 | End: 2023-12-27

## 2023-08-24 ENCOUNTER — CLINICAL SUPPORT (OUTPATIENT)
Dept: OBSTETRICS AND GYNECOLOGY | Facility: CLINIC | Age: 48
End: 2023-08-24
Payer: COMMERCIAL

## 2023-08-24 DIAGNOSIS — N95.1 MENOPAUSAL SYMPTOMS: Primary | ICD-10-CM

## 2023-08-24 PROCEDURE — 96372 PR INJECTION,THERAP/PROPH/DIAG2ST, IM OR SUBCUT: ICD-10-PCS | Mod: S$GLB,,, | Performed by: PHYSICIAN ASSISTANT

## 2023-08-24 PROCEDURE — 99999 PR PBB SHADOW E&M-EST. PATIENT-LVL I: ICD-10-PCS | Mod: PBBFAC,,,

## 2023-08-24 PROCEDURE — 99999 PR PBB SHADOW E&M-EST. PATIENT-LVL I: CPT | Mod: PBBFAC,,,

## 2023-08-24 PROCEDURE — 96372 THER/PROPH/DIAG INJ SC/IM: CPT | Mod: S$GLB,,, | Performed by: PHYSICIAN ASSISTANT

## 2023-08-24 RX ADMIN — TESTOSTERONE CYPIONATE 50 MG: 200 INJECTION, SOLUTION INTRAMUSCULAR at 04:08

## 2023-08-24 NOTE — PROGRESS NOTES
Here for hormone therapy injection, no complaints at this time. Injection given as ordered, tolerated well no reports of pain prior to or post injections. Advised to return to clinic as scheduled .    Site: [facility]    Testosterone 50mg    CLINIC SUPPLIED MEDICATION

## 2023-09-01 ENCOUNTER — OFFICE VISIT (OUTPATIENT)
Dept: GASTROENTEROLOGY | Facility: CLINIC | Age: 48
End: 2023-09-01
Payer: COMMERCIAL

## 2023-09-01 VITALS — BODY MASS INDEX: 36.84 KG/M2 | WEIGHT: 221.13 LBS | HEIGHT: 65 IN

## 2023-09-01 DIAGNOSIS — Z80.0 FH: COLON CANCER: ICD-10-CM

## 2023-09-01 DIAGNOSIS — K59.00 CONSTIPATION, UNSPECIFIED CONSTIPATION TYPE: Primary | ICD-10-CM

## 2023-09-01 DIAGNOSIS — Z12.11 COLON CANCER SCREENING: ICD-10-CM

## 2023-09-01 PROCEDURE — 1159F PR MEDICATION LIST DOCUMENTED IN MEDICAL RECORD: ICD-10-PCS | Mod: CPTII,S$GLB,,

## 2023-09-01 PROCEDURE — 1159F MED LIST DOCD IN RCRD: CPT | Mod: CPTII,S$GLB,,

## 2023-09-01 PROCEDURE — 99999 PR PBB SHADOW E&M-EST. PATIENT-LVL IV: CPT | Mod: PBBFAC,,,

## 2023-09-01 PROCEDURE — 3008F PR BODY MASS INDEX (BMI) DOCUMENTED: ICD-10-PCS | Mod: CPTII,S$GLB,,

## 2023-09-01 PROCEDURE — 3008F BODY MASS INDEX DOCD: CPT | Mod: CPTII,S$GLB,,

## 2023-09-01 PROCEDURE — 99204 OFFICE O/P NEW MOD 45 MIN: CPT | Mod: S$GLB,,,

## 2023-09-01 PROCEDURE — 99204 PR OFFICE/OUTPT VISIT, NEW, LEVL IV, 45-59 MIN: ICD-10-PCS | Mod: S$GLB,,,

## 2023-09-01 PROCEDURE — 99999 PR PBB SHADOW E&M-EST. PATIENT-LVL IV: ICD-10-PCS | Mod: PBBFAC,,,

## 2023-09-01 NOTE — PATIENT INSTRUCTIONS
Complete mini miralax cleanout  Then start taking miralax daily- about 1 capful in 6-8 ounces of water  Send me an update in about a week or two to let me know how your BM's are      Clean Out Instructions;    Purchase a 4.1 oz bottle of Miralax (Generic ok).        Purchase a 64 oz bottle of Gatorade.        Mix them both together and drink it.    2.  If you still have not had a Bowel Movement then,       Purchase 1 bottle of Milk of Magnesia and drink.(12oz bottle, any flavor, Generic ok)      3.  You can take Linzess Daily.       Or take a Stool Softner AND 1/2 capful of Miralax every OTHER day.

## 2023-09-01 NOTE — PROGRESS NOTES
GASTROENTEROLOGY CLINIC NOTE    Reason for visit: The primary encounter diagnosis was Constipation, unspecified constipation type. Diagnoses of Colon cancer screening and FH: colon cancer were also pertinent to this visit.  Referring provider/PCP: No, Primary Doctor    HPI:  Lachelle Engel is a 47 y.o. female here today for constipation. Has slowly progressed after having appendectomy years ago, but worsened over the past year. Has BM's about once per week.  Last BM was , was having associated sharp abd pain/cramping.  She took two dulcolax and fiber gummies with only small BM, but pain has subsided. She has tried taking magnesium in the past but did not seem to improve BM's. Had colonoscopy in 2015- no polyps, significant FH of CRC in extended family and mother with advanced colon polyps requiring colon resection. She has hypothyroidism, managed by physician at outside facility- she reports TSH is WNL. She recently started mounjaro, does not seem to have worsened BM's.     Prior Endoscopy:  EGD:  Colon:    (Portions of this note were dictated using voice recognition software and may contain dictation related errors in spelling/grammar/syntax not found on text review)    Review of Systems   Gastrointestinal:  Positive for abdominal pain and constipation. Negative for blood in stool.       Past Medical History: has a past medical history of Abnormal Pap smear of cervix, Allergy, Angio-edema, Bradycardia, Depression, Diabetes mellitus, Dizziness, GERD (gastroesophageal reflux disease), Hypertension, Impaired fasting blood sugar, Joint pain, Psoriasis, Recurrent upper respiratory infection (URI), Skin disease, and Thyroid disease.    Past Surgical History: has a past surgical history that includes  section; Incontinence surgery; Cholecystectomy; Endometrial ablation; Tubal ligation; Appendectomy; Esophagogastroduodenoscopy (N/A, 2020); and Cryotherapy.    Home medications:   Current  "Outpatient Medications on File Prior to Visit   Medication Sig Dispense Refill    adalimumab (HUMIRA PEN) PnKt injection INJECT 1 PEN UNDER THE SKIN EVERY 14 DAYS 2 pen 11    augmented betamethasone dipropionate (DIPROLENE-AF) 0.05 % cream Apply to affected areas of body BID prn psoriasis. 50 g 5    betamethasone valerate 0.1% (VALISONE) 0.1 % Lotn Apply to affected areas of scalp/ears BID prn psoriasis. 60 mL 3    estradiol 0.05 mg/24 hr td ptsw (VIVELLE-DOT) 0.05 mg/24 hr Place 1 patch onto the skin twice a week. 8 patch 11    levothyroxine (SYNTHROID) 137 MCG Tab tablet Take 137 mcg by mouth once daily.      methotrexate 2.5 MG Tab 5 tablets weekly for 4 weeks and then 7 tablets weekly after that 35 tablet 5    mupirocin (BACTROBAN) 2 % ointment Apply topically 3 (three) times daily. 1 Tube 2    progesterone (PROMETRIUM) 100 MG capsule Take 1 capsule (100 mg total) by mouth once daily. 30 capsule 11    tirzepatide (MOUNJARO) 5 mg/0.5 mL PnIj Inject 5 mg into the skin every 7 days. 4 pen 5    UNABLE TO FIND medication name: Testosterone 3% gel 1 click to upper inner thigh daily 30 g 5    valACYclovir (VALTREX) 500 MG tablet TAKE 1 TABLET BY MOUTH TWICE DAILY FOR 3 DAYS. BEGIN AT FIRST SIGN OF OUTBREAK 6 tablet 4    omeprazole (PRILOSEC) 20 MG capsule Take 1 capsule (20 mg total) by mouth once daily. 30 capsule 11     Current Facility-Administered Medications on File Prior to Visit   Medication Dose Route Frequency Provider Last Rate Last Admin    testosterone cypionate injection 50 mg  50 mg Intramuscular Q28 Days Anastasia Morrell PA-C   50 mg at 08/24/23 1638       Vital signs:  Ht 5' 5" (1.651 m)   Wt 100.3 kg (221 lb 1.9 oz)   BMI 36.80 kg/m²     Physical Exam  Constitutional:       Appearance: Normal appearance. She is obese.   Abdominal:      General: There is no distension.      Palpations: Abdomen is soft.      Tenderness: There is no abdominal tenderness.   Neurological:      Mental Status: She is " alert.       I have reviewed associated labs, imaging and notes.     Assessment:  1. Constipation, unspecified constipation type    2. Colon cancer screening    3. FH: colon cancer    Ongoing for a few years, progressing over the past year   BM's about once weekly   Minimal relief with fiber, magnesium, or dulcolax   No blood in stool  Colonoscopy in 2015- normal  FH of CRC in multiple extended family, mother with polyps requiring resection    Plan:  Orders Placed This Encounter    Case Request Endoscopy: COLONOSCOPY     Complete mini miralax cleanout   Then start taking miralax daily- if minimal relief will trial linzess    Schedule colonoscopy   Will wait to order prep to see how BM's improve   Consider two day or high volume prep       Lindsey Ray, NP [facility]

## 2023-09-03 ENCOUNTER — PATIENT MESSAGE (OUTPATIENT)
Dept: OBSTETRICS AND GYNECOLOGY | Facility: CLINIC | Age: 48
End: 2023-09-03
Payer: COMMERCIAL

## 2023-09-03 DIAGNOSIS — E11.69 TYPE 2 DIABETES MELLITUS WITH OTHER SPECIFIED COMPLICATION, WITHOUT LONG-TERM CURRENT USE OF INSULIN: Primary | ICD-10-CM

## 2023-09-03 DIAGNOSIS — E88.810 METABOLIC SYNDROME: ICD-10-CM

## 2023-09-21 ENCOUNTER — CLINICAL SUPPORT (OUTPATIENT)
Dept: OBSTETRICS AND GYNECOLOGY | Facility: CLINIC | Age: 48
End: 2023-09-21
Payer: COMMERCIAL

## 2023-09-21 DIAGNOSIS — N95.1 MENOPAUSAL SYMPTOMS: Primary | ICD-10-CM

## 2023-09-21 PROCEDURE — 96372 THER/PROPH/DIAG INJ SC/IM: CPT | Mod: S$GLB,,, | Performed by: PHYSICIAN ASSISTANT

## 2023-09-21 PROCEDURE — 96372 PR INJECTION,THERAP/PROPH/DIAG2ST, IM OR SUBCUT: ICD-10-PCS | Mod: S$GLB,,, | Performed by: PHYSICIAN ASSISTANT

## 2023-09-21 PROCEDURE — 99999 PR PBB SHADOW E&M-EST. PATIENT-LVL I: ICD-10-PCS | Mod: PBBFAC,,,

## 2023-09-21 PROCEDURE — 99999 PR PBB SHADOW E&M-EST. PATIENT-LVL I: CPT | Mod: PBBFAC,,,

## 2023-09-21 RX ADMIN — TESTOSTERONE CYPIONATE 50 MG: 200 INJECTION, SOLUTION INTRAMUSCULAR at 03:09

## 2023-10-01 DIAGNOSIS — N95.1 MENOPAUSAL SYMPTOMS: ICD-10-CM

## 2023-10-02 RX ORDER — ESTRADIOL 0.05 MG/D
FILM, EXTENDED RELEASE TRANSDERMAL
Qty: 8 PATCH | Refills: 11 | Status: SHIPPED | OUTPATIENT
Start: 2023-10-02 | End: 2024-01-09

## 2023-10-02 RX ORDER — PROGESTERONE 100 MG/1
100 CAPSULE ORAL
Qty: 30 CAPSULE | Refills: 11 | Status: SHIPPED | OUTPATIENT
Start: 2023-10-02 | End: 2024-01-09

## 2023-10-05 ENCOUNTER — TELEPHONE (OUTPATIENT)
Dept: GASTROENTEROLOGY | Facility: CLINIC | Age: 48
End: 2023-10-05
Payer: COMMERCIAL

## 2023-10-05 DIAGNOSIS — Z12.11 COLON CANCER SCREENING: Primary | ICD-10-CM

## 2023-10-05 RX ORDER — POLYETHYLENE GLYCOL 3350, SODIUM SULFATE ANHYDROUS, SODIUM BICARBONATE, SODIUM CHLORIDE, POTASSIUM CHLORIDE 236; 22.74; 6.74; 5.86; 2.97 G/4L; G/4L; G/4L; G/4L; G/4L
4 POWDER, FOR SOLUTION ORAL ONCE
Qty: 1 EACH | Refills: 0 | Status: SHIPPED | OUTPATIENT
Start: 2023-10-05 | End: 2023-10-05

## 2023-10-05 NOTE — TELEPHONE ENCOUNTER
Spoke with pt. Went over instructions for the colonoscopy. Informed pt she has to hold Manjaro a week before the procedure. Pt states she took it today. I informed her I will reach out to the provider to confirm if she can still have the procedure. Sent instructions through the portal.

## 2023-10-05 NOTE — TELEPHONE ENCOUNTER
Spoke with providers and ENDO dept. According to them, the pt has to be rescheduled. Spoke with pt and rescheduled her for 11/21/23.

## 2023-10-13 ENCOUNTER — PATIENT MESSAGE (OUTPATIENT)
Dept: RHEUMATOLOGY | Facility: CLINIC | Age: 48
End: 2023-10-13
Payer: COMMERCIAL

## 2023-10-30 ENCOUNTER — CLINICAL SUPPORT (OUTPATIENT)
Dept: OBSTETRICS AND GYNECOLOGY | Facility: CLINIC | Age: 48
End: 2023-10-30
Payer: COMMERCIAL

## 2023-10-30 DIAGNOSIS — N95.1 MENOPAUSAL SYMPTOMS: Primary | ICD-10-CM

## 2023-10-30 PROCEDURE — 99999 PR PBB SHADOW E&M-EST. PATIENT-LVL I: ICD-10-PCS | Mod: PBBFAC,,,

## 2023-10-30 PROCEDURE — 96372 PR INJECTION,THERAP/PROPH/DIAG2ST, IM OR SUBCUT: ICD-10-PCS | Mod: S$GLB,,, | Performed by: PHYSICIAN ASSISTANT

## 2023-10-30 PROCEDURE — 96372 THER/PROPH/DIAG INJ SC/IM: CPT | Mod: S$GLB,,, | Performed by: PHYSICIAN ASSISTANT

## 2023-10-30 PROCEDURE — 99999 PR PBB SHADOW E&M-EST. PATIENT-LVL I: CPT | Mod: PBBFAC,,,

## 2023-10-30 RX ADMIN — TESTOSTERONE CYPIONATE 50 MG: 200 INJECTION, SOLUTION INTRAMUSCULAR at 03:10

## 2023-10-30 NOTE — PROGRESS NOTES
Here for hormone therapy injection, no complaints at this time. Injection given as ordered, tolerated well no reports of pain prior to or post injection. Advised to return to clinic as scheduled      Site:[facility]    Testerone:50 mg    CLINIC SUPPLIED MEDICATION

## 2023-11-17 ENCOUNTER — TELEPHONE (OUTPATIENT)
Dept: ENDOSCOPY | Facility: HOSPITAL | Age: 48
End: 2023-11-17
Payer: COMMERCIAL

## 2023-11-17 NOTE — TELEPHONE ENCOUNTER
Spoke with patient about arrival time @ 0915.   Colon/Golytely    Prep instructions reviewed: the day before the procedure, follow a clear liquid diet all day, then start the first 1/2 of prep at 5pm and take 2nd 1/2 of prep @ 0415.  Pt must be completely NPO when prep completed @ 0615.              Medications: Do not take Insulin or oral diabetic medications the day of the procedure.  Last dose of Mounjaro 11/12. Take as prescribed: heart, seizure and blood pressure medication in the morning with a sip of water (less than an ounce).  Take any breathing medications and bring inhalers to hospital with you Leave all valuables and jewelry at home.     Wear comfortable clothes to procedure to change into hospital gown You cannot drive for 24 hours after your procedure because you will receive sedation for your procedure to make you comfortable.  A ride must be provided at discharge.

## 2023-11-20 ENCOUNTER — PATIENT MESSAGE (OUTPATIENT)
Dept: OBSTETRICS AND GYNECOLOGY | Facility: CLINIC | Age: 48
End: 2023-11-20
Payer: COMMERCIAL

## 2023-11-20 DIAGNOSIS — N95.2 VAGINAL ATROPHY: Primary | ICD-10-CM

## 2023-11-20 RX ORDER — PRASTERONE 6.5 MG/1
6.5 INSERT VAGINAL NIGHTLY
Qty: 28 EACH | Refills: 11 | Status: SHIPPED | OUTPATIENT
Start: 2023-11-20 | End: 2023-11-29

## 2023-11-21 ENCOUNTER — ANESTHESIA (OUTPATIENT)
Dept: ENDOSCOPY | Facility: HOSPITAL | Age: 48
End: 2023-11-21
Payer: COMMERCIAL

## 2023-11-21 ENCOUNTER — ANESTHESIA EVENT (OUTPATIENT)
Dept: ENDOSCOPY | Facility: HOSPITAL | Age: 48
End: 2023-11-21
Payer: COMMERCIAL

## 2023-11-21 ENCOUNTER — HOSPITAL ENCOUNTER (OUTPATIENT)
Facility: HOSPITAL | Age: 48
Discharge: HOME | End: 2023-11-21
Attending: INTERNAL MEDICINE | Admitting: INTERNAL MEDICINE
Payer: COMMERCIAL

## 2023-11-21 VITALS
DIASTOLIC BLOOD PRESSURE: 71 MMHG | SYSTOLIC BLOOD PRESSURE: 104 MMHG | OXYGEN SATURATION: 100 % | WEIGHT: 215 LBS | BODY MASS INDEX: 35.82 KG/M2 | HEIGHT: 65 IN | HEART RATE: 68 BPM | TEMPERATURE: 98 F | RESPIRATION RATE: 12 BRPM

## 2023-11-21 DIAGNOSIS — Z12.11 COLON CANCER SCREENING: ICD-10-CM

## 2023-11-21 LAB — POCT GLUCOSE: 71 MG/DL (ref 70–110)

## 2023-11-21 PROCEDURE — G0121 COLON CA SCRN NOT HI RSK IND: ICD-10-PCS | Mod: ,,, | Performed by: INTERNAL MEDICINE

## 2023-11-21 PROCEDURE — D9220A PRA ANESTHESIA: ICD-10-PCS | Mod: CRNA,,, | Performed by: NURSE ANESTHETIST, CERTIFIED REGISTERED

## 2023-11-21 PROCEDURE — 25000003 PHARM REV CODE 250: Performed by: INTERNAL MEDICINE

## 2023-11-21 PROCEDURE — G0121 COLON CA SCRN NOT HI RSK IND: HCPCS | Mod: ,,, | Performed by: INTERNAL MEDICINE

## 2023-11-21 PROCEDURE — D9220A PRA ANESTHESIA: Mod: ANES,,, | Performed by: STUDENT IN AN ORGANIZED HEALTH CARE EDUCATION/TRAINING PROGRAM

## 2023-11-21 PROCEDURE — 37000008 HC ANESTHESIA 1ST 15 MINUTES: Performed by: INTERNAL MEDICINE

## 2023-11-21 PROCEDURE — 37000009 HC ANESTHESIA EA ADD 15 MINS: Performed by: INTERNAL MEDICINE

## 2023-11-21 PROCEDURE — D9220A PRA ANESTHESIA: Mod: CRNA,,, | Performed by: NURSE ANESTHETIST, CERTIFIED REGISTERED

## 2023-11-21 PROCEDURE — D9220A PRA ANESTHESIA: ICD-10-PCS | Mod: ANES,,, | Performed by: STUDENT IN AN ORGANIZED HEALTH CARE EDUCATION/TRAINING PROGRAM

## 2023-11-21 PROCEDURE — 25000003 PHARM REV CODE 250: Performed by: NURSE ANESTHETIST, CERTIFIED REGISTERED

## 2023-11-21 PROCEDURE — G0121 COLON CA SCRN NOT HI RSK IND: HCPCS | Performed by: INTERNAL MEDICINE

## 2023-11-21 PROCEDURE — 63600175 PHARM REV CODE 636 W HCPCS: Performed by: NURSE ANESTHETIST, CERTIFIED REGISTERED

## 2023-11-21 RX ORDER — PROPOFOL 10 MG/ML
VIAL (ML) INTRAVENOUS CONTINUOUS PRN
Status: DISCONTINUED | OUTPATIENT
Start: 2023-11-21 | End: 2023-11-21

## 2023-11-21 RX ORDER — SODIUM CHLORIDE 9 MG/ML
INJECTION, SOLUTION INTRAVENOUS CONTINUOUS
Status: DISCONTINUED | OUTPATIENT
Start: 2023-11-21 | End: 2023-11-21 | Stop reason: HOSPADM

## 2023-11-21 RX ORDER — LIDOCAINE HYDROCHLORIDE 20 MG/ML
INJECTION INTRAVENOUS
Status: DISCONTINUED | OUTPATIENT
Start: 2023-11-21 | End: 2023-11-21

## 2023-11-21 RX ORDER — PROPOFOL 10 MG/ML
VIAL (ML) INTRAVENOUS
Status: DISCONTINUED | OUTPATIENT
Start: 2023-11-21 | End: 2023-11-21

## 2023-11-21 RX ADMIN — SODIUM CHLORIDE: 9 INJECTION, SOLUTION INTRAVENOUS at 09:11

## 2023-11-21 RX ADMIN — LIDOCAINE HYDROCHLORIDE 100 MG: 20 INJECTION, SOLUTION INTRAVENOUS at 11:11

## 2023-11-21 RX ADMIN — PROPOFOL 150 MCG/KG/MIN: 10 INJECTION, EMULSION INTRAVENOUS at 11:11

## 2023-11-21 RX ADMIN — PROPOFOL 70 MG: 10 INJECTION, EMULSION INTRAVENOUS at 11:11

## 2023-11-21 NOTE — H&P
Short Stay Endoscopy History and Physical    PCP - No, Primary Doctor    Procedure - Colonoscopy  ASA - II  Mallampati - per anesthesia  History of Anesthesia problems - no  Family history Anesthesia problems - no     HPI:  This is a 48 y.o. female here for evaluation of : Colon cancer screening    Family history of colon cancer - no  History of polyps - na  Change in bowel habits - no  Rectal bleeding - no      ROS:  Constitutional: No fevers, chills, No weight loss  ENT: No allergies  CV: No chest pain  Pulm: No shortness of breath  GI: see HPI  Derm: No rash    Medical History:  has a past medical history of Abnormal Pap smear of cervix, Allergy, Angio-edema, Bradycardia, Depression, Diabetes mellitus, Dizziness, GERD (gastroesophageal reflux disease), Hypertension, Impaired fasting blood sugar, Joint pain, Psoriasis, Recurrent upper respiratory infection (URI), Skin disease, and Thyroid disease.    Surgical History:  has a past surgical history that includes  section; Incontinence surgery; Cholecystectomy; Endometrial ablation; Tubal ligation; Appendectomy; Esophagogastroduodenoscopy (N/A, 2020); and Cryotherapy.    Family History: family history includes Breast cancer in her mother; Cancer in her father; Cancer (age of onset: 30) in her maternal aunt; Colon cancer in her maternal uncle; Eczema in her daughter; Multiple sclerosis in her maternal aunt; Ovarian cancer in her maternal aunt; Prostate cancer in her father.. Otherwise no colon cancer, inflammatory bowel disease, or GI malignancies.    Social History:  reports that she has been smoking cigarettes. She has a 3.8 pack-year smoking history. She uses smokeless tobacco. She reports current alcohol use of about 4.0 standard drinks of alcohol per week. She reports that she does not use drugs.    Review of patient's allergies indicates:  No Known Allergies    Medications:   Facility-Administered Medications Prior to Admission   Medication Dose  Route Frequency Provider Last Rate Last Admin    testosterone cypionate injection 50 mg  50 mg Intramuscular Q28 Days Anastasia Morrell PA-C   50 mg at 10/30/23 1517     Medications Prior to Admission   Medication Sig Dispense Refill Last Dose    adalimumab (HUMIRA PEN) PnKt injection INJECT 1 PEN UNDER THE SKIN EVERY 14 DAYS 2 pen 11     augmented betamethasone dipropionate (DIPROLENE-AF) 0.05 % cream Apply to affected areas of body BID prn psoriasis. 50 g 5     betamethasone valerate 0.1% (VALISONE) 0.1 % Lotn Apply to affected areas of scalp/ears BID prn psoriasis. 60 mL 3     estradiol 0.05 mg/24 hr td ptsw (VIVELLE-DOT) 0.05 mg/24 hr APPLY 1 PATCH TOPICALLY TO THE SKIN 2 TIMES A WEEK 8 patch 11     levothyroxine (SYNTHROID) 137 MCG Tab tablet Take 137 mcg by mouth once daily.       methotrexate 2.5 MG Tab 5 tablets weekly for 4 weeks and then 7 tablets weekly after that 35 tablet 5     mupirocin (BACTROBAN) 2 % ointment Apply topically 3 (three) times daily. 1 Tube 2     omeprazole (PRILOSEC) 20 MG capsule Take 1 capsule (20 mg total) by mouth once daily. 30 capsule 11     prasterone, dhea, (INTRAROSA) 6.5 mg Inst Place 6.5 mg vaginally every evening. 28 each 11     progesterone (PROMETRIUM) 100 MG capsule TAKE 1 CAPSULE(100 MG) BY MOUTH EVERY DAY 30 capsule 11     tirzepatide 7.5 mg/0.5 mL PnIj Inject 7.5 mg into the skin every 7 days. 4 pen 3     UNABLE TO FIND medication name: Testosterone 3% gel 1 click to upper inner thigh daily 30 g 5     valACYclovir (VALTREX) 500 MG tablet TAKE 1 TABLET BY MOUTH TWICE DAILY FOR 3 DAYS. BEGIN AT FIRST SIGN OF OUTBREAK 6 tablet 4          Objective Findings:    Vital Signs: see nursing notes  Physical Exam:  General Appearance: Well appearing in no acute distress  Eyes:    No scleral icterus  ENT: Neck supple  Lungs: CTA anteriorly  Heart:  S1, S2 normal, no murmurs heard  Abdomen: Soft, non tender, non distended with positive bowel sounds. No hepatosplenomegaly, ascites,  or mass  Extremities: no edema  Skin: No rash      Labs:  Lab Results   Component Value Date    WBC 6.17 04/21/2023    HGB 14.1 04/21/2023    HCT 43.0 04/21/2023     04/21/2023    CHOL 176 11/07/2020    TRIG 85 11/07/2020    HDL 57 11/07/2020    ALT 19 04/21/2023    AST 17 04/21/2023     04/21/2023    K 3.9 04/21/2023     04/21/2023    CREATININE 1.0 04/21/2023    BUN 15 04/21/2023    CO2 23 04/21/2023    TSH 2.116 11/07/2020    INR 0.9 07/09/2010    HGBA1C 5.0 11/07/2020       I have explained the risks and benefits of endoscopy procedures to the patient including but not limited to bleeding, perforation, infection, and death.    Piter Hearn MD

## 2023-11-21 NOTE — TRANSFER OF CARE
"Anesthesia Transfer of Care Note    Patient: Lachelle Engel    Procedure(s) Performed: Procedure(s) (LRB):  COLONOSCOPY (N/A)    Patient location: GI    Anesthesia Type: general    Transport from OR: Transported from OR on room air with adequate spontaneous ventilation    Post pain: adequate analgesia    Post assessment: no apparent anesthetic complications and tolerated procedure well    Post vital signs: stable    Level of consciousness: awake, alert and oriented    Nausea/Vomiting: no nausea/vomiting    Complications: none    Transfer of care protocol was followed      Last vitals: Visit Vitals  /68   Pulse 71   Temp 36.7 °C (98.1 °F)   Resp 18   Ht 5' 5" (1.651 m)   Wt 97.5 kg (215 lb)   SpO2 97%   Breastfeeding No   BMI 35.78 kg/m²     "

## 2023-11-21 NOTE — PROVATION PATIENT INSTRUCTIONS
Discharge Summary/Instructions after an Endoscopic Procedure  Patient Name: Lachelle Engel  Patient MRN: [id number]  Patient YOB: 1975 Tuesday, November 21, 2023  Piter Hearn MD  Dear patient,  As a result of recent federal legislation (The Federal Cures Act), you may   receive lab or pathology results from your procedure in your [facility]   account before your physician is able to contact you. Your physician or   their representative will relay the results to you with their   recommendations at their soonest availability.  Thank you,  Your health is very important to us during the Covid Crisis. Following your   procedure today, you will receive a daily text for 2 weeks asking about   signs or symptoms of Covid 19.  Please respond to this text when you   receive it so we can follow up and keep you as safe as possible.   RESTRICTIONS:  During your procedure today, you received medications for sedation.  These   medications may affect your judgment, balance and coordination.  Therefore,   for 24 hours, you have the following restrictions:   - DO NOT drive a car, operate machinery, make legal/financial decisions,   sign important papers or drink alcohol.    ACTIVITY:  Today: no heavy lifting, straining or running due to procedural   sedation/anesthesia.  The following day: return to full activity including work.  DIET:  Eat and drink normally unless instructed otherwise.     TREATMENT FOR COMMON SIDE EFFECTS:  - Mild abdominal pain, nausea, belching, bloating or excessive gas:  rest,   eat lightly and use a heating pad.  - Sore Throat: treat with throat lozenges and/or gargle with warm salt   water.  - Because air was used during the procedure, expelling large amounts of air   from your rectum or belching is normal.  - If a bowel prep was taken, you may not have a bowel movement for 1-3 days.    This is normal.  SYMPTOMS TO WATCH FOR AND REPORT TO YOUR PHYSICIAN:  1. Abdominal pain or  bloating, other than gas cramps.  2. Chest pain.  3. Back pain.  4. Signs of infection such as: chills or fever occurring within 24 hours   after the procedure.  5. Rectal bleeding, which would show as bright red, maroon, or black stools.   (A tablespoon of blood from the rectum is not serious, especially if   hemorrhoids are present.)  6. Vomiting.  7. Weakness or dizziness.  GO DIRECTLY TO THE NEAREST EMERGENCY ROOM IF YOU HAVE ANY OF THE FOLLOWING:      Difficulty breathing              Chills and/or fever over 101 F   Persistent vomiting and/or vomiting blood   Severe abdominal pain   Severe chest pain   Black, tarry stools   Bleeding- more than one tablespoon   Any other symptom or condition that you feel may need urgent attention  Your doctor recommends these additional instructions:  If any biopsies were taken, your doctors clinic will contact you in 1 to 2   weeks with any results.  - Discharge patient to home.   - Resume previous diet.   - Continue present medications.   - Await pathology results.   - Repeat colonoscopy in 10 years for screening purposes.   - Patient has a contact number available for emergencies.  The signs and   symptoms of potential delayed complications were discussed with the   patient.  Return to normal activities tomorrow.  Written discharge   instructions were provided to the patient.  For questions, problems or results please call your physician - Piter Hearn MD.  EMERGENCY PHONE NUMBER: [phone],  LAB RESULTS: [phone]  IF A COMPLICATION OR EMERGENCY SITUATION ARISES AND YOU ARE UNABLE TO REACH   YOUR PHYSICIAN - GO DIRECTLY TO THE EMERGENCY ROOM.  Piter Hearn MD  11/21/2023 11:24:01 AM  This report has been verified and signed electronically.  Dear patient,  As a result of recent federal legislation (The Federal Cures Act), you may   receive lab or pathology results from your procedure in your [facility]   account before your physician is  able to contact you. Your physician or   their representative will relay the results to you with their   recommendations at their soonest availability.  Thank you,  PROVATION

## 2023-11-21 NOTE — ANESTHESIA POSTPROCEDURE EVALUATION
Anesthesia Post Evaluation    Patient: Lachelle Engel    Procedure(s) Performed: Procedure(s) (LRB):  COLONOSCOPY (N/A)    Final Anesthesia Type: general      Patient location during evaluation: PACU  Patient participation: Yes- Able to Participate  Level of consciousness: awake  Post-procedure vital signs: reviewed and stable  Pain management: adequate  Airway patency: patent    PONV status at discharge: No PONV  Anesthetic complications: no      Cardiovascular status: blood pressure returned to baseline  Respiratory status: unassisted  Hydration status: euvolemic  Follow-up not needed.          Vitals Value Taken Time   /71 11/21/23 1156   Temp 36.6 °C (97.9 °F) 11/21/23 1125   Pulse 68 11/21/23 1156   Resp 12 11/21/23 1156   SpO2 100 % 11/21/23 1156         Event Time   Out of Recovery 11:58:56         Pain/Joel Score: Joel Score: 10 (11/21/2023 11:56 AM)

## 2023-11-21 NOTE — ANESTHESIA PREPROCEDURE EVALUATION
[facility]  Anesthesia Pre-Operative Evaluation         Patient Name: Lachelle Engel  YOB: 1975  MRN: [id number]    SUBJECTIVE:     2023    Procedure(s) (LRB):  COLONOSCOPY (N/A)    Lachelle Engel is a 48 y.o. female here for Procedure(s) (LRB):  COLONOSCOPY (N/A)    Drips:     Patient Active Problem List   Diagnosis    Depression    Psoriasis    Impaired fasting blood sugar    GERD (gastroesophageal reflux disease)    Acute appendicitis    Abdominal pain       Review of patient's allergies indicates:  No Known Allergies    Current Facility-Administered Medications on File Prior to Encounter   Medication Dose Route Frequency Provider Last Rate Last Admin    testosterone cypionate injection 50 mg  50 mg Intramuscular Q28 Days Anastasia Morrell PA-C   50 mg at 10/30/23 1517     Current Outpatient Medications on File Prior to Encounter   Medication Sig Dispense Refill    adalimumab (HUMIRA PEN) PnKt injection INJECT 1 PEN UNDER THE SKIN EVERY 14 DAYS 2 pen 11    augmented betamethasone dipropionate (DIPROLENE-AF) 0.05 % cream Apply to affected areas of body BID prn psoriasis. 50 g 5    betamethasone valerate 0.1% (VALISONE) 0.1 % Lotn Apply to affected areas of scalp/ears BID prn psoriasis. 60 mL 3    levothyroxine (SYNTHROID) 137 MCG Tab tablet Take 137 mcg by mouth once daily.      methotrexate 2.5 MG Tab 5 tablets weekly for 4 weeks and then 7 tablets weekly after that 35 tablet 5    mupirocin (BACTROBAN) 2 % ointment Apply topically 3 (three) times daily. 1 Tube 2    omeprazole (PRILOSEC) 20 MG capsule Take 1 capsule (20 mg total) by mouth once daily. 30 capsule 11    UNABLE TO FIND medication name: Testosterone 3% gel 1 click to upper inner thigh daily 30 g 5    valACYclovir (VALTREX) 500 MG tablet TAKE 1 TABLET BY MOUTH TWICE DAILY FOR 3 DAYS. BEGIN AT FIRST SIGN OF OUTBREAK 6 tablet 4       Past Surgical History:   Procedure Laterality Date    APPENDECTOMY        SECTION      CHOLECYSTECTOMY      CRYOTHERAPY      ENDOMETRIAL ABLATION      ESOPHAGOGASTRODUODENOSCOPY N/A 01/16/2020    Procedure: EGD (ESOPHAGOGASTRODUODENOSCOPY);  Surgeon: Venkat Be MD;  Location: [facility] ([address] [place] [address]);  Service: Endoscopy;  Laterality: N/A;  rice or smith ok      INCONTINENCE SURGERY      TUBAL LIGATION         Social History     Socioeconomic History    Marital status: Single   Occupational History     Employer: "[employer]   Tobacco Use    Smoking status: Some Days     Current packs/day: 0.25     Average packs/day: 0.3 packs/day for 15.0 years (3.8 ttl pk-yrs)     Types: Cigarettes    Smokeless tobacco: Current    Tobacco comments:     1 pack every 4 days   Substance and Sexual Activity    Alcohol use: Yes     Alcohol/week: 4.0 standard drinks of alcohol     Types: 4 Drinks containing 0.5 oz of alcohol per week     Comment: occasionally    Drug use: No    Sexual activity: Yes     Partners: Male     Birth control/protection: Post-menopausal     Comment: Tubal   Other Topics Concern    Are you pregnant or think you may be? No    Breast-feeding No         OBJECTIVE:     Vital Signs Range (Last 24H):       Significant Labs:  Lab Results   Component Value Date    WBC 6.17 04/21/2023    HGB 14.1 04/21/2023    HCT 43.0 04/21/2023     04/21/2023    CHOL 176 11/07/2020    TRIG 85 11/07/2020    HDL 57 11/07/2020    ALT 19 04/21/2023    AST 17 04/21/2023     04/21/2023    K 3.9 04/21/2023     04/21/2023    CREATININE 1.0 04/21/2023    BUN 15 04/21/2023    CO2 23 04/21/2023    TSH 2.116 11/07/2020    INR 0.9 07/09/2010    HGBA1C 5.0 11/07/2020       Diagnostic Studies:    EKG:   Results for orders placed or performed during the hospital encounter of 05/05/19   EKG 12-lead    Collection Time: 05/05/19  3:19 PM    Narrative    Test Reason : R10.13,    Vent. Rate : 094 BPM     Atrial Rate : 094 BPM     P-R Int : 136 ms          QRS Dur : 084 ms      QT Int : 352 ms        P-R-T Axes : 037 062 042 degrees     QTc Int : 440 ms    Normal sinus rhythm  Normal ECG  When compared with ECG of 11-SEP-2018 16:38,  Vent. rate has increased BY  32 BPM  Confirmed by Joey Oliveira MD ([id number]) on 5/6/2019 12:30:47 AM    Referred By: CALVIN   SELF           Confirmed By:Joey Oliveira MD           Pre-op Assessment    I have reviewed the Patient Summary Reports.     I have reviewed the Nursing Notes. I have reviewed the NPO Status.   I have reviewed the Medications.     Review of Systems  Anesthesia Hx:  No problems with previous Anesthesia   History of prior surgery of interest to airway management or planning:            Denies Personal Hx of Anesthesia complications.                    Social:  Smoker, Social Alcohol Use       Cardiovascular:     Hypertension                                        Pulmonary:       Recent URI                 Renal/:   Denies Chronic Renal Disease.                Hepatic/GI:     GERD Denies Liver Disease.            Neurological:  Neurology Normal Denies TIA.  Denies CVA.    Denies Seizures.                                Endocrine:  Diabetes Denies Hypothyroidism.  Denies Hyperthyroidism.       Obesity / BMI > 30  Psych:  Psychiatric History                  Physical Exam  General: Well nourished, Cooperative, Alert and Oriented    Airway:  Mallampati: II   Mouth Opening: Normal  TM Distance: Normal    Dental:  Intact        Anesthesia Plan  Type of Anesthesia, risks & benefits discussed:    Anesthesia Type: Gen Natural Airway  Intra-op Monitoring Plan: Standard ASA Monitors  Post Op Pain Control Plan: multimodal analgesia  Induction:  IV  Informed Consent: Informed consent signed with the Patient and all parties understand the risks and agree with anesthesia plan.  All questions answered.   ASA Score: 2  Day of Surgery Review of History & Physical: H&P Update referred to the surgeon/provider.    Ready For Surgery From Anesthesia Perspective.     .

## 2023-11-28 ENCOUNTER — CLINICAL SUPPORT (OUTPATIENT)
Dept: OBSTETRICS AND GYNECOLOGY | Facility: CLINIC | Age: 48
End: 2023-11-28
Payer: COMMERCIAL

## 2023-11-28 DIAGNOSIS — N95.1 MENOPAUSAL SYMPTOMS: Primary | ICD-10-CM

## 2023-11-28 PROCEDURE — 96372 THER/PROPH/DIAG INJ SC/IM: CPT | Mod: S$GLB,,, | Performed by: PHYSICIAN ASSISTANT

## 2023-11-28 PROCEDURE — 99999 PR PBB SHADOW E&M-EST. PATIENT-LVL I: ICD-10-PCS | Mod: PBBFAC,,,

## 2023-11-28 PROCEDURE — 96372 PR INJECTION,THERAP/PROPH/DIAG2ST, IM OR SUBCUT: ICD-10-PCS | Mod: S$GLB,,, | Performed by: PHYSICIAN ASSISTANT

## 2023-11-28 PROCEDURE — 99999 PR PBB SHADOW E&M-EST. PATIENT-LVL I: CPT | Mod: PBBFAC,,,

## 2023-11-28 RX ADMIN — TESTOSTERONE CYPIONATE 50 MG: 200 INJECTION, SOLUTION INTRAMUSCULAR at 03:11

## 2023-11-29 RX ORDER — ESTRADIOL 0.1 MG/G
1 CREAM VAGINAL
Qty: 42.5 G | Refills: 1 | Status: SHIPPED | OUTPATIENT
Start: 2023-11-30 | End: 2024-01-09

## 2023-12-08 ENCOUNTER — PATIENT MESSAGE (OUTPATIENT)
Dept: OBSTETRICS AND GYNECOLOGY | Facility: CLINIC | Age: 48
End: 2023-12-08
Payer: COMMERCIAL

## 2023-12-08 DIAGNOSIS — E11.69 TYPE 2 DIABETES MELLITUS WITH OTHER SPECIFIED COMPLICATION, WITHOUT LONG-TERM CURRENT USE OF INSULIN: Primary | ICD-10-CM

## 2023-12-19 ENCOUNTER — LAB VISIT (OUTPATIENT)
Dept: LAB | Facility: HOSPITAL | Age: 48
End: 2023-12-19
Payer: COMMERCIAL

## 2023-12-19 ENCOUNTER — OFFICE VISIT (OUTPATIENT)
Dept: DERMATOLOGY | Facility: CLINIC | Age: 48
End: 2023-12-19
Payer: COMMERCIAL

## 2023-12-19 DIAGNOSIS — L40.0 PSORIASIS VULGARIS: Primary | ICD-10-CM

## 2023-12-19 DIAGNOSIS — L40.50 PSORIATIC ARTHRITIS: ICD-10-CM

## 2023-12-19 DIAGNOSIS — N95.1 MENOPAUSAL SYMPTOMS: ICD-10-CM

## 2023-12-19 DIAGNOSIS — Z79.899 LONG-TERM USE OF HIGH-RISK MEDICATION: ICD-10-CM

## 2023-12-19 LAB — ESTRADIOL SERPL-MCNC: 16 PG/ML

## 2023-12-19 PROCEDURE — 1159F PR MEDICATION LIST DOCUMENTED IN MEDICAL RECORD: ICD-10-PCS | Mod: CPTII,S$GLB,, | Performed by: DERMATOLOGY

## 2023-12-19 PROCEDURE — 99214 PR OFFICE/OUTPT VISIT, EST, LEVL IV, 30-39 MIN: ICD-10-PCS | Mod: S$GLB,,, | Performed by: DERMATOLOGY

## 2023-12-19 PROCEDURE — 82670 ASSAY OF TOTAL ESTRADIOL: CPT | Performed by: PHYSICIAN ASSISTANT

## 2023-12-19 PROCEDURE — 99214 OFFICE O/P EST MOD 30 MIN: CPT | Mod: S$GLB,,, | Performed by: DERMATOLOGY

## 2023-12-19 PROCEDURE — 84402 ASSAY OF FREE TESTOSTERONE: CPT | Performed by: PHYSICIAN ASSISTANT

## 2023-12-19 PROCEDURE — 1160F RVW MEDS BY RX/DR IN RCRD: CPT | Mod: CPTII,S$GLB,, | Performed by: DERMATOLOGY

## 2023-12-19 PROCEDURE — 1160F PR REVIEW ALL MEDS BY PRESCRIBER/CLIN PHARMACIST DOCUMENTED: ICD-10-PCS | Mod: CPTII,S$GLB,, | Performed by: DERMATOLOGY

## 2023-12-19 PROCEDURE — 1159F MED LIST DOCD IN RCRD: CPT | Mod: CPTII,S$GLB,, | Performed by: DERMATOLOGY

## 2023-12-19 PROCEDURE — 36415 COLL VENOUS BLD VENIPUNCTURE: CPT | Performed by: PHYSICIAN ASSISTANT

## 2023-12-19 RX ORDER — RISANKIZUMAB-RZAA 150 MG/ML
INJECTION SUBCUTANEOUS
Qty: 1 ML | Refills: 1 | Status: ACTIVE | OUTPATIENT
Start: 2023-12-19

## 2023-12-19 RX ORDER — RISANKIZUMAB-RZAA 150 MG/ML
INJECTION SUBCUTANEOUS
Qty: 2 ML | Refills: 1 | Status: ACTIVE | OUTPATIENT
Start: 2023-12-19

## 2023-12-19 NOTE — PROGRESS NOTES
Patient Information  Name: Lachelle Engel  : 1975  MRN: [id number]     Referring Physician:  No ref. provider found   Primary Care Physician:  No, Primary Doctor   Date of Visit: 2023      Subjective:     History of Present lllness:    Lachelle Engel is a 48 y.o. female who presents with a chief complaint of psoriasis.  Location: hands, left foot, scalp, inside ears  Signs/Symptoms: still getting flares on hands, feet and scalp-hands are cracking and painful, scalp is tender to touch, itchy all over.   Symptom course: unchanged  Current treatment: humira-hands, feet and scalp never fully clear, tx with topical steroids betamethasone 0.1% lotion helps but always comes back    Patient was last seen: 2023.  Prior notes by myself reviewed.   Clinical documentation obtained by nursing staff reviewed.    Review of Systems   Musculoskeletal:  Positive for joint swelling and arthralgias.        Neck, right knee, shoulders-does not take more than 30 mins to loosen up in am   Skin:  Positive for itching, rash and dry skin.       Objective:   Physical Exam   Constitutional: She appears well-developed and well-nourished. No distress.   Neurological: She is alert and oriented to person, place, and time. She is not disoriented.   Psychiatric: She has a normal mood and affect.   Skin:   Areas Examined (abnormalities noted in diagram):   Scalp / Hair Palpated and Inspected  Head / Face Inspection Performed  RUE Inspected  LUE Inspection Performed  RLE Inspected  LLE Inspection Performed  Nails and Digits Inspection Performed                 Diagram Legend     Erythematous scaling macule/papule c/w actinic keratosis       Vascular papule c/w angioma      Pigmented verrucoid papule/plaque c/w seborrheic keratosis      Yellow umbilicated papule c/w sebaceous hyperplasia      Irregularly shaped tan macule c/w lentigo     1-2 mm smooth white papules consistent with Milia      Movable subcutaneous cyst with  punctum c/w epidermal inclusion cyst      Subcutaneous movable cyst c/w pilar cyst      Firm pink to brown papule c/w dermatofibroma      Pedunculated fleshy papule(s) c/w skin tag(s)      Evenly pigmented macule c/w junctional nevus     Mildly variegated pigmented, slightly irregular-bordered macule c/w mildly atypical nevus      Flesh colored to evenly pigmented papule c/w intradermal nevus       Pink pearly papule/plaque c/w basal cell carcinoma      Erythematous hyperkeratotic cursted plaque c/w SCC      Surgical scar with no sign of skin cancer recurrence      Open and closed comedones      Inflammatory papules and pustules      Verrucoid papule consistent consistent with wart     Erythematous eczematous patches and plaques     Dystrophic onycholytic nail with subungual debris c/w onychomycosis     Umbilicated papule    Erythematous-base heme-crusted tan verrucoid plaque consistent with inflamed seborrheic keratosis     Erythematous Silvery Scaling Plaque c/w Psoriasis     See annotation    No images are attached to the encounter or orders placed in the encounter.      [] Data reviewed  [] Prior external notes reviewed  [] Independent review of test  [] Management discussed with another provider  [] Independent historian    Assessment / Plan:        Psoriasis vulgaris  Psoriatic arthritis  Long-term use of high-risk medication  - chronic problem with exacerbation/progression  Pt has tried and failed MTX, Otezla, Humira, and Stelara.     Discussed benefits and risks of Skyrizi, including but not limited to injection site reactions, allergic reaction, headache, fatigue, increased risk of infection, and decreased tumor surveillance. Patient informed to discontinue Skyrizi and notify our office if an infection develops.  Patient counseled to avoid live vaccines.    Will start Skyrizi at 150 mg SQ on week 0, week 4, then [id number] thereafter.  Will need CBC, CMP q 3 - 6 months. Will need PPD or Quantiferon gold  annually.    -     risankizumab-rzaa (SKYRIZI) 150 mg/mL PnIj; Inject 150 mg SC at week 0, week 4, and then [id number].  Dispense: 2 mL; Refill: 1  -     risankizumab-rzaa (SKYRIZI) 150 mg/mL PnIj; Inject 150 mg SC [id number].  Dispense: 1 mL; Refill: 1    -     CBC Auto Differential; Future; Expected date: 12/19/2023  -     Comprehensive Metabolic Panel; Future; Expected date: 12/19/2023  -     Hepatitis C Antibody; Future; Expected date: 12/19/2023  -     Quantiferon Gold TB; Future; Expected date: 12/19/2023      Follow up in about 3 months (around 3/19/2024) for follow up, or sooner if symptoms worsening or not improving.      Ximena Maloney MD, FAAD  [facility]

## 2023-12-21 LAB — TESTOST FREE SERPL-MCNC: 1.1 PG/ML

## 2023-12-22 PROBLEM — L40.50 PSORIATIC ARTHRITIS: Status: ACTIVE | Noted: 2023-12-22

## 2023-12-26 ENCOUNTER — TELEPHONE (OUTPATIENT)
Dept: OBSTETRICS AND GYNECOLOGY | Facility: CLINIC | Age: 48
End: 2023-12-26
Payer: COMMERCIAL

## 2023-12-26 ENCOUNTER — CLINICAL SUPPORT (OUTPATIENT)
Dept: OBSTETRICS AND GYNECOLOGY | Facility: CLINIC | Age: 48
End: 2023-12-26
Payer: COMMERCIAL

## 2023-12-26 ENCOUNTER — PATIENT MESSAGE (OUTPATIENT)
Dept: OBSTETRICS AND GYNECOLOGY | Facility: CLINIC | Age: 48
End: 2023-12-26

## 2023-12-26 DIAGNOSIS — N95.1 MENOPAUSAL SYMPTOMS: Primary | ICD-10-CM

## 2023-12-26 PROCEDURE — 96372 THER/PROPH/DIAG INJ SC/IM: CPT | Mod: S$GLB,,, | Performed by: PHYSICIAN ASSISTANT

## 2023-12-26 PROCEDURE — 96372 PR INJECTION,THERAP/PROPH/DIAG2ST, IM OR SUBCUT: ICD-10-PCS | Mod: S$GLB,,, | Performed by: PHYSICIAN ASSISTANT

## 2023-12-26 PROCEDURE — 99999 PR PBB SHADOW E&M-EST. PATIENT-LVL I: CPT | Mod: PBBFAC,,,

## 2023-12-26 PROCEDURE — 99999 PR PBB SHADOW E&M-EST. PATIENT-LVL I: ICD-10-PCS | Mod: PBBFAC,,,

## 2023-12-26 RX ADMIN — TESTOSTERONE CYPIONATE 50 MG: 200 INJECTION, SOLUTION INTRAMUSCULAR at 03:12

## 2023-12-26 NOTE — PROGRESS NOTES
Here for hormone therapy injection, no complaints at this time, Injection given as ordered, tolerated well, no report of pain prior to or after injection. Return to clinic as scheduled.     Site - LB    Testosterone   50 mg    Clinic Supplied Medication

## 2023-12-28 ENCOUNTER — LAB VISIT (OUTPATIENT)
Dept: LAB | Facility: HOSPITAL | Age: 48
End: 2023-12-28
Attending: DERMATOLOGY
Payer: COMMERCIAL

## 2023-12-28 DIAGNOSIS — Z79.899 LONG-TERM USE OF HIGH-RISK MEDICATION: ICD-10-CM

## 2023-12-28 DIAGNOSIS — L40.50 PSORIATIC ARTHRITIS: ICD-10-CM

## 2023-12-28 DIAGNOSIS — L40.0 PSORIASIS VULGARIS: ICD-10-CM

## 2023-12-28 PROCEDURE — 86480 TB TEST CELL IMMUN MEASURE: CPT | Performed by: DERMATOLOGY

## 2023-12-29 LAB
GAMMA INTERFERON BACKGROUND BLD IA-ACNC: 0.03 IU/ML
M TB IFN-G CD4+ BCKGRND COR BLD-ACNC: 0.02 IU/ML
M TB IFN-G CD4+ BCKGRND COR BLD-ACNC: 0.02 IU/ML
MITOGEN IGNF BCKGRD COR BLD-ACNC: 9.97 IU/ML
TB GOLD PLUS: NEGATIVE

## 2024-01-04 ENCOUNTER — OFFICE VISIT (OUTPATIENT)
Dept: URGENT CARE | Facility: CLINIC | Age: 49
End: 2024-01-04
Payer: COMMERCIAL

## 2024-01-04 VITALS
WEIGHT: 220 LBS | SYSTOLIC BLOOD PRESSURE: 112 MMHG | HEART RATE: 79 BPM | BODY MASS INDEX: 36.65 KG/M2 | RESPIRATION RATE: 16 BRPM | HEIGHT: 65 IN | OXYGEN SATURATION: 96 % | DIASTOLIC BLOOD PRESSURE: 79 MMHG | TEMPERATURE: 98 F

## 2024-01-04 DIAGNOSIS — F17.200 NEEDS SMOKING CESSATION EDUCATION: ICD-10-CM

## 2024-01-04 DIAGNOSIS — R39.9 UTI SYMPTOMS: Primary | ICD-10-CM

## 2024-01-04 LAB
BILIRUB UR QL STRIP: NEGATIVE
GLUCOSE UR QL STRIP: NEGATIVE
KETONES UR QL STRIP: NEGATIVE
LEUKOCYTE ESTERASE UR QL STRIP: NEGATIVE
PH, POC UA: 5.5 (ref 5–8)
POC BLOOD, URINE: NEGATIVE
POC NITRATES, URINE: NEGATIVE
PROT UR QL STRIP: NEGATIVE
SP GR UR STRIP: 1.01 (ref 1–1.03)
UROBILINOGEN UR STRIP-ACNC: NORMAL (ref 0.1–1.1)

## 2024-01-04 PROCEDURE — 99203 OFFICE O/P NEW LOW 30 MIN: CPT | Mod: S$GLB,,, | Performed by: NURSE PRACTITIONER

## 2024-01-04 PROCEDURE — 81003 URINALYSIS AUTO W/O SCOPE: CPT | Mod: QW,S$GLB,, | Performed by: NURSE PRACTITIONER

## 2024-01-04 RX ORDER — NEOMYCIN SULFATE, POLYMYXIN B SULFATE AND DEXAMETHASONE 3.5; 10000; 1 MG/ML; [USP'U]/ML; MG/ML
1 SUSPENSION/ DROPS OPHTHALMIC 4 TIMES DAILY
COMMUNITY
Start: 2023-08-24

## 2024-01-04 RX ORDER — ADALIMUMAB 40MG/0.8ML
KIT SUBCUTANEOUS
COMMUNITY

## 2024-01-04 RX ORDER — ETODOLAC 500 MG/1
TABLET, FILM COATED ORAL
COMMUNITY

## 2024-01-04 RX ORDER — CEPHALEXIN 500 MG/1
500 CAPSULE ORAL EVERY 12 HOURS
Qty: 14 CAPSULE | Refills: 0 | Status: SHIPPED | OUTPATIENT
Start: 2024-01-04 | End: 2024-01-11

## 2024-01-04 RX ORDER — DULAGLUTIDE 0.75 MG/.5ML
INJECTION, SOLUTION SUBCUTANEOUS
COMMUNITY
End: 2024-01-09

## 2024-01-04 RX ORDER — METFORMIN HYDROCHLORIDE 500 MG/1
TABLET, EXTENDED RELEASE ORAL
COMMUNITY

## 2024-01-04 NOTE — PROGRESS NOTES
"rvrcSubjective:      Patient ID: Lachelle Engel is a 48 y.o. female.    Vitals:  height is 5' 5" (1.651 m) and weight is 99.8 kg (220 lb). Her oral temperature is 98.1 °F (36.7 °C). Her blood pressure is 112/79 and her pulse is 79. Her respiration is 16 and oxygen saturation is 96%.     Chief Complaint: Urinary Tract Infection (Pain in back. Possible blood in urine this morning. - Entered by patient)    47 y/o female presents to  today with c/o UTI symptoms. Also reports some lower back pain-more so to the right side.     Urinary Tract Infection   This is a new problem. The current episode started in the past 7 days (1/2/2024). The problem occurs intermittently. The problem has been gradually worsening. The quality of the pain is described as stabbing and aching. The pain is at a severity of 7/10. The pain is severe. There has been no fever. She is Sexually active. Associated symptoms include frequency, hematuria, nausea and urgency.       Constitution: Negative for fever.   Gastrointestinal:  Positive for nausea.   Genitourinary:  Positive for dysuria, frequency, urgency and hematuria.   Musculoskeletal:  Positive for back pain.      Objective:     Physical Exam   Constitutional: She is oriented to person, place, and time.  Non-toxic appearance. She does not appear ill. No distress.   HENT:   Head: Normocephalic.   Nose: Nose normal.   Mouth/Throat: Mucous membranes are moist.   Cardiovascular: Normal rate.   Pulmonary/Chest: Effort normal.   Abdominal: Normal appearance. There is no left CVA tenderness and no right CVA tenderness (her back pain is inferior to CVA area).   Musculoskeletal: Normal range of motion.         General: Normal range of motion.   Neurological: She is alert and oriented to person, place, and time.   Skin: Skin is warm, dry and not diaphoretic.   Psychiatric: Her behavior is normal. Mood normal.   Nursing note and vitals reviewed.    Results for orders placed or performed in visit " on 01/04/24   POCT Urinalysis, Dipstick, Automated, W/O Scope   Result Value Ref Range    POC Blood, Urine Negative Negative, Positive Slide, Positive Tube    POC Bilirubin, Urine Negative Negative, Positive Slide, Positive Tube    POC Urobilinogen, Urine normal 0.1 - 1.1    POC Ketones, Urine Negative Negative, Positive Slide, Positive Tube    POC Protein, Urine Negative Negative, Positive Slide, Positive Tube    POC Nitrates, Urine Negative Negative, Positive Slide, Positive Tube    POC Glucose, Urine Negative Negative, Positive Slide, Positive Tube    pH, UA 5.5 5 - 8    POC Specific Gravity, Urine 1.010 1.003 - 1.029    POC Leukocytes, Urine Negative Negative, Positive Slide, Positive Tube        Assessment:     1. UTI symptoms        Plan:       UTI symptoms  -     POCT Urinalysis, Dipstick, Automated, W/O Scope  -     cephALEXin (KEFLEX) 500 MG capsule; Take 1 capsule (500 mg total) by mouth every 12 (twelve) hours. for 7 days  Dispense: 14 capsule; Refill: 0        Patient Instructions   Increase water intake  Wipe front to back  Take time on toilet while voiding  Use gentle soap  Avoid scented soaps/gels/bubble baths

## 2024-01-09 ENCOUNTER — OFFICE VISIT (OUTPATIENT)
Dept: OBSTETRICS AND GYNECOLOGY | Facility: CLINIC | Age: 49
End: 2024-01-09
Payer: COMMERCIAL

## 2024-01-09 VITALS
DIASTOLIC BLOOD PRESSURE: 85 MMHG | HEART RATE: 80 BPM | WEIGHT: 227.38 LBS | BODY MASS INDEX: 37.88 KG/M2 | HEIGHT: 65 IN | SYSTOLIC BLOOD PRESSURE: 135 MMHG

## 2024-01-09 DIAGNOSIS — N95.2 VAGINAL ATROPHY: ICD-10-CM

## 2024-01-09 DIAGNOSIS — N95.1 MENOPAUSAL SYMPTOMS: ICD-10-CM

## 2024-01-09 DIAGNOSIS — Z01.419 ENCOUNTER FOR GYNECOLOGICAL EXAMINATION WITHOUT ABNORMAL FINDING: Primary | ICD-10-CM

## 2024-01-09 DIAGNOSIS — D58.2 ELEVATED HEMOGLOBIN: ICD-10-CM

## 2024-01-09 PROCEDURE — 3075F SYST BP GE 130 - 139MM HG: CPT | Mod: CPTII,S$GLB,, | Performed by: PHYSICIAN ASSISTANT

## 2024-01-09 PROCEDURE — 99999 PR PBB SHADOW E&M-EST. PATIENT-LVL IV: CPT | Mod: PBBFAC,,, | Performed by: PHYSICIAN ASSISTANT

## 2024-01-09 PROCEDURE — 1159F MED LIST DOCD IN RCRD: CPT | Mod: CPTII,S$GLB,, | Performed by: PHYSICIAN ASSISTANT

## 2024-01-09 PROCEDURE — 3079F DIAST BP 80-89 MM HG: CPT | Mod: CPTII,S$GLB,, | Performed by: PHYSICIAN ASSISTANT

## 2024-01-09 PROCEDURE — 1160F RVW MEDS BY RX/DR IN RCRD: CPT | Mod: CPTII,S$GLB,, | Performed by: PHYSICIAN ASSISTANT

## 2024-01-09 PROCEDURE — 99396 PREV VISIT EST AGE 40-64: CPT | Mod: S$GLB,,, | Performed by: PHYSICIAN ASSISTANT

## 2024-01-09 PROCEDURE — 3008F BODY MASS INDEX DOCD: CPT | Mod: CPTII,S$GLB,, | Performed by: PHYSICIAN ASSISTANT

## 2024-01-09 RX ORDER — PROGESTERONE 200 MG/1
200 CAPSULE ORAL NIGHTLY
Qty: 90 CAPSULE | Refills: 3 | Status: SHIPPED | OUTPATIENT
Start: 2024-01-09 | End: 2025-01-08

## 2024-01-09 RX ORDER — PRASTERONE 6.5 MG/1
6.5 INSERT VAGINAL NIGHTLY
Qty: 28 EACH | Refills: 11 | Status: SHIPPED | OUTPATIENT
Start: 2024-01-09 | End: 2024-01-11

## 2024-01-09 RX ORDER — ESTRADIOL 0.1 MG/D
1 FILM, EXTENDED RELEASE TRANSDERMAL
Qty: 24 PATCH | Refills: 3 | Status: SHIPPED | OUTPATIENT
Start: 2024-01-11 | End: 2025-01-10

## 2024-01-09 NOTE — PROGRESS NOTES
CC: Well woman exam    Lachelle Engel is a 48 y.o. female  presents for well woman exam.  LMP: No LMP recorded. Patient has had an ablation. Denies vaginal bleeding.  She is on HRT, taking Progesterone 100mg and Vivelle dot 0.05mg BIW.  She is also getting testosterone 50mg IM [id number]. Reports hot flashes, fatigue and vaginal dryness. Hemoglobin recently increased to 16.2 on 2023 labs. Libido has helped with her libido. She is tolerating it well without bothersome side effects.  She is taking Mounjaro 10mg weekly for weight loss. She would like to continue this for now.  She is at increased risk for breast cancer. Previously calculated her Tyrer-Cuzick score to be 22.1% and 5 year gina model of 1.7% at her last visit on 2023. No significant changes in her personal or family medical history since last seen.  Denies breast mass, changes in skin or nipple, or drainage. Mammogram is scheduled.    23 LABS  Estradiol 16  Free Testosterone 1.1    BREAST IMAGING  Bilateral Screening Mammogram: 2023 negative  Breast MRI: 7/3/2023 - negative    Pap: 2023 negative, HPV negative  PCP: Dr. Maloney- rheumatology  Routine Screening Labs: 2020  Colonoscopy: 2023 repeat in 10 years  DEXA: 21 normal     Past Medical History:   Diagnosis Date    Abnormal Pap smear of cervix     Allergy     Angio-edema     Bradycardia     Depression     Diabetes mellitus     type 2     Dizziness     GERD (gastroesophageal reflux disease)     Hypertension     Impaired fasting blood sugar     Joint pain     Psoriasis     Recurrent upper respiratory infection (URI)     Skin disease     Thyroid disease     hyothyroidism     Past Surgical History:   Procedure Laterality Date    APPENDECTOMY       SECTION      CHOLECYSTECTOMY      COLONOSCOPY N/A 2023    Procedure: COLONOSCOPY;  Surgeon: Piter Hearn MD;  [facility]: [facility];  Service: Endoscopy;  Laterality: N/A;     CRYOTHERAPY      ENDOMETRIAL ABLATION      ESOPHAGOGASTRODUODENOSCOPY N/A 2020    Procedure: EGD (ESOPHAGOGASTRODUODENOSCOPY);  Surgeon: Venkat Be MD;  Location: [facility] ([address] [place] [address]);  Service: Endoscopy;  Laterality: N/A;  rice or smith ok      INCONTINENCE SURGERY      TUBAL LIGATION       Social History     Socioeconomic History    Marital status: Single   Occupational History     Employer: [employer]   Tobacco Use    Smoking status: Some Days     Current packs/day: 0.25     Average packs/day: 0.3 packs/day for 15.0 years (3.8 ttl pk-yrs)     Types: Cigarettes    Smokeless tobacco: Current    Tobacco comments:     1 pack every 4 days   Substance and Sexual Activity    Alcohol use: Yes     Alcohol/week: 4.0 standard drinks of alcohol     Types: 4 Drinks containing 0.5 oz of alcohol per week     Comment: occasionally    Drug use: No    Sexual activity: Yes     Partners: Male     Birth control/protection: Post-menopausal     Comment: Tubal   Other Topics Concern    Are you pregnant or think you may be? No    Breast-feeding No     Family History   Problem Relation Age of Onset    Breast cancer Mother     Cancer Father         prostate    Prostate cancer Father     Cancer Maternal Aunt 30        colon cancer    Multiple sclerosis Maternal Aunt     Ovarian cancer Maternal Aunt     Colon cancer Maternal Uncle     Eczema Daughter     Melanoma Neg Hx      OB History          2    Para   2    Term   2            AB        Living   2         SAB        IAB        Ectopic        Multiple        Live Births   2                 Current Outpatient Medications:     adalimumab (HUMIRA PEN) PnKt injection, , Disp: , Rfl:     augmented betamethasone dipropionate (DIPROLENE-AF) 0.05 % cream, Apply to affected areas of body BID prn psoriasis., Disp: 50 g, Rfl: 5    betamethasone valerate 0.1% (VALISONE) 0.1 % Lotn, Apply to affected areas of scalp/ears BID prn psoriasis., Disp: 60 mL, Rfl:  3    cephALEXin (KEFLEX) 500 MG capsule, Take 1 capsule (500 mg total) by mouth every 12 (twelve) hours. for 7 days, Disp: 14 capsule, Rfl: 0    [START ON 1/11/2024] estradioL (VIVELLE-DOT) 0.1 mg/24 hr PTSW, Place 1 patch onto the skin twice a week., Disp: 24 patch, Rfl: 3    etodolac (LODINE) 500 MG tablet, TK 1 T PO BID WC, Disp: , Rfl:     folic acid (FOLVITE) 1 MG tablet, Take 1 mg by mouth once daily., Disp: , Rfl:     levothyroxine (SYNTHROID) 137 MCG Tab tablet, Take 137 mcg by mouth once daily., Disp: , Rfl:     metFORMIN (GLUCOPHAGE-XR) 500 MG ER 24hr tablet, TK 1 T PO D WITH PRETTY, Disp: , Rfl:     methotrexate 2.5 MG Tab, 5 tablets weekly for 4 weeks and then 7 tablets weekly after that, Disp: 35 tablet, Rfl: 5    mupirocin (BACTROBAN) 2 % ointment, Apply topically 3 (three) times daily., Disp: 1 Tube, Rfl: 2    neomycin-polymyxin-dexamethasone (MAXITROL) 3.5mg/mL-10,000 unit/mL-0.1 % DrpS, Place 1 drop into both eyes 4 (four) times daily., Disp: , Rfl:     omeprazole (PRILOSEC) 20 MG capsule, Take 1 capsule (20 mg total) by mouth once daily., Disp: 30 capsule, Rfl: 11    prasterone, dhea, (INTRAROSA) 6.5 mg Inst, Place 6.5 mg vaginally every evening., Disp: 28 each, Rfl: 11    progesterone (PROMETRIUM) 200 MG capsule, Take 1 capsule (200 mg total) by mouth every evening., Disp: 90 capsule, Rfl: 3    risankizumab-rzaa (SKYRIZI) 150 mg/mL PnIj, Inject 150 mg (1 pen) into the skin at week 0, week 4, and then every 12 weeks., Disp: 2 mL, Rfl: 1    risankizumab-rzaa (SKYRIZI) 150 mg/mL PnIj, Inject 150 mg SC [id number]., Disp: 1 mL, Rfl: 1    tirzepatide 10 mg/0.5 mL PnIj, Inject 10 mg into the skin every 7 days., Disp: 4 Pen, Rfl: 3    valACYclovir (VALTREX) 500 MG tablet, TAKE 1 TABLET BY MOUTH TWICE DAILY FOR 3 DAYS. BEGIN AT FIRST SIGN OF OUTBREAK, Disp: 6 tablet, Rfl: 4    The ASCVD Risk score (Kiki JERRY, et al., 2019) failed to calculate for the following reasons:    Cannot find a previous HDL lab     "Cannot find a previous total cholesterol lab    /85   Pulse 80   Ht 5' 5" (1.651 m)   Wt 103.1 kg (227 lb 6.4 oz)   BMI 37.84 kg/m²       ROS:  GENERAL: Denies weight gain. +fatigue  SKIN: Denies rash or lesions.   HEAD: Denies head injury or headache.   NODES: Denies enlarged lymph nodes.   CHEST: Denies chest pain or shortness of breath.   CARDIOVASCULAR: Denies palpitations or left sided chest pain.   ABDOMEN: No abdominal pain, constipation, diarrhea, nausea, vomiting or rectal bleeding.   URINARY: No frequency, dysuria, hematuria, or burning on urination.  REPRODUCTIVE: See HPI.   BREASTS: Denies pain, lumps, or nipple discharge.   HEMATOLOGIC: No easy bruisability or excessive bleeding.   MUSCULOSKELETAL: Denies joint pain or swelling.   NEUROLOGIC: Denies syncope or weakness.   PSYCHIATRIC: Denies depression, anxiety or mood swings.    PHYSICAL EXAM:  APPEARANCE: Well nourished, well developed, in no acute distress.  AFFECT: WNL, alert and oriented x 3  CHEST: Good respiratory effect  ABDOMEN: Soft.  No tenderness or masses.  No hepatosplenomegaly.  No hernias.  BREASTS: Symmetrical, no skin changes or visible lesions.  No palpable masses, nipple discharge bilaterally.  PELVIC: Normal external genitalia without lesions.  Normal hair distribution.  Adequate perineal body, normal urethral meatus.  Vagina moist and well rugated without lesions or discharge.  Cervix pink, without lesions, discharge or tenderness.  No significant cystocele or rectocele.  Bimanual exam shows uterus to be normal size, regular, mobile and nontender.  Adnexa without masses or tenderness.    EXTREMITIES: No edema.    ASSESSMENT:   Encounter for gynecological examination without abnormal finding    Vaginal atrophy  -     prasterone, dhea, (INTRAROSA) 6.5 mg Inst; Place 6.5 mg vaginally every evening.  Dispense: 28 each; Refill: 11    Menopausal symptoms  -     progesterone (PROMETRIUM) 200 MG capsule; Take 1 capsule (200 mg " total) by mouth every evening.  Dispense: 90 capsule; Refill: 3  -     estradioL (VIVELLE-DOT) 0.1 mg/24 hr PTSW; Place 1 patch onto the skin twice a week.  Dispense: 24 patch; Refill: 3    Elevated hemoglobin          PLAN:   Mammogram scheduled  Breast MRI in 7/2024  Reviewed recent labs  Increase vivelle dot to 0.1mg BIW  Increase progesterone to 200mg QHS  Add Intrarosa vaginally at night  Continue Testosterone 50mg IM [id number]  Repeat CBC and obtain lipid at follow up.  If hemoglobin remains elevated, will need to decrease testosterone.   Continue Mounjaro 10mg SC weekly.  Follow up in 3 months.    Patient was counseled today on A.C.S. Pap guidelines and recommendations for yearly pelvic exams, mammograms and monthly self breast exams; to see her PCP for other health maintenance.

## 2024-01-11 ENCOUNTER — PATIENT MESSAGE (OUTPATIENT)
Dept: OBSTETRICS AND GYNECOLOGY | Facility: CLINIC | Age: 49
End: 2024-01-11
Payer: COMMERCIAL

## 2024-01-23 ENCOUNTER — PROCEDURE VISIT (OUTPATIENT)
Dept: DERMATOLOGY | Facility: CLINIC | Age: 49
End: 2024-01-23
Payer: COMMERCIAL

## 2024-01-23 ENCOUNTER — CLINICAL SUPPORT (OUTPATIENT)
Dept: OBSTETRICS AND GYNECOLOGY | Facility: CLINIC | Age: 49
End: 2024-01-23
Payer: COMMERCIAL

## 2024-01-23 DIAGNOSIS — Z41.1 ENCOUNTER FOR COSMETIC PROCEDURE: Primary | ICD-10-CM

## 2024-01-23 DIAGNOSIS — N95.1 MENOPAUSAL SYMPTOMS: Primary | ICD-10-CM

## 2024-01-23 PROCEDURE — 99999 PR PBB SHADOW E&M-EST. PATIENT-LVL I: CPT | Mod: PBBFAC,,,

## 2024-01-23 PROCEDURE — 96372 THER/PROPH/DIAG INJ SC/IM: CPT | Mod: S$GLB,,, | Performed by: PHYSICIAN ASSISTANT

## 2024-01-23 PROCEDURE — 99241 PR OFFICE CONSULT, COSMETIC: CPT | Mod: CSM,S$GLB,, | Performed by: DERMATOLOGY

## 2024-01-23 PROCEDURE — 99499 UNLISTED E&M SERVICE: CPT | Mod: CSM,S$GLB,, | Performed by: DERMATOLOGY

## 2024-01-23 RX ORDER — TESTOSTERONE CYPIONATE 200 MG/ML
50 INJECTION, SOLUTION INTRAMUSCULAR
Status: DISCONTINUED | OUTPATIENT
Start: 2024-01-23 | End: 2024-04-09

## 2024-01-23 RX ADMIN — TESTOSTERONE CYPIONATE 50 MG: 200 INJECTION, SOLUTION INTRAMUSCULAR at 02:01

## 2024-01-23 NOTE — PROGRESS NOTES
PROCEDURE NOTE: DESTRUCTION VIA HYFRECATION AND CURETTAGE    DIAGNOSIS:  seborrheic keratoses    LOCATION: back    Discussed with the patient that this procedure is not covered by insurance because treatment of these benign lesions is considered cosmetic. The patient would like to pursue treatment. She understands that she is responsible for the bill and agrees to pay.     Benefits, risks (infection, scarring, hypo- or hyperpigmentation, recurrence, and development of more lesions), and alternatives of treatment, including no treatment, are discussed with the patient who verbally agrees to the procedure.     2 seborrheic keratoses are cleaned with alcohol and anesthetized with 1% lidocaine with epinephrine. Lesions are then lightly hyfrecated (on low 1.2) and curetted to remove visible lesion. Hemostasis is achieved with aluminum chloride application.     The patient tolerated the procedure well without adverse event and with negligible blood loss. The patient is advised to keep the excision sites covered to minimize tenderness and to promote healing. Instructions on wound care are given to the patient, who is to call for any bleeding or signs of infection, such as redness or purulent discharge.    Follow up as needed.        CRYOSURGERY PROCEDURE NOTE    DIAGNOSIS: macular seborrheic keratosis    LOCATION:  nose    Discussed with the patient that this procedure is not covered by insurance because treatment of these benign lesions is considered cosmetic. The patient would like to pursue treatment. She understands that she is responsible for the bill and agrees to pay.     Risk, benefits, and alternatives of cryosurgery are discussed with the patient, including but not limited to the risks of hypopigmentation, hyperpigmentation, scar, infection, recurrence of lesion(s), development of new lesion(s), and need for additional treatment of the lesion(s). Verbal consent obtained from patient. Liquid nitrogen cryosurgery  lightly applied to 1 lesion to produce a freeze injury. Counseled patient that blisters may form, and instructed patient on wound care with gentle cleansing and use of Vaseline ointment to keep moist until healed. Handout was provided, and patient was instructed to return to clinic in 1-2 months if lesions do not completely resolve.    Follow up as needed.

## 2024-01-29 ENCOUNTER — HOSPITAL ENCOUNTER (OUTPATIENT)
Dept: RADIOLOGY | Facility: HOSPITAL | Age: 49
Discharge: HOME | End: 2024-01-29
Attending: PHYSICIAN ASSISTANT
Payer: COMMERCIAL

## 2024-01-29 DIAGNOSIS — Z12.31 SCREENING MAMMOGRAM, ENCOUNTER FOR: ICD-10-CM

## 2024-01-29 PROCEDURE — 77067 SCR MAMMO BI INCL CAD: CPT | Mod: TC

## 2024-01-29 PROCEDURE — 77067 SCR MAMMO BI INCL CAD: CPT | Mod: 26,,, | Performed by: RADIOLOGY

## 2024-01-29 PROCEDURE — 77063 BREAST TOMOSYNTHESIS BI: CPT | Mod: 26,,, | Performed by: RADIOLOGY

## 2024-02-16 ENCOUNTER — OFFICE VISIT (OUTPATIENT)
Dept: OPTOMETRY | Facility: CLINIC | Age: 49
End: 2024-02-16
Payer: COMMERCIAL

## 2024-02-16 DIAGNOSIS — Z46.0 FITTING AND ADJUSTMENT OF SPECTACLES AND CONTACT LENSES: Primary | ICD-10-CM

## 2024-02-16 DIAGNOSIS — H52.4 HYPEROPIA WITH PRESBYOPIA OF BOTH EYES: ICD-10-CM

## 2024-02-16 DIAGNOSIS — G43.109 OCULAR MIGRAINE: Primary | ICD-10-CM

## 2024-02-16 DIAGNOSIS — Z97.3 WEARS CONTACT LENSES: ICD-10-CM

## 2024-02-16 DIAGNOSIS — H52.03 HYPEROPIA WITH PRESBYOPIA OF BOTH EYES: ICD-10-CM

## 2024-02-16 PROCEDURE — 99499 UNLISTED E&M SERVICE: CPT | Mod: ,,, | Performed by: OPTOMETRIST

## 2024-02-16 PROCEDURE — 1159F MED LIST DOCD IN RCRD: CPT | Mod: CPTII,S$GLB,, | Performed by: OPTOMETRIST

## 2024-02-16 PROCEDURE — 92015 DETERMINE REFRACTIVE STATE: CPT | Mod: S$GLB,,, | Performed by: OPTOMETRIST

## 2024-02-16 PROCEDURE — 99999 PR PBB SHADOW E&M-EST. PATIENT-LVL I: CPT | Mod: PBBFAC,,, | Performed by: OPTOMETRIST

## 2024-02-16 PROCEDURE — 92004 COMPRE OPH EXAM NEW PT 1/>: CPT | Mod: S$GLB,,, | Performed by: OPTOMETRIST

## 2024-02-16 PROCEDURE — 99999 PR PBB SHADOW E&M-EST. PATIENT-LVL III: CPT | Mod: PBBFAC,,, | Performed by: OPTOMETRIST

## 2024-02-16 PROCEDURE — 92310 CONTACT LENS FITTING OU: CPT | Mod: CSM,,, | Performed by: OPTOMETRIST

## 2024-02-16 NOTE — PROGRESS NOTES
HPI    CC: Routine eye exam with new onset of flashes. She reports white flashes   and that she sometimes has a headache. Episode lasts about 30 min. Occurs   every few weeks. This started a few months ago. Pt reports no changes in   vision after episodes.     ADRIANNA: 8/24/2022  at [facility]    (+) Changes in vision: near [place] when [place]  (-) Pain  (-) Irritation   (-) Itching   (+) Dryness  (+) Flashes: Patient states she started seeing flashes within the last few   months. It occurs for about 30 mins.   (-) Floaters  (+) Glasses wearer  (+) CL wearer, B&L Ultra MF OU  (+) Uses eye gtts: OTC rewetting drops    Does patient want a refraction today? Yes    (-) Eye injury  (-) Eye surgery   (-)POHx  (-)FOHx    (-)DM    Last edited by Yesenia Shine, OD on 2/16/2024 10:56 AM.            Assessment /Plan     For exam results, see Encounter Report.    Ocular migraine    Hyperopia with presbyopia of both eyes      Educated pt on findings. Recommend to keep a journal of potential triggers and try to avoid if possible. If frequency or intensity increases, recommend to f/u with PCP. If permanent vision changes occur, RTC ASAP. Monitor.      2. Updated SRx. Monitor yearly.    Ordered CL trials, B&L Ultra MF. Will have pt RTC for CL fit once trials arrive. Will finalize CL Rx at that time.       RTC for CL fit or sooner if needed.

## 2024-02-20 ENCOUNTER — CLINICAL SUPPORT (OUTPATIENT)
Dept: OBSTETRICS AND GYNECOLOGY | Facility: CLINIC | Age: 49
End: 2024-02-20
Payer: COMMERCIAL

## 2024-02-20 DIAGNOSIS — N95.1 MENOPAUSAL SYMPTOMS: Primary | ICD-10-CM

## 2024-02-20 PROCEDURE — 99999 PR PBB SHADOW E&M-EST. PATIENT-LVL I: CPT | Mod: PBBFAC,,,

## 2024-02-20 PROCEDURE — 96372 THER/PROPH/DIAG INJ SC/IM: CPT | Mod: S$GLB,,, | Performed by: PHYSICIAN ASSISTANT

## 2024-02-20 RX ADMIN — TESTOSTERONE CYPIONATE 50 MG: 200 INJECTION, SOLUTION INTRAMUSCULAR at 02:02

## 2024-03-05 ENCOUNTER — PATIENT MESSAGE (OUTPATIENT)
Dept: OPTOMETRY | Facility: CLINIC | Age: 49
End: 2024-03-05
Payer: COMMERCIAL

## 2024-03-05 DIAGNOSIS — N95.1 MENOPAUSAL SYMPTOMS: ICD-10-CM

## 2024-03-05 RX ORDER — PROGESTERONE 200 MG/1
200 CAPSULE ORAL NIGHTLY
Qty: 90 CAPSULE | Refills: 3 | Status: SHIPPED | OUTPATIENT
Start: 2024-03-05 | End: 2025-03-05

## 2024-03-05 RX ORDER — ESTRADIOL 0.1 MG/D
1 FILM, EXTENDED RELEASE TRANSDERMAL
Qty: 24 PATCH | Refills: 3 | Status: SHIPPED | OUTPATIENT
Start: 2024-03-07 | End: 2025-03-07

## 2024-03-11 ENCOUNTER — TELEPHONE (OUTPATIENT)
Dept: OPTOMETRY | Facility: CLINIC | Age: 49
End: 2024-03-11
Payer: COMMERCIAL

## 2024-03-11 NOTE — TELEPHONE ENCOUNTER
----- Message from Yesenia Shine, OD sent at 3/11/2024  3:59 PM CDT -----  Regarding: CL F/u  Hi! Can you see if this pt can come for a CL f/u at 4:00pm on 03/22/2024? If so, can you get her booked for then? Thank you!

## 2024-03-13 ENCOUNTER — OFFICE VISIT (OUTPATIENT)
Dept: SPORTS MEDICINE | Facility: CLINIC | Age: 49
End: 2024-03-13
Payer: COMMERCIAL

## 2024-03-13 ENCOUNTER — HOSPITAL ENCOUNTER (OUTPATIENT)
Dept: RADIOLOGY | Facility: HOSPITAL | Age: 49
Discharge: HOME | End: 2024-03-13
Attending: PHYSICIAN ASSISTANT
Payer: COMMERCIAL

## 2024-03-13 VITALS
HEIGHT: 65 IN | DIASTOLIC BLOOD PRESSURE: 81 MMHG | BODY MASS INDEX: 37.83 KG/M2 | HEART RATE: 69 BPM | SYSTOLIC BLOOD PRESSURE: 124 MMHG | WEIGHT: 227.06 LBS

## 2024-03-13 DIAGNOSIS — G89.29 CHRONIC PAIN OF RIGHT KNEE: Primary | ICD-10-CM

## 2024-03-13 DIAGNOSIS — M22.41 CHONDROMALACIA OF RIGHT PATELLA: ICD-10-CM

## 2024-03-13 DIAGNOSIS — M25.561 CHRONIC PAIN OF RIGHT KNEE: Primary | ICD-10-CM

## 2024-03-13 DIAGNOSIS — M25.561 CHRONIC PAIN OF RIGHT KNEE: ICD-10-CM

## 2024-03-13 DIAGNOSIS — S83.231D COMPLEX TEAR OF MEDIAL MENISCUS OF RIGHT KNEE AS CURRENT INJURY, SUBSEQUENT ENCOUNTER: ICD-10-CM

## 2024-03-13 DIAGNOSIS — G89.29 CHRONIC PAIN OF RIGHT KNEE: ICD-10-CM

## 2024-03-13 PROCEDURE — 99215 OFFICE O/P EST HI 40 MIN: CPT | Mod: 25,S$GLB,, | Performed by: PHYSICIAN ASSISTANT

## 2024-03-13 PROCEDURE — 1159F MED LIST DOCD IN RCRD: CPT | Mod: CPTII,S$GLB,, | Performed by: PHYSICIAN ASSISTANT

## 2024-03-13 PROCEDURE — 73564 X-RAY EXAM KNEE 4 OR MORE: CPT | Mod: TC,50

## 2024-03-13 PROCEDURE — 73564 X-RAY EXAM KNEE 4 OR MORE: CPT | Mod: 26,,, | Performed by: RADIOLOGY

## 2024-03-13 PROCEDURE — 3079F DIAST BP 80-89 MM HG: CPT | Mod: CPTII,S$GLB,, | Performed by: PHYSICIAN ASSISTANT

## 2024-03-13 PROCEDURE — 99999 PR PBB SHADOW E&M-EST. PATIENT-LVL V: CPT | Mod: PBBFAC,,, | Performed by: PHYSICIAN ASSISTANT

## 2024-03-13 PROCEDURE — 1160F RVW MEDS BY RX/DR IN RCRD: CPT | Mod: CPTII,S$GLB,, | Performed by: PHYSICIAN ASSISTANT

## 2024-03-13 PROCEDURE — 20610 DRAIN/INJ JOINT/BURSA W/O US: CPT | Mod: RT,S$GLB,, | Performed by: PHYSICIAN ASSISTANT

## 2024-03-13 PROCEDURE — 3074F SYST BP LT 130 MM HG: CPT | Mod: CPTII,S$GLB,, | Performed by: PHYSICIAN ASSISTANT

## 2024-03-13 PROCEDURE — 3008F BODY MASS INDEX DOCD: CPT | Mod: CPTII,S$GLB,, | Performed by: PHYSICIAN ASSISTANT

## 2024-03-13 RX ORDER — TRIAMCINOLONE ACETONIDE 40 MG/ML
40 INJECTION, SUSPENSION INTRA-ARTICULAR; INTRAMUSCULAR
Status: COMPLETED | OUTPATIENT
Start: 2024-03-13 | End: 2024-03-13

## 2024-03-13 RX ORDER — BUPIVACAINE HYDROCHLORIDE 2.5 MG/ML
3 INJECTION, SOLUTION INFILTRATION; PERINEURAL
Status: COMPLETED | OUTPATIENT
Start: 2024-03-13 | End: 2024-03-13

## 2024-03-13 RX ADMIN — TRIAMCINOLONE ACETONIDE 40 MG: 40 INJECTION, SUSPENSION INTRA-ARTICULAR; INTRAMUSCULAR at 02:03

## 2024-03-13 RX ADMIN — BUPIVACAINE HYDROCHLORIDE 7.5 MG: 2.5 INJECTION, SOLUTION INFILTRATION; PERINEURAL at 02:03

## 2024-03-13 NOTE — PROGRESS NOTES
Subjective:          Chief Complaint: Lachelle Engel is a 48 y.o. female who had concerns including Pain of the Right Knee.    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 Pain worsened since discontuinung Humira 9 months ago.  She has a known complex medial meniscus tear from MRI in February 2021. She is requesting a CSI in her right knee today and would like to discuss future surgery. Last CSI right knee was 6/9/23, which she received significant relief of pain until a week ago. Denies a specific mechanism of injury. Pain is located along the medial aspect of her right knee. Reports occasional swelling in her right knee. Denies instability and mechanical symptoms.  Pain increases with prolonged standing and walking.  Pain today is 6/10. She has been taking ibuprofen and Tylenol as needed for pain.        Pain  Associated symptoms include joint swelling. Pertinent negatives include no abdominal pain, chest pain, chills, congestion, coughing, fever, headaches, myalgias, nausea, numbness, rash, sore throat or vomiting.         Review of Systems   Constitutional: Negative. Negative for chills, fever, weight gain and weight loss.   HENT:  Negative for congestion and sore throat.    Eyes:  Negative for blurred vision and double vision.   Cardiovascular:  Negative for chest pain, leg swelling and palpitations.   Respiratory:  Negative for cough and shortness of breath.    Hematologic/Lymphatic: Does not bruise/bleed easily.   Skin:  Negative for itching, poor wound healing and rash.   Musculoskeletal:  Positive for joint pain, joint swelling and stiffness. Negative for back pain, muscle weakness and myalgias.   Gastrointestinal:  Negative for abdominal pain, constipation, diarrhea, nausea and vomiting.   Genitourinary: Negative.  Negative for frequency and hematuria.   Neurological:  Negative for dizziness, headaches, numbness,  paresthesias and sensory change.   Psychiatric/Behavioral:  Negative for altered mental status and depression. The patient is not nervous/anxious.    Allergic/Immunologic: Negative for hives.       Pain Related Questions  Over the past 3 days, what was your highest pain level?: 6  Over the past 3 days, what was your lowest pain level? : 6    Other  How many nights a week are you awakened by your affected body part?: 2  Was the patient's HEIGHT measured or patient reported?: Patient Reported  Was the patient's WEIGHT measured or patient reported?: Measured      Objective:        General: Lachelle Gunderson is well-developed, well-nourished, appears stated age, in no acute distress, alert and oriented to time, place and person.     General    Vitals reviewed.  Constitutional: She is oriented to person, place, and time. She appears well-developed and well-nourished. No distress.   HENT:   Head: Normocephalic and atraumatic.   Eyes: EOM are normal.   Cardiovascular:  Normal rate and regular rhythm.            Pulmonary/Chest: Effort normal. No respiratory distress.   Neurological: She is alert and oriented to person, place, and time. She has normal reflexes. No cranial nerve deficit. Coordination normal.   Psychiatric: She has a normal mood and affect. Her behavior is normal. Judgment and thought content normal.     General Musculoskeletal Exam   Gait: abnormal and antalgic       Right Knee Exam     Inspection   Erythema: absent  Scars: absent  Swelling: absent  Effusion: present  Deformity: absent  Bruising: absent    Tenderness   The patient is tender to palpation of the medial joint line.    Range of Motion   Extension:  0 normal   Flexion:  130 normal     Tests   Meniscus   Yenifer:  Medial - positive Lateral - negative  Ligament Examination   Lachman: normal (-1 to 2mm)   PCL-Posterior Drawer: normal (0 to 2mm)     MCL - Valgus: normal (0 to 2mm)  LCL - Varus: normal  Pivot Shift: normal (Equal)  Reverse Pivot Shift:  normal (Equal)  Posterolateral Corner: stable  Patella   Passive Patellar Tilt: neutral  Patellar Tracking: normal  Patellar Glide (quadrants): Lateral - 1   Medial - 2  Patellar Grind: negative    Other   Sensation: normal    Left Knee Exam     Inspection   Erythema: absent  Scars: absent  Swelling: absent  Effusion: absent  Deformity: absent  Bruising: absent    Tenderness   The patient is experiencing no tenderness.     Range of Motion   Extension:  0 normal   Flexion:  130 normal     Tests   Meniscus   Yenifer:  Medial - negative Lateral - negative  Stability   Lachman: normal (-1 to 2mm)   PCL-Posterior Drawer: normal (0 to 2mm)  MCL - Valgus: normal (0 to 2mm)  LCL - Varus: normal (0 to 2mm)  Pivot Shift: normal (Equal)  Reverse Pivot Shift: normal (Equal)  Posterolateral Corner: stable  Patella   Passive Patellar Tilt: neutral  Patellar Tracking: normal  Patellar Glide (Quadrants): Lateral - 1 Medial - 2  Patellar Grind: negative    Other   Sensation: normal    Muscle Strength   Right Lower Extremity   Hip Abduction: 5/5   Quadriceps:  5/5   Hamstrin/5   Left Lower Extremity   Hip Abduction: 5/5   Quadriceps:  5/5   Hamstrin/5     Reflexes     Left Side  Achilles:  2+  Quadriceps:  2+    Right Side   Achilles:  2+  Quadriceps:  2+    Vascular Exam     Right Pulses  Dorsalis Pedis:      2+  Posterior Tibial:      2+        Left Pulses  Dorsalis Pedis:      2+  Posterior Tibial:      2+        RADIOGRAPHS: 3/13/24  Bilateral knees:  FINDINGS:  Right: No fracture or dislocation.  No joint effusion.  Medial joint space narrowing.     Left: No fracture or dislocation.  No joint effusion.  Medial joint space narrowing.    MRI right knee: 21  FINDINGS:  Menisci: There is a complex tear of the body segment/posterior horn of the medial meniscus with a predominant horizontal component that extends into the superior articular surface and a small associated peripheral parameniscal cyst.  Lateral meniscus  is intact.  Anterior and posterior root ligaments are within normal limits.     Ligaments: ACL, PCL, MCL and posterior-lateral corner structures are normal.     Extensor Mechanism: Patellofemoral alignment is maintained.  Quadriceps and patellar tendons are intact.  MPFL and medial/lateral retinacula are normal.     Cartilage:     *Patellofemoral: Superficial chondral fissuring overlies the medial patellar facet.  Trochlear cartilage is preserved.  No subchondral edema.  *Medial tibiofemoral: Intact without partial or full-thickness defects.  No subchondral edema.  *Lateral tibiofemoral: Intact without partial or full-thickness defects.  No subchondral edema.  Bones: Low signal intensity focus noted within the anterior aspect of the medial distal femoral metaphysis, likely a bone island and corresponding with sclerotic focus identified on previous radiograph.  No fractures.  No avascular necrosis.  No marrow infiltrative process.     Miscellaneous: There is a small joint effusion.  There is distal semimembranosus tendinosis with trace surrounding bursal fluid.  Distal iliotibial band is intact.  Visualized pes anserine tendons are within normal limits.  Origin of the medial and lateral gastrocnemius appears intact.  There is mild muscular edema within the medial and lateral gastrocnemius, possibly related to delayed onset muscle soreness.  There is synovitis along the expected course of the infrapatellar plica.  There is subcutaneous edema throughout the knee.  Visualized neurovascular structures appear normal.      Assessment:       Encounter Diagnoses   Name Primary?    Chronic pain of right knee Yes    Complex tear of medial meniscus of right knee as current injury, subsequent encounter     Chondromalacia of right patella           Plan:       1. I made the decision to obtain old records of the patient including previous notes and imaging. New imaging was ordered today of the extremity or extremities evaluated. I  independently reviewed and interpreted the radiographs today as well as prior imaging.  Reviewed radiographs and MRI with patient in detail. Discussed treatment options including conservative vs surgical. It is recommended to move forward with right knee arthroscopy. However, patient would like a repeat CSI in right knee and would like to move forward with surgery in July 2024.    2.  NSAIDs p.r.n. pain    3. We reviewed with Lachelle Gunderson today, the pathology and natural history of her diagnosis. We have discussed a variety of treatment options including medications, physical therapy and other alternative treatments. I also explained the indications, risks and benefits of surgery. After discussion, Lachelle Gunderson decided to proceed with surgery. The decision was made to go forward with: Plan for July 2024. Pending new MRI.     right Knee:    77125 Arthroscopy, with meniscectomy (medial OR lateral)   25667 Arthroscopy, debridement/shaving of articular cartilage (Chondroplasty)  00772 Arthroscopy, knee, synovectomy, limited    Clearance Medically Necessary: Yes  - Dermatology for immunosuppression medication recommendations    The details of the surgical procedure were explained, including the location of probable incisions and a description of likely hardware and/or grafts to be used.  The patient understands the likely convalescence after surgery.  Also, we have thoroughly discussed the risks, benefits and alternatives to surgery, including, but not limited to, the risk of infection, joint stiffness, blood clot (including DVT and/or pulmonary embolus), neurologic and vascular injury.  It was explained that, if tissue has been repaired or reconstructed, there is a chance of failure, which may require further management.    All of the patient's questions were answered and informed consent was obtained. The patient will contact us if they have any questions or concerns in the interim.    4. New right knee MRI since MRI  is from 2021.    5. Booking sheet filled out. Placed in file cabinet    6. Injection Procedure right knee  A time out was performed, including verification of patient ID, procedure, site and side, availability of information and equipment, review of safety issues, and agreement with consent, the procedure site was marked.    After time out was performed, the patient was prepped aseptically with povidone-iodine swabsticks. A diagnostic and therapeutic injection of 1:3cc Kenalog/Marcaine was given under sterile technique using a 22g x 1.5 needle from the Superolateral  aspect of the right Knee Joint in the supine position.      Lachelle Engel had no adverse reactions to the medication. Pain decreased. She was instructed to apply ice to the joint for 20 minutes and avoid strenuous activities for 24-36 hours following the injection. She was warned of possible blood sugar and/or blood pressure changes during that time. Following that time, she can resume regular activities.    She was reminded to call the clinic immediately for any adverse side effects as explained in clinic today.     7. RTC in 1 week with April Acharya PA-C for MRI results review- virtual visit. Will review MRI with Dr. Nelson once completed. If he agrees with surgical plan, will schedule surgery.                    Patient questionnaires may have been collected.

## 2024-03-19 ENCOUNTER — HOSPITAL ENCOUNTER (OUTPATIENT)
Dept: RADIOLOGY | Facility: HOSPITAL | Age: 49
Discharge: HOME | End: 2024-03-19
Attending: PHYSICIAN ASSISTANT
Payer: COMMERCIAL

## 2024-03-19 ENCOUNTER — CLINICAL SUPPORT (OUTPATIENT)
Dept: OBSTETRICS AND GYNECOLOGY | Facility: CLINIC | Age: 49
End: 2024-03-19
Payer: COMMERCIAL

## 2024-03-19 DIAGNOSIS — S83.231D COMPLEX TEAR OF MEDIAL MENISCUS OF RIGHT KNEE AS CURRENT INJURY, SUBSEQUENT ENCOUNTER: ICD-10-CM

## 2024-03-19 DIAGNOSIS — N95.1 MENOPAUSAL SYMPTOMS: Primary | ICD-10-CM

## 2024-03-19 PROCEDURE — 73721 MRI JNT OF LWR EXTRE W/O DYE: CPT | Mod: TC,RT

## 2024-03-19 PROCEDURE — 73721 MRI JNT OF LWR EXTRE W/O DYE: CPT | Mod: 26,RT,, | Performed by: RADIOLOGY

## 2024-03-19 PROCEDURE — 99999 PR PBB SHADOW E&M-EST. PATIENT-LVL I: CPT | Mod: PBBFAC,,,

## 2024-03-19 PROCEDURE — 96372 THER/PROPH/DIAG INJ SC/IM: CPT | Mod: S$GLB,,, | Performed by: PHYSICIAN ASSISTANT

## 2024-03-19 RX ADMIN — TESTOSTERONE CYPIONATE 50 MG: 200 INJECTION, SOLUTION INTRAMUSCULAR at 03:03

## 2024-03-20 ENCOUNTER — OFFICE VISIT (OUTPATIENT)
Dept: SPORTS MEDICINE | Facility: CLINIC | Age: 49
End: 2024-03-20
Payer: COMMERCIAL

## 2024-03-20 DIAGNOSIS — M22.41 CHONDROMALACIA OF RIGHT PATELLA: ICD-10-CM

## 2024-03-20 DIAGNOSIS — S83.231D COMPLEX TEAR OF MEDIAL MENISCUS OF RIGHT KNEE AS CURRENT INJURY, SUBSEQUENT ENCOUNTER: Primary | ICD-10-CM

## 2024-03-20 PROCEDURE — 1159F MED LIST DOCD IN RCRD: CPT | Mod: CPTII,95,, | Performed by: PHYSICIAN ASSISTANT

## 2024-03-20 PROCEDURE — 1160F RVW MEDS BY RX/DR IN RCRD: CPT | Mod: CPTII,95,, | Performed by: PHYSICIAN ASSISTANT

## 2024-03-20 PROCEDURE — 99213 OFFICE O/P EST LOW 20 MIN: CPT | Mod: 95,,, | Performed by: PHYSICIAN ASSISTANT

## 2024-03-20 NOTE — PROGRESS NOTES
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 Pain worsened since discontuinung Humira 9 months ago. She has a known complex medial meniscus tear from MRI in February 2021. She is requesting a CSI in her right knee today and would like to discuss future surgery. Last CSI right knee was 6/9/23, which she received significant relief of pain until a week ago. Denies a specific mechanism of injury. Pain is located along the medial aspect of her right knee. Reports occasional swelling in her right knee. Denies instability and mechanical symptoms. Pain increases with prolonged standing and walking. Pain today is 6/10. She has been taking ibuprofen and Tylenol as needed for pain.     VISIT TYPE X   Virtual visit with synchronous audio and video X   Telephone E/M service      Total time spent with patient: see X beck on chart below.   More than half of the time was spent counseling or coordinating care including prognosis, differential diagnosis, risks and benefits of treatment, instructions, compliance risk reductions     EST MINUTES X   10691 5    70497 10    67294 15 X   99214 25    23957 40    NEW     52889 10    50168 20    27596 30    74193 45    16436 60    PHONE      5-10    22817 11-20    66663 21-30      MRI right knee: 3/19/24  FINDINGS:  Examination is slightly degraded by patient motion artifact.     Menisci: There is a complex, predominantly horizontal tear of the body segment and posterior horn of the medial meniscus with a 0.4 x 2.2 cm posterior parameniscal cyst.  Lateral meniscus demonstrates preserved morphology and signal intensity.  Intact anterior and posterior root ligaments.     Ligaments: ACL, PCL, MCL and posterior-lateral corner  structures are normal.     Extensor Mechanism: Patellofemoral alignment is maintained.  Quadriceps and patellar tendons are intact.  MPFL and medial/lateral retinacula are normal.     Cartilage:     *Patellofemoral: Superficial chondral fissuring at the patellar eminence and medial patellar facet.  Trochlear cartilage is preserved.  No full-thickness defects or subchondral marrow edema.  *Medial tibiofemoral: Intact without partial or full-thickness defects.  No subchondral edema.  *Lateral tibiofemoral: Intact without partial or full-thickness defects.  No subchondral edema.  Bones: Focal ovoid area of T1/T2 signal hypointensity within the anterior aspect of the distal femoral metaphysis, likely a bone island.  No acute fractures.  No avascular necrosis.  No marrow infiltrative process.     Miscellaneous: Small joint effusion.  No popliteal cyst.  Persistent increased signal intensity involving the distal insertional fibers of the semimembranosus tendon with decreased bursal fluid.  Medial gastrocnemius, lateral gastrocnemius and visualized pes anserine tendons are intact.  Distal iliotibial band is normal.  Visualized neurovascular structures demonstrate no significant abnormalities.    Assessment:  Right knee medial meniscus tear  Right patella chondromalacia    Plan:    1. I will review MRI with Dr. Nelson. We reviewed with Lachelle Engel today, the pathology and natural history of her diagnosis. We have discussed a variety of treatment options including medications, physical therapy and other alternative treatments. I also explained the indications, risks and benefits of surgery. After discussion, she decided to proceed with surgery. The decision was made to go forward with: PLAN FOR JULY 2024  right Knee:    02753 Arthroscopy, with meniscectomy (medial OR lateral)   56959 Arthroscopy, debridement/shaving of articular cartilage (Chondroplasty)  55858 Arthroscopy, knee, synovectomy, limited,    Clearance  Medically Necessary: Shahram Flores from Dermatology for psoriasis    Pre-Op Center Clearance Medically Necessary:   No    The details of the surgical procedure were explained, including the location of probable incisions and a description of likely hardware and/or grafts to be used.  The patient understands the likely convalescence after surgery.  Also, we have thoroughly discussed the risks, benefits and alternatives to surgery, including, but not limited to, the risk of infection, joint stiffness, blood clot (including DVT and/or pulmonary embolus), neurologic and vascular injury.  It was explained that, if tissue has been repaired or reconstructed, there is a chance of failure, which may require further management.    I made the decision to obtain old records of the patient including previous notes and imaging. I independently reviewed and interpreted lab results today as well as prior imaging. Reviewed with patient in detail.    2. Ice compress to the affected area 2-3x a day for 15-20 minutes as needed for pain management.  3. Booking sheet filled out and given to Rayne to schedule surgery.  4. OTC NSAIDs as needed.  5. Patient will meet Dr. Nelson at pre-op appt.

## 2024-03-22 ENCOUNTER — OFFICE VISIT (OUTPATIENT)
Dept: OPTOMETRY | Facility: CLINIC | Age: 49
End: 2024-03-22
Payer: COMMERCIAL

## 2024-03-22 DIAGNOSIS — H52.03 HYPEROPIA WITH PRESBYOPIA OF BOTH EYES: Primary | ICD-10-CM

## 2024-03-22 DIAGNOSIS — Z46.0 FITTING AND ADJUSTMENT OF SPECTACLES AND CONTACT LENSES: ICD-10-CM

## 2024-03-22 DIAGNOSIS — H52.4 HYPEROPIA WITH PRESBYOPIA OF BOTH EYES: Primary | ICD-10-CM

## 2024-03-22 PROCEDURE — 99499 UNLISTED E&M SERVICE: CPT | Mod: S$GLB,,, | Performed by: OPTOMETRIST

## 2024-03-22 NOTE — PROGRESS NOTES
HPI    Patient is here today for CL fit.   Last edited by Mara Warner on 3/22/2024  3:25 PM.            Assessment /Plan     For exam results, see Encounter Report.    Hyperopia with presbyopia of both eyes    Fitting and adjustment of spectacles and contact lenses      Updated CL Rx. Initially good comfort and vision. Given CL trials to take home. If happy with comfort and vision of trial lenses, may order annual supply. If issues arise, RTC for CL f/u. Reviewed proper CL care and hygiene. Monitor yearly unless changes noted sooner.        RTC for next annual (02/2025) or sooner if needed.

## 2024-03-27 ENCOUNTER — PATIENT MESSAGE (OUTPATIENT)
Dept: OPTOMETRY | Facility: CLINIC | Age: 49
End: 2024-03-27
Payer: COMMERCIAL

## 2024-04-09 ENCOUNTER — OFFICE VISIT (OUTPATIENT)
Dept: OBSTETRICS AND GYNECOLOGY | Facility: CLINIC | Age: 49
End: 2024-04-09
Payer: COMMERCIAL

## 2024-04-09 VITALS — BODY MASS INDEX: 36.82 KG/M2 | HEIGHT: 65 IN | WEIGHT: 221 LBS

## 2024-04-09 DIAGNOSIS — E11.69 TYPE 2 DIABETES MELLITUS WITH OTHER SPECIFIED COMPLICATION, WITHOUT LONG-TERM CURRENT USE OF INSULIN: ICD-10-CM

## 2024-04-09 DIAGNOSIS — Z91.89 AT HIGH RISK FOR BREAST CANCER: ICD-10-CM

## 2024-04-09 DIAGNOSIS — N95.1 MENOPAUSAL SYMPTOMS: Primary | ICD-10-CM

## 2024-04-09 DIAGNOSIS — Z13.220 SCREENING FOR HYPERLIPIDEMIA: ICD-10-CM

## 2024-04-09 PROCEDURE — 1160F RVW MEDS BY RX/DR IN RCRD: CPT | Mod: CPTII,95,, | Performed by: PHYSICIAN ASSISTANT

## 2024-04-09 PROCEDURE — 3008F BODY MASS INDEX DOCD: CPT | Mod: CPTII,95,, | Performed by: PHYSICIAN ASSISTANT

## 2024-04-09 PROCEDURE — 1159F MED LIST DOCD IN RCRD: CPT | Mod: CPTII,95,, | Performed by: PHYSICIAN ASSISTANT

## 2024-04-09 PROCEDURE — 99214 OFFICE O/P EST MOD 30 MIN: CPT | Mod: 95,,, | Performed by: PHYSICIAN ASSISTANT

## 2024-04-09 RX ORDER — TESTOSTERONE CYPIONATE 200 MG/ML
50 INJECTION, SOLUTION INTRAMUSCULAR
Status: SHIPPED | OUTPATIENT
Start: 2024-04-16 | End: 2024-10-01

## 2024-04-09 NOTE — PROGRESS NOTES
The patient location is: work  The chief complaint leading to consultation is: follow up    Visit type: audiovisual    Face to Face time with patient: 20 minutes  30 minutes of total time spent on the encounter, which includes face to face time and non-face to face time preparing to see the patient (eg, review of tests), Obtaining and/or reviewing separately obtained history, Documenting clinical information in the electronic or other health record, Independently interpreting results (not separately reported) and communicating results to the patient/family/caregiver, or Care coordination (not separately reported).         Each patient to whom he or she provides medical services by telemedicine is:  (1) informed of the relationship between the physician and patient and the respective role of any other health care provider with respect to management of the patient; and (2) notified that he or she may decline to receive medical services by telemedicine and may withdraw from such care at any time.    Notes:   Subjective:      Lachelle Engel is a 48 y.o. female who presents for follow-up.  At her last visit on 1/9/2024, she reported hot flashes, fatigue and vaginal dryness on vivelle dot 0.05mg BIW, progesterone 100mg and testosterone 50mg IM [id number]. Hemoglobin recently increased to 16.2 on 12/28/2023 labs. She has a history of type 2 DM and taking Moujnaro 10mg SC weekly.     12/19/23 LABS  Estradiol 16  Free Testosterone 1.1    PLAN on 1/9/2024:  Breast MRI in 7/2024  Reviewed recent labs  Increase vivelle dot to 0.1mg BIW  Increase progesterone to 200mg QHS  Add Intrarosa vaginally at night- switched to Imvexxy  Continue Testosterone 50mg IM [id number]  Repeat CBC and obtain lipid at follow up.  Continue Mounjaro 10mg SC weekly     The patient reports that she is feeling better with increase in hormones. Symptoms have improved and wishes to continue as is. Denies adverse side effects. No vaginal  bleeding  Continues to take Mounjaro 10mg SC weekly. Tolerating well without side effects. Losing weight but very slow. She would like to increase the dose. Having knee surgery this summer and would like to lose more weight prior. She is getting regular protein. Eating 3 meals a day with a small dinner.    BREAST IMAGING  Bilateral Screening Mammogram: 1/26/2023 negative  Breast MRI: 7/3/2023 - negative  Bilateral Screening Mammogram: 1/29/2024 - negative    WWE: 1/9/2024  Pap: 1/13/2023 negative, HPV negative  PCP: Scheduled with new PCP next month  Routine Screening Labs: 11/7/2020  Colonoscopy: 11/21/2023 repeat in 10 years  DEXA: 2/22/21 normal     No visits with results within 3 Month(s) from this visit.   Latest known visit with results is:   Office Visit on 01/04/2024   Component Date Value Ref Range Status    POC Blood, Urine 01/04/2024 Negative  Negative, Positive Slide, Positive Tube Final    POC Bilirubin, Urine 01/04/2024 Negative  Negative, Positive Slide, Positive Tube Final    POC Urobilinogen, Urine 01/04/2024 normal  0.1 - 1.1 Final    POC Ketones, Urine 01/04/2024 Negative  Negative, Positive Slide, Positive Tube Final    POC Protein, Urine 01/04/2024 Negative  Negative, Positive Slide, Positive Tube Final    POC Nitrates, Urine 01/04/2024 Negative  Negative, Positive Slide, Positive Tube Final    POC Glucose, Urine 01/04/2024 Negative  Negative, Positive Slide, Positive Tube Final    pH, UA 01/04/2024 5.5  5 - 8 Final    POC Specific Gravity, Urine 01/04/2024 1.010  1.003 - 1.029 Final    POC Leukocytes, Urine 01/04/2024 Negative  Negative, Positive Slide, Positive Tube Final       Past Medical History:   Diagnosis Date    Abnormal Pap smear of cervix     Allergy     Angio-edema     Bradycardia     Depression     Diabetes mellitus     type 2     Dizziness     GERD (gastroesophageal reflux disease)     Hypertension     Impaired fasting blood sugar     Joint pain     Psoriasis     Recurrent upper  respiratory infection (URI)     Skin disease     Thyroid disease     hyothyroidism     Past Surgical History:   Procedure Laterality Date    APPENDECTOMY       SECTION      CHOLECYSTECTOMY      COLONOSCOPY N/A 2023    Procedure: COLONOSCOPY;  Surgeon: Piter Hearn MD;  Location: [facility];  Service: Endoscopy;  Laterality: N/A;    CRYOTHERAPY      ENDOMETRIAL ABLATION      ESOPHAGOGASTRODUODENOSCOPY N/A 2020    Procedure: EGD (ESOPHAGOGASTRODUODENOSCOPY);  Surgeon: Venkat Be MD;  Location: [facility] ([facility]);  Service: Endoscopy;  Laterality: N/A;  rice or smith ok      INCONTINENCE SURGERY      TUBAL LIGATION       Social History     Tobacco Use    Smoking status: Some Days     Current packs/day: 0.25     Average packs/day: 0.3 packs/day for 15.0 years (3.8 ttl pk-yrs)     Types: Cigarettes    Smokeless tobacco: Current    Tobacco comments:     1 pack every 4 days   Substance Use Topics    Alcohol use: Yes     Alcohol/week: 4.0 standard drinks of alcohol     Types: 4 Drinks containing 0.5 oz of alcohol per week     Comment: occasionally    Drug use: No     Family History   Problem Relation Age of Onset    Breast cancer Mother     Cancer Father         prostate    Prostate cancer Father     Cancer Maternal Aunt 30        colon cancer    Multiple sclerosis Maternal Aunt     Ovarian cancer Maternal Aunt     Colon cancer Maternal Uncle     Eczema Daughter     Melanoma Neg Hx      OB History    Para Term  AB Living   2 2 2     2   SAB IAB Ectopic Multiple Live Births           2      # Outcome Date GA Lbr Robert/2nd Weight Sex Delivery Anes PTL Lv   2 Term      CS-LTranv   NATI   1 Term      CS-LTranv   NATI       Current Outpatient Medications:     augmented betamethasone dipropionate (DIPROLENE-AF) 0.05 % cream, Apply to affected areas of body BID prn psoriasis., Disp: 50 g, Rfl: 5    betamethasone valerate 0.1% (VALISONE) 0.1 % Lotn, Apply to affected areas of  scalp/ears BID prn psoriasis., Disp: 60 mL, Rfl: 3    estradioL (IMVEXXY MAINTENANCE PACK) 10 mcg Inst, Place 10 mcg vaginally twice a week., Disp: 8 each, Rfl: 11    estradioL (VIVELLE-DOT) 0.1 mg/24 hr PTSW, Place 1 patch onto the skin twice a week., Disp: 24 patch, Rfl: 3    etodolac (LODINE) 500 MG tablet, TK 1 T PO BID WC, Disp: , Rfl:     folic acid (FOLVITE) 1 MG tablet, Take 1 mg by mouth once daily., Disp: , Rfl:     levothyroxine (SYNTHROID) 137 MCG Tab tablet, Take 137 mcg by mouth once daily., Disp: , Rfl:     metFORMIN (GLUCOPHAGE-XR) 500 MG ER 24hr tablet, TK 1 T PO D WITH PRETTY, Disp: , Rfl:     methotrexate 2.5 MG Tab, 5 tablets weekly for 4 weeks and then 7 tablets weekly after that, Disp: 35 tablet, Rfl: 5    mupirocin (BACTROBAN) 2 % ointment, Apply topically 3 (three) times daily., Disp: 1 Tube, Rfl: 2    neomycin-polymyxin-dexamethasone (MAXITROL) 3.5mg/mL-10,000 unit/mL-0.1 % DrpS, Place 1 drop into both eyes 4 (four) times daily., Disp: , Rfl:     omeprazole (PRILOSEC) 20 MG capsule, Take 1 capsule (20 mg total) by mouth once daily., Disp: 30 capsule, Rfl: 11    progesterone (PROMETRIUM) 200 MG capsule, Take 1 capsule (200 mg total) by mouth every evening., Disp: 90 capsule, Rfl: 3    risankizumab-rzaa (SKYRIZI) 150 mg/mL PnIj, Inject 150 mg (1 pen) into the skin at week 0, week 4, and then every 12 weeks., Disp: 2 mL, Rfl: 1    risankizumab-rzaa (SKYRIZI) 150 mg/mL PnIj, Inject 150 mg SC [id number]., Disp: 1 mL, Rfl: 1    tirzepatide 12.5 mg/0.5 mL PnIj, Inject 12.5 mg into the skin every 7 days., Disp: 4 Pen, Rfl: 3    valACYclovir (VALTREX) 500 MG tablet, TAKE 1 TABLET BY MOUTH TWICE DAILY FOR 3 DAYS. BEGIN AT FIRST SIGN OF OUTBREAK, Disp: 6 tablet, Rfl: 4    Current Facility-Administered Medications:     [START ON 4/16/2024] testosterone cypionate injection 50 mg, 50 mg, Intramuscular, Q28 Days, Anastasia Morrell PA-C    Review of Systems:  General: No fever, chills, or weight loss.  Chest: No  "chest pain, shortness of breath, or palpitations.  Breast: No pain, masses, or nipple discharge.  Vulva: No pain, lesions, or itching.  Vagina: No relaxation, itching, discharge, or lesions.  Abdomen: No pain, nausea, vomiting, diarrhea, or constipation.  Urinary: No incontinence, nocturia, frequency, or dysuria.  Extremities:  No leg cramps, edema, or calf pain.  Neurologic: No headaches, dizziness, or visual changes.    Objective:     Vitals:    04/09/24 1506   Weight: 100.2 kg (221 lb)   Height: 5' 5" (1.651 m)     Body mass index is 36.78 kg/m².      Physical Exam: Deferred       Assessment:    Menopausal symptoms  -     CBC Auto Differential; Future; Expected date: 04/09/2024  -     Estradiol; Future; Expected date: 04/09/2024  -     Testosterone, free; Future; Expected date: 04/09/2024  -     Testosterone; Future; Expected date: 04/09/2024  -     testosterone cypionate injection 50 mg    Screening for hyperlipidemia  -     Lipid panel; Future; Expected date: 04/09/2024    Type 2 diabetes mellitus with other specified complication, without long-term current use of insulin  -     tirzepatide 12.5 mg/0.5 mL PnIj; Inject 12.5 mg into the skin every 7 days.  Dispense: 4 Pen; Refill: 3  -     Hemoglobin A1C; Future; Expected date: 04/09/2024        Plan:   Continue vivelle dot to 0.1mg BIW and progesterone to 200mg QHS  continue Imvexxy BIW  Continue Testosterone 50mg IM [id number]  Routine screening labs and hormone levels 2 weeks after her next injection.  If hemoglobin remains elevated, will need to decrease testosterone and refer to hematology.  Increase Mounjaro to 12.5mg SC weekly.  Continue low glycemic diet with lean protein at each meal.  Maintain hydration  Breast MRI in 7/2024   Follow up in 6 months or sooner PRN      Instructed patient to call if she experiences any side effects or has any questions.    I spent a total of 30 minutes on the day of the visit.This includes face to face time and non-face to " face time preparing to see the patient (eg, review of tests), obtaining and/or reviewing separately obtained history, documenting clinical information in the electronic or other health record, independently interpreting results and communicating results to the patient/family/caregiver, or care coordinator.

## 2024-04-14 NOTE — PROGRESS NOTES
SAME DAY VISIT NOTE     PRESENTING HISTORY     Reason for Visit:  SAME DAY visit.    No chief complaint on file.    History of Present Illness & ROS: Ms. Lachelle Engel is a 48 y.o. female.  Same day apt.   No PCP. Request to address current 'headaches' and will RTC to establish medical care with a provider accepting new patients at a later time.   Pleasant lady.   3rd .   Has a reported history of migraines, since the age of 20. Doing well until now. Has taken Fioricet in the past, but has been years since needing anything to control symptoms.   Seen by Optometry in 2/2024, was having headaches at the time and was told'eyes looked fine'. Headaches are occurring more.   Reports that she has been experiencing 'headaches, with changes in vision for the past 'couple of months'. Mild sensitivity to light, minimal nausea, but no vomiting with the events.   No falls or recent head traumas or MVAs.     Review of Systems:  Eyes: denies visual changes at this time denies floaters   ENT: no nasal congestion or sore throat  Respiratory: no cough or shorness of breath  Cardiovascular: no chest pain or palpitations  Gastrointestinal: no nausea or vomiting, no abdominal pain or change in bowel habits  Genitourinary: no hematuria or dysuria; denies frequency  Hematologic/Lymphatic: no easy bruising or lymphadenopathy  Musculoskeletal: no arthralgias or myalgias  Neurological: no seizures or tremors  Endocrine: no heat or cold intolerance    PAST HISTORY:     Past Medical History:   Diagnosis Date    Abnormal Pap smear of cervix     Allergy     Angio-edema     Bradycardia     Depression     Diabetes mellitus     type 2     Dizziness     GERD (gastroesophageal reflux disease)     Hypertension     Impaired fasting blood sugar     Joint pain     Psoriasis     Recurrent upper respiratory infection (URI)     Skin disease     Thyroid disease     hyothyroidism       Past Surgical History:   Procedure Laterality  Date    APPENDECTOMY       SECTION      CHOLECYSTECTOMY      COLONOSCOPY N/A 2023    Procedure: COLONOSCOPY;  Surgeon: Piter Hearn MD;  Location: [facility];  Service: Endoscopy;  Laterality: N/A;    CRYOTHERAPY      ENDOMETRIAL ABLATION      ESOPHAGOGASTRODUODENOSCOPY N/A 2020    Procedure: EGD (ESOPHAGOGASTRODUODENOSCOPY);  Surgeon: Venkat Be MD;  Location: [facility] (4TH FLR);  Service: Endoscopy;  Laterality: N/A;  rice or smith ok      INCONTINENCE SURGERY      TUBAL LIGATION         Family History   Problem Relation Name Age of Onset    Breast cancer Mother colon polyps     Cancer Father          prostate    Prostate cancer Father      Cancer Maternal Aunt ms 30        colon cancer    Multiple sclerosis Maternal Aunt ms     Ovarian cancer Maternal Aunt ms     Colon cancer Maternal Uncle      Eczema Daughter Isabela     Melanoma Neg Hx         Social History     Socioeconomic History    Marital status: Single   Occupational History     Employer: [employer]   Tobacco Use    Smoking status: Some Days     Current packs/day: 0.25     Average packs/day: 0.3 packs/day for 15.0 years (3.8 ttl pk-yrs)     Types: Cigarettes    Smokeless tobacco: Current    Tobacco comments:     1 pack every 4 days   Substance and Sexual Activity    Alcohol use: Yes     Alcohol/week: 4.0 standard drinks of alcohol     Types: 4 Drinks containing 0.5 oz of alcohol per week     Comment: occasionally    Drug use: No    Sexual activity: Yes     Partners: Male     Birth control/protection: Post-menopausal     Comment: Tubal   Other Topics Concern    Are you pregnant or think you may be? No    Breast-feeding No     Social Determinants of Health     Financial Resource Strain: Low Risk  (3/12/2024)    Overall Financial Resource Strain (CARDIA)     Difficulty of Paying Living Expenses: Not very hard   Food Insecurity: No Food Insecurity (3/12/2024)    Hunger Vital Sign     Worried About  Running Out of Food in the Last Year: Never true     Ran Out of Food in the Last Year: Never true   Transportation Needs: No Transportation Needs (3/12/2024)    PRAPARE - Transportation     Lack of Transportation (Medical): No     Lack of Transportation (Non-Medical): No   Physical Activity: Insufficiently Active (3/12/2024)    Exercise Vital Sign     Days of Exercise per Week: 3 days     Minutes of Exercise per Session: 40 min   Stress: No Stress Concern Present (3/12/2024)    [employer] - Occupational Stress Questionnaire     Feeling of Stress : Only a little   Social Connections: Unknown (3/12/2024)    Social Connection and Isolation Panel [NHANES]     Frequency of Communication with Friends and Family: More than three times a week     Frequency of Social Gatherings with Friends and Family: Three times a week     Active Member of Clubs or Organizations: No     Attends Club or Organization Meetings: Never     Marital Status: Never    Housing Stability: Low Risk  (3/12/2024)    Housing Stability Vital Sign     Unable to Pay for Housing in the Last Year: No     Number of Places Lived in the Last Year: 1     Unstable Housing in the Last Year: No       MEDICATIONS & ALLERGIES:     Current Outpatient Medications on File Prior to Visit   Medication Sig Dispense Refill    augmented betamethasone dipropionate (DIPROLENE-AF) 0.05 % cream Apply to affected areas of body BID prn psoriasis. 50 g 5    betamethasone valerate 0.1% (VALISONE) 0.1 % Lotn Apply to affected areas of scalp/ears BID prn psoriasis. 60 mL 3    estradioL (IMVEXXY MAINTENANCE PACK) 10 mcg Inst Place 10 mcg vaginally twice a week. 8 each 11    estradioL (VIVELLE-DOT) 0.1 mg/24 hr PTSW Place 1 patch onto the skin twice a week. 24 patch 3    etodolac (LODINE) 500 MG tablet TK 1 T PO BID WC      folic acid (FOLVITE) 1 MG tablet Take 1 mg by mouth once daily.      levothyroxine (SYNTHROID) 137 MCG Tab tablet Take 137 mcg by  mouth once daily.      metFORMIN (GLUCOPHAGE-XR) 500 MG ER 24hr tablet TK 1 T PO D WITH PRETTY      methotrexate 2.5 MG Tab 5 tablets weekly for 4 weeks and then 7 tablets weekly after that 35 tablet 5    mupirocin (BACTROBAN) 2 % ointment Apply topically 3 (three) times daily. 1 Tube 2    neomycin-polymyxin-dexamethasone (MAXITROL) 3.5mg/mL-10,000 unit/mL-0.1 % DrpS Place 1 drop into both eyes 4 (four) times daily.      omeprazole (PRILOSEC) 20 MG capsule Take 1 capsule (20 mg total) by mouth once daily. 30 capsule 11    progesterone (PROMETRIUM) 200 MG capsule Take 1 capsule (200 mg total) by mouth every evening. 90 capsule 3    risankizumab-rzaa (SKYRIZI) 150 mg/mL PnIj Inject 150 mg into the skin every 12 weeks. 1 mL 1    tirzepatide 12.5 mg/0.5 mL PnIj Inject 12.5 mg into the skin every 7 days. 4 Pen 3    valACYclovir (VALTREX) 500 MG tablet TAKE 1 TABLET BY MOUTH TWICE DAILY FOR 3 DAYS. BEGIN AT FIRST SIGN OF OUTBREAK 6 tablet 4     Current Facility-Administered Medications on File Prior to Visit   Medication Dose Route Frequency Provider Last Rate Last Admin    [START ON 4/16/2024] testosterone cypionate injection 50 mg  50 mg Intramuscular Q28 Days Anastasia Morrell PA-C            Review of patient's allergies indicates:  No Known Allergies    Medications Reconciliation:   I have reconciled the patient's home medications and discharge medications with the patient/family. I have updated all changes.  Refer to After-Visit Medication List.    OBJECTIVE:     Vital Signs:  There were no vitals filed for this visit.  Wt Readings from Last 3 Encounters:   04/09/24 1506 100.2 kg (221 lb)   03/13/24 1030 103 kg (227 lb 1.2 oz)   01/09/24 1436 103.1 kg (227 lb 6.4 oz)     There is no height or weight on file to calculate BMI.   Wt Readings from Last 3 Encounters:   04/17/24 101.2 kg (223 lb 1.7 oz)   04/09/24 100.2 kg (221 lb)   03/13/24 103 kg (227 lb 1.2 oz)     Temp Readings from Last 3 Encounters:   01/04/24 98.1 °F  (36.7 °C) (Oral)   11/21/23 97.9 °F (36.6 °C) (Temporal)   02/20/20 97.1 °F (36.2 °C) (Oral)     BP Readings from Last 3 Encounters:   04/17/24 (!) 132/98   03/13/24 124/81   01/09/24 135/85     Pulse Readings from Last 3 Encounters:   04/17/24 71   03/13/24 69   01/09/24 80       Physical Exam:  General: Well developed, well nourished. No distress.  HEENT: Head is normocephalic, atraumatic; ears are normal.   Eyes: Clear conjunctiva.  Neck: Supple, symmetrical neck; trachea midline.  Lungs: Clear to auscultation bilaterally and normal respiratory effort.  Cardiovascular: Heart with regular rate and rhythm. No murmurs, gallops or rubs  Extremities: No LE edema. Pulses 2+ and symmetric.   Skin: Skin color, texture, turgor normal. No rashes.  Musculoskeletal: Normal gait.   Neurologic: Normal strength and tone. No focal numbness or weakness.       Laboratory  Lab Results   Component Value Date    WBC 4.64 12/28/2023    HGB 16.2 (H) 12/28/2023    HCT 45.6 12/28/2023     12/28/2023    CHOL 176 11/07/2020    TRIG 85 11/07/2020    HDL 57 11/07/2020    ALT 26 12/28/2023    AST 23 12/28/2023     12/28/2023    K 4.5 12/28/2023     12/28/2023    CREATININE 1.0 12/28/2023    BUN 12 12/28/2023    CO2 26 12/28/2023    TSH 2.116 11/07/2020    INR 0.9 07/09/2010    HGBA1C 5.0 11/07/2020       ASSESSMENT & PLAN:     Chronic intractable headache, unspecified headache type    Vision changes    Hx of migraines  *Check Imaging ([facility])  *Check labs; will add on to the labs pending for her ANGELIA Schaeffer  -     Comprehensive Metabolic Panel; Future; Expected date: 04/17/2024  -     IRON AND TIBC; Future; Expected date: 04/17/2024  -     Magnesium; Future; Expected date: 04/17/2024  -     Ambulatory referral/consult to Neurology; Future; Expected date: 04/24/2024  -     FERRITIN; Future; Expected date: 04/17/2024  -     CT Head Without Contrast; Future; Expected date: 04/17/2024  -     ondansetron (ZOFRAN-ODT) 4 MG TbDL;  Take 1 tablet (4 mg total) by mouth every 6 (six) hours as needed (Nausea).  Dispense: 30 tablet; Refill: 0  -     naproxen (NAPROSYN) 500 MG tablet; Take 1 tablet (500 mg total) by mouth 2 (two) times daily as needed (Pain).  Dispense: 30 tablet; Refill: 0  -     butalbital-acetaminophen-caffeine -40 mg (FIORICET, ESGIC) -40 mg per tablet; Take 1 tablet by mouth every 4 (four) hours as needed for Headaches.  Dispense: 15 tablet; Refill: 0    Other medical issues:   Psoriasis:   ` Skyrizi  ` follow up with Dermatology; defer     Psoriatic Arthritis:   MTX  ` follow up with Rheumatology, Dr. PARKER; marylou    Thyroid Disorder:   *Followed by Endocrine at [facility]; defer  ` Synthroid     HRT:   Defer to GYN; defer     *Same Day apt. Follow up with PCP at this time. Has been advised to ER red flags.     Future Appointments   Date Time Provider Department Center   4/23/2024  3:00 PM [facility]-CT1 500 LB LIMIT [facility]   4/30/2024  7:00 AM LAB, APPOINTMENT [facility]   5/14/2024  3:00 PM INJECTION, [facility]   6/11/2024  1:40 PM INJECTION, [facility]   10/15/2024  2:45 PM Anastasia Morrell PA-C [facility]        Medication List            Accurate as of April 17, 2024  5:10 PM. If you have any questions, ask your nurse or doctor.                START taking these medications      butalbital-acetaminophen-caffeine -40 mg -40 mg per tablet  Commonly known as: FIORICET, ESGIC  Take 1 tablet by mouth every 4 (four) hours as needed for Headaches.  Started by: NIKHIL Adames     naproxen 500 MG tablet  Commonly known as: NAPROSYN  Take 1 tablet (500 mg total) by mouth 2 (two) times daily as needed (Pain).  Started by: Trixie A Rosemarie-Telles, FNP     ondansetron 4 MG Tbdl  Commonly known as: ZOFRAN-ODT  Take 1 tablet (4 mg total) by mouth  every 6 (six) hours as needed (Nausea).  Started by: NIKHIL Adames            CONTINUE taking these medications      augmented betamethasone dipropionate 0.05 % cream  Commonly known as: DIPROLENE-AF  Apply to affected areas of body BID prn psoriasis.     betamethasone valerate 0.1% 0.1 % Lotn  Commonly known as: VALISONE  Apply to affected areas of scalp/ears BID prn psoriasis.     folic acid 1 MG tablet  Commonly known as: FOLVITE     * IMVEXXY MAINTENANCE PACK 10 mcg Inst  Generic drug: estradioL  Place 10 mcg vaginally twice a week.     * estradioL 0.1 mg/24 hr Ptsw  Commonly known as: VIVELLE-DOT  Place 1 patch onto the skin twice a week.     levothyroxine 137 MCG Tab tablet  Commonly known as: SYNTHROID     methotrexate 2.5 MG Tab  5 tablets weekly for 4 weeks and then 7 tablets weekly after that     mupirocin 2 % ointment  Commonly known as: BACTROBAN  Apply topically 3 (three) times daily.     neomycin-polymyxin-dexamethasone 3.5mg/mL-10,000 unit/mL-0.1 % Drps  Commonly known as: MAXITROL     progesterone 200 MG capsule  Commonly known as: PROMETRIUM  Take 1 capsule (200 mg total) by mouth every evening.     SKYRIZI 150 mg/mL Pnij  Generic drug: risankizumab-rzaa  Inject 150 mg into the skin every 12 weeks.     tirzepatide 12.5 mg/0.5 mL Pnij  Inject 12.5 mg into the skin every 7 days.     valACYclovir 500 MG tablet  Commonly known as: VALTREX  TAKE 1 TABLET BY MOUTH TWICE DAILY FOR 3 DAYS. BEGIN AT FIRST SIGN OF OUTBREAK           * This list has 2 medication(s) that are the same as other medications prescribed for you. Read the directions carefully, and ask your doctor or other care provider to review them with you.                STOP taking these medications      etodolac 500 MG tablet  Commonly known as: LODINE  Stopped by: NIKHIL Adames     metFORMIN 500 MG ER 24hr tablet  Commonly known as: GLUCOPHAGE-XR  Stopped by: NIKHIL Adames     omeprazole 20 MG  capsule  Commonly known as: PRILOSEC  Stopped by: NIKHIL Adames               Where to Get Your Medications        [id number] - [facility] [address] [facility] [address]  [address] [facility] [address]      Phone: [phone]   butalbital-acetaminophen-caffeine -40 mg -40 mg per tablet  naproxen 500 MG tablet  ondansetron 4 MG Tbdl       Signing Physician:  NIKHIL Adames

## 2024-04-16 ENCOUNTER — CLINICAL SUPPORT (OUTPATIENT)
Dept: OBSTETRICS AND GYNECOLOGY | Facility: CLINIC | Age: 49
End: 2024-04-16
Payer: COMMERCIAL

## 2024-04-16 DIAGNOSIS — N95.1 MENOPAUSAL SYMPTOMS: Primary | ICD-10-CM

## 2024-04-16 PROCEDURE — 99999 PR PBB SHADOW E&M-EST. PATIENT-LVL I: CPT | Mod: PBBFAC,,,

## 2024-04-16 PROCEDURE — 96372 THER/PROPH/DIAG INJ SC/IM: CPT | Mod: S$GLB,,, | Performed by: PHYSICIAN ASSISTANT

## 2024-04-16 RX ADMIN — TESTOSTERONE CYPIONATE 50 MG: 200 INJECTION, SOLUTION INTRAMUSCULAR at 03:04

## 2024-04-17 ENCOUNTER — LAB VISIT (OUTPATIENT)
Dept: LAB | Facility: HOSPITAL | Age: 49
End: 2024-04-17
Payer: COMMERCIAL

## 2024-04-17 ENCOUNTER — OFFICE VISIT (OUTPATIENT)
Dept: INTERNAL MEDICINE | Facility: CLINIC | Age: 49
End: 2024-04-17
Payer: COMMERCIAL

## 2024-04-17 VITALS
DIASTOLIC BLOOD PRESSURE: 98 MMHG | OXYGEN SATURATION: 99 % | HEIGHT: 65 IN | WEIGHT: 223.13 LBS | HEART RATE: 71 BPM | BODY MASS INDEX: 37.18 KG/M2 | SYSTOLIC BLOOD PRESSURE: 132 MMHG

## 2024-04-17 DIAGNOSIS — G89.29 CHRONIC INTRACTABLE HEADACHE, UNSPECIFIED HEADACHE TYPE: Primary | ICD-10-CM

## 2024-04-17 DIAGNOSIS — R51.9 CHRONIC INTRACTABLE HEADACHE, UNSPECIFIED HEADACHE TYPE: Primary | ICD-10-CM

## 2024-04-17 DIAGNOSIS — R51.9 CHRONIC INTRACTABLE HEADACHE, UNSPECIFIED HEADACHE TYPE: ICD-10-CM

## 2024-04-17 DIAGNOSIS — Z86.69 HX OF MIGRAINES: ICD-10-CM

## 2024-04-17 DIAGNOSIS — G89.29 CHRONIC INTRACTABLE HEADACHE, UNSPECIFIED HEADACHE TYPE: ICD-10-CM

## 2024-04-17 DIAGNOSIS — H53.9 VISION CHANGES: ICD-10-CM

## 2024-04-17 LAB
ALBUMIN SERPL BCP-MCNC: 4.1 G/DL (ref 3.5–5.2)
ALP SERPL-CCNC: 93 U/L (ref 55–135)
ALT SERPL W/O P-5'-P-CCNC: 26 U/L (ref 10–44)
ANION GAP SERPL CALC-SCNC: 9 MMOL/L (ref 8–16)
AST SERPL-CCNC: 21 U/L (ref 10–40)
BILIRUB SERPL-MCNC: 0.4 MG/DL (ref 0.1–1)
BUN SERPL-MCNC: 10 MG/DL (ref 6–20)
CALCIUM SERPL-MCNC: 10 MG/DL (ref 8.7–10.5)
CHLORIDE SERPL-SCNC: 106 MMOL/L (ref 95–110)
CO2 SERPL-SCNC: 26 MMOL/L (ref 23–29)
CREAT SERPL-MCNC: 1 MG/DL (ref 0.5–1.4)
EST. GFR  (NO RACE VARIABLE): >60 ML/MIN/1.73 M^2
FERRITIN SERPL-MCNC: 112 NG/ML (ref 20–300)
GLUCOSE SERPL-MCNC: 76 MG/DL (ref 70–110)
IRON SERPL-MCNC: 63 UG/DL (ref 30–160)
MAGNESIUM SERPL-MCNC: 2 MG/DL (ref 1.6–2.6)
POTASSIUM SERPL-SCNC: 4.4 MMOL/L (ref 3.5–5.1)
PROT SERPL-MCNC: 7 G/DL (ref 6–8.4)
SATURATED IRON: 16 % (ref 20–50)
SODIUM SERPL-SCNC: 141 MMOL/L (ref 136–145)
TOTAL IRON BINDING CAPACITY: 392 UG/DL (ref 250–450)
TRANSFERRIN SERPL-MCNC: 265 MG/DL (ref 200–375)

## 2024-04-17 PROCEDURE — 99214 OFFICE O/P EST MOD 30 MIN: CPT | Mod: S$GLB,,, | Performed by: NURSE PRACTITIONER

## 2024-04-17 PROCEDURE — 83540 ASSAY OF IRON: CPT | Performed by: NURSE PRACTITIONER

## 2024-04-17 PROCEDURE — 3008F BODY MASS INDEX DOCD: CPT | Mod: CPTII,S$GLB,, | Performed by: NURSE PRACTITIONER

## 2024-04-17 PROCEDURE — 3075F SYST BP GE 130 - 139MM HG: CPT | Mod: CPTII,S$GLB,, | Performed by: NURSE PRACTITIONER

## 2024-04-17 PROCEDURE — 1159F MED LIST DOCD IN RCRD: CPT | Mod: CPTII,S$GLB,, | Performed by: NURSE PRACTITIONER

## 2024-04-17 PROCEDURE — 36415 COLL VENOUS BLD VENIPUNCTURE: CPT | Performed by: NURSE PRACTITIONER

## 2024-04-17 PROCEDURE — 3080F DIAST BP >= 90 MM HG: CPT | Mod: CPTII,S$GLB,, | Performed by: NURSE PRACTITIONER

## 2024-04-17 PROCEDURE — 83735 ASSAY OF MAGNESIUM: CPT | Performed by: NURSE PRACTITIONER

## 2024-04-17 PROCEDURE — 82728 ASSAY OF FERRITIN: CPT | Performed by: NURSE PRACTITIONER

## 2024-04-17 PROCEDURE — 80053 COMPREHEN METABOLIC PANEL: CPT | Performed by: NURSE PRACTITIONER

## 2024-04-17 PROCEDURE — 99999 PR PBB SHADOW E&M-EST. PATIENT-LVL V: CPT | Mod: PBBFAC,,, | Performed by: NURSE PRACTITIONER

## 2024-04-17 RX ORDER — NAPROXEN 500 MG/1
500 TABLET ORAL 2 TIMES DAILY PRN
Qty: 30 TABLET | Refills: 0 | Status: SHIPPED | OUTPATIENT
Start: 2024-04-17

## 2024-04-17 RX ORDER — ONDANSETRON 4 MG/1
4 TABLET, ORALLY DISINTEGRATING ORAL EVERY 6 HOURS PRN
Qty: 30 TABLET | Refills: 0 | Status: SHIPPED | OUTPATIENT
Start: 2024-04-17

## 2024-04-17 RX ORDER — BUTALBITAL, ACETAMINOPHEN AND CAFFEINE 50; 325; 40 MG/1; MG/1; MG/1
1 TABLET ORAL EVERY 4 HOURS PRN
Qty: 15 TABLET | Refills: 0 | Status: SHIPPED | OUTPATIENT
Start: 2024-04-17 | End: 2024-05-17

## 2024-04-18 ENCOUNTER — TELEPHONE (OUTPATIENT)
Dept: INTERNAL MEDICINE | Facility: CLINIC | Age: 49
End: 2024-04-18
Payer: COMMERCIAL

## 2024-04-18 RX ORDER — FERROUS SULFATE 325(65) MG
325 TABLET, DELAYED RELEASE (ENTERIC COATED) ORAL DAILY
Qty: 90 TABLET | Refills: 1 | Status: SHIPPED | OUTPATIENT
Start: 2024-04-18

## 2024-04-23 ENCOUNTER — HOSPITAL ENCOUNTER (OUTPATIENT)
Dept: RADIOLOGY | Facility: HOSPITAL | Age: 49
Discharge: HOME | End: 2024-04-23
Attending: NURSE PRACTITIONER
Payer: COMMERCIAL

## 2024-04-23 DIAGNOSIS — Z86.69 HX OF MIGRAINES: ICD-10-CM

## 2024-04-23 DIAGNOSIS — G89.29 CHRONIC INTRACTABLE HEADACHE, UNSPECIFIED HEADACHE TYPE: ICD-10-CM

## 2024-04-23 DIAGNOSIS — R51.9 CHRONIC INTRACTABLE HEADACHE, UNSPECIFIED HEADACHE TYPE: ICD-10-CM

## 2024-04-23 DIAGNOSIS — H53.9 VISION CHANGES: ICD-10-CM

## 2024-04-23 PROCEDURE — 70450 CT HEAD/BRAIN W/O DYE: CPT | Mod: 26,,, | Performed by: STUDENT IN AN ORGANIZED HEALTH CARE EDUCATION/TRAINING PROGRAM

## 2024-04-23 PROCEDURE — 70450 CT HEAD/BRAIN W/O DYE: CPT | Mod: TC

## 2024-04-30 ENCOUNTER — LAB VISIT (OUTPATIENT)
Dept: LAB | Facility: HOSPITAL | Age: 49
End: 2024-04-30
Payer: COMMERCIAL

## 2024-04-30 DIAGNOSIS — Z13.220 SCREENING FOR HYPERLIPIDEMIA: ICD-10-CM

## 2024-04-30 DIAGNOSIS — E11.69 TYPE 2 DIABETES MELLITUS WITH OTHER SPECIFIED COMPLICATION, WITHOUT LONG-TERM CURRENT USE OF INSULIN: ICD-10-CM

## 2024-04-30 DIAGNOSIS — N95.1 MENOPAUSAL SYMPTOMS: ICD-10-CM

## 2024-04-30 LAB
BASOPHILS # BLD AUTO: 0.02 K/UL (ref 0–0.2)
BASOPHILS NFR BLD: 0.5 % (ref 0–1.9)
CHOLEST SERPL-MCNC: 164 MG/DL (ref 120–199)
CHOLEST/HDLC SERPL: 3.5 {RATIO} (ref 2–5)
DIFFERENTIAL METHOD BLD: ABNORMAL
EOSINOPHIL # BLD AUTO: 0.1 K/UL (ref 0–0.5)
EOSINOPHIL NFR BLD: 1.9 % (ref 0–8)
ERYTHROCYTE [DISTWIDTH] IN BLOOD BY AUTOMATED COUNT: 12.5 % (ref 11.5–14.5)
ESTIMATED AVG GLUCOSE: 94 MG/DL (ref 68–131)
ESTRADIOL SERPL-MCNC: 49 PG/ML
HBA1C MFR BLD: 4.9 % (ref 4–5.6)
HCT VFR BLD AUTO: 45.3 % (ref 37–48.5)
HDLC SERPL-MCNC: 47 MG/DL (ref 40–75)
HDLC SERPL: 28.7 % (ref 20–50)
HGB BLD-MCNC: 14.9 G/DL (ref 12–16)
IMM GRANULOCYTES # BLD AUTO: 0.01 K/UL (ref 0–0.04)
IMM GRANULOCYTES NFR BLD AUTO: 0.2 % (ref 0–0.5)
LDLC SERPL CALC-MCNC: 107.6 MG/DL (ref 63–159)
LYMPHOCYTES # BLD AUTO: 1.8 K/UL (ref 1–4.8)
LYMPHOCYTES NFR BLD: 43.2 % (ref 18–48)
MCH RBC QN AUTO: 30.3 PG (ref 27–31)
MCHC RBC AUTO-ENTMCNC: 32.9 G/DL (ref 32–36)
MCV RBC AUTO: 92 FL (ref 82–98)
MONOCYTES # BLD AUTO: 0.2 K/UL (ref 0.3–1)
MONOCYTES NFR BLD: 5 % (ref 4–15)
NEUTROPHILS # BLD AUTO: 2.1 K/UL (ref 1.8–7.7)
NEUTROPHILS NFR BLD: 49.2 % (ref 38–73)
NONHDLC SERPL-MCNC: 117 MG/DL
NRBC BLD-RTO: 0 /100 WBC
PLATELET # BLD AUTO: 151 K/UL (ref 150–450)
PMV BLD AUTO: 10.8 FL (ref 9.2–12.9)
RBC # BLD AUTO: 4.91 M/UL (ref 4–5.4)
TESTOST SERPL-MCNC: 310 NG/DL (ref 5–73)
TRIGL SERPL-MCNC: 47 MG/DL (ref 30–150)
WBC # BLD AUTO: 4.17 K/UL (ref 3.9–12.7)

## 2024-04-30 PROCEDURE — 84402 ASSAY OF FREE TESTOSTERONE: CPT | Performed by: PHYSICIAN ASSISTANT

## 2024-04-30 PROCEDURE — 83036 HEMOGLOBIN GLYCOSYLATED A1C: CPT | Performed by: PHYSICIAN ASSISTANT

## 2024-04-30 PROCEDURE — 84403 ASSAY OF TOTAL TESTOSTERONE: CPT | Performed by: PHYSICIAN ASSISTANT

## 2024-04-30 PROCEDURE — 82670 ASSAY OF TOTAL ESTRADIOL: CPT | Performed by: PHYSICIAN ASSISTANT

## 2024-04-30 PROCEDURE — 85025 COMPLETE CBC W/AUTO DIFF WBC: CPT | Performed by: PHYSICIAN ASSISTANT

## 2024-04-30 PROCEDURE — 80061 LIPID PANEL: CPT | Performed by: PHYSICIAN ASSISTANT

## 2024-05-02 LAB — TESTOST FREE SERPL-MCNC: 5.2 PG/ML

## 2024-05-06 DIAGNOSIS — L40.0 PSORIASIS VULGARIS: ICD-10-CM

## 2024-05-06 RX ORDER — BETAMETHASONE DIPROPIONATE 0.5 MG/G
CREAM TOPICAL
Qty: 50 G | Refills: 1 | Status: SHIPPED | OUTPATIENT
Start: 2024-05-06

## 2024-05-09 ENCOUNTER — PATIENT MESSAGE (OUTPATIENT)
Dept: SPORTS MEDICINE | Facility: CLINIC | Age: 49
End: 2024-05-09
Payer: COMMERCIAL

## 2024-05-14 ENCOUNTER — CLINICAL SUPPORT (OUTPATIENT)
Dept: OBSTETRICS AND GYNECOLOGY | Facility: CLINIC | Age: 49
End: 2024-05-14
Payer: COMMERCIAL

## 2024-05-14 DIAGNOSIS — N95.1 MENOPAUSAL SYMPTOMS: Primary | ICD-10-CM

## 2024-05-14 PROCEDURE — 99999 PR PBB SHADOW E&M-EST. PATIENT-LVL I: CPT | Mod: PBBFAC,,,

## 2024-05-14 PROCEDURE — 96372 THER/PROPH/DIAG INJ SC/IM: CPT | Mod: S$GLB,,, | Performed by: PHYSICIAN ASSISTANT

## 2024-05-14 RX ADMIN — TESTOSTERONE CYPIONATE 50 MG: 200 INJECTION, SOLUTION INTRAMUSCULAR at 03:05

## 2024-05-14 NOTE — PROGRESS NOTES
Here for hormone therapy injection, no complaints at this time, Injection given as ordered, tolerated well, no report of pain prior to or after injection. Return to clinic as scheduled.     Site - RB    Testosterone  50  mg    Patient denies any side effects    Clinic Supplied Medication

## 2024-05-20 ENCOUNTER — PATIENT MESSAGE (OUTPATIENT)
Dept: OBSTETRICS AND GYNECOLOGY | Facility: CLINIC | Age: 49
End: 2024-05-20
Payer: COMMERCIAL

## 2024-05-20 DIAGNOSIS — E11.69 TYPE 2 DIABETES MELLITUS WITH OTHER SPECIFIED COMPLICATION, WITHOUT LONG-TERM CURRENT USE OF INSULIN: Primary | ICD-10-CM

## 2024-05-21 RX ORDER — SEMAGLUTIDE 1.34 MG/ML
1 INJECTION, SOLUTION SUBCUTANEOUS
Qty: 3 ML | Refills: 5 | Status: SHIPPED | OUTPATIENT
Start: 2024-05-21 | End: 2025-05-21

## 2024-06-11 ENCOUNTER — CLINICAL SUPPORT (OUTPATIENT)
Dept: OBSTETRICS AND GYNECOLOGY | Facility: CLINIC | Age: 49
End: 2024-06-11
Payer: COMMERCIAL

## 2024-06-11 DIAGNOSIS — N95.1 MENOPAUSAL SYMPTOMS: Primary | ICD-10-CM

## 2024-06-11 PROCEDURE — 96372 THER/PROPH/DIAG INJ SC/IM: CPT | Mod: S$GLB,,, | Performed by: PHYSICIAN ASSISTANT

## 2024-06-11 PROCEDURE — 99999 PR PBB SHADOW E&M-EST. PATIENT-LVL I: CPT | Mod: PBBFAC,,,

## 2024-06-11 RX ADMIN — TESTOSTERONE CYPIONATE 50 MG: 200 INJECTION, SOLUTION INTRAMUSCULAR at 02:06

## 2024-06-11 NOTE — PROGRESS NOTES
Here for hormone therapy injection, no complaints at this time, Injection given as ordered, tolerated well, no report of pain prior to or after injection. Return to clinic as scheduled.     Site - LB    Testosterone  50 mg    Patient does not need to stop hormones before or after surgery on 7/23/2024 for meniscus surgery. Okayed kyle GALAN     Clinic Supplied Medication

## 2024-06-12 DIAGNOSIS — M22.41 CHONDROMALACIA OF RIGHT PATELLA: ICD-10-CM

## 2024-06-12 DIAGNOSIS — M23.203 OLD TEAR OF MEDIAL MENISCUS OF RIGHT KNEE, UNSPECIFIED TEAR TYPE: Primary | ICD-10-CM

## 2024-07-04 ENCOUNTER — PATIENT MESSAGE (OUTPATIENT)
Dept: ADMINISTRATIVE | Facility: OTHER | Age: 49
End: 2024-07-04
Payer: COMMERCIAL

## 2024-07-09 ENCOUNTER — PATIENT MESSAGE (OUTPATIENT)
Dept: SPORTS MEDICINE | Facility: CLINIC | Age: 49
End: 2024-07-09
Payer: COMMERCIAL

## 2024-07-12 ENCOUNTER — CLINICAL SUPPORT (OUTPATIENT)
Dept: OBSTETRICS AND GYNECOLOGY | Facility: CLINIC | Age: 49
End: 2024-07-12
Payer: COMMERCIAL

## 2024-07-12 DIAGNOSIS — N95.1 MENOPAUSAL SYMPTOMS: Primary | ICD-10-CM

## 2024-07-12 PROCEDURE — 99999 PR PBB SHADOW E&M-EST. PATIENT-LVL I: CPT | Mod: PBBFAC,,,

## 2024-07-12 RX ADMIN — TESTOSTERONE CYPIONATE 50 MG: 200 INJECTION, SOLUTION INTRAMUSCULAR at 11:07

## 2024-07-12 NOTE — PROGRESS NOTES
Here for hormone therapy injection, no complaints at this time, Injection given as ordered, tolerated well, no report of pain prior to or after injection. Return to clinic as scheduled.     Site -RB    Testosterone 50 mg      Clinic Supplied Medication

## 2024-07-15 ENCOUNTER — OFFICE VISIT (OUTPATIENT)
Dept: NEUROLOGY | Facility: CLINIC | Age: 49
End: 2024-07-15
Payer: COMMERCIAL

## 2024-07-15 VITALS
DIASTOLIC BLOOD PRESSURE: 79 MMHG | HEART RATE: 84 BPM | WEIGHT: 219.94 LBS | SYSTOLIC BLOOD PRESSURE: 121 MMHG | BODY MASS INDEX: 36.59 KG/M2

## 2024-07-15 DIAGNOSIS — H53.9 VISION CHANGES: ICD-10-CM

## 2024-07-15 DIAGNOSIS — G43.019 MIGRAINE WITHOUT AURA, INTRACTABLE, WITHOUT STATUS MIGRAINOSUS: Primary | ICD-10-CM

## 2024-07-15 DIAGNOSIS — G89.29 CHRONIC INTRACTABLE HEADACHE, UNSPECIFIED HEADACHE TYPE: ICD-10-CM

## 2024-07-15 DIAGNOSIS — R51.9 CHRONIC INTRACTABLE HEADACHE, UNSPECIFIED HEADACHE TYPE: ICD-10-CM

## 2024-07-15 DIAGNOSIS — E66.01 SEVERE OBESITY (BMI 35.0-39.9) WITH COMORBIDITY: ICD-10-CM

## 2024-07-15 PROCEDURE — 1159F MED LIST DOCD IN RCRD: CPT | Mod: CPTII,S$GLB,, | Performed by: NURSE PRACTITIONER

## 2024-07-15 PROCEDURE — G2211 COMPLEX E/M VISIT ADD ON: HCPCS | Mod: S$GLB,,, | Performed by: NURSE PRACTITIONER

## 2024-07-15 PROCEDURE — 3078F DIAST BP <80 MM HG: CPT | Mod: CPTII,S$GLB,, | Performed by: NURSE PRACTITIONER

## 2024-07-15 PROCEDURE — 99999 PR PBB SHADOW E&M-EST. PATIENT-LVL V: CPT | Mod: PBBFAC,,, | Performed by: NURSE PRACTITIONER

## 2024-07-15 PROCEDURE — 1160F RVW MEDS BY RX/DR IN RCRD: CPT | Mod: CPTII,S$GLB,, | Performed by: NURSE PRACTITIONER

## 2024-07-15 PROCEDURE — 3044F HG A1C LEVEL LT 7.0%: CPT | Mod: CPTII,S$GLB,, | Performed by: NURSE PRACTITIONER

## 2024-07-15 PROCEDURE — 3008F BODY MASS INDEX DOCD: CPT | Mod: CPTII,S$GLB,, | Performed by: NURSE PRACTITIONER

## 2024-07-15 PROCEDURE — 99204 OFFICE O/P NEW MOD 45 MIN: CPT | Mod: S$GLB,,, | Performed by: NURSE PRACTITIONER

## 2024-07-15 PROCEDURE — 3074F SYST BP LT 130 MM HG: CPT | Mod: CPTII,S$GLB,, | Performed by: NURSE PRACTITIONER

## 2024-07-15 RX ORDER — RIZATRIPTAN BENZOATE 10 MG/1
10 TABLET, ORALLY DISINTEGRATING ORAL
Qty: 12 TABLET | Refills: 5 | Status: SHIPPED | OUTPATIENT
Start: 2024-07-15

## 2024-07-15 NOTE — PROGRESS NOTES
NEW PATIENT CONSULT  SUBJECTIVE:  Patient ID: Lachelle Engel   MRN: [id number]  Referred By: Trixie Cedeno  Chief Complaint: Consult    History of Present Illness:   48 y.o. female with obesity, GERD, migraines, psoriatric arthritis, who presents to clinic alone for evaluation of headaches.  Long history of migraines in her 20's which were largely stable for 10 or so years, however migraines began to worsen/increase in frequency around 9 months ago and have continued to gradually worsen since.    Tingling in her face, zig zags in her vision followed by the onset of headache pain.  Currently suffering with headaches once per week each of which last hours up to multiple days in duration.  Migraines feel very similar to the migraines she has had in the past.  Currently suffering with 15-20 headache days.  Headaches are associated with photophobia, phonophobia, mild nausea.  Headaches can be associated with visual changes, was seen recently by optometrist, was told eye exam was unremarkable.      Treatments Tried and Response  Amitriptyline   Celexa   Cymbalta   Topiramate -   Emgality - given today   Maxalt 10 mg mlt - given today     Current Medications:    augmented betamethasone dipropionate (DIPROLENE-AF) 0.05 % cream, APPLY TO AFFECTED AREAS OF BODY TWICE DAILY AS NEEDED PSORIASIS, Disp: 50 g, Rfl: 1    betamethasone valerate 0.1% (VALISONE) 0.1 % Lotn, Apply to affected areas of scalp/ears BID prn psoriasis., Disp: 60 mL, Rfl: 3    estradioL (IMVEXXY MAINTENANCE PACK) 10 mcg Inst, Place 10 mcg vaginally twice a week., Disp: 8 each, Rfl: 11    estradioL (VIVELLE-DOT) 0.1 mg/24 hr PTSW, Place 1 patch onto the skin twice a week., Disp: 24 patch, Rfl: 3    ferrous sulfate 325 (65 FE) MG EC tablet, Take 1 tablet (325 mg total) by mouth once daily., Disp: 90 tablet, Rfl: 1    folic acid (FOLVITE) 1 MG tablet, Take 1 mg by mouth once daily., Disp: , Rfl:     mupirocin (BACTROBAN) 2 % ointment, Apply  topically 3 (three) times daily., Disp: 1 Tube, Rfl: 2    naproxen (NAPROSYN) 500 MG tablet, Take 1 tablet (500 mg total) by mouth 2 (two) times daily as needed (Pain)., Disp: 30 tablet, Rfl: 0    neomycin-polymyxin-dexamethasone (MAXITROL) 3.5mg/mL-10,000 unit/mL-0.1 % DrpS, Place 1 drop into both eyes 4 (four) times daily. (Patient not taking: Reported on 7/19/2024), Disp: , Rfl:     ondansetron (ZOFRAN-ODT) 4 MG TbDL, Take 1 tablet (4 mg total) by mouth every 6 (six) hours as needed (Nausea)., Disp: 30 tablet, Rfl: 0    progesterone (PROMETRIUM) 200 MG capsule, Take 1 capsule (200 mg total) by mouth every evening., Disp: 90 capsule, Rfl: 3    risankizumab-rzaa (SKYRIZI) 150 mg/mL PnIj, Inject 150 mg into the skin every 12 weeks., Disp: 1 mL, Rfl: 1    semaglutide (OZEMPIC) 1 mg/dose (4 mg/3 mL), Inject 1 mg into the skin every 7 days. (Patient not taking: Reported on 7/19/2024), Disp: 3 mL, Rfl: 5    tirzepatide 12.5 mg/0.5 mL PnIj, Inject 12.5 mg into the skin every 7 days., Disp: 4 Pen, Rfl: 3    valACYclovir (VALTREX) 500 MG tablet, TAKE 1 TABLET BY MOUTH TWICE DAILY FOR 3 DAYS. BEGIN AT FIRST SIGN OF OUTBREAK, Disp: 6 tablet, Rfl: 4    butalbital-acetaminophen-caffeine -40 mg (FIORICET, ESGIC) -40 mg per tablet, Take 1 tablet by mouth every 4 (four) hours as needed., Disp: , Rfl:     galcanezumab-gnlm 120 mg/mL PnIj, Inject 1 mL (120 mg total) into the skin every 28 days. Begin 28 days after loading dose., Disp: 1 mL, Rfl: 10    galcanezumab-gnlm 120 mg/mL PnIj, Inject 2 mLs (240 mg total) into the skin once. for 1 dose (Patient not taking: Reported on 7/19/2024), Disp: 2 mL, Rfl: 0    meloxicam (MOBIC) 7.5 MG tablet, Take 1 tablet by mouth twice a day with food for 5 days then as needed., Disp: , Rfl:     rizatriptan (MAXALT-MLT) 10 MG disintegrating tablet, Take 1 tablet (10 mg total) by mouth every 2 (two) hours as needed for Migraine. Max 30 mg/day., Disp: 12 tablet, Rfl: 5    Current  Facility-Administered Medications:     testosterone cypionate injection 50 mg, 50 mg, Intramuscular, Q28 Days, Anastasia Morrell PA-C, 50 mg at 07/12/24 1100    Review of Systems - A review of 10+ systems was conducted with pertinent positive and negative findings documented in HPI with all other systems reviewed and negative.    PFSH: Past medical, family, and social history reviewed as documented in chart with pertinent positive medical, family, and social history detailed in HPI.    OBJECTIVE:  Vitals:  /79   Pulse 84   Wt 99.7 kg (219 lb 14.5 oz)   BMI 36.59 kg/m²      Physical Exam:  Constitutional: she appears well-developed and well-nourished. she is well groomed. NAD    Review of Data:   Notes from internal medicine, bariatric medicine reviewed   Labs:  Lab Visit on 04/30/2024   Component Date Value Ref Range Status    Cholesterol 04/30/2024 164  120 - 199 mg/dL Final    Triglycerides 04/30/2024 47  30 - 150 mg/dL Final    HDL 04/30/2024 47  40 - 75 mg/dL Final    LDL Cholesterol 04/30/2024 107.6  63.0 - 159.0 mg/dL Final    HDL/Cholesterol Ratio 04/30/2024 28.7  20.0 - 50.0 % Final    Total Cholesterol/HDL Ratio 04/30/2024 3.5  2.0 - 5.0 Final    Non-HDL Cholesterol 04/30/2024 117  mg/dL Final    WBC 04/30/2024 4.17  3.90 - 12.70 K/uL Final    RBC 04/30/2024 4.91  4.00 - 5.40 M/uL Final    Hemoglobin 04/30/2024 14.9  12.0 - 16.0 g/dL Final    Hematocrit 04/30/2024 45.3  37.0 - 48.5 % Final    MCV 04/30/2024 92  82 - 98 fL Final    MCH 04/30/2024 30.3  27.0 - 31.0 pg Final    MCHC 04/30/2024 32.9  32.0 - 36.0 g/dL Final    RDW 04/30/2024 12.5  11.5 - 14.5 % Final    Platelets 04/30/2024 151  150 - 450 K/uL Final    MPV 04/30/2024 10.8  9.2 - 12.9 fL Final    Immature Granulocytes 04/30/2024 0.2  0.0 - 0.5 % Final    Gran # (ANC) 04/30/2024 2.1  1.8 - 7.7 K/uL Final    Immature Grans (Abs) 04/30/2024 0.01  0.00 - 0.04 K/uL Final    Lymph # 04/30/2024 1.8  1.0 - 4.8 K/uL Final    Mono # 04/30/2024 0.2  (L)  0.3 - 1.0 K/uL Final    Eos # 04/30/2024 0.1  0.0 - 0.5 K/uL Final    Baso # 04/30/2024 0.02  0.00 - 0.20 K/uL Final    nRBC 04/30/2024 0  0 /100 WBC Final    Gran % 04/30/2024 49.2  38.0 - 73.0 % Final    Lymph % 04/30/2024 43.2  18.0 - 48.0 % Final    Mono % 04/30/2024 5.0  4.0 - 15.0 % Final    Eosinophil % 04/30/2024 1.9  0.0 - 8.0 % Final    Basophil % 04/30/2024 0.5  0.0 - 1.9 % Final    Differential Method 04/30/2024 Automated   Final    Estradiol 04/30/2024 49  See Text pg/mL Final    Testosterone, Free 04/30/2024 5.2  pg/mL Final    Testosterone, Total 04/30/2024 310 (H)  5 - 73 ng/dL Final    Hemoglobin A1C 04/30/2024 4.9  4.0 - 5.6 % Final    Estimated Avg Glucose 04/30/2024 94  68 - 131 mg/dL Final   Lab Visit on 04/17/2024   Component Date Value Ref Range Status    Sodium 04/17/2024 141  136 - 145 mmol/L Final    Potassium 04/17/2024 4.4  3.5 - 5.1 mmol/L Final    Chloride 04/17/2024 106  95 - 110 mmol/L Final    CO2 04/17/2024 26  23 - 29 mmol/L Final    Glucose 04/17/2024 76  70 - 110 mg/dL Final    BUN 04/17/2024 10  6 - 20 mg/dL Final    Creatinine 04/17/2024 1.0  0.5 - 1.4 mg/dL Final    Calcium 04/17/2024 10.0  8.7 - 10.5 mg/dL Final    Total Protein 04/17/2024 7.0  6.0 - 8.4 g/dL Final    Albumin 04/17/2024 4.1  3.5 - 5.2 g/dL Final    Total Bilirubin 04/17/2024 0.4  0.1 - 1.0 mg/dL Final    Alkaline Phosphatase 04/17/2024 93  55 - 135 U/L Final    AST 04/17/2024 21  10 - 40 U/L Final    ALT 04/17/2024 26  10 - 44 U/L Final    eGFR 04/17/2024 >60.0  >60 mL/min/1.73 m^2 Final    Anion Gap 04/17/2024 9  8 - 16 mmol/L Final    Iron 04/17/2024 63  30 - 160 ug/dL Final    Transferrin 04/17/2024 265  200 - 375 mg/dL Final    TIBC 04/17/2024 392  250 - 450 ug/dL Final    Saturated Iron 04/17/2024 16 (L)  20 - 50 % Final    Magnesium 04/17/2024 2.0  1.6 - 2.6 mg/dL Final    Ferritin 04/17/2024 112  20.0 - 300.0 ng/mL Final     Imaging:  Results for orders placed or performed during the hospital  encounter of 04/23/24   CT Head Without Contrast    Narrative    EXAMINATION:  CT HEAD WITHOUT CONTRAST    CLINICAL HISTORY:  Headache, chronic, new features or increased frequency;Headache, new or worsening, neuro deficit (Age 19-49y); Headache, unspecified    TECHNIQUE:  Low dose axial CT images obtained throughout the head without intravenous contrast. Sagittal and coronal reconstructions were performed.    COMPARISON:  None.    FINDINGS:  Intracranial compartment:    Ventricles and sulci are normal in size for age without evidence of hydrocephalus. No extra-axial blood or fluid collections.    The brain parenchyma appears normal. No parenchymal mass, hemorrhage, edema or major vascular distribution infarct.    Skull/extracranial contents (limited evaluation): No fracture. Mastoid air cells and paranasal sinuses are essentially clear.      Impression    No acute intracranial pathology.      Electronically signed by: Shon Morales  Date:    04/23/2024  Time:    16:08     Note: I have independently reviewed any/all imaging/labs/tests and agree with the report (s) as documented.  Any discrepancies will be as noted/demarcated by free text.  ECBRISEYDA, NIKHIL-C 7/15/2024    ASSESSMENT:  1. Migraine without aura, intractable, without status migrainosus    2. Chronic intractable headache, unspecified headache type    3. Vision changes    4. Severe obesity (BMI 35.0-39.9) with comorbidity      PLAN:  - Discussed symptoms appear to be consistent with migraine with and without visual aura, discussed treatment options and patient agreed with the following plan:  - For migraine prevention - Start Emgality 240 mg SQ loading dose followed by 120 mg SQ monthly injections.   - For acute migraines - maxalt 10 mg mlt   - Has fioricet available for rescue  - Has zofran 4 mg odt available for nausea    - Start tracking headaches via Migraine Hector robert on phone   - Risks, benefits, and potential side effects of emgality, maxalt discussed  -  Alternative treatment options offered   - Obesity - has lost nearly 15 pounds since January 2024, continue regular f/up with bariatric medicine for management/monitoring   - RTC in 2-3 months or sooner if needed     Orders Placed This Encounter    galcanezumab-gnlm 120 mg/mL PnIj    galcanezumab-gnlm 120 mg/mL PnIj    rizatriptan (MAXALT-MLT) 10 MG disintegrating tablet     Questions and concerns were sought and answered to the patient's stated verbal satisfaction.  The patient verbalizes understanding and agreement with the above stated treatment plan.     Visit today included increased complexity associated with the care of the episodic problem migraine with aura addressed and managing the longitudinal care of the patient due to the serious and/or complex managed problem(s).   Plan for patient to return to clinic in 3 months for follow-up visit.     CC: No, Primary Doctor      Norma Wright, FNP-C  [facility]  [phone]    Dr. Quintero was available during today's encounter.

## 2024-07-19 ENCOUNTER — LAB VISIT (OUTPATIENT)
Dept: LAB | Facility: HOSPITAL | Age: 49
End: 2024-07-19
Payer: COMMERCIAL

## 2024-07-19 ENCOUNTER — OFFICE VISIT (OUTPATIENT)
Dept: INTERNAL MEDICINE | Facility: CLINIC | Age: 49
End: 2024-07-19
Payer: COMMERCIAL

## 2024-07-19 VITALS
HEIGHT: 65 IN | WEIGHT: 216.06 LBS | HEART RATE: 75 BPM | OXYGEN SATURATION: 98 % | BODY MASS INDEX: 36 KG/M2 | SYSTOLIC BLOOD PRESSURE: 106 MMHG | DIASTOLIC BLOOD PRESSURE: 72 MMHG

## 2024-07-19 DIAGNOSIS — L40.50 PSORIATIC ARTHRITIS: ICD-10-CM

## 2024-07-19 DIAGNOSIS — Z86.39 HISTORY OF HYPOTHYROIDISM: ICD-10-CM

## 2024-07-19 DIAGNOSIS — R41.840 CONCENTRATION DEFICIT: Primary | ICD-10-CM

## 2024-07-19 LAB
T4 FREE SERPL-MCNC: 0.92 NG/DL (ref 0.71–1.51)
TSH SERPL DL<=0.005 MIU/L-ACNC: 0.08 UIU/ML (ref 0.4–4)

## 2024-07-19 PROCEDURE — 36415 COLL VENOUS BLD VENIPUNCTURE: CPT | Performed by: NURSE PRACTITIONER

## 2024-07-19 PROCEDURE — 84439 ASSAY OF FREE THYROXINE: CPT | Performed by: NURSE PRACTITIONER

## 2024-07-19 PROCEDURE — 99214 OFFICE O/P EST MOD 30 MIN: CPT | Mod: S$GLB,,, | Performed by: NURSE PRACTITIONER

## 2024-07-19 PROCEDURE — 3074F SYST BP LT 130 MM HG: CPT | Mod: CPTII,S$GLB,, | Performed by: NURSE PRACTITIONER

## 2024-07-19 PROCEDURE — 3008F BODY MASS INDEX DOCD: CPT | Mod: CPTII,S$GLB,, | Performed by: NURSE PRACTITIONER

## 2024-07-19 PROCEDURE — 99999 PR PBB SHADOW E&M-EST. PATIENT-LVL V: CPT | Mod: PBBFAC,,, | Performed by: NURSE PRACTITIONER

## 2024-07-19 PROCEDURE — 3044F HG A1C LEVEL LT 7.0%: CPT | Mod: CPTII,S$GLB,, | Performed by: NURSE PRACTITIONER

## 2024-07-19 PROCEDURE — 1159F MED LIST DOCD IN RCRD: CPT | Mod: CPTII,S$GLB,, | Performed by: NURSE PRACTITIONER

## 2024-07-19 PROCEDURE — 84443 ASSAY THYROID STIM HORMONE: CPT | Performed by: NURSE PRACTITIONER

## 2024-07-19 PROCEDURE — 3078F DIAST BP <80 MM HG: CPT | Mod: CPTII,S$GLB,, | Performed by: NURSE PRACTITIONER

## 2024-07-19 RX ORDER — BUTALBITAL, ACETAMINOPHEN AND CAFFEINE 50; 325; 40 MG/1; MG/1; MG/1
1 TABLET ORAL EVERY 4 HOURS PRN
COMMUNITY
Start: 2024-04-17

## 2024-07-19 RX ORDER — MELOXICAM 7.5 MG/1
TABLET ORAL
COMMUNITY
Start: 2024-07-03

## 2024-07-19 NOTE — PROGRESS NOTES
INTERNAL MEDICINE PROGRESS/URGENT CARE NOTE    CHIEF COMPLAINT     Chief Complaint   Patient presents with    Referral     Psych        HPI     Lachelle Engel is a 48 y.o. female who presents for a follow up visit today.    States for most of her life she has had issues with concentration and not finishing thinks she starts.  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  Her prior employer was very structured so didn't feel like she struggled much but just started a new job and not as structured as her prior one. Would like to be tested for ADHD.    Of note she does have a history of hypothyroidism.  Was on Synthroid but she stopped it over a year ago. + fatigue and weight gain.  Was on Ozempic and majora but insurance would no longer cover.    Use to see rheumatology for psoriatic arthritis.  But has since switched her care to derm.  On kevin Fransisca now and doing much better.  Off the methotrexate        Past Medical History:  Past Medical History:   Diagnosis Date    Abnormal Pap smear of cervix     Allergy     Angio-edema     Bradycardia     Depression     Diabetes mellitus     type 2     Dizziness     GERD (gastroesophageal reflux disease)     Hypertension     Impaired fasting blood sugar     Joint pain     Psoriasis     Recurrent upper respiratory infection (URI)     Skin disease     Thyroid disease     hyothyroidism        Past Surgical History:  Past Surgical History:   Procedure Laterality Date    APPENDECTOMY       SECTION      CHOLECYSTECTOMY      COLONOSCOPY N/A 2023    Procedure: COLONOSCOPY;  Surgeon: Piter Hearn MD;  Location: [facility];  Service: Endoscopy;  Laterality: N/A;    CRYOTHERAPY      ENDOMETRIAL ABLATION      ESOPHAGOGASTRODUODENOSCOPY N/A 2020    Procedure: EGD (ESOPHAGOGASTRODUODENOSCOPY);  Surgeon: Venkat Be MD;  Location: [facility] ([address] [place] [address]);  Service: Endoscopy;  Laterality: N/A;  rice or smith ok      INCONTINENCE SURGERY      TUBAL  LIGATION          Allergies:  Review of patient's allergies indicates:  No Known Allergies    Home Medications:    Current Outpatient Medications:     augmented betamethasone dipropionate (DIPROLENE-AF) 0.05 % cream, APPLY TO AFFECTED AREAS OF BODY TWICE DAILY AS NEEDED PSORIASIS, Disp: 50 g, Rfl: 1    betamethasone valerate 0.1% (VALISONE) 0.1 % Lotn, Apply to affected areas of scalp/ears BID prn psoriasis., Disp: 60 mL, Rfl: 3    butalbital-acetaminophen-caffeine -40 mg (FIORICET, ESGIC) -40 mg per tablet, Take 1 tablet by mouth every 4 (four) hours as needed., Disp: , Rfl:     estradioL (IMVEXXY MAINTENANCE PACK) 10 mcg Inst, Place 10 mcg vaginally twice a week., Disp: 8 each, Rfl: 11    estradioL (VIVELLE-DOT) 0.1 mg/24 hr PTSW, Place 1 patch onto the skin twice a week., Disp: 24 patch, Rfl: 3    ferrous sulfate 325 (65 FE) MG EC tablet, Take 1 tablet (325 mg total) by mouth once daily., Disp: 90 tablet, Rfl: 1    folic acid (FOLVITE) 1 MG tablet, Take 1 mg by mouth once daily., Disp: , Rfl:     galcanezumab-gnlm 120 mg/mL PnIj, Inject 1 mL (120 mg total) into the skin every 28 days. Begin 28 days after loading dose., Disp: 1 mL, Rfl: 10    meloxicam (MOBIC) 7.5 MG tablet, Take 1 tablet by mouth twice a day with food for 5 days then as needed., Disp: , Rfl:     mupirocin (BACTROBAN) 2 % ointment, Apply topically 3 (three) times daily., Disp: 1 Tube, Rfl: 2    naproxen (NAPROSYN) 500 MG tablet, Take 1 tablet (500 mg total) by mouth 2 (two) times daily as needed (Pain)., Disp: 30 tablet, Rfl: 0    ondansetron (ZOFRAN-ODT) 4 MG TbDL, Take 1 tablet (4 mg total) by mouth every 6 (six) hours as needed (Nausea)., Disp: 30 tablet, Rfl: 0    progesterone (PROMETRIUM) 200 MG capsule, Take 1 capsule (200 mg total) by mouth every evening., Disp: 90 capsule, Rfl: 3    risankizumab-rzaa (SKYRIZI) 150 mg/mL PnIj, Inject 150 mg into the skin every 12 weeks., Disp: 1 mL, Rfl: 1    rizatriptan (MAXALT-MLT) 10 MG  "disintegrating tablet, Take 1 tablet (10 mg total) by mouth every 2 (two) hours as needed for Migraine. Max 30 mg/day., Disp: 12 tablet, Rfl: 5    tirzepatide 12.5 mg/0.5 mL PnIj, Inject 12.5 mg into the skin every 7 days., Disp: 4 Pen, Rfl: 3    valACYclovir (VALTREX) 500 MG tablet, TAKE 1 TABLET BY MOUTH TWICE DAILY FOR 3 DAYS. BEGIN AT FIRST SIGN OF OUTBREAK, Disp: 6 tablet, Rfl: 4    galcanezumab-gnlm 120 mg/mL PnIj, Inject 2 mLs (240 mg total) into the skin once. for 1 dose (Patient not taking: Reported on 7/19/2024), Disp: 2 mL, Rfl: 0    neomycin-polymyxin-dexamethasone (MAXITROL) 3.5mg/mL-10,000 unit/mL-0.1 % DrpS, Place 1 drop into both eyes 4 (four) times daily. (Patient not taking: Reported on 7/19/2024), Disp: , Rfl:     semaglutide (OZEMPIC) 1 mg/dose (4 mg/3 mL), Inject 1 mg into the skin every 7 days. (Patient not taking: Reported on 7/19/2024), Disp: 3 mL, Rfl: 5    Current Facility-Administered Medications:     testosterone cypionate injection 50 mg, 50 mg, Intramuscular, Q28 Days, Anastasia Morrell PA-C, 50 mg at 07/12/24 1100     Review of Systems:  Review of Systems   Constitutional:  Positive for fatigue. Negative for fever.   Respiratory:  Negative for cough and shortness of breath.    Cardiovascular:  Negative for chest pain.   Gastrointestinal:  Negative for abdominal pain and vomiting.   Neurological:  Negative for weakness.   Psychiatric/Behavioral:  Positive for decreased concentration. Negative for dysphoric mood.          PHYSICAL EXAM     Vitals:    07/19/24 1539   BP: 106/72   BP Location: Left arm   Patient Position: Sitting   BP Method: Medium (Manual)   Pulse: 75   SpO2: 98%   Weight: 98 kg (216 lb 0.8 oz)   Height: 5' 5" (1.651 m)      Body mass index is 35.95 kg/m².     Physical Exam  Vitals reviewed.   Constitutional:       Appearance: Normal appearance.   HENT:      Head: Normocephalic.      Mouth/Throat:      Mouth: Mucous membranes are moist.      Pharynx: Oropharynx is clear. "   Eyes:      Conjunctiva/sclera: Conjunctivae normal.      Pupils: Pupils are equal, round, and reactive to light.   Cardiovascular:      Rate and Rhythm: Normal rate and regular rhythm.   Pulmonary:      Effort: Pulmonary effort is normal.      Breath sounds: Normal breath sounds.   Skin:     General: Skin is warm and dry.   Neurological:      Mental Status: She is alert and oriented to person, place, and time.   Psychiatric:         Mood and Affect: Mood normal.         Behavior: Behavior normal.         LABS     Lab Results   Component Value Date    HGBA1C 4.9 04/30/2024     CMP  Sodium   Date Value Ref Range Status   04/17/2024 141 136 - 145 mmol/L Final     Potassium   Date Value Ref Range Status   04/17/2024 4.4 3.5 - 5.1 mmol/L Final     Chloride   Date Value Ref Range Status   04/17/2024 106 95 - 110 mmol/L Final     CO2   Date Value Ref Range Status   04/17/2024 26 23 - 29 mmol/L Final     Glucose   Date Value Ref Range Status   04/17/2024 76 70 - 110 mg/dL Final     BUN   Date Value Ref Range Status   04/17/2024 10 6 - 20 mg/dL Final     Creatinine   Date Value Ref Range Status   04/17/2024 1.0 0.5 - 1.4 mg/dL Final     Calcium   Date Value Ref Range Status   04/17/2024 10.0 8.7 - 10.5 mg/dL Final     Total Protein   Date Value Ref Range Status   04/17/2024 7.0 6.0 - 8.4 g/dL Final     Albumin   Date Value Ref Range Status   04/17/2024 4.1 3.5 - 5.2 g/dL Final     Total Bilirubin   Date Value Ref Range Status   04/17/2024 0.4 0.1 - 1.0 mg/dL Final     Comment:     For infants and newborns, interpretation of results should be based  on gestational age, weight and in agreement with clinical  observations.    Premature Infant recommended reference ranges:  Up to 24 hours.............<8.0 mg/dL  Up to 48 hours............<12.0 mg/dL  3-5 days..................<15.0 mg/dL  6-29 days.................<15.0 mg/dL       Alkaline Phosphatase   Date Value Ref Range Status   04/17/2024 93 55 - 135 U/L Final     AST    Date Value Ref Range Status   04/17/2024 21 10 - 40 U/L Final     ALT   Date Value Ref Range Status   04/17/2024 26 10 - 44 U/L Final     Anion Gap   Date Value Ref Range Status   04/17/2024 9 8 - 16 mmol/L Final     eGFR if    Date Value Ref Range Status   05/16/2022 >60.0 >60 mL/min/1.73 m^2 Final     eGFR if non    Date Value Ref Range Status   05/16/2022 >60.0 >60 mL/min/1.73 m^2 Final     Comment:     Calculation used to obtain the estimated glomerular filtration  rate (eGFR) is the CKD-EPI equation.        Lab Results   Component Value Date    WBC 4.17 04/30/2024    HGB 14.9 04/30/2024    HCT 45.3 04/30/2024    MCV 92 04/30/2024     04/30/2024     Lab Results   Component Value Date    CHOL 164 04/30/2024    CHOL 176 11/07/2020    CHOL 159 07/17/2012     Lab Results   Component Value Date    HDL 47 04/30/2024    HDL 57 11/07/2020    HDL 50 07/17/2012     Lab Results   Component Value Date    LDLCALC 107.6 04/30/2024    LDLCALC 102.0 11/07/2020    LDLCALC 89.0 07/17/2012     Lab Results   Component Value Date    TRIG 47 04/30/2024    TRIG 85 11/07/2020    TRIG 100 07/17/2012     Lab Results   Component Value Date    CHOLHDL 28.7 04/30/2024    CHOLHDL 32.4 11/07/2020    CHOLHDL 31.4 07/17/2012     Lab Results   Component Value Date    TSH 2.116 11/07/2020       ASSESSMENT     1. Concentration deficit    2. History of hypothyroidism    3. Psoriatic arthritis           PLAN  Placed referral to behavioral health for testing.  We will also check her TSH to make sure this isn't contributing to her symptoms.  She keeps follow up with her dermatologist regarding her arthritis which has been under good control.  Continue current meds    She will establish care with a doctor who is accepting patients    1. Concentration deficit  - Ambulatory referral/consult to Behavioral Health; Future    2. History of hypothyroidism  - TSH; Future  - T4, FREE; Future    3. Psoriatic arthritis        Patient's plan/treatment was discussed including medications and possible side effects. Verbalized understanding of all instructions.          DANII Glover  Department of Internal Medicine - [facility]  07/19/2024

## 2024-07-22 ENCOUNTER — TELEPHONE (OUTPATIENT)
Dept: INTERNAL MEDICINE | Facility: CLINIC | Age: 49
End: 2024-07-22
Payer: COMMERCIAL

## 2024-07-22 DIAGNOSIS — E05.90 HYPERTHYROIDISM: Primary | ICD-10-CM

## 2024-07-22 PROBLEM — E66.01 SEVERE OBESITY (BMI 35.0-39.9) WITH COMORBIDITY: Status: ACTIVE | Noted: 2024-07-22

## 2024-07-26 DIAGNOSIS — M23.306 DEGENERATIVE TEAR OF MENISCUS OF RIGHT KNEE: Primary | ICD-10-CM

## 2024-08-06 ENCOUNTER — CLINICAL SUPPORT (OUTPATIENT)
Dept: REHABILITATION | Facility: HOSPITAL | Age: 49
End: 2024-08-06
Payer: COMMERCIAL

## 2024-08-06 DIAGNOSIS — R29.898 WEAKNESS OF RIGHT LOWER EXTREMITY: ICD-10-CM

## 2024-08-06 DIAGNOSIS — M25.661 DECREASED RANGE OF MOTION (ROM) OF RIGHT KNEE: Primary | ICD-10-CM

## 2024-08-06 PROCEDURE — 97161 PT EVAL LOW COMPLEX 20 MIN: CPT

## 2024-08-06 PROCEDURE — 97110 THERAPEUTIC EXERCISES: CPT

## 2024-08-09 ENCOUNTER — CLINICAL SUPPORT (OUTPATIENT)
Dept: OBSTETRICS AND GYNECOLOGY | Facility: CLINIC | Age: 49
End: 2024-08-09
Payer: COMMERCIAL

## 2024-08-09 DIAGNOSIS — N95.1 MENOPAUSAL SYMPTOMS: Primary | ICD-10-CM

## 2024-08-09 PROCEDURE — 99999 PR PBB SHADOW E&M-EST. PATIENT-LVL I: CPT | Mod: PBBFAC,,,

## 2024-08-09 RX ADMIN — TESTOSTERONE CYPIONATE 50 MG: 200 INJECTION, SOLUTION INTRAMUSCULAR at 03:08

## 2024-08-14 ENCOUNTER — TELEPHONE (OUTPATIENT)
Dept: BARIATRICS | Facility: CLINIC | Age: 49
End: 2024-08-14
Payer: COMMERCIAL

## 2024-08-16 ENCOUNTER — TELEPHONE (OUTPATIENT)
Dept: BARIATRICS | Facility: CLINIC | Age: 49
End: 2024-08-16
Payer: COMMERCIAL

## 2024-09-02 ENCOUNTER — TELEPHONE (OUTPATIENT)
Dept: PHARMACY | Facility: CLINIC | Age: 49
End: 2024-09-02
Payer: COMMERCIAL

## 2024-09-02 NOTE — TELEPHONE ENCOUNTER
[facility] Chart Review & Patient Outreach Details For Medication Adherence Project    Reason for Outreach Encounter: 3rd Party payor non-compliance report ([employer], [employer], C, etc)  2.  Patient Outreach Method: Intelligent Data Sensor Deviceshart message  3.   Medication in question: Mounjaro 12.5 mg    LAST FILLED: 4/19/24 for 28 day supply  Diabetes Medications               semaglutide (OZEMPIC) 1 mg/dose (4 mg/3 mL) Inject 1 mg into the skin every 7 days.    tirzepatide 12.5 mg/0.5 mL PnIj Inject 12.5 mg into the skin every 7 days.              4.  Reviewed and or Updates Made To: Patient Chart  5. Outreach Outcomes and/or actions taken: Sent inquiry to patient: Waiting for response.

## 2024-09-03 ENCOUNTER — LAB VISIT (OUTPATIENT)
Dept: LAB | Facility: HOSPITAL | Age: 49
End: 2024-09-03
Payer: COMMERCIAL

## 2024-09-03 ENCOUNTER — TELEPHONE (OUTPATIENT)
Dept: BARIATRICS | Facility: CLINIC | Age: 49
End: 2024-09-03
Payer: COMMERCIAL

## 2024-09-03 DIAGNOSIS — E05.90 HYPERTHYROIDISM: ICD-10-CM

## 2024-09-03 LAB
T3 SERPL-MCNC: 92 NG/DL (ref 60–180)
T4 FREE SERPL-MCNC: 1.07 NG/DL (ref 0.71–1.51)
THYROPEROXIDASE IGG SERPL-ACNC: <6 IU/ML
TSH SERPL DL<=0.005 MIU/L-ACNC: 1.39 UIU/ML (ref 0.4–4)

## 2024-09-03 PROCEDURE — 84445 ASSAY OF TSI GLOBULIN: CPT | Performed by: NURSE PRACTITIONER

## 2024-09-03 PROCEDURE — 84439 ASSAY OF FREE THYROXINE: CPT | Performed by: NURSE PRACTITIONER

## 2024-09-03 PROCEDURE — 86376 MICROSOMAL ANTIBODY EACH: CPT | Performed by: NURSE PRACTITIONER

## 2024-09-03 PROCEDURE — 84480 ASSAY TRIIODOTHYRONINE (T3): CPT | Performed by: NURSE PRACTITIONER

## 2024-09-03 PROCEDURE — 84443 ASSAY THYROID STIM HORMONE: CPT | Performed by: NURSE PRACTITIONER

## 2024-09-03 PROCEDURE — 36415 COLL VENOUS BLD VENIPUNCTURE: CPT | Performed by: NURSE PRACTITIONER

## 2024-09-03 NOTE — TELEPHONE ENCOUNTER
----- Message from Bill James sent at 8/30/2024  3:32 PM CDT -----  Regarding: call to schedule [facility] APPROVED financial consult  call to schedule [facility] APPROVED financial consult

## 2024-09-05 ENCOUNTER — PATIENT MESSAGE (OUTPATIENT)
Dept: INTERNAL MEDICINE | Facility: CLINIC | Age: 49
End: 2024-09-05
Payer: COMMERCIAL

## 2024-09-06 LAB — TSI SER-ACNC: <0.1 IU/L

## 2024-09-09 ENCOUNTER — CLINICAL SUPPORT (OUTPATIENT)
Dept: OBSTETRICS AND GYNECOLOGY | Facility: CLINIC | Age: 49
End: 2024-09-09
Payer: COMMERCIAL

## 2024-09-09 DIAGNOSIS — N95.1 MENOPAUSAL SYMPTOMS: Primary | ICD-10-CM

## 2024-09-09 PROCEDURE — 96372 THER/PROPH/DIAG INJ SC/IM: CPT | Mod: S$GLB,,, | Performed by: PHYSICIAN ASSISTANT

## 2024-09-09 RX ADMIN — TESTOSTERONE CYPIONATE 50 MG: 200 INJECTION, SOLUTION INTRAMUSCULAR at 03:09

## 2024-09-11 ENCOUNTER — PATIENT MESSAGE (OUTPATIENT)
Dept: BARIATRICS | Facility: CLINIC | Age: 49
End: 2024-09-11

## 2024-09-20 NOTE — TELEPHONE ENCOUNTER
Patient requested to be scheduled with financial services , benefits were reviewed, pt has other questions

## 2024-09-30 DIAGNOSIS — Z79.899 LONG-TERM USE OF HIGH-RISK MEDICATION: ICD-10-CM

## 2024-09-30 DIAGNOSIS — L40.50 PSORIATIC ARTHRITIS: ICD-10-CM

## 2024-09-30 DIAGNOSIS — L40.0 PSORIASIS VULGARIS: ICD-10-CM

## 2024-09-30 RX ORDER — RISANKIZUMAB-RZAA 150 MG/ML
150 INJECTION SUBCUTANEOUS
Qty: 1 ML | Refills: 1 | OUTPATIENT
Start: 2024-09-30

## 2024-10-02 ENCOUNTER — PATIENT MESSAGE (OUTPATIENT)
Dept: OBSTETRICS AND GYNECOLOGY | Facility: CLINIC | Age: 49
End: 2024-10-02
Payer: COMMERCIAL

## 2024-10-03 ENCOUNTER — PATIENT MESSAGE (OUTPATIENT)
Dept: DERMATOLOGY | Facility: CLINIC | Age: 49
End: 2024-10-03
Payer: COMMERCIAL

## 2024-10-03 DIAGNOSIS — L40.50 PSORIATIC ARTHRITIS: ICD-10-CM

## 2024-10-03 DIAGNOSIS — Z79.899 LONG-TERM USE OF HIGH-RISK MEDICATION: ICD-10-CM

## 2024-10-03 DIAGNOSIS — L40.0 PSORIASIS VULGARIS: ICD-10-CM

## 2024-10-03 RX ORDER — RISANKIZUMAB-RZAA 150 MG/ML
150 INJECTION SUBCUTANEOUS
Qty: 1 ML | Refills: 1 | Status: CANCELLED | OUTPATIENT
Start: 2024-10-03

## 2024-10-08 ENCOUNTER — TELEPHONE (OUTPATIENT)
Dept: BARIATRICS | Facility: CLINIC | Age: 49
End: 2024-10-08
Payer: COMMERCIAL

## 2024-10-08 NOTE — TELEPHONE ENCOUNTER
Spoke with pt re: [facility] APPROVED on 8/30/24 for surgery in 2025 with Dr. Pelayo , confirmed BMI of 36 with hx of Type 2 DM, GERD, HTN, R knee pain. pt completed FS appointment 10/7/2024, medical criteria reviewed including 4 mo pre-op period copied below,, dashboard updated, discussed assigned nurse coordinator and program, scheduled Initial consult visits with ESTRELLA and Dietician. Explained I would be sending a copy of our Bariatric Patient Handbook, Surgery and Nutrition booklets for review, and the instructions to complete the online seminar before scheduled appointments. Pt verbalized understanding.    Copied from 9/6/2024 media:  i. To complete a four (4) month pre-operative period which begins on the date the Plan Participant is approved for enrollment in the Bariatric Surgery Benefit. During the four (4) month pre-operative period, the Plan Participant must:  (1) undergo a medically supervised weight loss program that is directed by the approved bariatric surgeon or bariatric surgery center. There is no minimum required length of time for the weight loss program;  (2) undergo nutritional counseling-including pre-operative nutritional assessment-and counseling about pre-operative nutrition, eating, and exercise;

## 2024-10-09 ENCOUNTER — PATIENT MESSAGE (OUTPATIENT)
Dept: OBSTETRICS AND GYNECOLOGY | Facility: CLINIC | Age: 49
End: 2024-10-09
Payer: COMMERCIAL

## 2024-10-10 ENCOUNTER — TELEPHONE (OUTPATIENT)
Dept: NEUROLOGY | Facility: CLINIC | Age: 49
End: 2024-10-10
Payer: COMMERCIAL

## 2024-10-10 NOTE — TELEPHONE ENCOUNTER
Spoke to Pt about rescheduling her virtual visit for tomorrow. Pt was offered an appt for October 15. 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 Pt stated she will call me back to reschedule since she was in a meeting.

## 2024-10-11 ENCOUNTER — CLINICAL SUPPORT (OUTPATIENT)
Dept: OBSTETRICS AND GYNECOLOGY | Facility: CLINIC | Age: 49
End: 2024-10-11
Payer: COMMERCIAL

## 2024-10-11 DIAGNOSIS — N95.1 MENOPAUSAL SYMPTOMS: Primary | ICD-10-CM

## 2024-10-11 PROCEDURE — 99999 PR PBB SHADOW E&M-EST. PATIENT-LVL I: CPT | Mod: PBBFAC,,,

## 2024-10-11 RX ORDER — TESTOSTERONE CYPIONATE 200 MG/ML
50 INJECTION, SOLUTION INTRAMUSCULAR
Status: COMPLETED | OUTPATIENT
Start: 2024-10-11 | End: 2024-10-11

## 2024-10-11 RX ADMIN — TESTOSTERONE CYPIONATE 50 MG: 200 INJECTION, SOLUTION INTRAMUSCULAR at 11:10

## 2024-10-15 ENCOUNTER — OFFICE VISIT (OUTPATIENT)
Dept: OBSTETRICS AND GYNECOLOGY | Facility: CLINIC | Age: 49
End: 2024-10-15
Payer: COMMERCIAL

## 2024-10-15 VITALS — BODY MASS INDEX: 35.99 KG/M2 | HEIGHT: 65 IN | WEIGHT: 216 LBS

## 2024-10-15 DIAGNOSIS — Z12.39 BREAST CANCER SCREENING, HIGH RISK PATIENT: ICD-10-CM

## 2024-10-15 DIAGNOSIS — Z12.31 SCREENING MAMMOGRAM, ENCOUNTER FOR: ICD-10-CM

## 2024-10-15 DIAGNOSIS — Z80.3 FAMILY HISTORY OF BREAST CANCER: ICD-10-CM

## 2024-10-15 DIAGNOSIS — R92.30 DENSE BREASTS: ICD-10-CM

## 2024-10-15 DIAGNOSIS — E66.9 OBESITY, UNSPECIFIED CLASS, UNSPECIFIED OBESITY TYPE, UNSPECIFIED WHETHER SERIOUS COMORBIDITY PRESENT: ICD-10-CM

## 2024-10-15 DIAGNOSIS — N95.1 MENOPAUSAL SYMPTOMS: ICD-10-CM

## 2024-10-15 DIAGNOSIS — Z91.89 INCREASED RISK OF BREAST CANCER: Primary | ICD-10-CM

## 2024-10-15 PROCEDURE — 1160F RVW MEDS BY RX/DR IN RCRD: CPT | Mod: CPTII,95,, | Performed by: PHYSICIAN ASSISTANT

## 2024-10-15 PROCEDURE — 3044F HG A1C LEVEL LT 7.0%: CPT | Mod: CPTII,95,, | Performed by: PHYSICIAN ASSISTANT

## 2024-10-15 PROCEDURE — 1159F MED LIST DOCD IN RCRD: CPT | Mod: CPTII,95,, | Performed by: PHYSICIAN ASSISTANT

## 2024-10-15 PROCEDURE — 99213 OFFICE O/P EST LOW 20 MIN: CPT | Mod: 95,,, | Performed by: PHYSICIAN ASSISTANT

## 2024-10-15 PROCEDURE — 3008F BODY MASS INDEX DOCD: CPT | Mod: CPTII,95,, | Performed by: PHYSICIAN ASSISTANT

## 2024-10-15 RX ORDER — ESTRADIOL 0.1 MG/D
1 FILM, EXTENDED RELEASE TRANSDERMAL
Qty: 24 PATCH | Refills: 3 | Status: SHIPPED | OUTPATIENT
Start: 2024-10-17 | End: 2025-10-17

## 2024-10-15 RX ORDER — TESTOSTERONE CYPIONATE 200 MG/ML
50 INJECTION, SOLUTION INTRAMUSCULAR
Status: SHIPPED | OUTPATIENT
Start: 2024-10-15 | End: 2025-04-01

## 2024-10-15 RX ORDER — PROGESTERONE 200 MG/1
200 CAPSULE ORAL NIGHTLY
Qty: 90 CAPSULE | Refills: 3 | Status: SHIPPED | OUTPATIENT
Start: 2024-10-15 | End: 2025-10-15

## 2024-10-15 NOTE — PROGRESS NOTES
The patient location is: work  The chief complaint leading to consultation is: follow up    Visit type: audiovisual    Face to Face time with patient: 10 minutes  15 minutes of total time spent on the encounter, which includes face to face time and non-face to face time preparing to see the patient (eg, review of tests), Obtaining and/or reviewing separately obtained history, Documenting clinical information in the electronic or other health record, Independently interpreting results (not separately reported) and communicating results to the patient/family/caregiver, or Care coordination (not separately reported).         Each patient to whom he or she provides medical services by telemedicine is:  (1) informed of the relationship between the physician and patient and the respective role of any other health care provider with respect to management of the patient; and (2) notified that he or she may decline to receive medical services by telemedicine and may withdraw from such care at any time.    Notes:    Subjective:      Lachelle Engel is a 48 y.o. female who presents for  follow up. She is taking compounded tirzepatide 12.5mg sC weekly. Tolerating well but has hit plateau. Eating a high protein diet. Walking about 3-4x per week for exercise. Not doing strength workouts. Her insurance does cover weight loss surgery so meeting with bariatrics to see options.  She is feeling well with current HRT and wishes to continue. Denies vaginal bleeding. High risk breast and missed breast Mri in July.    Current Tx:  vivelle dot to 0.1mg BIW  progesterone to 200mg QHS  Imvexxy BIW  Testosterone 50mg IM [id number]  Compounded Tirzepatide 12.5mg SC weekly.     BREAST IMAGING  Bilateral Screening Mammogram: 1/26/2023 negative  Breast MRI: 7/3/2023 - negative  Bilateral Screening Mammogram: 1/29/2024 - negative     WWE: 1/9/2024  Pap: 1/13/2023 negative, HPV negative  PCP: Scheduled with new PCP next month  Routine Screening  Labs: 2024  Colonoscopy: 2023 repeat in 10 years  DEXA: 21 normal     Lab Visit on 2024   Component Date Value Ref Range Status    TSH 2024 1.389  0.400 - 4.000 uIU/mL Final    Free T4 2024 1.07  0.71 - 1.51 ng/dL Final    T3, Total 2024 92  60 - 180 ng/dL Final    Thyroperoxidase Antibodies 2024 <6.0  <6.0 IU/mL Final    Thyroid-Stim IG Quantitative 2024 <0.10  <0.10 IU/L Final   Lab Visit on 2024   Component Date Value Ref Range Status    TSH 2024 0.081 (L)  0.400 - 4.000 uIU/mL Final    Free T4 2024 0.92  0.71 - 1.51 ng/dL Final       Past Medical History:   Diagnosis Date    Abnormal Pap smear of cervix     Allergy     Angio-edema     Bradycardia     Depression     Diabetes mellitus     type 2     Dizziness     GERD (gastroesophageal reflux disease)     Hypertension     Impaired fasting blood sugar     Joint pain     Psoriasis     Recurrent upper respiratory infection (URI)     Skin disease     Thyroid disease     hyothyroidism     Past Surgical History:   Procedure Laterality Date    APPENDECTOMY       SECTION      CHOLECYSTECTOMY      COLONOSCOPY N/A 2023    Procedure: COLONOSCOPY;  Surgeon: Piter Hearn MD;  Location: [facility];  Service: Endoscopy;  Laterality: N/A;    CRYOTHERAPY      ENDOMETRIAL ABLATION      ESOPHAGOGASTRODUODENOSCOPY N/A 2020    Procedure: EGD (ESOPHAGOGASTRODUODENOSCOPY);  Surgeon: Venkat Be MD;  Location: [address];  Service: Endoscopy;  Laterality: N/A;  rice or smith ok      INCONTINENCE SURGERY      TUBAL LIGATION       Social History     Tobacco Use    Smoking status: Some Days     Current packs/day: 0.25     Average packs/day: 0.3 packs/day for 15.0 years (3.8 ttl pk-yrs)     Types: Cigarettes    Smokeless tobacco: Current    Tobacco comments:     1 pack every 4 days   Substance Use Topics    Alcohol use: Yes     Alcohol/week: 4.0 standard drinks of alcohol      Types: 4 Drinks containing 0.5 oz of alcohol per week     Comment: occasionally    Drug use: No     Family History   Problem Relation Name Age of Onset    Breast cancer Mother colon polyps     Cancer Father          prostate    Prostate cancer Father      Cancer Maternal Aunt ms 30        colon cancer    Multiple sclerosis Maternal Aunt ms     Ovarian cancer Maternal Aunt ms     Colon cancer Maternal Uncle      Eczema Daughter Isabela     Melanoma Neg Hx       OB History    Para Term  AB Living   2 2 2     2   SAB IAB Ectopic Multiple Live Births           2      # Outcome Date GA Lbr Robert/2nd Weight Sex Type Anes PTL Lv   2 Term      CS-LTranv   NATI   1 Term      CS-LTranv   NATI       Current Outpatient Medications:     augmented betamethasone dipropionate (DIPROLENE-AF) 0.05 % cream, APPLY TO AFFECTED AREAS OF BODY TWICE DAILY AS NEEDED PSORIASIS, Disp: 50 g, Rfl: 1    betamethasone valerate 0.1% (VALISONE) 0.1 % Lotn, Apply to affected areas of scalp/ears BID prn psoriasis., Disp: 60 mL, Rfl: 3    butalbital-acetaminophen-caffeine -40 mg (FIORICET, ESGIC) -40 mg per tablet, Take 1 tablet by mouth every 4 (four) hours as needed., Disp: , Rfl:     estradioL (IMVEXXY MAINTENANCE PACK) 10 mcg Inst, Place 10 mcg vaginally twice a week., Disp: 8 each, Rfl: 11    [START ON 10/17/2024] estradioL (VIVELLE-DOT) 0.1 mg/24 hr PTSW, Place 1 patch onto the skin twice a week., Disp: 24 patch, Rfl: 3    ferrous sulfate 325 (65 FE) MG EC tablet, Take 1 tablet (325 mg total) by mouth once daily., Disp: 90 tablet, Rfl: 1    folic acid (FOLVITE) 1 MG tablet, Take 1 mg by mouth once daily., Disp: , Rfl:     galcanezumab-gnlm 120 mg/mL PnIj, Inject 1 mL (120 mg total) into the skin every 28 days. Begin 28 days after loading dose., Disp: 1 mL, Rfl: 10    meloxicam (MOBIC) 7.5 MG tablet, Take 1 tablet by mouth twice a day with food for 5 days then as needed., Disp: , Rfl:     mupirocin (BACTROBAN) 2 %  "ointment, Apply topically 3 (three) times daily., Disp: 1 Tube, Rfl: 2    naproxen (NAPROSYN) 500 MG tablet, Take 1 tablet (500 mg total) by mouth 2 (two) times daily as needed (Pain)., Disp: 30 tablet, Rfl: 0    neomycin-polymyxin-dexamethasone (MAXITROL) 3.5mg/mL-10,000 unit/mL-0.1 % DrpS, Place 1 drop into both eyes 4 (four) times daily. (Patient not taking: Reported on 7/19/2024), Disp: , Rfl:     ondansetron (ZOFRAN-ODT) 4 MG TbDL, Take 1 tablet (4 mg total) by mouth every 6 (six) hours as needed (Nausea)., Disp: 30 tablet, Rfl: 0    progesterone (PROMETRIUM) 200 MG capsule, Take 1 capsule (200 mg total) by mouth every evening., Disp: 90 capsule, Rfl: 3    risankizumab-rzaa (SKYRIZI) 150 mg/mL PnIj, Inject 150 mg into the skin every 12 weeks., Disp: 1 mL, Rfl: 1    rizatriptan (MAXALT-MLT) 10 MG disintegrating tablet, Take 1 tablet (10 mg total) by mouth every 2 (two) hours as needed for Migraine. Max 30 mg/day., Disp: 12 tablet, Rfl: 5    tirzepatide 12.5 mg/0.5 mL PnIj, Inject 12.5 mg into the skin every 7 days., Disp: , Rfl:     valACYclovir (VALTREX) 500 MG tablet, TAKE 1 TABLET BY MOUTH TWICE DAILY FOR 3 DAYS. BEGIN AT FIRST SIGN OF OUTBREAK, Disp: 6 tablet, Rfl: 4    Current Facility-Administered Medications:     testosterone cypionate injection 50 mg, 50 mg, Intramuscular, Q28 Days,     Review of Systems:  General: No fever, chills.  Chest: No chest pain, shortness of breath, or palpitations.  Breast: No pain, masses, or nipple discharge.  Vulva: No pain, lesions, or itching.  Vagina: No relaxation, itching, discharge, or lesions.  Abdomen: No pain, nausea, vomiting, diarrhea, or constipation.  Urinary: No incontinence, nocturia, frequency, or dysuria.  Extremities:  No leg cramps, edema, or calf pain.  Neurologic: No headaches, dizziness, or visual changes.    Objective:     Vitals:    10/15/24 1459   Weight: 98 kg (216 lb)   Height: 5' 5" (1.651 m)     Body mass index is 35.94 kg/m².    PHYSICAL " EXAM:  APPEARANCE: Well nourished, well developed, in no acute distress.  AFFECT: WNL, alert and oriented x 3      Assessment:      Increased risk of breast cancer  -     MRI Breast w/wo Contrast, w/CAD, Bilateral; Future; Expected date: 10/15/2024    Family history of breast cancer  -     MRI Breast w/wo Contrast, w/CAD, Bilateral; Future; Expected date: 10/15/2024    Breast cancer screening, high risk patient  -     MRI Breast w/wo Contrast, w/CAD, Bilateral; Future; Expected date: 10/15/2024    Dense breasts  -     MRI Breast w/wo Contrast, w/CAD, Bilateral; Future; Expected date: 10/15/2024    Screening mammogram, encounter for  -     Mammo Digital Screening Bilat w/ Patrick; Future; Expected date: 10/15/2024    Menopausal symptoms  -     testosterone cypionate injection 50 mg  -     estradioL (VIVELLE-DOT) 0.1 mg/24 hr PTSW; Place 1 patch onto the skin twice a week.  Dispense: 24 patch; Refill: 3  -     progesterone (PROMETRIUM) 200 MG capsule; Take 1 capsule (200 mg total) by mouth every evening.  Dispense: 90 capsule; Refill: 3    Obesity, unspecified class, unspecified obesity type, unspecified whether serious comorbidity present  -     tirzepatide 12.5 mg/0.5 mL PnIj; Inject 12.5 mg into the skin every 7 days.        Plan:   Continue low glycemic diet with lean protein at each meal.  Maintain hydration.  Continue walking 3-4x per week  Continue compounded Tirzepatide 12.5mg SC weekly- faxed to [facility]  Scheduled with bariatrics to consider surgical options.  vivelle dot to 0.1mg BIW  progesterone to 200mg QHS  Imvexxy BIW  Testosterone 50mg IM [id number]  Hormone levels scheduled  Schedule breast MRI  Annual mammogram due 2/2025  Follow up in 6 months for WWE or sooner PRN    Instructed patient to call if she experiences any side effects or has any questions.    I spent a total of 15 minutes on the day of the visit.This includes face to face time and non-face to face time preparing to see the patient (eg, review  of tests), obtaining and/or reviewing separately obtained history, documenting clinical information in the electronic or other health record, independently interpreting results and communicating results to the patient/family/caregiver, or care coordinator.

## 2024-10-17 ENCOUNTER — TELEPHONE (OUTPATIENT)
Dept: PHARMACY | Facility: CLINIC | Age: 49
End: 2024-10-17
Payer: COMMERCIAL

## 2024-10-17 NOTE — TELEPHONE ENCOUNTER
[facility]/Population Health Chart Review & Patient Outreach Details For Medication Adherence Project    Reason for Outreach Encounter: 3rd Party payor non-compliance report ([employer], [employer], C, etc)  2.  Patient Outreach Method: Reviewed patient chart   3.   Medication in question:   Diabetes Medications               tirzepatide 12.5 mg/0.5 mL PnIj Inject 12.5 mg into the skin every 7 days.              Pt is taking compounded tirzepatide. Not for DM    4.  Reviewed and or Updates Made To: Patient Chart  5. Outreach Outcomes and/or actions taken: Patient filled medication and is on track to be adherent  Additional Notes:

## 2024-10-23 ENCOUNTER — OFFICE VISIT (OUTPATIENT)
Dept: DERMATOLOGY | Facility: CLINIC | Age: 49
End: 2024-10-23
Payer: COMMERCIAL

## 2024-10-23 DIAGNOSIS — L40.50 PSORIATIC ARTHRITIS: ICD-10-CM

## 2024-10-23 DIAGNOSIS — L40.0 PSORIASIS VULGARIS: Primary | ICD-10-CM

## 2024-10-23 DIAGNOSIS — Z79.899 LONG-TERM USE OF HIGH-RISK MEDICATION: ICD-10-CM

## 2024-10-23 PROCEDURE — 3044F HG A1C LEVEL LT 7.0%: CPT | Mod: CPTII,95,, | Performed by: DERMATOLOGY

## 2024-10-23 PROCEDURE — 1159F MED LIST DOCD IN RCRD: CPT | Mod: CPTII,95,, | Performed by: DERMATOLOGY

## 2024-10-23 PROCEDURE — G2211 COMPLEX E/M VISIT ADD ON: HCPCS | Mod: 95,,, | Performed by: DERMATOLOGY

## 2024-10-23 PROCEDURE — 99214 OFFICE O/P EST MOD 30 MIN: CPT | Mod: 95,,, | Performed by: DERMATOLOGY

## 2024-10-23 PROCEDURE — 1160F RVW MEDS BY RX/DR IN RCRD: CPT | Mod: CPTII,95,, | Performed by: DERMATOLOGY

## 2024-10-23 RX ORDER — RISANKIZUMAB-RZAA 150 MG/ML
150 INJECTION SUBCUTANEOUS
Qty: 1 ML | Refills: 1 | Status: ACTIVE | OUTPATIENT
Start: 2024-10-23

## 2024-10-23 RX ORDER — FLUOCINONIDE TOPICAL SOLUTION USP, 0.05% 0.5 MG/ML
SOLUTION TOPICAL
Qty: 60 ML | Refills: 3 | Status: SHIPPED | OUTPATIENT
Start: 2024-10-23

## 2024-10-23 NOTE — PROGRESS NOTES
The patient location is: home  The chief complaint leading to consultation is: psoriasis  Visit type: virtual visit with synchronous audio and video  Total time spent with patient: 8 minutes  Each patient to whom I provide medical services by telemedicine is:  (1) informed of the relationship between the physician and patient and the respective role of any other health care provider with respect to management of the patient; and (2) notified that he or she may decline to receive medical services by telemedicine and may withdraw from such care at any time.      Patient Information  Name: Lachelle Engel  : 1975  MRN: [id number]     Referring Physician:  Laura ref. provider found   Primary Care Physician:  Laura, Primary Doctor   Date of Visit: 10/23/2024      Subjective:     History of Present lllness:    Lachelle Engel is a 48 y.o. female who presents with a chief complaint of psoriasis.    Location: scalp, ears, hands, groin and feet  Signs/Symptoms: cracking, crusting, inflamed, itching, peeling, scaling; starts to flare before she is due for next injection; helping w skin and joints  Symptom course: improving overall but has worsened recently since she is late with Skyrizi injxn  Current treatment: Skyrizi, betamethasone lotion  Pt has previously tried and failed MTX, Otezla, Humira, and Stelara.    Patient was last seen: 2024.  Prior notes by myself reviewed.   Clinical documentation obtained by nursing staff reviewed.    Review of Systems   Constitutional:  Negative for fever, fatigue and malaise.   Respiratory:  Negative for cough and shortness of breath.    Gastrointestinal:  Negative for nausea, vomiting and diarrhea.   Musculoskeletal:  Negative for joint swelling and arthralgias.       Objective:   Physical Exam     Diagram Legend     Erythematous scaling macule/papule c/w actinic keratosis       Vascular papule c/w angioma      Pigmented verrucoid papule/plaque c/w seborrheic keratosis       Yellow umbilicated papule c/w sebaceous hyperplasia      Irregularly shaped tan macule c/w lentigo     1-2 mm smooth white papules consistent with Milia      Movable subcutaneous cyst with punctum c/w epidermal inclusion cyst      Subcutaneous movable cyst c/w pilar cyst      Firm pink to brown papule c/w dermatofibroma      Pedunculated fleshy papule(s) c/w skin tag(s)      Evenly pigmented macule c/w junctional nevus     Mildly variegated pigmented, slightly irregular-bordered macule c/w mildly atypical nevus      Flesh colored to evenly pigmented papule c/w intradermal nevus       Pink pearly papule/plaque c/w basal cell carcinoma      Erythematous hyperkeratotic cursted plaque c/w SCC      Surgical scar with no sign of skin cancer recurrence      Open and closed comedones      Inflammatory papules and pustules      Verrucoid papule consistent consistent with wart     Erythematous eczematous patches and plaques     Dystrophic onycholytic nail with subungual debris c/w onychomycosis     Umbilicated papule    Erythematous-base heme-crusted tan verrucoid plaque consistent with inflamed seborrheic keratosis     Erythematous Silvery Scaling Plaque c/w Psoriasis     See annotation          [] Data reviewed  [] Prior external notes reviewed  [] Independent review of test  [] Management discussed with another provider  [] Independent historian    Assessment / Plan:        Psoriasis vulgaris - chronic problem, not at treatment goal  Psoriatic arthritis  - stable and chronic  Long-term use of high-risk medication  Improving. BSA is now <2% (previously >20%).   Will continue Skyrizi.  Check labs:  -     CBC Auto Differential; Future; Expected date: 10/23/2024  -     Comprehensive Metabolic Panel; Future; Expected date: 10/23/2024  -     Quantiferon Gold TB; Future; Expected date: 10/23/2024    -     risankizumab-rzaa (SKYRIZI) 150 mg/mL PnIj; Inject 150 mg into the skin every 12 weeks.  Dispense: 1 mL; Refill: 1    Will  also increase strength of topical steroid for flares in scalp.  -     fluocinonide (LIDEX) 0.05 % external solution; Apply to affected areas of scalp qday - bid prn scaling or itching.  Dispense: 60 mL; Refill: 3  Side effects from the overuse of topical steroids include thinning of skin, easy tearing/bruising of skin, stretch marks, spider veins, etc. Use the topical steroid no more than 2 days per week if used long-term and/or take breaks from the topical steroid, especially if any of the above side effects are noticed.      Follow up in about 6 months (around 4/23/2025) for follow up, or sooner if symptoms worsening or not improving.      Ximena Maloney MD, FAAD  [facility]

## 2024-11-01 ENCOUNTER — LAB VISIT (OUTPATIENT)
Dept: LAB | Facility: HOSPITAL | Age: 49
End: 2024-11-01
Payer: COMMERCIAL

## 2024-11-01 DIAGNOSIS — L40.50 PSORIATIC ARTHRITIS: ICD-10-CM

## 2024-11-01 DIAGNOSIS — L40.0 PSORIASIS VULGARIS: ICD-10-CM

## 2024-11-01 DIAGNOSIS — N95.1 MENOPAUSAL SYMPTOMS: ICD-10-CM

## 2024-11-01 DIAGNOSIS — Z79.899 LONG-TERM USE OF HIGH-RISK MEDICATION: ICD-10-CM

## 2024-11-01 LAB
ALBUMIN SERPL BCP-MCNC: 4.2 G/DL (ref 3.5–5.2)
ALP SERPL-CCNC: 104 U/L (ref 40–150)
ALT SERPL W/O P-5'-P-CCNC: 26 U/L (ref 10–44)
ANION GAP SERPL CALC-SCNC: 10 MMOL/L (ref 8–16)
AST SERPL-CCNC: 21 U/L (ref 10–40)
BILIRUB SERPL-MCNC: 0.3 MG/DL (ref 0.1–1)
BUN SERPL-MCNC: 15 MG/DL (ref 6–20)
CALCIUM SERPL-MCNC: 9.4 MG/DL (ref 8.7–10.5)
CHLORIDE SERPL-SCNC: 103 MMOL/L (ref 95–110)
CO2 SERPL-SCNC: 25 MMOL/L (ref 23–29)
CREAT SERPL-MCNC: 1 MG/DL (ref 0.5–1.4)
EST. GFR  (NO RACE VARIABLE): >60 ML/MIN/1.73 M^2
ESTRADIOL SERPL-MCNC: 66 PG/ML
GLUCOSE SERPL-MCNC: 82 MG/DL (ref 70–110)
POTASSIUM SERPL-SCNC: 3.9 MMOL/L (ref 3.5–5.1)
PROGEST SERPL-MCNC: 0.6 NG/ML
PROT SERPL-MCNC: 6.9 G/DL (ref 6–8.4)
SODIUM SERPL-SCNC: 138 MMOL/L (ref 136–145)

## 2024-11-01 PROCEDURE — 84403 ASSAY OF TOTAL TESTOSTERONE: CPT | Performed by: PHYSICIAN ASSISTANT

## 2024-11-01 PROCEDURE — 84144 ASSAY OF PROGESTERONE: CPT | Performed by: PHYSICIAN ASSISTANT

## 2024-11-01 PROCEDURE — 86480 TB TEST CELL IMMUN MEASURE: CPT | Performed by: DERMATOLOGY

## 2024-11-01 PROCEDURE — 36415 COLL VENOUS BLD VENIPUNCTURE: CPT | Performed by: PHYSICIAN ASSISTANT

## 2024-11-01 PROCEDURE — 82670 ASSAY OF TOTAL ESTRADIOL: CPT | Performed by: PHYSICIAN ASSISTANT

## 2024-11-01 PROCEDURE — 84402 ASSAY OF FREE TESTOSTERONE: CPT | Performed by: PHYSICIAN ASSISTANT

## 2024-11-01 PROCEDURE — 85025 COMPLETE CBC W/AUTO DIFF WBC: CPT | Performed by: DERMATOLOGY

## 2024-11-01 PROCEDURE — 80053 COMPREHEN METABOLIC PANEL: CPT | Performed by: DERMATOLOGY

## 2024-11-02 LAB
BASOPHILS # BLD AUTO: 0.02 K/UL (ref 0–0.2)
BASOPHILS NFR BLD: 0.4 % (ref 0–1.9)
DIFFERENTIAL METHOD BLD: NORMAL
EOSINOPHIL # BLD AUTO: 0.1 K/UL (ref 0–0.5)
EOSINOPHIL NFR BLD: 1.8 % (ref 0–8)
ERYTHROCYTE [DISTWIDTH] IN BLOOD BY AUTOMATED COUNT: 12.1 % (ref 11.5–14.5)
HCT VFR BLD AUTO: 43.3 % (ref 37–48.5)
HGB BLD-MCNC: 14.2 G/DL (ref 12–16)
IMM GRANULOCYTES # BLD AUTO: 0.01 K/UL (ref 0–0.04)
IMM GRANULOCYTES NFR BLD AUTO: 0.2 % (ref 0–0.5)
LYMPHOCYTES # BLD AUTO: 1.8 K/UL (ref 1–4.8)
LYMPHOCYTES NFR BLD: 33.6 % (ref 18–48)
MCH RBC QN AUTO: 30.8 PG (ref 27–31)
MCHC RBC AUTO-ENTMCNC: 32.8 G/DL (ref 32–36)
MCV RBC AUTO: 94 FL (ref 82–98)
MONOCYTES # BLD AUTO: 0.3 K/UL (ref 0.3–1)
MONOCYTES NFR BLD: 5.7 % (ref 4–15)
NEUTROPHILS # BLD AUTO: 3.2 K/UL (ref 1.8–7.7)
NEUTROPHILS NFR BLD: 58.3 % (ref 38–73)
NRBC BLD-RTO: 0 /100 WBC
PLATELET # BLD AUTO: 153 K/UL (ref 150–450)
PMV BLD AUTO: 11.9 FL (ref 9.2–12.9)
RBC # BLD AUTO: 4.61 M/UL (ref 4–5.4)
TESTOST SERPL-MCNC: 146 NG/DL (ref 5–73)
WBC # BLD AUTO: 5.44 K/UL (ref 3.9–12.7)

## 2024-11-04 LAB — TESTOST FREE SERPL-MCNC: 1.6 PG/ML

## 2024-11-05 ENCOUNTER — CLINICAL SUPPORT (OUTPATIENT)
Dept: OBSTETRICS AND GYNECOLOGY | Facility: CLINIC | Age: 49
End: 2024-11-05
Payer: COMMERCIAL

## 2024-11-05 DIAGNOSIS — N95.1 MENOPAUSAL SYMPTOMS: Primary | ICD-10-CM

## 2024-11-05 LAB
GAMMA INTERFERON BACKGROUND BLD IA-ACNC: 0.06 IU/ML
M TB IFN-G CD4+ BCKGRND COR BLD-ACNC: -0.01 IU/ML
M TB IFN-G CD4+ BCKGRND COR BLD-ACNC: -0.01 IU/ML
MITOGEN IGNF BCKGRD COR BLD-ACNC: 9.94 IU/ML
TB GOLD PLUS: NEGATIVE

## 2024-11-05 PROCEDURE — 96372 THER/PROPH/DIAG INJ SC/IM: CPT | Mod: S$GLB,,, | Performed by: PHYSICIAN ASSISTANT

## 2024-11-05 RX ADMIN — TESTOSTERONE CYPIONATE 50 MG: 200 INJECTION, SOLUTION INTRAMUSCULAR at 01:11

## 2024-11-05 NOTE — PROGRESS NOTES
Lab Results       Component                Value               Date                       TBGOLDPLUS               Negative            11/01/2024

## 2024-11-11 ENCOUNTER — OFFICE VISIT (OUTPATIENT)
Dept: URGENT CARE | Facility: CLINIC | Age: 49
End: 2024-11-11
Payer: COMMERCIAL

## 2024-11-11 VITALS
TEMPERATURE: 98 F | DIASTOLIC BLOOD PRESSURE: 73 MMHG | HEART RATE: 74 BPM | OXYGEN SATURATION: 98 % | RESPIRATION RATE: 18 BRPM | BODY MASS INDEX: 36.32 KG/M2 | WEIGHT: 218 LBS | HEIGHT: 65 IN | SYSTOLIC BLOOD PRESSURE: 108 MMHG

## 2024-11-11 DIAGNOSIS — J02.9 SORE THROAT: Primary | ICD-10-CM

## 2024-11-11 LAB
CTP QC/QA: YES
CTP QC/QA: YES
MOLECULAR STREP A: NEGATIVE
SARS-COV-2 AG RESP QL IA.RAPID: NEGATIVE

## 2024-11-11 PROCEDURE — 87811 SARS-COV-2 COVID19 W/OPTIC: CPT | Mod: QW,S$GLB,, | Performed by: FAMILY MEDICINE

## 2024-11-11 PROCEDURE — 87651 STREP A DNA AMP PROBE: CPT | Mod: QW,S$GLB,, | Performed by: FAMILY MEDICINE

## 2024-11-11 PROCEDURE — 99213 OFFICE O/P EST LOW 20 MIN: CPT | Mod: S$GLB,,, | Performed by: FAMILY MEDICINE

## 2024-11-11 NOTE — PROGRESS NOTES
"Subjective:      Patient ID: Lachelle Engel is a 49 y.o. female.    Vitals:  height is 5' 5" (1.651 m) and weight is 98.9 kg (218 lb). Her oral temperature is 98.3 °F (36.8 °C). Her blood pressure is 108/73 and her pulse is 74. Her respiration is 18 and oxygen saturation is 98%.     Chief Complaint: Sore Throat (Entered by patient)    This is a 49 y.o. female who presents today with a chief complaint of  sore throat x last night     Sore Throat   This is a new problem. The current episode started yesterday. The pain is at a severity of 7/10. Pertinent negatives include no abdominal pain, congestion, coughing, diarrhea, drooling, ear discharge, ear pain, headaches, hoarse voice, plugged ear sensation, neck pain, shortness of breath, stridor, swollen glands, trouble swallowing or vomiting. She has tried NSAIDs for the symptoms.       HENT:  Positive for sore throat. Negative for ear pain, ear discharge, drooling, congestion and trouble swallowing.    Neck: Negative for neck pain.   Respiratory:  Negative for cough, shortness of breath and stridor.    Gastrointestinal:  Negative for abdominal pain, vomiting and diarrhea.   Neurological:  Negative for headaches.      Objective:     Physical Exam   Constitutional: normal  HENT:   Head: Normocephalic and atraumatic.   Nose: Congestion present.   Mouth/Throat: Mucous membranes are moist. Posterior oropharyngeal erythema present. No oropharyngeal exudate.   Neck: Neck supple.   Cardiovascular: Normal rate, regular rhythm, normal heart sounds and normal pulses.   Pulmonary/Chest: Effort normal and breath sounds normal.   Abdominal: Normal appearance.   Neurological: She is alert.   Nursing note and vitals reviewed.    Results for orders placed or performed in visit on 11/11/24   SARS Coronavirus 2 Antigen, POCT Manual Read    Collection Time: 11/11/24  3:44 PM   Result Value Ref Range    SARS Coronavirus 2 Antigen Negative Negative     Acceptable Yes  "   POCT Strep A, Molecular    Collection Time: 11/11/24  3:44 PM   Result Value Ref Range    Molecular Strep A, POC Negative Negative     Acceptable Yes         Assessment:     1. Sore throat        Plan:       Sore throat  -     SARS Coronavirus 2 Antigen, POCT Manual Read  -     POCT Strep A, Molecular  -     diphenhydrAMINE-aluminum-magnesium hydroxide-simethicone-LIDOcaine viscous HCl 2%; Swish and spit 15 mLs every 4 (four) hours as needed (sore throat).  Dispense: 120 each; Refill: 0    OTC analgesia. RTC prn worsening symptoms

## 2024-11-18 ENCOUNTER — HOSPITAL ENCOUNTER (OUTPATIENT)
Dept: RADIOLOGY | Facility: HOSPITAL | Age: 49
Discharge: HOME | End: 2024-11-18
Attending: PHYSICIAN ASSISTANT
Payer: COMMERCIAL

## 2024-12-03 ENCOUNTER — PATIENT MESSAGE (OUTPATIENT)
Dept: OBSTETRICS AND GYNECOLOGY | Facility: CLINIC | Age: 49
End: 2024-12-03

## 2024-12-03 ENCOUNTER — CLINICAL SUPPORT (OUTPATIENT)
Dept: OBSTETRICS AND GYNECOLOGY | Facility: CLINIC | Age: 49
End: 2024-12-03
Payer: COMMERCIAL

## 2024-12-03 DIAGNOSIS — N95.1 MENOPAUSAL SYMPTOMS: Primary | ICD-10-CM

## 2024-12-03 PROCEDURE — 99999 PR PBB SHADOW E&M-EST. PATIENT-LVL I: CPT | Mod: PBBFAC,,,

## 2024-12-03 RX ADMIN — TESTOSTERONE CYPIONATE 50 MG: 200 INJECTION, SOLUTION INTRAMUSCULAR at 03:12

## 2024-12-18 ENCOUNTER — TELEPHONE (OUTPATIENT)
Dept: OBSTETRICS AND GYNECOLOGY | Facility: CLINIC | Age: 49
End: 2024-12-18
Payer: COMMERCIAL

## 2024-12-27 ENCOUNTER — CLINICAL SUPPORT (OUTPATIENT)
Dept: OBSTETRICS AND GYNECOLOGY | Facility: CLINIC | Age: 49
End: 2024-12-27
Payer: COMMERCIAL

## 2024-12-27 DIAGNOSIS — N95.1 MENOPAUSAL SYMPTOMS: Primary | ICD-10-CM

## 2024-12-27 PROCEDURE — 99999 PR PBB SHADOW E&M-EST. PATIENT-LVL I: CPT | Mod: PBBFAC,,,

## 2025-01-02 ENCOUNTER — PATIENT MESSAGE (OUTPATIENT)
Dept: DERMATOLOGY | Facility: CLINIC | Age: 50
End: 2025-01-02
Payer: COMMERCIAL

## 2025-01-02 DIAGNOSIS — L40.0 PSORIASIS VULGARIS: ICD-10-CM

## 2025-01-02 RX ORDER — BETAMETHASONE VALERATE 1 MG/ML
LOTION CUTANEOUS
Qty: 60 ML | Refills: 1 | Status: SHIPPED | OUTPATIENT
Start: 2025-01-02

## 2025-01-28 ENCOUNTER — CLINICAL SUPPORT (OUTPATIENT)
Dept: OBSTETRICS AND GYNECOLOGY | Facility: CLINIC | Age: 50
End: 2025-01-28
Payer: COMMERCIAL

## 2025-01-28 DIAGNOSIS — N95.1 MENOPAUSAL SYMPTOMS: Primary | ICD-10-CM

## 2025-01-28 PROCEDURE — 99999 PR PBB SHADOW E&M-EST. PATIENT-LVL I: CPT | Mod: PBBFAC,,,

## 2025-01-28 PROCEDURE — 96372 THER/PROPH/DIAG INJ SC/IM: CPT | Mod: S$GLB,,, | Performed by: PHYSICIAN ASSISTANT

## 2025-01-28 RX ADMIN — TESTOSTERONE CYPIONATE 50 MG: 200 INJECTION, SOLUTION INTRAMUSCULAR at 03:01

## 2025-01-29 ENCOUNTER — HOSPITAL ENCOUNTER (OUTPATIENT)
Dept: RADIOLOGY | Facility: HOSPITAL | Age: 50
Discharge: HOME | End: 2025-01-29
Attending: PHYSICIAN ASSISTANT
Payer: COMMERCIAL

## 2025-01-29 DIAGNOSIS — Z12.31 SCREENING MAMMOGRAM, ENCOUNTER FOR: ICD-10-CM

## 2025-01-29 PROCEDURE — 77067 SCR MAMMO BI INCL CAD: CPT | Mod: TC

## 2025-01-29 PROCEDURE — 77063 BREAST TOMOSYNTHESIS BI: CPT | Mod: 26,,, | Performed by: RADIOLOGY

## 2025-01-29 PROCEDURE — 77067 SCR MAMMO BI INCL CAD: CPT | Mod: 26,,, | Performed by: RADIOLOGY

## 2025-02-25 ENCOUNTER — CLINICAL SUPPORT (OUTPATIENT)
Dept: OBSTETRICS AND GYNECOLOGY | Facility: CLINIC | Age: 50
End: 2025-02-25
Payer: COMMERCIAL

## 2025-02-25 DIAGNOSIS — N95.1 MENOPAUSAL SYMPTOMS: Primary | ICD-10-CM

## 2025-02-25 PROCEDURE — 99999 PR PBB SHADOW E&M-EST. PATIENT-LVL I: CPT | Mod: PBBFAC,,,

## 2025-02-25 PROCEDURE — 96372 THER/PROPH/DIAG INJ SC/IM: CPT | Mod: S$GLB,,, | Performed by: PHYSICIAN ASSISTANT

## 2025-02-25 RX ADMIN — TESTOSTERONE CYPIONATE 50 MG: 200 INJECTION, SOLUTION INTRAMUSCULAR at 03:02

## 2025-04-03 DIAGNOSIS — L40.0 PSORIASIS VULGARIS: ICD-10-CM

## 2025-04-03 DIAGNOSIS — L40.50 PSORIATIC ARTHRITIS: ICD-10-CM

## 2025-04-03 DIAGNOSIS — Z79.899 LONG-TERM USE OF HIGH-RISK MEDICATION: ICD-10-CM

## 2025-04-03 RX ORDER — RISANKIZUMAB-RZAA 150 MG/ML
150 INJECTION SUBCUTANEOUS
Qty: 1 ML | Refills: 1 | OUTPATIENT
Start: 2025-04-03

## 2025-04-03 RX ORDER — RISANKIZUMAB-RZAA 150 MG/ML
150 INJECTION SUBCUTANEOUS
Qty: 1 ML | Refills: 1 | Status: CANCELLED | OUTPATIENT
Start: 2025-04-03

## 2025-04-08 ENCOUNTER — OFFICE VISIT (OUTPATIENT)
Dept: DERMATOLOGY | Facility: CLINIC | Age: 50
End: 2025-04-08
Payer: COMMERCIAL

## 2025-04-08 DIAGNOSIS — L40.50 PSORIATIC ARTHRITIS: ICD-10-CM

## 2025-04-08 DIAGNOSIS — L40.0 PSORIASIS VULGARIS: Primary | ICD-10-CM

## 2025-04-08 DIAGNOSIS — Z79.899 LONG-TERM USE OF HIGH-RISK MEDICATION: ICD-10-CM

## 2025-04-08 RX ORDER — RISANKIZUMAB-RZAA 150 MG/ML
150 INJECTION SUBCUTANEOUS
Qty: 1 ML | Refills: 1 | Status: ACTIVE | OUTPATIENT
Start: 2025-04-08

## 2025-04-08 NOTE — PROGRESS NOTES
Patient Information  Name: Lachelle Engel  : 1975  MRN: [id number]     Referring Physician:  Referring   Primary Care Physician:  Laura Primary Doctor   Date of Visit: 25      Subjective:     History of Present lllness:    Lachelle Engel is a 49 y.o. female who presents with a chief complaint of psoriasis.  Location: palms of hands  Signs/Symptoms: a little bit left on palms of hands  Symptom course: improving  Current treatment: SKYRIZI, Lidex solution    Patient was last seen: 10/23/2024.  Prior notes by myself reviewed.   Clinical documentation obtained by nursing staff reviewed.    Review of Systems   Constitutional:  Negative for fever, fatigue and malaise.   Respiratory:  Negative for cough and shortness of breath.    Gastrointestinal:  Negative for nausea, vomiting and diarrhea.   Musculoskeletal:  Positive for arthralgias (less joint pain). Negative for joint swelling.   Skin:  Positive for rash. Negative for abscesses.       Objective:   Physical Exam   Constitutional: She appears well-developed and well-nourished. No distress.   Neurological: She is alert and oriented to person, place, and time. She is not disoriented.   Psychiatric: She has a normal mood and affect.   Skin:   Areas Examined (abnormalities noted in diagram):   Head / Face Inspection Performed  RUE Inspected  LUE Inspection Performed                 Diagram Legend     Erythematous scaling macule/papule c/w actinic keratosis       Vascular papule c/w angioma      Pigmented verrucoid papule/plaque c/w seborrheic keratosis      Yellow umbilicated papule c/w sebaceous hyperplasia      Irregularly shaped tan macule c/w lentigo     1-2 mm smooth white papules consistent with Milia      Movable subcutaneous cyst with punctum c/w epidermal inclusion cyst      Subcutaneous movable cyst c/w pilar cyst      Firm pink to brown papule c/w dermatofibroma      Pedunculated fleshy papule(s) c/w skin tag(s)      Evenly pigmented macule  c/w junctional nevus     Mildly variegated pigmented, slightly irregular-bordered macule c/w mildly atypical nevus      Flesh colored to evenly pigmented papule c/w intradermal nevus       Pink pearly papule/plaque c/w basal cell carcinoma      Erythematous hyperkeratotic cursted plaque c/w SCC      Surgical scar with no sign of skin cancer recurrence      Open and closed comedones      Inflammatory papules and pustules      Verrucoid papule consistent consistent with wart     Erythematous eczematous patches and plaques     Dystrophic onycholytic nail with subungual debris c/w onychomycosis     Umbilicated papule    Erythematous-base heme-crusted tan verrucoid plaque consistent with inflamed seborrheic keratosis     Erythematous Silvery Scaling Plaque c/w Psoriasis     See annotation    Lab Results   Component Value Date    TBGOLDPLUS Negative 11/01/2024     No images are attached to the encounter or orders placed in the encounter.      [] Data reviewed  [] Prior external notes reviewed  [] Independent review of test  [] Management discussed with another provider  [] Independent historian    Assessment / Plan:        Psoriasis vulgaris  - stable and chronic  Psoriatic arthritis  - stable and chronic  Long-term use of high-risk medication  Continue Skyrizi. Check labs next month:  -     CBC Auto Differential; Future; Expected date: 05/08/2025  -     Comprehensive Metabolic Panel; Future; Expected date: 05/08/2025    -     risankizumab-rzaa (SKYRIZI) 150 mg/mL PnIj; Inject 150 mg into the skin every 12 weeks.  Dispense: 1 mL; Refill: 1      Follow up in about 6 months (around 10/8/2025) for follow up, or sooner if symptoms worsening or not improving.      Ximena Maloney MD, FAAD  [facility]

## 2025-04-14 ENCOUNTER — PATIENT MESSAGE (OUTPATIENT)
Dept: OBSTETRICS AND GYNECOLOGY | Facility: CLINIC | Age: 50
End: 2025-04-14
Payer: COMMERCIAL

## 2025-04-25 ENCOUNTER — OFFICE VISIT (OUTPATIENT)
Dept: OBSTETRICS AND GYNECOLOGY | Facility: CLINIC | Age: 50
End: 2025-04-25
Payer: COMMERCIAL

## 2025-04-25 VITALS
WEIGHT: 192.63 LBS | SYSTOLIC BLOOD PRESSURE: 117 MMHG | HEIGHT: 65 IN | DIASTOLIC BLOOD PRESSURE: 80 MMHG | BODY MASS INDEX: 32.1 KG/M2 | HEART RATE: 89 BPM

## 2025-04-25 DIAGNOSIS — Z12.31 VISIT FOR SCREENING MAMMOGRAM: ICD-10-CM

## 2025-04-25 DIAGNOSIS — R92.30 DENSE BREASTS: ICD-10-CM

## 2025-04-25 DIAGNOSIS — Z01.419 ENCOUNTER FOR GYNECOLOGICAL EXAMINATION WITHOUT ABNORMAL FINDING: Primary | ICD-10-CM

## 2025-04-25 DIAGNOSIS — Z12.39 BREAST CANCER SCREENING, HIGH RISK PATIENT: ICD-10-CM

## 2025-04-25 DIAGNOSIS — E66.9 OBESITY, UNSPECIFIED CLASS, UNSPECIFIED OBESITY TYPE, UNSPECIFIED WHETHER SERIOUS COMORBIDITY PRESENT: ICD-10-CM

## 2025-04-25 DIAGNOSIS — Z80.3 FAMILY HISTORY OF BREAST CANCER: ICD-10-CM

## 2025-04-25 DIAGNOSIS — N63.10 MASS OF RIGHT BREAST, UNSPECIFIED QUADRANT: ICD-10-CM

## 2025-04-25 DIAGNOSIS — N95.1 MENOPAUSAL SYMPTOMS: ICD-10-CM

## 2025-04-25 DIAGNOSIS — Z91.89 INCREASED RISK OF BREAST CANCER: ICD-10-CM

## 2025-04-25 PROCEDURE — 99999 PR PBB SHADOW E&M-EST. PATIENT-LVL V: CPT | Mod: PBBFAC,,, | Performed by: PHYSICIAN ASSISTANT

## 2025-04-25 RX ORDER — PROGESTERONE 200 MG/1
200 CAPSULE ORAL NIGHTLY
Qty: 90 CAPSULE | Refills: 3 | Status: SHIPPED | OUTPATIENT
Start: 2025-04-25 | End: 2026-04-25

## 2025-04-25 RX ORDER — TESTOSTERONE CYPIONATE 200 MG/ML
50 INJECTION, SOLUTION INTRAMUSCULAR
Status: SHIPPED | OUTPATIENT
Start: 2025-04-25 | End: 2025-10-10

## 2025-04-25 RX ORDER — ESTRADIOL 0.1 MG/D
1 FILM, EXTENDED RELEASE TRANSDERMAL
Qty: 24 PATCH | Refills: 3 | Status: SHIPPED | OUTPATIENT
Start: 2025-04-28 | End: 2026-04-28

## 2025-04-25 NOTE — PROGRESS NOTES
CC: Well woman exam    Lachelle Engel is a 49 y.o. female  presents for well woman exam.  LMP: No LMP recorded. Patient has had an ablation. She is on HRT and wishes to continue. Denies vaginal bleeding. No bothersome side effects. No recent history of abnormal pap. Sexually active with one long term partner.   She is taking compounded tirzepatide 12.5mg SC weekly for weight loss. Tolerating well without bothersome side effects. Finally starting to see weight loss and wishes to continue. She met with Bariatrics and did not qualify for surgery.  She is at increased risk for breast cancer based on elevated TC score. Wishes to continue breast MRI. Noticed a mass in the right breast at about 10:00 recently.     Current Tx:  vivelle dot to 0.1mg BIW  progesterone to 200mg QHS  Imvexxy BIW  Testosterone 50mg IM [id number]  Compounded Tirzepatide 12.5mg SC weekly (Johns)     BREAST IMAGING  Breast MRI: 7/3/2023- negative  Bilateral Screening Mammogram: 2024 - negative  Bilateral Screening Mammogram: 2025 - negative     WWE: 2024  Pap: 2023 negative, HPV negative  PCP: Scheduled with new PCP next month  Routine Screening Labs: 2024  Colonoscopy: 2023 repeat in 10 years  DEXA: 21 normal     Past Medical History:   Diagnosis Date    Abnormal Pap smear of cervix     Allergy     Angio-edema     Bradycardia     Depression     Diabetes mellitus     type 2     Dizziness     GERD (gastroesophageal reflux disease)     Hypertension     Impaired fasting blood sugar     Joint pain     Psoriasis     Recurrent upper respiratory infection (URI)     Skin disease     Thyroid disease     hyothyroidism     Past Surgical History:   Procedure Laterality Date    APPENDECTOMY       SECTION      CHOLECYSTECTOMY      COLONOSCOPY N/A 2023    Procedure: COLONOSCOPY;  Surgeon: Piter Hearn MD;  Location: [facility];  Service: Endoscopy;  Laterality: N/A;    CRYOTHERAPY       "ENDOMETRIAL ABLATION      ESOPHAGOGASTRODUODENOSCOPY N/A 2020    Procedure: EGD (ESOPHAGOGASTRODUODENOSCOPY);  Surgeon: Venkat Be MD;  Location: [facility] ([address] [place] [address]);  Service: Endoscopy;  Laterality: N/A;  rice or smith ok      INCONTINENCE SURGERY      TUBAL LIGATION       Social History[1]  Family History   Problem Relation Name Age of Onset    Breast cancer Mother colon polyps     Cancer Father          prostate    Prostate cancer Father      Cancer Maternal Aunt ms 30        colon cancer    Multiple sclerosis Maternal Aunt ms     Ovarian cancer Maternal Aunt ms     Colon cancer Maternal Uncle      Eczema Daughter Isabela     Melanoma Neg Hx       OB History          2    Para   2    Term   2            AB        Living   2         SAB        IAB        Ectopic        Multiple        Live Births   2               Current Medications[2]    The 10-year ASCVD risk score (Kiki JERRY, et al., 2019) is: 5%    Values used to calculate the score:      Age: 49 years      Sex: Female      Is Non- : No      Diabetic: Yes      Tobacco smoker: Yes      Systolic Blood Pressure: 117 mmHg      Is BP treated: No      HDL Cholesterol: 47 mg/dL      Total Cholesterol: 164 mg/dL    /80 (Patient Position: Sitting)   Pulse 89   Ht 5' 5" (1.651 m)   Wt 87.4 kg (192 lb 9.6 oz)   BMI 32.05 kg/m²       ROS:  GENERAL: Denies weight gain.  Feeling well overall.   SKIN: Denies rash or lesions.   HEAD: Denies head injury or headache.   NODES: Denies enlarged lymph nodes.   CHEST: Denies chest pain or shortness of breath.   CARDIOVASCULAR: Denies palpitations or left sided chest pain.   ABDOMEN: No abdominal pain, constipation, diarrhea, nausea, vomiting or rectal bleeding.   URINARY: No frequency, dysuria, hematuria, or burning on urination.  REPRODUCTIVE: See HPI.   BREASTS: Denies pain,  or nipple discharge.  +right breast mass  HEMATOLOGIC: No easy bruisability or excessive bleeding. "   MUSCULOSKELETAL: Denies joint pain or swelling.   NEUROLOGIC: Denies syncope or weakness.   PSYCHIATRIC: Denies depression, anxiety or mood swings.    PHYSICAL EXAM:  APPEARANCE: Well nourished, well developed, in no acute distress.  AFFECT: WNL, alert and oriented x 3  CHEST: Good respiratory effect  ABDOMEN: Soft.  No tenderness or masses.  No hepatosplenomegaly.  No hernias.  BREASTS: Symmetrical, no skin changes or visible lesions.  No palpable masses, nipple discharge bilaterally.  PELVIC: Normal external genitalia without lesions.  Normal hair distribution.  Adequate perineal body, normal urethral meatus.  Vagina moist and well rugated without lesions or discharge.  Cervix pink, without lesions, discharge or tenderness.  No significant cystocele or rectocele.  Bimanual exam shows uterus to be normal size, regular, mobile and nontender.  Adnexa without masses or tenderness.    EXTREMITIES: No edema.    ASSESSMENT:   Encounter for gynecological examination without abnormal finding    Visit for screening mammogram  -     Mammo Digital Screening Bilat w/ Patrick (XPD); Future; Expected date: 04/25/2025    Menopausal symptoms  -     testosterone cypionate injection 50 mg  -     progesterone (PROMETRIUM) 200 MG capsule; Take 1 capsule (200 mg total) by mouth every evening.  Dispense: 90 capsule; Refill: 3  -     estradioL (VIVELLE-DOT) 0.1 mg/24 hr PTSW; Place 1 patch onto the skin twice a week.  Dispense: 24 patch; Refill: 3  -     Estradiol; Future; Expected date: 04/25/2025  -     Testosterone, free; Future; Expected date: 04/25/2025  -     Testosterone; Future; Expected date: 04/25/2025  -     Progesterone; Future; Expected date: 04/25/2025    Mass of right breast, unspecified quadrant  -     Mammo Digital Diagnostic Right with Patrick (XPD); Future; Expected date: 04/25/2025  -     US Breast Right Limited; Future; Expected date: 04/25/2025    Increased risk of breast cancer  -     MRI Breast w/wo Contrast, w/CAD,  Bilateral; Future; Expected date: 04/25/2025    Breast cancer screening, high risk patient  -     MRI Breast w/wo Contrast, w/CAD, Bilateral; Future; Expected date: 04/25/2025    Family history of breast cancer  -     MRI Breast w/wo Contrast, w/CAD, Bilateral; Future; Expected date: 04/25/2025    Dense breasts  -     MRI Breast w/wo Contrast, w/CAD, Bilateral; Future; Expected date: 04/25/2025    Obesity, unspecified class, unspecified obesity type, unspecified whether serious comorbidity present  -     tirzepatide 12.5 mg/0.5 mL PnIj; With methylcobalamin for fatigue. Patient injects 12.5mg SC weekly.  Dispense: 4 Pen; Refill: 6          PLAN:   Right diagnostic mammogram and possible US for pt reported mass in right breast at 10:00  Breast MRI in 7/2025  Annual screening mammogram due 1/2026  Continue vivelle dot to 0.1mg BIW  Continue progesterone to 200mg QHS  Continue Testosterone 50mg IM [id number]  Repeat hormone labs 3 weeks after next injection.  Imvexxy BIW  Compounded Tirzepatide 12.5mg SC weekly (Johns)  Follow up in 6 months or sooner PRN    Patient was counseled today on A.C.S. Pap guidelines and recommendations for yearly pelvic exams, mammograms and monthly self breast exams; to see her PCP for other health maintenance.                             [1]   Social History  Socioeconomic History    Marital status: Single   Occupational History     Employer: [employer]   Tobacco Use    Smoking status: Some Days     Current packs/day: 0.25     Average packs/day: 0.3 packs/day for 15.0 years (3.8 ttl pk-yrs)     Types: Cigarettes    Smokeless tobacco: Current    Tobacco comments:     1 pack every 4 days   Substance and Sexual Activity    Alcohol use: Yes     Alcohol/week: 4.0 standard drinks of alcohol     Types: 4 Drinks containing 0.5 oz of alcohol per week     Comment: occasionally    Drug use: No    Sexual activity: Yes     Partners: Male     Birth control/protection: Post-menopausal      Comment: Tubal   Other Topics Concern    Are you pregnant or think you may be? No    Breast-feeding No     Social Drivers of Health     Financial Resource Strain: Low Risk  (3/12/2024)    Overall Financial Resource Strain (CARDIA)     Difficulty of Paying Living Expenses: Not very hard   Food Insecurity: No Food Insecurity (3/12/2024)    Hunger Vital Sign     Worried About Running Out of Food in the Last Year: Never true     Ran Out of Food in the Last Year: Never true   Transportation Needs: No Transportation Needs (3/12/2024)    PRAPARE - Transportation     Lack of Transportation (Medical): No     Lack of Transportation (Non-Medical): No   Physical Activity: Insufficiently Active (3/12/2024)    Exercise Vital Sign     Days of Exercise per Week: 3 days     Minutes of Exercise per Session: 40 min   Stress: No Stress Concern Present (3/12/2024)    [employer] - Occupational Stress Questionnaire     Feeling of Stress : Only a little   Housing Stability: Low Risk  (3/12/2024)    Housing Stability Vital Sign     Unable to Pay for Housing in the Last Year: No     Number of Places Lived in the Last Year: 1     Unstable Housing in the Last Year: No   [2]   Current Outpatient Medications:     augmented betamethasone dipropionate (DIPROLENE-AF) 0.05 % cream, APPLY TO AFFECTED AREAS OF BODY TWICE DAILY AS NEEDED PSORIASIS, Disp: 50 g, Rfl: 1    betamethasone valerate 0.1% (VALISONE) 0.1 % Lotn, Apply to affected areas of eyebrows/ears once daily prn psoriasis., Disp: 60 mL, Rfl: 1    butalbital-acetaminophen-caffeine -40 mg (FIORICET, ESGIC) -40 mg per tablet, Take 1 tablet by mouth every 4 (four) hours as needed., Disp: , Rfl:     dextroamphetamine-amphetamine (ADDERALL XR) 15 MG 24 hr capsule, Take 1 capsule by mouth every morning, Disp: 30 capsule, Rfl: 0    dextroamphetamine-amphetamine (ADDERALL XR) 20 MG 24 hr capsule, Take 1 capsule by mouth every morning, Disp: 30 capsule,  Rfl: 0    dextroamphetamine-amphetamine (ADDERALL XR) 20 MG 24 hr capsule, Take 1 capsule by mouth every morning, Disp: 30 capsule, Rfl: 0    dextroamphetamine-amphetamine (ADDERALL XR) 20 MG 24 hr capsule, Take 1 capsule by mouth every morning, Disp: 30 capsule, Rfl: 0    dextroamphetamine-amphetamine (ADDERALL XR) 20 MG 24 hr capsule, Take 1 capsule by mouth every morning, Disp: 30 capsule, Rfl: 0    estradioL (IMVEXXY MAINTENANCE PACK) 10 mcg Inst, Place 10 mcg vaginally twice a week., Disp: 8 each, Rfl: 11    [START ON 4/28/2025] estradioL (VIVELLE-DOT) 0.1 mg/24 hr PTSW, Place 1 patch onto the skin twice a week., Disp: 24 patch, Rfl: 3    ferrous sulfate 325 (65 FE) MG EC tablet, Take 1 tablet (325 mg total) by mouth once daily., Disp: 90 tablet, Rfl: 1    fluocinonide (LIDEX) 0.05 % external solution, Apply to affected areas of scalp once or twice daily as needed for scaling or itching., Disp: 60 mL, Rfl: 3    folic acid (FOLVITE) 1 MG tablet, Take 1 mg by mouth once daily., Disp: , Rfl:     galcanezumab-gnlm 120 mg/mL PnIj, Inject 1 mL (120 mg total) into the skin every 28 days. Begin 28 days after loading dose., Disp: 1 mL, Rfl: 10    meloxicam (MOBIC) 7.5 MG tablet, Take 1 tablet by mouth twice a day with food for 5 days then as needed., Disp: , Rfl:     mupirocin (BACTROBAN) 2 % ointment, Apply topically 3 (three) times daily., Disp: 1 Tube, Rfl: 2    naproxen (NAPROSYN) 500 MG tablet, Take 1 tablet (500 mg total) by mouth 2 (two) times daily as needed (Pain)., Disp: 30 tablet, Rfl: 0    neomycin-polymyxin-dexamethasone (MAXITROL) 3.5mg/mL-10,000 unit/mL-0.1 % DrpS, Place 1 drop into both eyes 4 (four) times daily., Disp: , Rfl:     ondansetron (ZOFRAN-ODT) 4 MG TbDL, Take 1 tablet (4 mg total) by mouth every 6 (six) hours as needed (Nausea)., Disp: 30 tablet, Rfl: 0    progesterone (PROMETRIUM) 200 MG capsule, Take 1 capsule (200 mg total) by mouth every evening., Disp: 90 capsule, Rfl: 3     risankizumab-rzaa (SKYRIZI) 150 mg/mL PnIj, Inject 150 mg into the skin every 12 weeks., Disp: 1 mL, Rfl: 1    rizatriptan (MAXALT-MLT) 10 MG disintegrating tablet, Take 1 tablet (10 mg total) by mouth every 2 (two) hours as needed for Migraine. Max 30 mg/day., Disp: 12 tablet, Rfl: 5    tirzepatide 12.5 mg/0.5 mL PnIj, With methylcobalamin for fatigue. Patient injects 12.5mg SC weekly., Disp: 4 Pen, Rfl: 6    valACYclovir (VALTREX) 500 MG tablet, TAKE 1 TABLET BY MOUTH TWICE DAILY FOR 3 DAYS. BEGIN AT FIRST SIGN OF OUTBREAK, Disp: 6 tablet, Rfl: 4    Current Facility-Administered Medications:     testosterone cypionate injection 50 mg, 50 mg, Intramuscular, Q28 Days,

## 2025-04-29 ENCOUNTER — CLINICAL SUPPORT (OUTPATIENT)
Dept: OBSTETRICS AND GYNECOLOGY | Facility: CLINIC | Age: 50
End: 2025-04-29
Payer: COMMERCIAL

## 2025-04-29 DIAGNOSIS — N95.1 MENOPAUSAL SYMPTOMS: Primary | ICD-10-CM

## 2025-04-29 PROCEDURE — 99999 PR PBB SHADOW E&M-EST. PATIENT-LVL I: CPT | Mod: PBBFAC,,,

## 2025-04-29 PROCEDURE — 96372 THER/PROPH/DIAG INJ SC/IM: CPT | Mod: S$GLB,,, | Performed by: PHYSICIAN ASSISTANT

## 2025-04-29 RX ADMIN — TESTOSTERONE CYPIONATE 50 MG: 200 INJECTION, SOLUTION INTRAMUSCULAR at 03:04

## 2025-05-11 ENCOUNTER — PATIENT MESSAGE (OUTPATIENT)
Dept: OPTOMETRY | Facility: CLINIC | Age: 50
End: 2025-05-11
Payer: COMMERCIAL

## 2025-05-19 ENCOUNTER — LAB VISIT (OUTPATIENT)
Dept: LAB | Facility: HOSPITAL | Age: 50
End: 2025-05-19
Payer: COMMERCIAL

## 2025-05-19 DIAGNOSIS — Z79.899 LONG-TERM USE OF HIGH-RISK MEDICATION: ICD-10-CM

## 2025-05-19 DIAGNOSIS — N95.1 MENOPAUSAL SYMPTOMS: ICD-10-CM

## 2025-05-19 DIAGNOSIS — L40.50 PSORIATIC ARTHRITIS: ICD-10-CM

## 2025-05-19 DIAGNOSIS — L40.0 PSORIASIS VULGARIS: ICD-10-CM

## 2025-05-19 LAB
ALBUMIN SERPL BCP-MCNC: 3.8 G/DL (ref 3.5–5.2)
ALP SERPL-CCNC: 75 UNIT/L (ref 40–150)
ALT SERPL W/O P-5'-P-CCNC: 14 UNIT/L (ref 10–44)
ANION GAP (OHS): 11 MMOL/L (ref 8–16)
AST SERPL-CCNC: 17 UNIT/L (ref 11–45)
BILIRUB SERPL-MCNC: 0.4 MG/DL (ref 0.1–1)
BUN SERPL-MCNC: 13 MG/DL (ref 6–20)
CALCIUM SERPL-MCNC: 9.4 MG/DL (ref 8.7–10.5)
CHLORIDE SERPL-SCNC: 108 MMOL/L (ref 95–110)
CO2 SERPL-SCNC: 25 MMOL/L (ref 23–29)
CREAT SERPL-MCNC: 0.8 MG/DL (ref 0.5–1.4)
GFR SERPLBLD CREATININE-BSD FMLA CKD-EPI: >60 ML/MIN/1.73/M2
GLUCOSE SERPL-MCNC: 84 MG/DL (ref 70–110)
POTASSIUM SERPL-SCNC: 4.3 MMOL/L (ref 3.5–5.1)
PROT SERPL-MCNC: 6.6 GM/DL (ref 6–8.4)
SODIUM SERPL-SCNC: 144 MMOL/L (ref 136–145)

## 2025-05-19 PROCEDURE — 36415 COLL VENOUS BLD VENIPUNCTURE: CPT

## 2025-05-19 PROCEDURE — 85025 COMPLETE CBC W/AUTO DIFF WBC: CPT

## 2025-05-19 PROCEDURE — 82247 BILIRUBIN TOTAL: CPT

## 2025-05-19 PROCEDURE — 84402 ASSAY OF FREE TESTOSTERONE: CPT

## 2025-05-19 PROCEDURE — 82670 ASSAY OF TOTAL ESTRADIOL: CPT

## 2025-05-19 PROCEDURE — 84144 ASSAY OF PROGESTERONE: CPT

## 2025-05-19 PROCEDURE — 84403 ASSAY OF TOTAL TESTOSTERONE: CPT

## 2025-05-20 ENCOUNTER — RESULTS FOLLOW-UP (OUTPATIENT)
Dept: DERMATOLOGY | Facility: CLINIC | Age: 50
End: 2025-05-20

## 2025-05-20 LAB
ABSOLUTE EOSINOPHIL (OHS): 0.08 K/UL
ABSOLUTE MONOCYTE (OHS): 0.23 K/UL (ref 0.3–1)
ABSOLUTE NEUTROPHIL COUNT (OHS): 2.43 K/UL (ref 1.8–7.7)
BASOPHILS # BLD AUTO: 0.01 K/UL
BASOPHILS NFR BLD AUTO: 0.2 %
ERYTHROCYTE [DISTWIDTH] IN BLOOD BY AUTOMATED COUNT: 12.5 % (ref 11.5–14.5)
ESTRADIOL SERPL HS-MCNC: 29 PG/ML
HCT VFR BLD AUTO: 40.1 % (ref 37–48.5)
HGB BLD-MCNC: 13.6 GM/DL (ref 12–16)
IMM GRANULOCYTES # BLD AUTO: 0 K/UL (ref 0–0.04)
IMM GRANULOCYTES NFR BLD AUTO: 0 % (ref 0–0.5)
LYMPHOCYTES # BLD AUTO: 1.55 K/UL (ref 1–4.8)
MCH RBC QN AUTO: 31.9 PG (ref 27–31)
MCHC RBC AUTO-ENTMCNC: 33.9 G/DL (ref 32–36)
MCV RBC AUTO: 94 FL (ref 82–98)
NUCLEATED RBC (/100WBC) (OHS): 0 /100 WBC
PLATELET # BLD AUTO: 135 K/UL (ref 150–450)
PMV BLD AUTO: 11.3 FL (ref 9.2–12.9)
PROGEST SERPL-MCNC: 5.1 NG/ML
RBC # BLD AUTO: 4.27 M/UL (ref 4–5.4)
RELATIVE EOSINOPHIL (OHS): 1.9 %
RELATIVE LYMPHOCYTE (OHS): 36 % (ref 18–48)
RELATIVE MONOCYTE (OHS): 5.3 % (ref 4–15)
RELATIVE NEUTROPHIL (OHS): 56.6 % (ref 38–73)
TESTOST SERPL-MCNC: 106 NG/DL (ref 5–73)
WBC # BLD AUTO: 4.3 K/UL (ref 3.9–12.7)

## 2025-05-22 LAB — W FREE TESTOSTERONE: 1 PG/ML

## 2025-05-23 ENCOUNTER — RESULTS FOLLOW-UP (OUTPATIENT)
Dept: OBSTETRICS AND GYNECOLOGY | Facility: CLINIC | Age: 50
End: 2025-05-23

## 2025-05-29 ENCOUNTER — HOSPITAL ENCOUNTER (OUTPATIENT)
Dept: RADIOLOGY | Facility: OTHER | Age: 50
Discharge: HOME | End: 2025-05-29
Attending: PHYSICIAN ASSISTANT
Payer: COMMERCIAL

## 2025-05-29 ENCOUNTER — CLINICAL SUPPORT (OUTPATIENT)
Dept: OBSTETRICS AND GYNECOLOGY | Facility: CLINIC | Age: 50
End: 2025-05-29
Payer: COMMERCIAL

## 2025-05-29 DIAGNOSIS — N63.10 MASS OF RIGHT BREAST, UNSPECIFIED QUADRANT: ICD-10-CM

## 2025-05-29 DIAGNOSIS — N95.1 MENOPAUSAL SYMPTOMS: Primary | ICD-10-CM

## 2025-05-29 PROCEDURE — 96372 THER/PROPH/DIAG INJ SC/IM: CPT | Mod: S$GLB,,, | Performed by: PHYSICIAN ASSISTANT

## 2025-05-29 PROCEDURE — 76642 ULTRASOUND BREAST LIMITED: CPT | Mod: TC,RT

## 2025-05-29 PROCEDURE — 99999 PR PBB SHADOW E&M-EST. PATIENT-LVL I: CPT | Mod: PBBFAC,,,

## 2025-05-29 PROCEDURE — 76642 ULTRASOUND BREAST LIMITED: CPT | Mod: 26,RT,, | Performed by: RADIOLOGY

## 2025-05-29 RX ADMIN — TESTOSTERONE CYPIONATE 50 MG: 200 INJECTION, SOLUTION INTRAMUSCULAR at 09:05

## 2025-05-29 NOTE — PROGRESS NOTES
Here for hormone therapy injection, no complaints at this time, Injection given as ordered, tolerated well, no report of pain prior to or after injection. Return to clinic as scheduled.     Site - RB    Testosterone   50  mg    Clinic Supplied Medication

## 2025-06-14 ENCOUNTER — PATIENT MESSAGE (OUTPATIENT)
Dept: OBSTETRICS AND GYNECOLOGY | Facility: CLINIC | Age: 50
End: 2025-06-14
Payer: COMMERCIAL

## 2025-06-14 DIAGNOSIS — E66.9 OBESITY, UNSPECIFIED CLASS, UNSPECIFIED OBESITY TYPE, UNSPECIFIED WHETHER SERIOUS COMORBIDITY PRESENT: Primary | ICD-10-CM

## 2025-06-27 ENCOUNTER — CLINICAL SUPPORT (OUTPATIENT)
Dept: OBSTETRICS AND GYNECOLOGY | Facility: CLINIC | Age: 50
End: 2025-06-27
Payer: COMMERCIAL

## 2025-06-27 DIAGNOSIS — N95.1 MENOPAUSAL SYMPTOMS: Primary | ICD-10-CM

## 2025-06-27 PROCEDURE — 99999 PR PBB SHADOW E&M-EST. PATIENT-LVL I: CPT | Mod: PBBFAC,,,

## 2025-06-27 RX ADMIN — TESTOSTERONE CYPIONATE 50 MG: 200 INJECTION, SOLUTION INTRAMUSCULAR at 09:06

## 2025-07-01 ENCOUNTER — PATIENT MESSAGE (OUTPATIENT)
Dept: URGENT CARE | Facility: CLINIC | Age: 50
End: 2025-07-01

## 2025-07-01 ENCOUNTER — ON-DEMAND VIRTUAL (OUTPATIENT)
Dept: URGENT CARE | Facility: CLINIC | Age: 50
End: 2025-07-01
Payer: COMMERCIAL

## 2025-07-01 DIAGNOSIS — N30.00 ACUTE CYSTITIS WITHOUT HEMATURIA: Primary | ICD-10-CM

## 2025-07-01 PROCEDURE — 98005 SYNCH AUDIO-VIDEO EST LOW 20: CPT | Mod: 95,,, | Performed by: NURSE PRACTITIONER

## 2025-07-01 RX ORDER — NITROFURANTOIN 25; 75 MG/1; MG/1
100 CAPSULE ORAL 2 TIMES DAILY
Qty: 14 CAPSULE | Refills: 0 | Status: SHIPPED | OUTPATIENT
Start: 2025-07-01 | End: 2025-07-08

## 2025-07-01 RX ORDER — PHENAZOPYRIDINE HYDROCHLORIDE 100 MG/1
100 TABLET, FILM COATED ORAL 3 TIMES DAILY PRN
Qty: 6 TABLET | Refills: 0 | Status: SHIPPED | OUTPATIENT
Start: 2025-07-01 | End: 2025-07-03

## 2025-07-01 NOTE — PROGRESS NOTES
Subjective:      Patient ID: Lachelle Engel is a 49 y.o. female.    Vitals:  vitals were not taken for this visit.     Chief Complaint: Urinary Tract Infection      Visit Type: TELE AUDIOVISUAL    Patient Location: [place]     Present with the patient at the time of consultation: TELEMED PRESENT WITH PATIENT: None    Past Medical History:   Diagnosis Date    Abnormal Pap smear of cervix     Allergy     Angio-edema     Bradycardia     Depression     Diabetes mellitus     type 2     Dizziness     GERD (gastroesophageal reflux disease)     Hypertension     Impaired fasting blood sugar     Joint pain     Psoriasis     Recurrent upper respiratory infection (URI)     Skin disease     Thyroid disease     hyothyroidism     Past Surgical History:   Procedure Laterality Date    APPENDECTOMY       SECTION      CHOLECYSTECTOMY      COLONOSCOPY N/A 2023    Procedure: COLONOSCOPY;  Surgeon: Piter Hearn MD;  Location: [facility];  Service: Endoscopy;  Laterality: N/A;    CRYOTHERAPY      ENDOMETRIAL ABLATION      ESOPHAGOGASTRODUODENOSCOPY N/A 2020    Procedure: EGD (ESOPHAGOGASTRODUODENOSCOPY);  Surgeon: Venkat Be MD;  [facility] ([address] [place] [address]);  Service: Endoscopy;  Laterality: N/A;  rice or smith ok      INCONTINENCE SURGERY      TUBAL LIGATION       Review of patient's allergies indicates:  No Known Allergies  Medications Ordered Prior to Encounter[1]  Family History   Problem Relation Name Age of Onset    Breast cancer Mother colon polyps     Cancer Father          prostate    Prostate cancer Father      Cancer Maternal Aunt ms 30        colon cancer    Multiple sclerosis Maternal Aunt ms     Ovarian cancer Maternal Aunt ms     Colon cancer Maternal Uncle      Eczema Daughter Isabela     Melanoma Neg Hx         Medications Ordered                [facility]   [address] [facility] [address]    Telephone: [phone]   Fax: [phone]    Hours: Always Open                         Internal Pharmacy (2 of 2)              nitrofurantoin, macrocrystal-monohydrate, (MACROBID) 100 MG capsule    Sig: Take 1 capsule (100 mg total) by mouth 2 (two) times daily. for 7 days       Start: 7/1/25     Quantity: 14 capsule Refills: 0                         phenazopyridine (PYRIDIUM) 100 MG tablet    Sig: Take 1 tablet (100 mg total) by mouth 3 (three) times daily as needed for Pain. May turn your urine orange.       Start: 7/1/25     Quantity: 6 tablet Refills: 0                           [facility] Peq Odvv Intake    7/1/2025  8:25 AM CDT - Filed by Patient   What is your current physical address in the event of a medical emergency? [address] [place] [address]   Are you able to take your vital signs? No   Please attach any relevant images or files    Is your employer contracted with [facility]? No         Pt presents UTI symptoms x 3 days, with urgency, burning, back pain, and lower pelvic discomfort.  Denies fever, blood in urine, ha, or dizziness.   LMP: menopause with past ablation      Urinary Tract Infection   The current episode started in the past 7 days. The problem has been gradually worsening. The quality of the pain is described as burning and aching. The pain is moderate. There has been no fever. Associated symptoms include flank pain, frequency and urgency. Pertinent negatives include no chills, discharge, hematuria, nausea or possible pregnancy. She has tried increased fluids, home medications and acetaminophen for the symptoms.       Constitution: Negative for chills and fever.   Respiratory:  Negative for shortness of breath.    Gastrointestinal:  Negative for nausea.   Genitourinary:  Positive for dysuria, frequency, urgency, flank pain and pelvic pain. Negative for hematuria.   Neurological:  Negative for dizziness.        Objective:   The physical exam was conducted virtually.  Physical Exam   Constitutional: She is oriented to person, place, and  time. She appears well-developed. She is cooperative.   HENT:   Head: Normocephalic and atraumatic.   Ears:   Right Ear: Hearing and external ear normal.   Left Ear: Hearing and external ear normal.   Nose: Nose normal. No mucosal edema or nasal deformity. No epistaxis. Right sinus exhibits no maxillary sinus tenderness and no frontal sinus tenderness. Left sinus exhibits no maxillary sinus tenderness and no frontal sinus tenderness.   Mouth/Throat: Uvula is midline, oropharynx is clear and moist and mucous membranes are normal. No trismus in the jaw. Normal dentition. No uvula swelling.   Eyes: Conjunctivae and lids are normal.   Neck: Trachea normal and phonation normal.   Pulmonary/Chest: Effort normal. No respiratory distress.   Abdominal: Normal appearance.   Neurological: She is alert and oriented to person, place, and time. She exhibits normal muscle tone.   Skin: Skin is intact.   Psychiatric: Her speech is normal.       Assessment:     1. Acute cystitis without hematuria        Plan:   Increase fluids and rest,  Continue medications as prescribed  Take all antibiotics as directed  Wipe from front to back, limit bubble bath, limit use of thong under ware, void after intercourse  F/u with PCP in 2-3 days,     Go to Urgent Care or ER if symptoms worsening or not improved      Acute cystitis without hematuria  -     phenazopyridine (PYRIDIUM) 100 MG tablet; Take 1 tablet (100 mg total) by mouth 3 (three) times daily as needed for Pain. May turn your urine orange.  Dispense: 6 tablet; Refill: 0  -     nitrofurantoin, macrocrystal-monohydrate, (MACROBID) 100 MG capsule; Take 1 capsule (100 mg total) by mouth 2 (two) times daily. for 7 days  Dispense: 14 capsule; Refill: 0    We appreciate you trusting us with your medical care. We hope you feel better soon. We will be happy to take care of you for all of your future medical needs.     You must understand that you've received Virtual treatment only and that you  may be released before all your medical problems are known or treated. You, the patient, will arrange for follow up care as instructed.     Follow up with your PCP or specialty clinic as directed in the next 1-2 weeks if not improved or as needed. You can call [phone] to schedule an appointment with the appropriate provider.     If your condition worsens we recommend that you receive another evaluation in person, with your primary care provider, urgent care or at the emergency room immediately or contact your primary medical clinics after hours call service to discuss your concerns.                     [1]   Current Outpatient Medications on File Prior to Visit   Medication Sig Dispense Refill    augmented betamethasone dipropionate (DIPROLENE-AF) 0.05 % cream APPLY TO AFFECTED AREAS OF BODY TWICE DAILY AS NEEDED PSORIASIS 50 g 1    betamethasone valerate 0.1% (VALISONE) 0.1 % Lotn Apply to affected areas of eyebrows/ears once daily prn psoriasis. 60 mL 1    butalbital-acetaminophen-caffeine -40 mg (FIORICET, ESGIC) -40 mg per tablet Take 1 tablet by mouth every 4 (four) hours as needed.      dextroamphetamine-amphetamine (ADDERALL XR) 15 MG 24 hr capsule Take 1 capsule by mouth every morning 30 capsule 0    dextroamphetamine-amphetamine (ADDERALL XR) 20 MG 24 hr capsule Take 1 capsule by mouth every morning 30 capsule 0    dextroamphetamine-amphetamine (ADDERALL XR) 20 MG 24 hr capsule Take 1 capsule by mouth every morning 30 capsule 0    dextroamphetamine-amphetamine (ADDERALL XR) 20 MG 24 hr capsule Take 1 capsule by mouth every morning 30 capsule 0    dextroamphetamine-amphetamine (ADDERALL XR) 20 MG 24 hr capsule Take 1 capsule by mouth every morning 30 capsule 0    dextroamphetamine-amphetamine (ADDERALL XR) 20 MG 24 hr capsule Take 1 capsule by mouth every morning 30 capsule 0    estradioL (IMVEXXY MAINTENANCE PACK) 10 mcg Inst Place 10 mcg vaginally twice a week. 8 each 11    estradioL  (VIVELLE-DOT) 0.1 mg/24 hr PTSW Place 1 patch onto the skin twice a week. 24 patch 3    ferrous sulfate 325 (65 FE) MG EC tablet Take 1 tablet (325 mg total) by mouth once daily. 90 tablet 1    fluocinonide (LIDEX) 0.05 % external solution Apply to affected areas of scalp once or twice daily as needed for scaling or itching. 60 mL 3    folic acid (FOLVITE) 1 MG tablet Take 1 mg by mouth once daily.      galcanezumab-gnlm 120 mg/mL PnIj Inject 1 mL (120 mg total) into the skin every 28 days. Begin 28 days after loading dose. 1 mL 10    meloxicam (MOBIC) 7.5 MG tablet Take 1 tablet by mouth twice a day with food for 5 days then as needed.      mupirocin (BACTROBAN) 2 % ointment Apply topically 3 (three) times daily. 1 Tube 2    naproxen (NAPROSYN) 500 MG tablet Take 1 tablet (500 mg total) by mouth 2 (two) times daily as needed (Pain). 30 tablet 0    neomycin-polymyxin-dexamethasone (MAXITROL) 3.5mg/mL-10,000 unit/mL-0.1 % DrpS Place 1 drop into both eyes 4 (four) times daily.      ondansetron (ZOFRAN-ODT) 4 MG TbDL Take 1 tablet (4 mg total) by mouth every 6 (six) hours as needed (Nausea). 30 tablet 0    progesterone (PROMETRIUM) 200 MG capsule Take 1 capsule (200 mg total) by mouth every evening. 90 capsule 3    risankizumab-rzaa (SKYRIZI) 150 mg/mL PnIj Inject 150 mg into the skin every 12 weeks. 1 mL 1    rizatriptan (MAXALT-MLT) 10 MG disintegrating tablet Take 1 tablet (10 mg total) by mouth every 2 (two) hours as needed for Migraine. Max 30 mg/day. 12 tablet 5    tirzepatide, weight loss, 15 mg/0.5 mL PnIj With methylcobalamin for fatigue. Patient injects 15mg SC weekly. 4 mL 6    valACYclovir (VALTREX) 500 MG tablet TAKE 1 TABLET BY MOUTH TWICE DAILY FOR 3 DAYS. BEGIN AT FIRST SIGN OF OUTBREAK 6 tablet 4     Current Facility-Administered Medications on File Prior to Visit   Medication Dose Route Frequency Provider Last Rate Last Admin    testosterone cypionate injection 50 mg  50 mg Intramuscular Q28 Days     50 mg at 06/27/25 0905

## 2025-07-01 NOTE — PATIENT INSTRUCTIONS

## 2025-07-03 ENCOUNTER — PATIENT MESSAGE (OUTPATIENT)
Dept: ADMINISTRATIVE | Facility: OTHER | Age: 50
End: 2025-07-03
Payer: COMMERCIAL

## 2025-07-15 ENCOUNTER — HOSPITAL ENCOUNTER (OUTPATIENT)
Dept: RADIOLOGY | Facility: HOSPITAL | Age: 50
Discharge: HOME | End: 2025-07-15
Attending: PHYSICIAN ASSISTANT
Payer: COMMERCIAL

## 2025-07-15 DIAGNOSIS — R92.30 DENSE BREASTS: ICD-10-CM

## 2025-07-15 DIAGNOSIS — Z91.89 INCREASED RISK OF BREAST CANCER: ICD-10-CM

## 2025-07-15 DIAGNOSIS — Z80.3 FAMILY HISTORY OF BREAST CANCER: ICD-10-CM

## 2025-07-15 DIAGNOSIS — Z12.39 BREAST CANCER SCREENING, HIGH RISK PATIENT: ICD-10-CM

## 2025-07-15 PROCEDURE — 77049 MRI BREAST C-+ W/CAD BI: CPT | Mod: 26,,, | Performed by: RADIOLOGY

## 2025-07-15 PROCEDURE — A9577 INJ MULTIHANCE: HCPCS | Performed by: PHYSICIAN ASSISTANT

## 2025-07-15 PROCEDURE — 77049 MRI BREAST C-+ W/CAD BI: CPT | Mod: TC

## 2025-07-15 PROCEDURE — 25500020 PHARM REV CODE 255: Performed by: PHYSICIAN ASSISTANT

## 2025-07-15 RX ADMIN — GADOBENATE DIMEGLUMINE 20 ML: 529 INJECTION, SOLUTION INTRAVENOUS at 10:07

## 2025-07-25 ENCOUNTER — CLINICAL SUPPORT (OUTPATIENT)
Dept: OBSTETRICS AND GYNECOLOGY | Facility: CLINIC | Age: 50
End: 2025-07-25
Payer: COMMERCIAL

## 2025-07-25 DIAGNOSIS — N95.1 MENOPAUSAL SYMPTOMS: Primary | ICD-10-CM

## 2025-07-25 PROCEDURE — 99999 PR PBB SHADOW E&M-EST. PATIENT-LVL I: CPT | Mod: PBBFAC,,,

## 2025-07-25 RX ADMIN — TESTOSTERONE CYPIONATE 50 MG: 200 INJECTION, SOLUTION INTRAMUSCULAR at 03:07

## 2025-08-21 ENCOUNTER — CLINICAL SUPPORT (OUTPATIENT)
Dept: OBSTETRICS AND GYNECOLOGY | Facility: CLINIC | Age: 50
End: 2025-08-21
Payer: COMMERCIAL

## 2025-08-21 DIAGNOSIS — N95.1 MENOPAUSAL SYMPTOMS: Primary | ICD-10-CM

## 2025-08-21 PROCEDURE — 96372 THER/PROPH/DIAG INJ SC/IM: CPT | Mod: S$GLB,,, | Performed by: PHYSICIAN ASSISTANT

## 2025-08-21 PROCEDURE — 99999 PR PBB SHADOW E&M-EST. PATIENT-LVL I: CPT | Mod: PBBFAC,,,

## 2025-08-21 RX ADMIN — TESTOSTERONE CYPIONATE 50 MG: 200 INJECTION, SOLUTION INTRAMUSCULAR at 03:08

## 2025-08-25 DIAGNOSIS — G43.019 MIGRAINE WITHOUT AURA, INTRACTABLE, WITHOUT STATUS MIGRAINOSUS: ICD-10-CM

## 2025-08-26 RX ORDER — GALCANEZUMAB 120 MG/ML
120 INJECTION, SOLUTION SUBCUTANEOUS
Qty: 1 ML | Refills: 10 | OUTPATIENT
Start: 2025-08-26

## 2025-08-30 DIAGNOSIS — G43.019 MIGRAINE WITHOUT AURA, INTRACTABLE, WITHOUT STATUS MIGRAINOSUS: ICD-10-CM

## 2025-09-02 RX ORDER — GALCANEZUMAB 120 MG/ML
120 INJECTION, SOLUTION SUBCUTANEOUS
Qty: 1 ML | Refills: 10 | OUTPATIENT
Start: 2025-09-02

## (undated) DEVICE — ELECTRODE REM PLYHSV RETURN 9

## (undated) DEVICE — TRAY MINOR GEN SURG

## (undated) DEVICE — BLADE SURG CARBON STEEL SZ11

## (undated) DEVICE — DRAPE ABDOMINAL TIBURON 14X11

## (undated) DEVICE — ADHESIVE DERMABOND ADVANCED

## (undated) DEVICE — SUT 0 VICRYL / UR6 (J603)

## (undated) DEVICE — SUT MCRYL PLUS 4-0 PS2 27IN

## (undated) DEVICE — SEE MEDLINE ITEM 157117

## (undated) DEVICE — SOL NS 1000CC

## (undated) DEVICE — TROCAR ENDOPATH XCEL 5X100MM

## (undated) DEVICE — STAPLER INT LINEAR ARTC 3.5-45

## (undated) DEVICE — BAG TISS RETRV MONARCH 10MM

## (undated) DEVICE — TRAY FOLEY 16FR INFECTION CONT

## (undated) DEVICE — TROCAR ENDOPATH XCEL 5MM 7.5CM

## (undated) DEVICE — SCISSOR 5MMX35CM DIRECT DRIVE

## (undated) DEVICE — KIT ANTIFOG

## (undated) DEVICE — TROCAR SPACEMAKER BLUNT 12MM

## (undated) DEVICE — CART STAPLE RELD 45MM WHT

## (undated) DEVICE — APPLICATOR CHLORAPREP ORN 26ML

## (undated) DEVICE — DISSECTOR 5MM ENDOPATH

## (undated) DEVICE — SEE MEDLINE ITEM 152622

## (undated) DEVICE — TUBING HF INSUFFLATION W/ FLTR

## (undated) DEVICE — DRAPE STERI INSTRUMENT 1018